# Patient Record
Sex: MALE | Race: WHITE | NOT HISPANIC OR LATINO | Employment: UNEMPLOYED | URBAN - METROPOLITAN AREA
[De-identification: names, ages, dates, MRNs, and addresses within clinical notes are randomized per-mention and may not be internally consistent; named-entity substitution may affect disease eponyms.]

---

## 2019-02-04 ENCOUNTER — HOSPITAL ENCOUNTER (EMERGENCY)
Facility: CLINIC | Age: 29
Discharge: HOME | End: 2019-02-04
Attending: EMERGENCY MEDICINE | Admitting: EMERGENCY MEDICINE
Payer: COMMERCIAL

## 2019-02-04 ENCOUNTER — OFFICE VISIT (OUTPATIENT)
Dept: INTERNAL MEDICINE | Facility: CLINIC | Age: 29
End: 2019-02-04
Payer: COMMERCIAL

## 2019-02-04 VITALS
RESPIRATION RATE: 18 BRPM | WEIGHT: 170 LBS | SYSTOLIC BLOOD PRESSURE: 142 MMHG | HEIGHT: 70 IN | OXYGEN SATURATION: 95 % | DIASTOLIC BLOOD PRESSURE: 87 MMHG | HEART RATE: 87 BPM | BODY MASS INDEX: 24.34 KG/M2 | TEMPERATURE: 98 F

## 2019-02-04 VITALS
WEIGHT: 171.4 LBS | BODY MASS INDEX: 24.59 KG/M2 | TEMPERATURE: 97.6 F | HEART RATE: 83 BPM | DIASTOLIC BLOOD PRESSURE: 80 MMHG | SYSTOLIC BLOOD PRESSURE: 140 MMHG | OXYGEN SATURATION: 96 % | RESPIRATION RATE: 16 BRPM

## 2019-02-04 DIAGNOSIS — F41.1 GENERALIZED ANXIETY DISORDER: Primary | ICD-10-CM

## 2019-02-04 DIAGNOSIS — F10.920 ALCOHOLIC INTOXICATION WITHOUT COMPLICATION (H): ICD-10-CM

## 2019-02-04 DIAGNOSIS — F41.1 ANXIETY REACTION: ICD-10-CM

## 2019-02-04 DIAGNOSIS — F10.10 ALCOHOL ABUSE, EPISODIC DRINKING BEHAVIOR: ICD-10-CM

## 2019-02-04 DIAGNOSIS — F10.10 ALCOHOL ABUSE: ICD-10-CM

## 2019-02-04 LAB
ANION GAP SERPL CALCULATED.3IONS-SCNC: 24 MMOL/L (ref 3–14)
BUN SERPL-MCNC: 9 MG/DL (ref 7–30)
CALCIUM SERPL-MCNC: 9.7 MG/DL (ref 8.5–10.1)
CHLORIDE SERPL-SCNC: 88 MMOL/L (ref 94–109)
CO2 SERPL-SCNC: 20 MMOL/L (ref 20–32)
CREAT SERPL-MCNC: 0.76 MG/DL (ref 0.66–1.25)
ERYTHROCYTE [DISTWIDTH] IN BLOOD BY AUTOMATED COUNT: 12.8 % (ref 10–15)
ETHANOL SERPL-MCNC: 0.09 G/DL
GFR SERPL CREATININE-BSD FRML MDRD: >90 ML/MIN/{1.73_M2}
GLUCOSE SERPL-MCNC: 93 MG/DL (ref 70–99)
HCT VFR BLD AUTO: 43.5 % (ref 40–53)
HGB BLD-MCNC: 15.7 G/DL (ref 13.3–17.7)
INTERPRETATION ECG - MUSE: NORMAL
MCH RBC QN AUTO: 31 PG (ref 26.5–33)
MCHC RBC AUTO-ENTMCNC: 36.1 G/DL (ref 31.5–36.5)
MCV RBC AUTO: 86 FL (ref 78–100)
PLATELET # BLD AUTO: 406 10E9/L (ref 150–450)
POTASSIUM SERPL-SCNC: 3.4 MMOL/L (ref 3.4–5.3)
RBC # BLD AUTO: 5.06 10E12/L (ref 4.4–5.9)
SODIUM SERPL-SCNC: 132 MMOL/L (ref 133–144)
WBC # BLD AUTO: 16.9 10E9/L (ref 4–11)

## 2019-02-04 PROCEDURE — 25000128 H RX IP 250 OP 636: Performed by: EMERGENCY MEDICINE

## 2019-02-04 PROCEDURE — 96374 THER/PROPH/DIAG INJ IV PUSH: CPT

## 2019-02-04 PROCEDURE — 93005 ELECTROCARDIOGRAM TRACING: CPT

## 2019-02-04 PROCEDURE — 99285 EMERGENCY DEPT VISIT HI MDM: CPT | Mod: 25

## 2019-02-04 PROCEDURE — 80048 BASIC METABOLIC PNL TOTAL CA: CPT | Performed by: EMERGENCY MEDICINE

## 2019-02-04 PROCEDURE — 99203 OFFICE O/P NEW LOW 30 MIN: CPT | Performed by: INTERNAL MEDICINE

## 2019-02-04 PROCEDURE — 80320 DRUG SCREEN QUANTALCOHOLS: CPT | Performed by: EMERGENCY MEDICINE

## 2019-02-04 PROCEDURE — 85027 COMPLETE CBC AUTOMATED: CPT | Performed by: EMERGENCY MEDICINE

## 2019-02-04 PROCEDURE — 25000128 H RX IP 250 OP 636

## 2019-02-04 PROCEDURE — 96361 HYDRATE IV INFUSION ADD-ON: CPT

## 2019-02-04 RX ORDER — TRAZODONE HYDROCHLORIDE 50 MG/1
50 TABLET, FILM COATED ORAL AT BEDTIME
Qty: 30 TABLET | Refills: 1 | Status: SHIPPED | OUTPATIENT
Start: 2019-02-04 | End: 2019-02-16

## 2019-02-04 RX ORDER — ONDANSETRON 4 MG/1
4 TABLET, ORALLY DISINTEGRATING ORAL EVERY 6 HOURS PRN
Qty: 10 TABLET | Refills: 0 | Status: SHIPPED | OUTPATIENT
Start: 2019-02-04 | End: 2019-02-14

## 2019-02-04 RX ORDER — ONDANSETRON 2 MG/ML
INJECTION INTRAMUSCULAR; INTRAVENOUS
Status: COMPLETED
Start: 2019-02-04 | End: 2019-02-04

## 2019-02-04 RX ORDER — LORAZEPAM 2 MG/ML
2 INJECTION INTRAMUSCULAR ONCE
Status: DISCONTINUED | OUTPATIENT
Start: 2019-02-04 | End: 2019-02-04

## 2019-02-04 RX ORDER — LORAZEPAM 2 MG/ML
1 INJECTION INTRAMUSCULAR ONCE
Status: COMPLETED | OUTPATIENT
Start: 2019-02-04 | End: 2019-02-04

## 2019-02-04 RX ORDER — SODIUM CHLORIDE 9 MG/ML
1000 INJECTION, SOLUTION INTRAVENOUS CONTINUOUS
Status: DISCONTINUED | OUTPATIENT
Start: 2019-02-04 | End: 2019-02-04 | Stop reason: HOSPADM

## 2019-02-04 RX ADMIN — SODIUM CHLORIDE 1000 ML: 9 INJECTION, SOLUTION INTRAVENOUS at 05:27

## 2019-02-04 RX ADMIN — LORAZEPAM 1 MG: 2 INJECTION, SOLUTION INTRAMUSCULAR; INTRAVENOUS at 05:27

## 2019-02-04 RX ADMIN — ONDANSETRON 4 MG: 2 INJECTION INTRAMUSCULAR; INTRAVENOUS at 06:24

## 2019-02-04 RX ADMIN — SODIUM CHLORIDE 1000 ML: 9 INJECTION, SOLUTION INTRAVENOUS at 06:24

## 2019-02-04 ASSESSMENT — ENCOUNTER SYMPTOMS
NERVOUS/ANXIOUS: 1
SHORTNESS OF BREATH: 0
ABDOMINAL PAIN: 0

## 2019-02-04 ASSESSMENT — MIFFLIN-ST. JEOR: SCORE: 1752.36

## 2019-02-04 NOTE — PROGRESS NOTES
"  SUBJECTIVE:   Mohit Porter is a 28 year old male who presents to clinic today for the following health issues:      ED/UC Followup:    Facility:  [facility]  Date of visit: 02/04/2019  Reason for visit: Panic Attack  Current Status: Still feeling anxious             Problem list and histories reviewed & adjusted, as indicated.  Additional history: as documented    Patient was just seen in emergency department earlier this morning, after presenting with acute alcohol intoxication, and anxiety.    Patient has previous history of alcohol abuse and episodic drinking behavior.  Multiple ER visits in the last few years for alcohol occasion, he has had at least 1 DWI, and has been through inpatient treatment at least once.  At present, he does not feel particularly motivated to maintain sobriety, and states that he drinks because of anxiety.    Has significant social stressors.  He has a young daughter who he sees very rarely.  When plans to see her did not materialize this past weekend, that sent him on another \"faria.\"    Past history of anxiety, has tried multiple medications, most of which he cannot remember the names of.  He does remember that he has been on trazodone in the past, which has been helpful for nighttime anxiety when alone, which has been a frequent past trigger.    The ER this morning intoxicated, after drinking most of the weekend due to anxiety.  BAC was 0.09 in ER this morning.    Reviewed and updated as needed this visit by clinical staff  Tobacco  Allergies  Meds  Problems  Med Hx  Surg Hx  Fam Hx       Reviewed and updated as needed this visit by Provider  Tobacco  Allergies  Meds  Problems  Med Hx  Surg Hx  Fam Hx         ROS:  Constitutional, HEENT, cardiovascular, pulmonary, GI, , musculoskeletal, neuro, skin, endocrine and psych systems are negative, except as otherwise noted.    OBJECTIVE:     /80   Pulse 83   Temp 97.6  F (36.4  C) (Oral)   Resp 16   " Wt 77.7 kg (171 lb 6.4 oz)   SpO2 96%   BMI 24.59 kg/m    Body mass index is 24.59 kg/m .  GENERAL: healthy, alert and no distress  NECK: no adenopathy, no asymmetry, masses, or scars and thyroid normal to palpation  RESP: lungs clear to auscultation - no rales, rhonchi or wheezes  CV: regular rate and rhythm, normal S1 S2, no S3 or S4, no murmur, click or rub, no peripheral edema and peripheral pulses strong  ABDOMEN: soft, nontender, no hepatosplenomegaly, no masses and bowel sounds normal  MS: no gross musculoskeletal defects noted, no edema        ASSESSMENT/PLAN:     1. Generalized anxiety disorder  Start Zoloft once daily, start trazodone nightly, needs to initiate some sort of talk therapy.  Follow-up with me in 1 month.  - MENTAL HEALTH REFERRAL  - Adult; Outpatient Treatment; Individual/Couples/Family/Group Therapy/Health Psychology[place] [facility] [place] [facility] [phone]; We will contact you to schedule the appointment or please call with any questions  - sertraline (ZOLOFT) 50 MG tablet; Take 1 tablet (50 mg) by mouth daily  Dispense: 30 tablet; Refill: 1  - traZODone (DESYREL) 50 MG tablet; Take 1 tablet (50 mg) by mouth At Bedtime  Dispense: 30 tablet; Refill: 1    2. Alcohol abuse, episodic drinking behavior  Reiterated the importance of maintaining sobriety.  Recommended attendance at AA meetings, counseling therapy as above etc.          Gerardo Rock MD  [facility]

## 2019-02-04 NOTE — ED PROVIDER NOTES
"  History     Chief Complaint:  Panic Attack    HPI   Mohit Porter is a 28 year old male with history of anxiety, who presents for evaluation of a panic attack. He states he has been \"binge drinking\" alcohol for the pat 4 days, and is drinking about a pint of vodka a day. He does not typically drink alcohol daily. His last drink was 24 hours ago. Since yesterday, he has been experiencing \"panic attacks\" and describes this as feeling anxious, nauseous, hyperventilating, and his hands intermittently have a tingling sensation. HR was 168 on arrival, but after examination came down to 116 without intervention. He states he has had panic attacks in the past, and today's symptoms feel similar. Denies chest pain, shortness of breath, or abdominal pain. Denies having seizures or fevers with withdrawal from alcohol; reports last drink was 1 day ago.  Reports poor oral intake. Marek drug use, specifically meth or cocaine. No surgical history. He is not on any medications for anxiety.     Allergies:  No Known Drug Allergies      Medications:    The patient is not currently taking any prescribed medications.     Past Medical History:    Anxiety    Past Surgical History:    The patient does not have any pertinent past surgical history.     Family History:    No past pertinent family history.     Social History:  Relationship status: Single  Tobacco use: Chews tobacco  Alcohol use: Yes, not typically daily. Past 4 days a pint a day.   The patient presents with significant other.       Review of Systems   Respiratory: Negative for shortness of breath.    Cardiovascular: Negative for chest pain.   Gastrointestinal: Negative for abdominal pain.   Neurological:        Tingling in fingertips   Psychiatric/Behavioral: The patient is nervous/anxious.    All other systems reviewed and are negative.    Physical Exam     Patient Vitals for the past 24 hrs:   BP Temp Temp src Pulse Resp SpO2 Height Weight   02/04/19 0453 (!) 137/106 " "98  F (36.7  C) Temporal 168 24 95 % 1.778 m (5' 10\") 77.1 kg (170 lb)        Physical Exam  General: Alert and cooperative with exam. Patient in mild to moderate distress. Normal mentation. Anxious in appearance.   Head:  Scalp is NC/AT  Eyes:  No scleral icterus, PERRL  ENT:  The external nose and ears are normal. The oropharynx is normal and without erythema; mucus membranes are moist. Uvula midline, no evidence of deep space infection.  Neck:  Normal range of motion without rigidity.  CV:  Tachycardic rate and regular rhythm    No pathologic murmur   Resp:  Breath sounds are clear bilaterally    Non-labored, no retractions or accessory muscle use  GI:  Abdomen is soft, no distension, no tenderness. No peritoneal signs  MS:  No lower extremity edema   Skin:  Warm and dry, No rash or lesions noted.  Neuro: Oriented x 3. No gross motor deficits.    Emergency Department Course     ECG:  Indication: Panic attacks  Completed at 0515.  Read at 0528.   Rate 119 bpm. HI interval 134. QRS duration 80. QT/QTc 326/458. P-R-T axes 60  75  51.  Sinus tachycardia. Cannot rule out anterior infarct, age undetermined. Abnormal ECG.     Laboratory:  Laboratory findings were communicated with the patient who voiced understanding of the findings.    CBC: WBC: 16.9(H) o/w WNL (HGB 15.7, )  BMP: NA: 132(L), Chloride: 88(L), Anion gap: 24(H) o/w WNL (Creatinine 0.76)   Alcohol level blood (collected 0520): 0.09(H)    Interventions:  0527 Normal Saline 1000 mL IV   0527 Lorazepam, 1 mg, IV injection   0624 Normal Saline 1000 mL IV   0624 Zofran 4 mg IV    Emergency Department Course:  Nursing notes and vitals reviewed.  EKG obtained in the ED, see results above.    IV was inserted and blood was drawn for laboratory testing, results above.     0459 I performed an exam of the patient as documented above.   0615 I reassessed the patient.     Findings and plan explained to the patient. Patient discharged home with instructions " regarding supportive care, medications, and reasons to return. The importance of close follow-up was reviewed.      I personally reviewed the laboratory results with the Patient and answered all related questions prior to discharge.    Impression & Plan      Medical Decision Making:  Patient is a 28-year-old male who presents with concern for panic attack.  Patient's medical history and records were reviewed.  Noted to be tachycardic and anxious on arrival.  As there was some concern for alcohol withdrawal labs and EKG were obtained.  EKG demonstrated sinus tachycardia.  He was provided IV fluids and 1 mg Ativan.  Patient became somnolent after the Ativan and required supplemental oxygen for mild hypoxia likely secondary to Ativan.  Labs returned demonstrating mildly elevated white count, significantly elevated anion gap, and elevated ethanol level (0.09).  Anion gap likely secondary to alcoholic/starvation ketosis.  Patient notes that he has been drinking Etoh heavily over the last 4 days and also states that he has not had much to eat.  No evidence of diabetes, infectious process, or other toxic alcohol ingestion or other emergent etiology.  On reevaluation patient notes significantly clinical improvement and is requesting discharge; tachycardia resolved; awake and alert.  He was able to tolerate oral challenge.  Offered but deferred resources for alcohol abuse.  At this time there is no evidence of significant alcohol withdrawal and patient denies history of withdrawal symptoms.  Recommended close follow-up with PCP.  Return precautions were discussed.  Patient discharged home.  Was provided prescription for Zofran for breakthrough nausea.    Diagnosis:    ICD-10-CM    1. Anxiety reaction F41.1    2. Alcohol abuse F10.10    3. Alcoholic intoxication without complication (H) F10.920        Disposition:   Discharged to home       Discharge Medications:  Current Discharge Medication List      START taking these  medications    Details   ondansetron (ZOFRAN ODT) 4 MG ODT tab Take 1 tablet (4 mg) by mouth every 6 hours as needed for nausea  Qty: 10 tablet, Refills: 0             Scribe Disclosure:  I, Angelita Cm, am serving as a scribe at 5:06 AM on 2/4/2019 to document services personally performed by Daniel Miller DO, based on my observations and the provider's statements to me.     Angelita Cm  2/4/2019    EMERGENCY DEPARTMENT       Daniel Miller DO  02/04/19 0856

## 2019-02-04 NOTE — ED AVS SNAPSHOT
Emergency Department  [address] [place] [address]  Phone:  [phone]  Fax:  [phone]                                    Mohit Porter   MRN: [id number]    Department:   Emergency Department   Date of Visit:  2/4/2019           After Visit Summary Signature Page    I have received my discharge instructions, and my questions have been answered. I have discussed any challenges I see with this plan with the nurse or doctor.    ..........................................................................................................................................  Patient/Patient Representative Signature      ..........................................................................................................................................  Patient Representative Print Name and Relationship to Patient    ..................................................               ................................................  Date                                   Time    ..........................................................................................................................................  Reviewed by Signature/Title    ...................................................              ..............................................  Date                                               Time          22EPIC Rev 08/18

## 2019-02-04 NOTE — LETTER
February 4, 2019      To Whom It May Concern:      Mohit Crouch [place] was seen in our Emergency Department today, 02/04/19.  I expect his condition to improve over the next 2 days.  He may return to work/school when improved.    Sincerely,        Jammie Chong RN

## 2019-02-04 NOTE — LETTER
2019    To whom it may concern:    I am writing this letter on behalf of my patient, Mohit Porter (: 1990).  This is to verify that he was seen in my clinic today.      Please contact my office if you have questions or concerns.    Sincerely,        BRETT Rock  Department of Internal Medicine  [facility]

## 2019-02-04 NOTE — PATIENT INSTRUCTIONS
- Start taking Zoloft, once daily.  (Prescription has been sent to your pharmacy)  - Start taking Trazodone nightly.  (Prescription has been sent to your pharmacy)    - [facility] will contact you to arrange your talk therapy.    - Please follow-up with me in clinic in 1 month.

## 2019-02-04 NOTE — LETTER
February 4, 2019      To Whom It May Concern:      Mohit Miko Charlotte was seen in our Emergency Department today, 02/04/19.  I expect his condition to improve over the next 2 days.  He may return to work/school when improved.    Since

## 2019-02-09 ENCOUNTER — HOSPITAL ENCOUNTER (EMERGENCY)
Facility: CLINIC | Age: 29
Discharge: HOME | End: 2019-02-09
Attending: EMERGENCY MEDICINE | Admitting: EMERGENCY MEDICINE
Payer: COMMERCIAL

## 2019-02-09 VITALS
DIASTOLIC BLOOD PRESSURE: 62 MMHG | OXYGEN SATURATION: 100 % | RESPIRATION RATE: 16 BRPM | SYSTOLIC BLOOD PRESSURE: 128 MMHG | TEMPERATURE: 97.4 F | HEART RATE: 78 BPM

## 2019-02-09 DIAGNOSIS — F10.920 ALCOHOLIC INTOXICATION WITHOUT COMPLICATION (H): ICD-10-CM

## 2019-02-09 PROCEDURE — 99282 EMERGENCY DEPT VISIT SF MDM: CPT

## 2019-02-09 NOTE — DISCHARGE INSTRUCTIONS
*You should drink less alcohol.  *No new medications. Continue your current medications.  *Follow-up with your doctor for a recheck in 2-3 days.  *Return if you change your mind and wish to stop drinking, develop fever, withdrawal symptoms, vomiting, faint or feel like you will faint or become worse in any way.    Discharge Instructions  Alcohol Intoxication    You have been seen today with alcohol intoxication. This means that you have enough alcohol in your system to impair your ability to mentally and physically function. When you are intoxicated, we are not allowed to release you without a sober adult to be with you. You may not drive, operate dangerous equipment, or do anything else dangerous until you are sober.    You may have come to the Emergency Department because of your intoxication, or for another reason, such as because of an injury. No matter what the case is, this visit is a ?red flag? regarding alcohol use, and you should consider whether your drinking pattern is a problem for you.     You may be at risk for alcohol-related problems if:    Men: you drink more than 14 drinks per week, or more than 4 drinks per occasion.    Women: you drink more than 7 drinks per week or more than 3 drinks per occasion.    You have black-outs.  You do things you regret while drinking.  You have legal problems because of drinking (DUI).  You have job problems because of drinking (you call in sick to work because of drinking).    CAGE Questions  Have you ever felt you should cut down on your drinking?  Have people annoyed you by criticizing your drinking?  Have you ever felt bad or guilty about your drinking?  Have you ever had a drink first thing in the morning to steady your nerves or get rid of a hangover (eye opener)?    If you answer yes to any of the CAGE questions, you may have a problem with alcohol.      Return to the Emergency Department if:  You become shaky or tremble when you try to stop drinking.    You  have a seizure or pass out.    You throw up (vomit) blood. This may be bright red or it may look like black coffee grounds.    You have blood in the stool. This may be bright red or appear as a black, tarry, bad smelling stool.    You become lightheaded or faint.      For further help, contact:   Your caregiver.    Alcoholics Anonymous (AA).    A drug or alcohol rehabilitation program.    You can get information on alcohol resources and groups by calling the number 807 or 1-482.180.3683 on any phone.       Seek medical care if:  You have persistent vomiting.    You have persistent pain in any part of your body.    You do not feel better after a few days.    If you were given a prescription for medicine here today, be sure to read all of the information (including the package insert) that comes with your prescription.  This will include important information about the medicine, its side effects, and any warnings that you need to know about.  The pharmacist who fills the prescription can provide more information and answer questions you may have about the medicine.  If you have questions or concerns that the pharmacist cannot address, please call or return to the Emergency Department.   Remember that you can always come back to the Emergency Department if you are not able to see your regular doctor in the amount of time listed above, if you get any new symptoms, or if there is anything that worries you.

## 2019-02-09 NOTE — ED NOTES
Bed: [facility]  Expected date:   Expected time:   Means of arrival:   Comments:  512  28 M etoh/hold  0996

## 2019-02-09 NOTE — ED AVS SNAPSHOT
Emergency Department  [address] [place] [address]  Phone:  [phone]  Fax:  [phone]                                    Mohit Porter   MRN: [id number]    Department:   Emergency Department   Date of Visit:  2/9/2019           After Visit Summary Signature Page    I have received my discharge instructions, and my questions have been answered. I have discussed any challenges I see with this plan with the nurse or doctor.    ..........................................................................................................................................  Patient/Patient Representative Signature      ..........................................................................................................................................  Patient Representative Print Name and Relationship to Patient    ..................................................               ................................................  Date                                   Time    ..........................................................................................................................................  Reviewed by Signature/Title    ...................................................              ..............................................  Date                                               Time          22EPIC Rev 08/18
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
## 2019-02-12 ENCOUNTER — HOSPITAL ENCOUNTER (EMERGENCY)
Facility: CLINIC | Age: 29
Discharge: HOME | End: 2019-02-12
Payer: COMMERCIAL

## 2019-02-14 ENCOUNTER — HOSPITAL ENCOUNTER (EMERGENCY)
Facility: CLINIC | Age: 29
Discharge: HOME | End: 2019-02-15
Attending: EMERGENCY MEDICINE | Admitting: EMERGENCY MEDICINE
Payer: COMMERCIAL

## 2019-02-14 DIAGNOSIS — F10.920 ALCOHOL INTOXICATION, UNCOMPLICATED (H): ICD-10-CM

## 2019-02-14 DIAGNOSIS — F10.10 ALCOHOL ABUSE: ICD-10-CM

## 2019-02-14 PROCEDURE — 99283 EMERGENCY DEPT VISIT LOW MDM: CPT

## 2019-02-14 ASSESSMENT — ENCOUNTER SYMPTOMS
SEIZURES: 0
NERVOUS/ANXIOUS: 1

## 2019-02-14 ASSESSMENT — MIFFLIN-ST. JEOR: SCORE: 1790.92

## 2019-02-14 NOTE — ED AVS SNAPSHOT
Emergency Department  [address] [place] [id number]  Phone:  [phone]  Fax:  [phone]                                    Mohit Porter   MRN: [id number]    Department:   Emergency Department   Date of Visit:  2/14/2019           After Visit Summary Signature Page    I have received my discharge instructions, and my questions have been answered. I have discussed any challenges I see with this plan with the nurse or doctor.    ..........................................................................................................................................  Patient/Patient Representative Signature      ..........................................................................................................................................  Patient Representative Print Name and Relationship to Patient    ..................................................               ................................................  Date                                   Time    ..........................................................................................................................................  Reviewed by Signature/Title    ...................................................              ..............................................  Date                                               Time          22EPIC Rev 08/18

## 2019-02-15 VITALS
RESPIRATION RATE: 18 BRPM | DIASTOLIC BLOOD PRESSURE: 98 MMHG | BODY MASS INDEX: 24.5 KG/M2 | HEART RATE: 112 BPM | WEIGHT: 175 LBS | HEIGHT: 71 IN | OXYGEN SATURATION: 97 % | TEMPERATURE: 98.2 F | SYSTOLIC BLOOD PRESSURE: 153 MMHG

## 2019-02-15 NOTE — DISCHARGE INSTRUCTIONS
"  Alcohol Abuse  Alcoholic drinks are harmful when you have too many of them. There is no set number of drinks that defines too much. Drinking that disrupts your life or your health is called alcohol abuse. Alcohol abuse can hurt your relationships with others. You may lose friends, a spouse, or even your job. You may be abusing alcohol if any of the following are true for you:    Duties at home or with  suffer because of drinking.    Duties at work or in school suffer because of drinking.    You have missed work or school because of drinking.    You use alcohol while driving or operating machinery.    You have legal problems such as arrests due to drinking.    You keep drinking even though it causes serious problems in your life.  Health effects  Alcohol abuse causes health problems. Sometimes this can happen after only drinking a  little.\" There is no set number of drinks or amount of alcohol that defines too much. The more you drink at one time, and the more often you drink determine both the short-term and long-term health effects. It affects all parts of your body and your health, including your:    Brain. Alcohol is a central nervous system depressant. It can damage parts of the brain that affect your balance, memory, thinking, and emotions. It can cause memory loss, blackouts, depression, agitation, sleep cycle changes, and seizures. These changes may or may not be reversible.    Heart and vascular system. Alcohol affects multiple areas. It can damage heart muscle causing cardiomyopathy, which is a weakening and stretching of the heart muscle. This can lead to trouble breathing, an irregular heartbeat, atrial fibrillation, leg swelling, and heart failure. Alcohol use makes the blood vessels stiffen causing high blood pressure. All of these problems increase your risk of having heart attacks or strokes.    Liver. Alcohol causes fat to build up in the liver, affecting its normal function. This " increases the risk for hepatitis, leading to abdominal pain, appetite loss, jaundice, bleeding problems, liver fibrosis, and cirrhosis. This, in turn, can affect your ability to fight off infections, and can cause diabetes. The liver changes prevent it from removing toxins in your blood that can cause encephalopathy, which may show with confusion, altered level of consciousness, personality changes, memory loss, seizures, coma, and death.    Pancreas. Alcohol can cause inflammation of the pancreas, or pancreatitis. This can cause abdominal pain, fever, and diabetes.    Immune system. Alcohol weakens your immune system in a number of ways. It suppresses your immune system making it harder to fight infections and colds. It also increases the chance of getting pneumonia and tuberculosis.    Cancer. Alcohol is a risk factor for developing cancer of the mouth, esophagus, pharynx, larynx, liver, and breast.    Sexual function. Alcohol can lead to sexual problems.  Home care  The following guidelines will help you deal with alcohol abuse:    Admit you have a problem with alcohol.    Ask for help from your healthcare provider and trusted family members or close friends.    Get help from people trained in dealing with alcohol abuse. This may be individual counseling or group therapy, or it may be a supervised alcohol treatment program.    Join a self-help group for alcohol abuse such as Alcoholics Anonymous (AA).    Stay away from people who abuse alcohol or tempt you to drink.  Follow-up care  Follow up with your healthcare provider, or as advised. Contact these groups to get help:    Alcoholics Anonymous (AA): Go to www.aa.org or check the phone book for meetings near you.    National Alcohol and Substance Abuse Information Center (NASAIC): 267.868.5884, www.addictioncareoptions.com    National [place] on Alcoholism and Drug Dependence (NCADD): 987-DUT-MKQO (985-704-8440), www.ncadd.org  Call 911  Call 911 if any of these  occur:    Trouble breathing or slow irregular breathing    Chest pain    Sudden weakness on one side of your body or sudden trouble speaking    Heavy bleeding or vomiting blood    Very drowsy or trouble awakening    Fainting or loss of consciousness    Rapid heart rate    Seizure  When to seek medical care  Call your healthcare provider right away if any of these occur:     Confusion    Hallucinations (seeing, hearing, or feeling things that aren t there)    Pain in your upper abdomen that gets worse    Repeated vomiting or black or tarry stools    Severe shakiness  Date Last Reviewed: 6/1/2016 2000-2018 [employer]. [address] [place] [address]. All rights reserved. This information is not intended as a substitute for professional medical care. Always follow your healthcare professional's instructions.

## 2019-02-15 NOTE — ED NOTES
Patient unable to contact parents at this time. Patient resting on stretcher with eyes closed, respirations even and unlabored.

## 2019-02-15 NOTE — PHARMACY-ADMISSION MEDICATION HISTORY
Admission medication history interview status for the 2/14/2019  admission is complete. See EPIC admission navigator for prior to admission medications     Medication history source reliability:Good    Actions taken by pharmacist (provider contacted, etc):None     Additional medication history information not noted on PTA med list :None    Medication reconciliation/reorder completed by provider prior to medication history? No    Time spent in this activity: 15 min    Prior to Admission medications    Medication Sig Last Dose Taking? Auth Provider   traZODone (DESYREL) 50 MG tablet Take 1 tablet (50 mg) by mouth At Bedtime Past Week at Unknown time Yes Gerardo Rock MD   sertraline (ZOLOFT) 50 MG tablet Take 1 tablet (50 mg) by mouth daily  Patient taking differently: Take 50 mg by mouth daily Not taking but just prescribed by Dr. Rock on 2/4/19   Gerardo Rock MD

## 2019-02-15 NOTE — ED PROVIDER NOTES
"  History     Chief Complaint:    Alcohol Intoxification     The history is provided by the patient and the EMS personnel.      Mohit Porter is a 28 year old male with a history of alcohol abuse who presents with alcohol intoxification. Per EMS report, police were called for a wellness check. He was reportedly found to be in his bed naked, motionless, and unable to care for himself prompting transfer here on a hold. Patient states he is an alcoholic and has been drinking today. He would like to stop drinking, but does not have interest in detox today stating he \"just want[s] to go home.\" He denies history of alcohol withdrawal seizures.    Notably, he told nursing that he was depressed after losing his job. I specifically asked the patient if he was sad or depressed and he denied this. He does endorse feeling anxious. He denies suicidal ideation. He denies use of illicit drugs. He has no concerns at this time.     Allergies:  No known drug allergies.       Medications:    Zoloft  Desyrel    Past Medical History:    Alcohol Abuse  Anxiety    Past Surgical History:    Past surgical history reviewed. No pertinent past surgical history.     Family History:    Family history reviewed. No pertinent family history.     Social History:  The patient was accompanied to the ED by EMS.  Smoking Status: Never Smoker  Smokeless Tobacco: Current User   Types: chew  Alcohol Use: Positive  Drug Use: Negative  Marital Status:  Single   Lives with his girlfriend    Review of Systems   Neurological: Negative for seizures.   Psychiatric/Behavioral: Negative for dysphoric mood and suicidal ideas. The patient is nervous/anxious.    All other systems reviewed and are negative.    Physical Exam     Patient Vitals for the past 24 hrs:   BP Temp Temp src Heart Rate Resp SpO2 Height Weight   02/14/19 2227 (!) 134/94 -- -- 99 16 95 % -- --   02/14/19 2004 (!) 144/103 98.3  F (36.8  C) Oral 113 16 97 % 1.803 m (5' 11\") 79.4 kg (175 lb)    " "    Physical Exam  General: WD/WN; well appearing young  man; cooperative; appears intoxicated  Head:  Atraumatic  Eyes:  Conjunctivae, lids, and sclerae are normal  ENT:    Normal nose; MMM  Neck:  Supple; normal ROM  Resp:  No respiratory distress  GI:  Nondistended    MS:  Normal ROM  Skin:  Warm; non-diaphoretic; no pallor  Neuro: Awake; A&Ox3  Psych:  Flat mood and affect; slow, slurred speech; denies suicidal thought content; not responding to internal stimuli  Vitals reviewed.    Emergency Department Course     Emergency Department Course:     Nursing notes and vitals reviewed.    2021 I performed an exam of the patient as documented above.     2205 I rechecked the patient due to the nurses telling me that the patient thought he was having a panic attack. He was not outwardly anxious on exam and not hyperventilating. He still is clinically intoxicated. Will continue to monitor.    2300 Patient endorsed to my partner at the end of my shift.     I personally reviewed the exam results with the patient and answered all related questions prior to signout.    Impression & Plan      Medical Decision Making:  Mohit is a 28 year old man who is brought in by paramedics after his girlfriend wanted a well-being check.  He was found intoxicated in his apartment and it was felt he could not care for himself.  He arrives on a hold.  He reports he has been drinking today but denies coingestions.  He denies suicidal ideation and denies feeling sad or depressed but does report being anxious.  Clinically, patient appears intoxicated with slow, slurred speech.  He does not appear anxious and is not responding to internal stimuli.  I believe he requires continued health officer hold as he is quite intoxicated clinically.  Patient was monitored in the emergency department and did quite well.  At one point he reported he was having a \"panic attack\" as evidenced by the fact that his hands were \"seizing up\" though patient " continued to appear relaxed with normal-appearing hands and did not seem anxious.  He was not hyperventilating and was not tremulous indicating withdrawal and did not require intervention.  At the end of my shift, patient remains intoxicated and will need further metabolism of his alcohol for repeat evaluation.  He may require mental health assessment for his reported anxiety or the depressed mood he reported to nursing staff though I suspect when he garrett he will be able to be discharged.  At the end of my shift, the patient was endorsed to my partner, Dr. Goss, pending clinical sobriety and repeat evaluation for final disposition.    Diagnosis:    ICD-10-CM    1. Alcohol intoxication, uncomplicated (H) F10.920    2. Alcohol abuse F10.10         Disposition:   The patient is signed out to my colleague, Dr. Goss, pending sobriety.    Scribe Disclosure:  I, Orla Severson, am serving as a scribe at 8:11 PM on 2/14/2019 to document services personally performed by Heavenly Robb MD based on my observations and the provider's statements to me.      EMERGENCY DEPARTMENT       Heavenly Robb MD  02/18/19 8297

## 2019-02-15 NOTE — ED NOTES
Patient given discharge paperwork and patient provided with his belongings. Patient dressed appropriate for weather. Pt ambulated into ED lobby with steady, unassisted gait.

## 2019-02-16 ENCOUNTER — HOSPITAL ENCOUNTER (INPATIENT)
Facility: CLINIC | Age: 29
LOS: 2 days | Discharge: HOME | DRG: 896 | End: 2019-02-18
Attending: PSYCHIATRY & NEUROLOGY | Admitting: PSYCHIATRY & NEUROLOGY
Payer: COMMERCIAL

## 2019-02-16 ENCOUNTER — HOSPITAL ENCOUNTER (EMERGENCY)
Facility: CLINIC | Age: 29
Discharge: ANOTHER HOSPITAL | End: 2019-02-16
Attending: EMERGENCY MEDICINE | Admitting: EMERGENCY MEDICINE
Payer: COMMERCIAL

## 2019-02-16 VITALS
DIASTOLIC BLOOD PRESSURE: 95 MMHG | OXYGEN SATURATION: 97 % | SYSTOLIC BLOOD PRESSURE: 142 MMHG | TEMPERATURE: 98.8 F | BODY MASS INDEX: 24.34 KG/M2 | HEIGHT: 70 IN | HEART RATE: 102 BPM | RESPIRATION RATE: 18 BRPM | WEIGHT: 170 LBS

## 2019-02-16 DIAGNOSIS — F10.930 ALCOHOL WITHDRAWAL SYNDROME WITHOUT COMPLICATION (H): Primary | ICD-10-CM

## 2019-02-16 DIAGNOSIS — D72.829 LEUKOCYTOSIS, UNSPECIFIED TYPE: ICD-10-CM

## 2019-02-16 DIAGNOSIS — F10.220 ACUTE ALCOHOLIC INTOXICATION IN ALCOHOLISM WITHOUT COMPLICATION (H): ICD-10-CM

## 2019-02-16 DIAGNOSIS — R74.8 ELEVATED LIVER ENZYMES: ICD-10-CM

## 2019-02-16 PROBLEM — F10.939 ALCOHOL WITHDRAWAL (H): Status: ACTIVE | Noted: 2019-02-16

## 2019-02-16 LAB
ALBUMIN SERPL-MCNC: 4.1 G/DL (ref 3.4–5)
ALBUMIN UR-MCNC: 100 MG/DL
ALP SERPL-CCNC: 116 U/L (ref 40–150)
ALT SERPL W P-5'-P-CCNC: 197 U/L (ref 0–70)
AMMONIA PLAS-SCNC: 19 UMOL/L (ref 10–50)
AMPHETAMINES UR QL SCN: NEGATIVE
ANION GAP SERPL CALCULATED.3IONS-SCNC: 20 MMOL/L (ref 3–14)
APPEARANCE UR: ABNORMAL
AST SERPL W P-5'-P-CCNC: 131 U/L (ref 0–45)
BARBITURATES UR QL: NEGATIVE
BASOPHILS # BLD AUTO: 0 10E9/L (ref 0–0.2)
BASOPHILS NFR BLD AUTO: 0.1 %
BENZODIAZ UR QL: NEGATIVE
BILIRUB SERPL-MCNC: 1.1 MG/DL (ref 0.2–1.3)
BILIRUB UR QL STRIP: NEGATIVE
BUN SERPL-MCNC: 9 MG/DL (ref 7–30)
CALCIUM SERPL-MCNC: 8.6 MG/DL (ref 8.5–10.1)
CANNABINOIDS UR QL SCN: NEGATIVE
CHLORIDE SERPL-SCNC: 89 MMOL/L (ref 94–109)
CO2 SERPL-SCNC: 25 MMOL/L (ref 20–32)
COCAINE UR QL: NEGATIVE
COLOR UR AUTO: YELLOW
CREAT SERPL-MCNC: 0.75 MG/DL (ref 0.66–1.25)
DIFFERENTIAL METHOD BLD: ABNORMAL
EOSINOPHIL # BLD AUTO: 0 10E9/L (ref 0–0.7)
EOSINOPHIL NFR BLD AUTO: 0 %
ERYTHROCYTE [DISTWIDTH] IN BLOOD BY AUTOMATED COUNT: 13.3 % (ref 10–15)
ERYTHROCYTE [DISTWIDTH] IN BLOOD BY AUTOMATED COUNT: 13.5 % (ref 10–15)
GFR SERPL CREATININE-BSD FRML MDRD: >90 ML/MIN/{1.73_M2}
GLUCOSE SERPL-MCNC: 157 MG/DL (ref 70–99)
GLUCOSE UR STRIP-MCNC: NEGATIVE MG/DL
HCT VFR BLD AUTO: 37 % (ref 40–53)
HCT VFR BLD AUTO: 47 % (ref 40–53)
HCT VFR BLD CALC: 39 %PCV (ref 40–53)
HGB BLD CALC-MCNC: 13.3 G/DL (ref 13.3–17.7)
HGB BLD-MCNC: 12.9 G/DL (ref 13.3–17.7)
HGB BLD-MCNC: 16.5 G/DL (ref 13.3–17.7)
HGB UR QL STRIP: ABNORMAL
HYALINE CASTS #/AREA URNS LPF: 70 /LPF (ref 0–2)
IMM GRANULOCYTES # BLD: 0.1 10E9/L (ref 0–0.4)
IMM GRANULOCYTES NFR BLD: 0.3 %
KETONES UR STRIP-MCNC: 40 MG/DL
LEUKOCYTE ESTERASE UR QL STRIP: NEGATIVE
LYMPHOCYTES # BLD AUTO: 0.6 10E9/L (ref 0.8–5.3)
LYMPHOCYTES NFR BLD AUTO: 2.2 %
MCH RBC QN AUTO: 29.8 PG (ref 26.5–33)
MCH RBC QN AUTO: 30.1 PG (ref 26.5–33)
MCHC RBC AUTO-ENTMCNC: 34.9 G/DL (ref 31.5–36.5)
MCHC RBC AUTO-ENTMCNC: 35.1 G/DL (ref 31.5–36.5)
MCV RBC AUTO: 85 FL (ref 78–100)
MCV RBC AUTO: 86 FL (ref 78–100)
MONOCYTES # BLD AUTO: 1 10E9/L (ref 0–1.3)
MONOCYTES NFR BLD AUTO: 3.6 %
MUCOUS THREADS #/AREA URNS LPF: PRESENT /LPF
NEUTROPHILS # BLD AUTO: 26 10E9/L (ref 1.6–8.3)
NEUTROPHILS NFR BLD AUTO: 93.8 %
NITRATE UR QL: NEGATIVE
OPIATES UR QL SCN: NEGATIVE
PCP UR QL SCN: NEGATIVE
PH UR STRIP: 6 PH (ref 5–7)
PLATELET # BLD AUTO: 185 10E9/L (ref 150–450)
PLATELET # BLD AUTO: 271 10E9/L (ref 150–450)
POTASSIUM BLD-SCNC: 3.2 MMOL/L (ref 3.4–5.3)
POTASSIUM SERPL-SCNC: 3.6 MMOL/L (ref 3.4–5.3)
PROT SERPL-MCNC: 7.8 G/DL (ref 6.8–8.8)
RBC # BLD AUTO: 4.29 10E12/L (ref 4.4–5.9)
RBC # BLD AUTO: 5.53 10E12/L (ref 4.4–5.9)
RBC #/AREA URNS AUTO: 0 /HPF (ref 0–2)
SODIUM BLD-SCNC: 137 MMOL/L (ref 133–144)
SODIUM SERPL-SCNC: 134 MMOL/L (ref 133–144)
SOURCE: ABNORMAL
SP GR UR STRIP: 1.02 (ref 1–1.03)
SQUAMOUS #/AREA URNS AUTO: 2 /HPF (ref 0–1)
UROBILINOGEN UR STRIP-MCNC: NORMAL MG/DL (ref 0–2)
WBC # BLD AUTO: 21.6 10E9/L (ref 4–11)
WBC # BLD AUTO: 27.7 10E9/L (ref 4–11)
WBC #/AREA URNS AUTO: 12 /HPF (ref 0–5)

## 2019-02-16 PROCEDURE — 40000498 ZZHCL STATISTIC POTASSIUM ED POCT

## 2019-02-16 PROCEDURE — 82075 ASSAY OF BREATH ETHANOL: CPT

## 2019-02-16 PROCEDURE — 96365 THER/PROPH/DIAG IV INF INIT: CPT

## 2019-02-16 PROCEDURE — 12800008 ZZH R&B CD ADULT

## 2019-02-16 PROCEDURE — 25000132 ZZH RX MED GY IP 250 OP 250 PS 637: Performed by: PSYCHIATRY & NEUROLOGY

## 2019-02-16 PROCEDURE — 25800030 ZZH RX IP 258 OP 636: Performed by: EMERGENCY MEDICINE

## 2019-02-16 PROCEDURE — H2032 ACTIVITY THERAPY, PER 15 MIN: HCPCS

## 2019-02-16 PROCEDURE — 25000128 H RX IP 250 OP 636: Performed by: EMERGENCY MEDICINE

## 2019-02-16 PROCEDURE — HZ2ZZZZ DETOXIFICATION SERVICES FOR SUBSTANCE ABUSE TREATMENT: ICD-10-PCS | Performed by: PSYCHIATRY & NEUROLOGY

## 2019-02-16 PROCEDURE — 85027 COMPLETE CBC AUTOMATED: CPT | Performed by: EMERGENCY MEDICINE

## 2019-02-16 PROCEDURE — 81001 URINALYSIS AUTO W/SCOPE: CPT | Performed by: EMERGENCY MEDICINE

## 2019-02-16 PROCEDURE — 40000501 ZZHCL STATISTIC HEMATOCRIT ED POCT

## 2019-02-16 PROCEDURE — 96375 TX/PRO/DX INJ NEW DRUG ADDON: CPT

## 2019-02-16 PROCEDURE — 85025 COMPLETE CBC W/AUTO DIFF WBC: CPT | Performed by: EMERGENCY MEDICINE

## 2019-02-16 PROCEDURE — 25000132 ZZH RX MED GY IP 250 OP 250 PS 637: Performed by: EMERGENCY MEDICINE

## 2019-02-16 PROCEDURE — 40000497 ZZHCL STATISTIC SODIUM ED POCT

## 2019-02-16 PROCEDURE — 82140 ASSAY OF AMMONIA: CPT | Performed by: EMERGENCY MEDICINE

## 2019-02-16 PROCEDURE — 80307 DRUG TEST PRSMV CHEM ANLYZR: CPT | Performed by: EMERGENCY MEDICINE

## 2019-02-16 PROCEDURE — 80053 COMPREHEN METABOLIC PANEL: CPT | Performed by: EMERGENCY MEDICINE

## 2019-02-16 PROCEDURE — 25000125 ZZHC RX 250: Performed by: EMERGENCY MEDICINE

## 2019-02-16 PROCEDURE — 99285 EMERGENCY DEPT VISIT HI MDM: CPT | Mod: 25

## 2019-02-16 PROCEDURE — 96361 HYDRATE IV INFUSION ADD-ON: CPT

## 2019-02-16 RX ORDER — ACETAMINOPHEN 325 MG/1
650 TABLET ORAL EVERY 4 HOURS PRN
Status: DISCONTINUED | OUTPATIENT
Start: 2019-02-16 | End: 2019-02-17

## 2019-02-16 RX ORDER — DIAZEPAM 5 MG
5 TABLET ORAL ONCE
Status: COMPLETED | OUTPATIENT
Start: 2019-02-16 | End: 2019-02-16

## 2019-02-16 RX ORDER — HYDROXYZINE HYDROCHLORIDE 25 MG/1
25 TABLET, FILM COATED ORAL EVERY 4 HOURS PRN
Status: DISCONTINUED | OUTPATIENT
Start: 2019-02-16 | End: 2019-02-18 | Stop reason: HOSPADM

## 2019-02-16 RX ORDER — DIAZEPAM 5 MG
5-20 TABLET ORAL EVERY 30 MIN PRN
Status: DISCONTINUED | OUTPATIENT
Start: 2019-02-16 | End: 2019-02-18 | Stop reason: HOSPADM

## 2019-02-16 RX ORDER — MULTIPLE VITAMINS W/ MINERALS TAB 9MG-400MCG
1 TAB ORAL DAILY
Status: DISCONTINUED | OUTPATIENT
Start: 2019-02-16 | End: 2019-02-18 | Stop reason: HOSPADM

## 2019-02-16 RX ORDER — TRAZODONE HYDROCHLORIDE 50 MG/1
50-100 TABLET, FILM COATED ORAL
Status: DISCONTINUED | OUTPATIENT
Start: 2019-02-16 | End: 2019-02-18 | Stop reason: HOSPADM

## 2019-02-16 RX ORDER — LANOLIN ALCOHOL/MO/W.PET/CERES
3 CREAM (GRAM) TOPICAL
Status: DISCONTINUED | OUTPATIENT
Start: 2019-02-16 | End: 2019-02-16 | Stop reason: HOSPADM

## 2019-02-16 RX ORDER — TRAZODONE HYDROCHLORIDE 50 MG/1
50 TABLET, FILM COATED ORAL
Status: ON HOLD | COMMUNITY
End: 2019-02-18

## 2019-02-16 RX ORDER — LORAZEPAM 1 MG/1
1 TABLET ORAL ONCE
Status: COMPLETED | OUTPATIENT
Start: 2019-02-16 | End: 2019-02-16

## 2019-02-16 RX ORDER — LANOLIN ALCOHOL/MO/W.PET/CERES
100 CREAM (GRAM) TOPICAL DAILY
Status: DISCONTINUED | OUTPATIENT
Start: 2019-02-16 | End: 2019-02-18 | Stop reason: HOSPADM

## 2019-02-16 RX ORDER — LORAZEPAM 2 MG/ML
1 INJECTION INTRAMUSCULAR ONCE
Status: COMPLETED | OUTPATIENT
Start: 2019-02-16 | End: 2019-02-16

## 2019-02-16 RX ORDER — ALUMINA, MAGNESIA, AND SIMETHICONE 2400; 2400; 240 MG/30ML; MG/30ML; MG/30ML
30 SUSPENSION ORAL EVERY 4 HOURS PRN
Status: DISCONTINUED | OUTPATIENT
Start: 2019-02-16 | End: 2019-02-18 | Stop reason: HOSPADM

## 2019-02-16 RX ORDER — SODIUM CHLORIDE, SODIUM LACTATE, POTASSIUM CHLORIDE, CALCIUM CHLORIDE 600; 310; 30; 20 MG/100ML; MG/100ML; MG/100ML; MG/100ML
1000 INJECTION, SOLUTION INTRAVENOUS CONTINUOUS
Status: DISCONTINUED | OUTPATIENT
Start: 2019-02-16 | End: 2019-02-16 | Stop reason: HOSPADM

## 2019-02-16 RX ORDER — LORAZEPAM 2 MG/ML
1 INJECTION INTRAMUSCULAR
Status: DISCONTINUED | OUTPATIENT
Start: 2019-02-16 | End: 2019-02-16

## 2019-02-16 RX ORDER — BISACODYL 10 MG
10 SUPPOSITORY, RECTAL RECTAL DAILY PRN
Status: DISCONTINUED | OUTPATIENT
Start: 2019-02-16 | End: 2019-02-18 | Stop reason: HOSPADM

## 2019-02-16 RX ORDER — FOLIC ACID 1 MG/1
1 TABLET ORAL DAILY
Status: DISCONTINUED | OUTPATIENT
Start: 2019-02-16 | End: 2019-02-18 | Stop reason: HOSPADM

## 2019-02-16 RX ORDER — ATENOLOL 50 MG/1
50 TABLET ORAL DAILY PRN
Status: DISCONTINUED | OUTPATIENT
Start: 2019-02-16 | End: 2019-02-18 | Stop reason: HOSPADM

## 2019-02-16 RX ADMIN — LORAZEPAM 1 MG: 2 INJECTION INTRAMUSCULAR; INTRAVENOUS at 04:02

## 2019-02-16 RX ADMIN — NICOTINE POLACRILEX 4 MG: 2 GUM, CHEWING BUCCAL at 16:46

## 2019-02-16 RX ADMIN — DIAZEPAM 10 MG: 5 TABLET ORAL at 16:08

## 2019-02-16 RX ADMIN — SODIUM CHLORIDE, POTASSIUM CHLORIDE, SODIUM LACTATE AND CALCIUM CHLORIDE 1000 ML: 600; 310; 30; 20 INJECTION, SOLUTION INTRAVENOUS at 04:03

## 2019-02-16 RX ADMIN — SODIUM CHLORIDE, POTASSIUM CHLORIDE, SODIUM LACTATE AND CALCIUM CHLORIDE 1000 ML: 600; 310; 30; 20 INJECTION, SOLUTION INTRAVENOUS at 04:02

## 2019-02-16 RX ADMIN — LORAZEPAM 1 MG: 2 INJECTION INTRAMUSCULAR; INTRAVENOUS at 12:03

## 2019-02-16 RX ADMIN — ALUMINUM HYDROXIDE, MAGNESIUM HYDROXIDE, AND DIMETHICONE 30 ML: 400; 400; 40 SUSPENSION ORAL at 19:10

## 2019-02-16 RX ADMIN — DIAZEPAM 10 MG: 5 TABLET ORAL at 19:53

## 2019-02-16 RX ADMIN — DIAZEPAM 5 MG: 5 TABLET ORAL at 01:16

## 2019-02-16 RX ADMIN — LORAZEPAM 1 MG: 1 TABLET ORAL at 08:30

## 2019-02-16 RX ADMIN — DIAZEPAM 10 MG: 5 TABLET ORAL at 14:04

## 2019-02-16 RX ADMIN — TRAZODONE HYDROCHLORIDE 100 MG: 50 TABLET ORAL at 20:36

## 2019-02-16 RX ADMIN — FOLIC ACID: 5 INJECTION, SOLUTION INTRAMUSCULAR; INTRAVENOUS; SUBCUTANEOUS at 06:00

## 2019-02-16 ASSESSMENT — ENCOUNTER SYMPTOMS
FEVER: 0
VOMITING: 0

## 2019-02-16 ASSESSMENT — MIFFLIN-ST. JEOR
SCORE: 1752.36
SCORE: 1743.29

## 2019-02-16 NOTE — ED PROVIDER NOTES
This patient is a 28-year-old male who was handed off to me by Dr. rajan pending hopeful room assignment in a detox bed.  I spoke with Dr. Case, the proposed admitting physician, who is willing to accept the patient but has understandable concerns given his elevated white count.  His primary concern is that there could be an infection, such as a pneumonia, that is being missed at this point and could get the patient both sick as well as require transfer of service.  I went and reevaluated the patient.  He is sitting upright in bed, talkative, alert, appropriate.  Heart rate is in the low 100s.  Heart sounds are normal, he has completely clear breath sounds with good air movement and no rhonchi/wheeze/rales in all 4 lungs.  I re-asked review of systems questions.  The patient adamantly denies any chest pain, shortness of breath, recent cough, rhinorrhea.  He does endorse that he had some mild streaks of blood in his emesis last night, consistent with a likely Tamera-Green tear.  I will recheck hemoglobin.  Otherwise he denies abdominal pain, current nausea or vomiting, recent diarrhea, skin changes.  Temperature was just rechecked and was around 98 degrees.  Assuming the hemoglobin is within a reasonable close range to last night's, I do suspect it will be slightly lower as he is probably not quite as hemodiluted at this point, I think is reasonable to continue ahead with the admission.  Per my conversation with Dr. Case he would feel comfortable with this plan.  MD Abdelrahman Duran, Rody Woodard MD  02/16/19 8446

## 2019-02-16 NOTE — ED NOTES
IV access #20 established - ivf infusing per orders at this time. Pt requesting something more for his anxiety as he has not been able to sleep since getting here - md made aware. Will medicate per emar

## 2019-02-16 NOTE — PROGRESS NOTES
28 year old male admitted to  for detoxification from alcohol. Patient reports drinking up to 750 ml.of vodka several times a week. Denies use of other chemicals. Last use of alcohol yesterday. Previous treatment x2 with minimal sobriety. Denies suicide ideation and thoughts of self harm. MSSA score 10 on admission. Medicated with Valium 10 mg.

## 2019-02-16 NOTE — PHARMACY-ADMISSION MEDICATION HISTORY
Admission medication history interview status for the 2/16/2019 admission is complete. See Epic admission navigator for allergy information, pharmacy, prior to admission medications and immunization status.     Medication history interview sources:  patient, [facility] pharmacy in [place]    Changes made to PTA medication list (reason)  Added: none  Deleted: none  Changed:   - trazodone 50 mg at bedtime --> at bedtime PRN     Additional medication history information (including reliability of information, actions taken by pharmacist):  - patient states he is not taking acramprosate, gabapentin, and mirtazapine   - patient states he is only taking trazodone PRN and no other rx, OTC, or supplement meds      Prior to Admission medications    Medication Sig Last Dose Taking? Auth Provider   traZODone (DESYREL) 50 MG tablet Take 50 mg by mouth nightly as needed for sleep  at prn Yes Unknown, Entered By History         Medication history completed by: Makenna Garrett, PharmD IV Student

## 2019-02-16 NOTE — ED PROVIDER NOTES
"  History     Chief Complaint:  Alcohol Intoxication    HPI   Mohit Porter is a 28 year old male with a history of alcohol abuse who presents via EMS for evaluation of alcohol intoxication. He was discharged from the ED for intoxication yesterday morning, and checked into a hotel because his girlfriend kicked him out of their apartment. When he arrived at the hotel, he reports immediately starting drinking and that he finished a 750 mL bottle of vodka by 1000. Tonight, he called EMS and requested that they bring him to detox. Here in the ED, he reports only consuming alcohol today and denies any drug use. He has no physical complaints, and denies any suicidal or homicidal ideations. He has no history of withdrawal seizures, but he has gone through detox treatment in the past. He denies any medical history.     Allergies:  NKDA    Medications:    Sertraline  Trazodone    Past Medical History:    Alcohol abuse  Anxiety    Past Surgical History:    The patient does not have any pertinent past surgical history.    Family History:    No past pertinent family history.    Social History:  Marital Status:  Single [1]  Presents via EMS  Negative for cigarette use.   Currently uses tobacco chew.   Positive for alcohol use.   Negative for drug use.      Review of Systems   Constitutional: Negative for fever.   Gastrointestinal: Negative for vomiting.   Psychiatric/Behavioral: Negative for suicidal ideas.   All other systems reviewed and are negative.        Physical Exam     Patient Vitals for the past 24 hrs:   BP Temp Temp src Pulse Heart Rate Resp SpO2 Height Weight   02/16/19 0629 131/85 98.7  F (37.1  C) Oral -- -- 16 98 % -- --   02/16/19 0255 -- -- -- 117 -- 18 95 % -- --   02/16/19 0250 (!) 146/99 -- -- -- -- -- -- -- --   02/16/19 0054 (!) 140/101 97.6  F (36.4  C) Oral -- 136 -- 96 % 1.778 m (5' 10\") 77.1 kg (170 lb)     Physical Exam  General: Alert, interactive in mild distress  Head:  Scalp is " atraumatic  Eyes:  The pupils are equal, round, and reactive to light    EOM's intact    No scleral icterus  ENT:      Nose:  The external nose is normal  Ears:  External ears are normal  Mouth/Throat: The oropharynx is normal    Mucus membranes are dry      Neck:  Normal range of motion.      There is no rigidity.    Trachea is in the midline         CV:  Tachycardic, regular rhythm    No murmur   Resp:  Breath sounds are clear bilaterally    Non-labored, no retractions or accessory muscle use      GI:  Abdomen is soft, no distension, no tenderness.       MS:  Normal strength in all 4 extremities  Skin:  Warm and dry, No rash or lesions noted.  Neuro: Mildly tremulous.     Strength 5/5 x4.  Sensation intact  In all 4 extremities.        Psych:  Awake. Alert.  Anxious affect.      Appropriate interactions.    Denies suicidal or homicidal ideations.     Emergency Department Course   Laboratory:  CBC: WBC: 27.7 (H), HGB: 16.5, PLT: 271  CBC: WBC: 21.6 (H), HGB: 12.9 (L), PLT: 185    CMP: Glucose 157 (H), Anion gap: 20 (H), Cl:  89 (L), ALT: 197 (H), AST: 131 (H), o/w WNL (Creatinine: 0.75)    Ammonia: pending    Alcohol breath test: 0.205 (H)    Drug screen: All Negative    UA with Microscopic: Ketones: 40 (A), Blood: Trace (A), Albumin: 100 (A), WBC: 12 (H), Squamous epithelial: 2 (H), Mucous: Present (A), Hyaline casts: 70 (H), o/w WNL    Interventions:  0116 Valium, 5 mg, PO  0402 Ativan, 1 mg, IV injection   LR, 1L, IV  0403 LR, 1L, IV  0600 NS w/ infuvite, 1L, IV    Emergency Department Course:  Patient arrives via EMS.  Paramedic notes and vitals reviewed.   (0050) I performed an exam of the patient as documented above.      IV inserted. Medicine administered as documented above. Blood drawn. This was sent to the lab for further testing, results above.     (0108) I consulted with the DEC  after their evaluation of the patient. They report that [facility] does have a bed available to which the patient can  be transferred.     (0144) I rechecked the patient and discussed the results of his workup thus far.     (0320) After seeing the patient's initial laboratory results, [facility] is now refusing to accept the patient because of his leukocytosis. I will repeat the CBC after IV fluids.     The patient provided a urine sample here in the emergency department. This was sent for laboratory testing, findings above.     (0359) I spoke to [facility] in regards to possible placement of the patient as [facility] has raised concerns for accepting him.     Findings and plan explained to the Patient who consents to admission.            Impression & Plan       Medical Decision Making:  Mohit Porter is a 28 year old male who was seen and evaluated.  The patient is requesting help with his alcohol use and is requesting detox.  He is demonstrating signs of mild withdrawal but no signs of delirium tremens or more concerning illness.  Breathalyzer was 0.207.  [facility] spoke with [facility] and there is a bed available at [facility] however they have requested laboratory workup which was performed and is noted above.  He does have a slight elevation of his liver function tests consistent with his alcoholism.  He has an unexplained leukocytosis however this is been an issue for him in the past, this did improve after IV fluids but his white count does remain elevated.  There are no signs of an acute infectious etiology and I do not have a clear-cut etiology for his leukocytosis other than his alcoholism.  There is no sign of coingestion.  Care was turned over to Dr. Quick pending availability of a detox bed.      Diagnosis:    ICD-10-CM    1. Acute alcoholic intoxication in alcoholism without complication (H) F10.220 CBC with platelets differential     Comprehensive metabolic panel   2. Elevated liver enzymes R74.8    3. Leukocytosis, unspecified type D72.829        Disposition:  Per Dr. Quick.    [facility]  Disclosure:  I, Darling Bennett, am serving as a scribe on 2/16/2019 at 12:50 AM to personally document services performed by Richard Yeager MD based on my observations and the provider's statements to me.       Darling Bennett  2/16/2019    EMERGENCY DEPARTMENT       Richard Yeager MD  02/16/19 0712       Richard Yeager MD  02/16/19 0742

## 2019-02-16 NOTE — PROGRESS NOTES
02/16/19 1307   Patient Belongings   Did you bring any home meds/supplements to the hospital?  No   Patient Belongings other (see comments)   Belongings Search Yes   Clothing Search Yes   Second Staff Roge RANDALL, Dick Ordonez, jacket, pants w/strings in storage    Cell,  in med room    2 [address] in security    A               Admission:  I am responsible for any personal items that are not sent to the safe or pharmacy.  [facility] is not responsible for loss, theft or damage of any property in my possession.    Signature:  _________________________________ Date: _______  Time: _____                                              Staff Signature:  ____________________________ Date: ________  Time: _____      2nd Staff person, if patient is unable/unwilling to sign:    Signature: ________________________________ Date: ________  Time: _____     Discharge:  [facility] has returned all of my personal belongings:    Signature: _________________________________ Date: ________  Time: _____                                          Staff Signature:  ____________________________ Date: ________  Time: _____

## 2019-02-17 LAB
ALBUMIN SERPL-MCNC: 3.6 G/DL (ref 3.4–5)
ALP SERPL-CCNC: 86 U/L (ref 40–150)
ALT SERPL W P-5'-P-CCNC: 136 U/L (ref 0–70)
ANION GAP SERPL CALCULATED.3IONS-SCNC: 9 MMOL/L (ref 3–14)
AST SERPL W P-5'-P-CCNC: 109 U/L (ref 0–45)
BASOPHILS # BLD AUTO: 0 10E9/L (ref 0–0.2)
BASOPHILS NFR BLD AUTO: 0.1 %
BILIRUB SERPL-MCNC: 1.4 MG/DL (ref 0.2–1.3)
BUN SERPL-MCNC: 5 MG/DL (ref 7–30)
CALCIUM SERPL-MCNC: 8.6 MG/DL (ref 8.5–10.1)
CHLORIDE SERPL-SCNC: 97 MMOL/L (ref 94–109)
CHOLEST SERPL-MCNC: 114 MG/DL
CO2 SERPL-SCNC: 30 MMOL/L (ref 20–32)
CREAT SERPL-MCNC: 0.65 MG/DL (ref 0.66–1.25)
DIFFERENTIAL METHOD BLD: ABNORMAL
EOSINOPHIL # BLD AUTO: 0 10E9/L (ref 0–0.7)
EOSINOPHIL NFR BLD AUTO: 0.3 %
ERYTHROCYTE [DISTWIDTH] IN BLOOD BY AUTOMATED COUNT: 13.1 % (ref 10–15)
GFR SERPL CREATININE-BSD FRML MDRD: >90 ML/MIN/{1.73_M2}
GGT SERPL-CCNC: 43 U/L (ref 0–75)
GLUCOSE SERPL-MCNC: 93 MG/DL (ref 70–99)
HCT VFR BLD AUTO: 39.2 % (ref 40–53)
HDLC SERPL-MCNC: 63 MG/DL
HGB BLD-MCNC: 13.1 G/DL (ref 13.3–17.7)
IMM GRANULOCYTES # BLD: 0 10E9/L (ref 0–0.4)
IMM GRANULOCYTES NFR BLD: 0.2 %
LDLC SERPL CALC-MCNC: 38 MG/DL
LYMPHOCYTES # BLD AUTO: 1.1 10E9/L (ref 0.8–5.3)
LYMPHOCYTES NFR BLD AUTO: 8.4 %
MCH RBC QN AUTO: 30 PG (ref 26.5–33)
MCHC RBC AUTO-ENTMCNC: 33.4 G/DL (ref 31.5–36.5)
MCV RBC AUTO: 90 FL (ref 78–100)
MONOCYTES # BLD AUTO: 0.6 10E9/L (ref 0–1.3)
MONOCYTES NFR BLD AUTO: 4.6 %
NEUTROPHILS # BLD AUTO: 11.6 10E9/L (ref 1.6–8.3)
NEUTROPHILS NFR BLD AUTO: 86.4 %
NONHDLC SERPL-MCNC: 51 MG/DL
NRBC # BLD AUTO: 0 10*3/UL
NRBC BLD AUTO-RTO: 0 /100
PLATELET # BLD AUTO: 171 10E9/L (ref 150–450)
POTASSIUM SERPL-SCNC: 3 MMOL/L (ref 3.4–5.3)
PROT SERPL-MCNC: 7 G/DL (ref 6.8–8.8)
RBC # BLD AUTO: 4.36 10E12/L (ref 4.4–5.9)
SODIUM SERPL-SCNC: 136 MMOL/L (ref 133–144)
TRIGL SERPL-MCNC: 63 MG/DL
TSH SERPL DL<=0.005 MIU/L-ACNC: 2.26 MU/L (ref 0.4–4)
VIT B12 SERPL-MCNC: 792 PG/ML (ref 193–986)
WBC # BLD AUTO: 13.5 10E9/L (ref 4–11)

## 2019-02-17 PROCEDURE — 87591 N.GONORRHOEAE DNA AMP PROB: CPT | Performed by: PHYSICIAN ASSISTANT

## 2019-02-17 PROCEDURE — 25000132 ZZH RX MED GY IP 250 OP 250 PS 637: Performed by: PHYSICIAN ASSISTANT

## 2019-02-17 PROCEDURE — 99207 ZZC CONSULT E&M CHANGED TO INITIAL LEVEL: CPT | Performed by: PHYSICIAN ASSISTANT

## 2019-02-17 PROCEDURE — 25000131 ZZH RX MED GY IP 250 OP 636 PS 637: Performed by: PHYSICIAN ASSISTANT

## 2019-02-17 PROCEDURE — 85025 COMPLETE CBC W/AUTO DIFF WBC: CPT | Performed by: PHYSICIAN ASSISTANT

## 2019-02-17 PROCEDURE — 82977 ASSAY OF GGT: CPT | Performed by: PSYCHIATRY & NEUROLOGY

## 2019-02-17 PROCEDURE — 82607 VITAMIN B-12: CPT | Performed by: PSYCHIATRY & NEUROLOGY

## 2019-02-17 PROCEDURE — 99222 1ST HOSP IP/OBS MODERATE 55: CPT | Performed by: PHYSICIAN ASSISTANT

## 2019-02-17 PROCEDURE — 84443 ASSAY THYROID STIM HORMONE: CPT | Performed by: PSYCHIATRY & NEUROLOGY

## 2019-02-17 PROCEDURE — 36415 COLL VENOUS BLD VENIPUNCTURE: CPT | Performed by: PSYCHIATRY & NEUROLOGY

## 2019-02-17 PROCEDURE — 36415 COLL VENOUS BLD VENIPUNCTURE: CPT | Performed by: PHYSICIAN ASSISTANT

## 2019-02-17 PROCEDURE — 25000132 ZZH RX MED GY IP 250 OP 250 PS 637: Performed by: PSYCHIATRY & NEUROLOGY

## 2019-02-17 PROCEDURE — 87086 URINE CULTURE/COLONY COUNT: CPT | Performed by: PHYSICIAN ASSISTANT

## 2019-02-17 PROCEDURE — 80053 COMPREHEN METABOLIC PANEL: CPT | Performed by: PHYSICIAN ASSISTANT

## 2019-02-17 PROCEDURE — 80061 LIPID PANEL: CPT | Performed by: PSYCHIATRY & NEUROLOGY

## 2019-02-17 PROCEDURE — 86803 HEPATITIS C AB TEST: CPT | Performed by: PSYCHIATRY & NEUROLOGY

## 2019-02-17 PROCEDURE — 12800008 ZZH R&B CD ADULT

## 2019-02-17 PROCEDURE — 87491 CHLMYD TRACH DNA AMP PROBE: CPT | Performed by: PHYSICIAN ASSISTANT

## 2019-02-17 RX ORDER — ONDANSETRON 4 MG/1
4 TABLET, ORALLY DISINTEGRATING ORAL EVERY 6 HOURS PRN
Status: DISCONTINUED | OUTPATIENT
Start: 2019-02-17 | End: 2019-02-18 | Stop reason: HOSPADM

## 2019-02-17 RX ORDER — GABAPENTIN 100 MG/1
100 CAPSULE ORAL 3 TIMES DAILY
Status: DISCONTINUED | OUTPATIENT
Start: 2019-02-17 | End: 2019-02-18 | Stop reason: HOSPADM

## 2019-02-17 RX ORDER — NICOTINE 21 MG/24HR
1 PATCH, TRANSDERMAL 24 HOURS TRANSDERMAL DAILY
Status: DISCONTINUED | OUTPATIENT
Start: 2019-02-17 | End: 2019-02-18 | Stop reason: HOSPADM

## 2019-02-17 RX ORDER — POTASSIUM CHLORIDE 750 MG/1
40 TABLET, EXTENDED RELEASE ORAL EVERY 4 HOURS
Status: COMPLETED | OUTPATIENT
Start: 2019-02-17 | End: 2019-02-17

## 2019-02-17 RX ORDER — SUCRALFATE ORAL 1 G/10ML
1 SUSPENSION ORAL
Status: DISCONTINUED | OUTPATIENT
Start: 2019-02-17 | End: 2019-02-18 | Stop reason: HOSPADM

## 2019-02-17 RX ORDER — ONDANSETRON 4 MG/1
4 TABLET, ORALLY DISINTEGRATING ORAL ONCE
Status: COMPLETED | OUTPATIENT
Start: 2019-02-17 | End: 2019-02-17

## 2019-02-17 RX ORDER — BUSPIRONE HYDROCHLORIDE 5 MG/1
5 TABLET ORAL 3 TIMES DAILY
Status: DISCONTINUED | OUTPATIENT
Start: 2019-02-17 | End: 2019-02-18 | Stop reason: HOSPADM

## 2019-02-17 RX ORDER — PANTOPRAZOLE SODIUM 40 MG/1
40 TABLET, DELAYED RELEASE ORAL
Status: DISCONTINUED | OUTPATIENT
Start: 2019-02-17 | End: 2019-02-18 | Stop reason: HOSPADM

## 2019-02-17 RX ADMIN — NICOTINE 1 PATCH: 21 PATCH, EXTENDED RELEASE TRANSDERMAL at 20:12

## 2019-02-17 RX ADMIN — SUCRALFATE 1 G: 1 SUSPENSION ORAL at 10:58

## 2019-02-17 RX ADMIN — BUSPIRONE HYDROCHLORIDE 5 MG: 5 TABLET ORAL at 20:15

## 2019-02-17 RX ADMIN — DIAZEPAM 10 MG: 5 TABLET ORAL at 08:11

## 2019-02-17 RX ADMIN — POTASSIUM CHLORIDE 40 MEQ: 750 TABLET, EXTENDED RELEASE ORAL at 20:12

## 2019-02-17 RX ADMIN — DIAZEPAM 10 MG: 5 TABLET ORAL at 11:14

## 2019-02-17 RX ADMIN — ONDANSETRON 4 MG: 4 TABLET, ORALLY DISINTEGRATING ORAL at 10:57

## 2019-02-17 RX ADMIN — GABAPENTIN 100 MG: 100 CAPSULE ORAL at 16:09

## 2019-02-17 RX ADMIN — PANTOPRAZOLE SODIUM 40 MG: 40 TABLET, DELAYED RELEASE ORAL at 16:09

## 2019-02-17 RX ADMIN — NICOTINE POLACRILEX 2 MG: 2 GUM, CHEWING BUCCAL at 16:47

## 2019-02-17 RX ADMIN — GABAPENTIN 100 MG: 100 CAPSULE ORAL at 22:24

## 2019-02-17 RX ADMIN — TRAZODONE HYDROCHLORIDE 100 MG: 50 TABLET ORAL at 22:24

## 2019-02-17 RX ADMIN — HYDROXYZINE HYDROCHLORIDE 25 MG: 25 TABLET ORAL at 00:22

## 2019-02-17 RX ADMIN — DIAZEPAM 10 MG: 5 TABLET ORAL at 05:20

## 2019-02-17 RX ADMIN — DIAZEPAM 10 MG: 5 TABLET ORAL at 00:22

## 2019-02-17 RX ADMIN — HYDROXYZINE HYDROCHLORIDE 25 MG: 25 TABLET ORAL at 16:09

## 2019-02-17 RX ADMIN — SUCRALFATE 1 G: 1 SUSPENSION ORAL at 16:09

## 2019-02-17 RX ADMIN — HYDROXYZINE HYDROCHLORIDE 25 MG: 25 TABLET ORAL at 20:00

## 2019-02-17 RX ADMIN — POTASSIUM CHLORIDE 40 MEQ: 750 TABLET, EXTENDED RELEASE ORAL at 13:04

## 2019-02-17 RX ADMIN — SUCRALFATE 1 G: 1 SUSPENSION ORAL at 22:24

## 2019-02-17 RX ADMIN — BUSPIRONE HYDROCHLORIDE 5 MG: 5 TABLET ORAL at 14:14

## 2019-02-17 RX ADMIN — PANTOPRAZOLE SODIUM 40 MG: 40 TABLET, DELAYED RELEASE ORAL at 10:58

## 2019-02-17 ASSESSMENT — ACTIVITIES OF DAILY LIVING (ADL)
HYGIENE/GROOMING: INDEPENDENT
DRESS: INDEPENDENT
ORAL_HYGIENE: INDEPENDENT
HYGIENE/GROOMING: INDEPENDENT

## 2019-02-17 NOTE — PROGRESS NOTES
"EDGARDO Rueda Miko Porter is a 28 year old year old male with a chief complaint of alcohol problem  S = Situation:   Pt admitted today for alcohol withdrawal.  Elevated WBC noted prior to admission.  Evaluation in ER deemed pt medically clear for admission for alcohol withdrawal.  White blood cell count 21.6 mg/dL, pt at time of admission was afebrile, HR 90's, no respiratory complaints, lung fields clear.  UA presented keytones, protein, white cells and hyaline casts.  B  = Background:   Pt BP has elevated over evening.  He has been medicated for alcohol withdrawal X 2 with valium 10 mg.  At 20:00 HR 94, /105 and temp elevated to 99.9 orally.  He denies discomfort although reports \"a little tightness in my chest.\"  Pt is eating well, drinking fluids, awake and watching television, conversing with peers in lounge this evening.  In no apparent distress.  A  =  Assessment:   Vital Signs: BP (!) 151/105   Pulse 94   Temp 99.9  F (37.7  C) (Oral)   Resp 16   Ht 1.778 m (5' 10\")   Wt 76.2 kg (168 lb)   BMI 24.11 kg/m    Alert and oriented X 3, no SI, no SIB.  Pt's affect is flat is mood is somewhat sad.  Child protection was informed of his situation today and he was told by his girlfriend that he can no longer stay with her.  Pt's symptoms are consistent with alcohol withdrawal and low mood.  R =   Request or Recommendation:   Continue alcohol withdrawal monitoring, Psychiatrist and moon lighter alerted to patient symptoms and labs for further evaluation.  Medicine to see patient in am.  Nursing to continue to monitor symptoms.     "

## 2019-02-17 NOTE — PLAN OF CARE
Continues in detox status for alcohol withdrawal. MSSA scores 8 and 8. Medicated x2 with Valium 10 mg.Taking fluids well. Solids poorly. Complaining of burning sensation in his throat and stomach when he tries to eat. Low energy. Spent most of the day in bed.Denies suicide ideation and thoughts of self harm.

## 2019-02-17 NOTE — CONSULTS
"Consult Date:  02/17/2019      HISTORY OF PRESENT ILLNESS:  The patient is a 28-year-old male admitted to station 3A for severe alcohol abuse and detoxification.  He states he has been on a 5-6 day binge, drinking up to 750 mL of vodka daily.  An Internal Medicine consultation was ordered by Dr. Lombardi to assess multiple medical problems, including leukocytosis, hypokalemia, transaminitis, and acute chest and abdominal pain with nausea.        At this time, Mohit admits to approximately 3-4 day history of chest pain which he describes as \"esophageal burning,\" as well as nausea.  States he vomited several times prior to admission and stated it was dark in appearance.  Also states having blackish stools.  States during his alcohol binge, he had no p.o. food intake at all.  States he tried to eat a bite at breakfast this morning; however, due to the pain and nausea did not have any more.  Denies fever and chills.  Denies shortness of breath.  Denies bladder concerns, including dysuria.      PAST MEDICAL HISTORY:   1.  Alcohol use disorder and other psychiatric history per Dr. Lombardi.   2.  Denies history of major medical problems, including cardiopulmonary disease, hypertension, and diabetes.      PAST SURGICAL HISTORY:  None.      ADMISSION MEDICATIONS:  Trazodone.      ALLERGIES:  NO KNOWN DRUG ALLERGIES.      SOCIAL HISTORY:  Single.  One child.  Lives in [place].  States he recently lost his job.  Denies smoking cigarettes.  Denies illicit drug use.      FAMILY HISTORY:  Reviewed and noncontributory.      REVIEW OF SYSTEMS:  Ten-point review of systems negative except as stated above in history of present illness.      PHYSICAL EXAMINATION:   GENERAL:  A young man in no acute distress.   VITAL SIGNS:  Stable.  Temperature afebrile, pulse in the 90s, blood pressure 150s/100s.   HEENT:  His tongue has a whitish coat but nontender.  The rest of exam negative.   NECK:  Supple, no cervical lymphadenopathy or " thyromegaly.   LUNGS:  Clear.   CARDIOVASCULAR:  Regular rate and rhythm.   ABDOMEN:  Soft, nontender.   EXTREMITIES:  No edema.   SKIN:  No rashes noted on exposed areas.   NEUROLOGIC:  He is awake, alert and oriented x 3.  Cranial nerves grossly intact.  He does have a mild upper extremity tremor.  Gait is sluggish.      LABORATORY DATA:  Urinalysis with trace of blood, 12 white blood cells, otherwise unremarkable.  Repeat CBC with improved white blood cell count of 13.5, hemoglobin 13.1.  Repeat potassium 3.0.  Total bilirubin 1.4, , .  Otherwise, rest of CBC and comprehensive metabolic panel unremarkable.  Lipid panel, TSH, and vitamin B12 levels normal.  GGT within normal limits.      IMPRESSION:   1.  Alcohol abuse and withdrawal, per Dr. Lombardi.   2.  Acute nausea, vomiting, with likely Tamera-Green tear.   3.  Marked leukocytosis, resolving, and most likely secondary to stress leukemoid reaction related to his severe withdrawal.   4.  Resolving transaminitis, likely due to his heavy alcohol abuse prior to admission; however, not unusual 2:1 AST to ALT ratio, now with mild hyperbilirubinemia.   5.  Worsening hypokalemia, likely due to the patient admitting he had no p.o. food intake prior to admission.   6.  Mild microscopic pyuria of unclear etiology at this time.  Rule out UTI or sexually transmitted infection.      PLAN:  Will start Carafate 1 gram q.i.d., as well as Protonix 40 mg p.o. b.i.d., to treat likely severe alcohol-induced gastritis and likely Tamera-Green tear.  Will give patient potassium chloride 40 mEq every 4 hours x 2 doses with repeat CBC and comprehensive metabolic panel tomorrow a.m.  Will add on to labs already drawn a hepatitis C screen.  Will obtain a routine urine culture, gonorrhea and chlamydia screen.  Discontinue Tylenol.  No NSAIDs.  Will obtain a Nutrition consult given the patient's likely malnourished state and ongoing poor p.o. intake.  No further medical  intervention indicated at this time.  The patient is medically stable and Medicine will continue to follow.  Please feel free to call with questions.         Thank you for the consultation.     Rajiv Gutiérrez PA-C  Internal Medicine [facility]  [phone]            D: 2019   T: 2019   MT: RAMÓN      Name:     AMAYA GUSTAFSON   MRN:      [id number]        Account:       [id number]   :      1990           Consult Date:  2019      Document: [id number]

## 2019-02-17 NOTE — H&P
"Admitted:     02/16/2019      Mr. Mohit Porter, date of birth 1990, is a 28-year-old man admitted to [facility] detox unit on 02/16/2019.  He was admitted to the hospital on a voluntary status to detoxify from alcohol.      He had presented to the hospital after a series of unfortunate events.  He has been on a 5-6 day binge, drinking about 750 mL of vodka per day.  He notes that his girlfriend has left him.  She had come home from work as a nurse, and he was supposed to be babysitting but found him drunk and very difficult to wake up.  She called the police, and now there is a Child Protection Services case involving him.      In the emergency room, he was noted to have a high white count of 27.      Urinalysis showed key tones.  Treatment was started with Valium and workup was done for other causes of his high white count, which turned out negative (probably gastritis after Rajiv Gutiérrez's evaluation).  He presented via the EMS.  He had checked into a hotel, and when he arrived he started drinking, and he called the EMS requesting they bring him to detoxification.      The patient states this is his first binge.  He believes he has had alcohol problems for about 2 years, but \"it's not much of a problem when everything goes well.\"      PSYCHIATRIC HISTORY:  Positive for generalized anxiety disorder.  He has previously taken Lexapro, Celexa, mirtazapine, and gabapentin.  He was prescribed sertraline but has not taken it.  He is prescribed trazodone at night for sleep.  He felt gabapentin was partially helpful.  Lexapro and Celexa caused sexual side effects.  He has been reluctant to take the sertraline, fearing that it would do the same (probably it would).      LABORATORY DATA:  Shows most recent white count down to 13.5, hemoglobin 13.1, hematocrit 39.2, platelets 171.  Red blood cell count slightly low at 4.36.  GGT was 43.  Lipids were unremarkable.  ALT was elevated at 136, " AST at 109, bilirubin at 1.4.  BUN and creatinine were a bit low and unremarkable.      VITAL SIGNS:  Today show temperature 97, pulse 87, heart rate 136, respirations 16, blood pressure 157/103, SpO2 of 97 on room air.      MENTAL STATUS EXAMINATION:  Shows an alert man.  He is somewhat guarded but overall cooperative.  Motor behavior is normal.  Eye contact is good.  There are no signs of psychosis.  Associations are intact.      He denies being suicidal, and there is no sign of homicidal or psychotic symptoms.        IMPRESSION:  Alcohol use disorder, severe, with withdrawal.  He apparently has gastritis and possible esophageal tear secondary to alcohol.      PLAN:  Plan at this time is to discharge using an MSSA protocol and Valium.  He is asking to go home, but he is clearly not safe for that at this time.  Plan is to restart gabapentin and add BuSpar for his anxiety and add naltrexone for alcohol.         MADDI JACOBS MD             D: 2019   T: 2019   MT: RAMÓN      Name:     AMAYA GUSTASFON   MRN:      [id number]        Account:      [id number]   :      1990        Admitted:     2019                   Document: [id number]

## 2019-02-17 NOTE — PROGRESS NOTES
Pt. Endorses anxiety and general feeling of unwellness.  Pt. Has eleveted (temp 99.8 tympnic) and /82.  MSSA 11 & 14;10 X 2 of valium given.

## 2019-02-17 NOTE — ED NOTES
Chart accessed by writer Nahed Jones RN for follow up with ED management as writer was pt primary RN on 2/16/2019.

## 2019-02-17 NOTE — PHARMACY-ADMISSION MEDICATION HISTORY
Med rec completed by current pharmacy student Makenna at [facility].  Please refer.

## 2019-02-17 NOTE — PROGRESS NOTES
Case Management Note  2/17/2019    Met with pt to discuss discharge planning. Pt reported he wants to go home and attend AA meetings and get a sponsor. Pt reports this has worked for him in the past. Pt reports this use episode has lasted maybe a week or week and a half, but prior to this pt was sober for 3-4 months. Pt asked when he would be allowed to leave, writer deferred pt to talk with his nurse/attending about this.     Lilia Leon, LIGIA, LADC

## 2019-02-17 NOTE — PROGRESS NOTES
"CLINICAL NUTRITION SERVICES - ASSESSMENT NOTE     Nutrition Prescription    RECOMMENDATIONS FOR MDs/PROVIDERS TO ORDER:  Continue to monitor esophageal irritation/burning. Pt reports this is already resolving this am and was able to eat most of breakfast this am.  If po intakes decline, consider scheduling Boost Plus with meals. If pt feeling hungry still despite eating 100% of meal tray, consider allowing double portions.    Malnutrition Status:    Patient does not meet two of the above criteria necessary for diagnosing malnutrition but is at risk for malnutrition pending po intakes and wt trends    Recommendations already ordered by Registered Dietitian (RD):  None today    Future/Additional Recommendations:  Monitor po intakes and wt trends. Consider need to add snack/supplement. Adjust flavors pending trends and pt preferences.       REASON FOR ASSESSMENT  Mohit Porter is a/an 28 year old male assessed by the dietitian for Provider Order - Malnourished 2/2 severe etoh abuse    NUTRITION HISTORY  Mohit reports no food allergies or intolerances. He reports that he usually eats 3 meals/day + some snacking. He reports that for 4-5 days PTA he was binge drinking and only eating ~1 fast food meal/day during this time. He says that he had a lot of esophageal irritation d/t emesis PTA with heavy drinking, which was also affecting food intakes.    CURRENT NUTRITION ORDERS  Diet: Regular  Intake/Tolerance: Patient reports that his appetite has been improved over the past couple of days. Has some anxiety here, which causes some changes in appetite for him. He reports that already his esophageal irritation and burning sensation when eating is resolving. No problems with breakfast his am. He reports eating all of his eggs, sausage, some fruit, and juice.    LABS  BUN 5 (L)    MEDICATIONS  Folic Acid  Thiamine  Theravit-M    ANTHROPOMETRICS  Ht Readings from Last 1 Encounters:   02/16/19 1.778 m (5' 10\")   Most Recent " Weight: 76.2 kg (168 lb)  IBW: 75.5 kg (101% IBW)  BMI: Normal BMI  Weight History: Pt reports UBW of 165-170 lb. Appears stable over the last 2 weeks. Minimal wt history prior to this time. Pt reports no known weight changes.  Wt Readings from Last 10 Encounters:   02/16/19 76.2 kg (168 lb)   02/16/19 77.1 kg (170 lb)   02/14/19 79.4 kg (175 lb)   02/04/19 77.7 kg (171 lb 6.4 oz)   02/04/19 77.1 kg (170 lb)     Dosing Weight: 76 kg (actual)    ASSESSED NUTRITION NEEDS  Estimated Energy Needs: 2425-7121 kcals/day (25 - 30 kcals/kg)  Justification: Maintenance  Estimated Protein Needs: 61-76 grams protein/day (0.8 - 1 grams of pro/kg)  Justification: Maintenance  Estimated Fluid Needs: 1 mL/kcal   Justification: Per provider pending fluid status    PHYSICAL FINDINGS  See malnutrition section below.     MALNUTRITION  % Intake: </= 50% for >/= 5 days (severe)  % Weight Loss: None noted  Subcutaneous Fat Loss: None observed  Muscle Loss: None observed  Fluid Accumulation/Edema: None noted  Malnutrition Diagnosis: Patient does not meet two of the above criteria necessary for diagnosing malnutrition but is at risk for malnutrition pending po intakes and wt trends    NUTRITION DIAGNOSIS  Inadequate oral intake related to recent binge drinking 4-5 days PTA with esophageal irritation/burning as evidenced by reported intakes in the days leading to admission.    INTERVENTIONS  Implementation  Nutrition Education: Encouraged pt to establish pattern of meals TID here + snacks on unit. Given patient states his esophageal irritation is resolving, reduced intakes was acute, and intakes are improving, no additional oral nutritional supplements warranted at this time. If intakes decline or pt still feeling hungry at meals, may consider supplements or double portions.    Goals  Patient to consume % of nutritionally adequate meal trays TID, or the equivalent with supplements/snacks.     Monitoring/Evaluation  Progress toward  goals will be monitored and evaluated per protocol.    Rosa Salgado RD, LD  Unit pgr: [phone]

## 2019-02-18 VITALS
DIASTOLIC BLOOD PRESSURE: 95 MMHG | HEIGHT: 70 IN | WEIGHT: 168 LBS | RESPIRATION RATE: 16 BRPM | HEART RATE: 88 BPM | SYSTOLIC BLOOD PRESSURE: 143 MMHG | BODY MASS INDEX: 24.05 KG/M2 | TEMPERATURE: 98.2 F

## 2019-02-18 LAB
ALBUMIN SERPL-MCNC: 4 G/DL (ref 3.4–5)
ALP SERPL-CCNC: 109 U/L (ref 40–150)
ALT SERPL W P-5'-P-CCNC: 164 U/L (ref 0–70)
ANION GAP SERPL CALCULATED.3IONS-SCNC: 8 MMOL/L (ref 3–14)
AST SERPL W P-5'-P-CCNC: 142 U/L (ref 0–45)
BACTERIA SPEC CULT: NO GROWTH
BILIRUB SERPL-MCNC: 0.8 MG/DL (ref 0.2–1.3)
BUN SERPL-MCNC: 5 MG/DL (ref 7–30)
C TRACH DNA SPEC QL NAA+PROBE: NEGATIVE
CALCIUM SERPL-MCNC: 9.3 MG/DL (ref 8.5–10.1)
CHLORIDE SERPL-SCNC: 102 MMOL/L (ref 94–109)
CO2 SERPL-SCNC: 28 MMOL/L (ref 20–32)
CREAT SERPL-MCNC: 0.78 MG/DL (ref 0.66–1.25)
ERYTHROCYTE [DISTWIDTH] IN BLOOD BY AUTOMATED COUNT: 13.4 % (ref 10–15)
GFR SERPL CREATININE-BSD FRML MDRD: >90 ML/MIN/{1.73_M2}
GLUCOSE SERPL-MCNC: 103 MG/DL (ref 70–99)
HCT VFR BLD AUTO: 44.8 % (ref 40–53)
HGB BLD-MCNC: 14.7 G/DL (ref 13.3–17.7)
Lab: NORMAL
MCH RBC QN AUTO: 30.1 PG (ref 26.5–33)
MCHC RBC AUTO-ENTMCNC: 32.8 G/DL (ref 31.5–36.5)
MCV RBC AUTO: 92 FL (ref 78–100)
N GONORRHOEA DNA SPEC QL NAA+PROBE: NEGATIVE
PLATELET # BLD AUTO: 185 10E9/L (ref 150–450)
POTASSIUM SERPL-SCNC: 3.7 MMOL/L (ref 3.4–5.3)
PROT SERPL-MCNC: 8.2 G/DL (ref 6.8–8.8)
RBC # BLD AUTO: 4.89 10E12/L (ref 4.4–5.9)
SODIUM SERPL-SCNC: 138 MMOL/L (ref 133–144)
SPECIMEN SOURCE: NORMAL
WBC # BLD AUTO: 14 10E9/L (ref 4–11)

## 2019-02-18 PROCEDURE — 99239 HOSP IP/OBS DSCHRG MGMT >30: CPT | Performed by: PSYCHIATRY & NEUROLOGY

## 2019-02-18 PROCEDURE — 25000132 ZZH RX MED GY IP 250 OP 250 PS 637: Performed by: PSYCHIATRY & NEUROLOGY

## 2019-02-18 PROCEDURE — 36415 COLL VENOUS BLD VENIPUNCTURE: CPT | Performed by: PHYSICIAN ASSISTANT

## 2019-02-18 PROCEDURE — 85027 COMPLETE CBC AUTOMATED: CPT | Performed by: PHYSICIAN ASSISTANT

## 2019-02-18 PROCEDURE — 80053 COMPREHEN METABOLIC PANEL: CPT | Performed by: PHYSICIAN ASSISTANT

## 2019-02-18 PROCEDURE — 25000132 ZZH RX MED GY IP 250 OP 250 PS 637: Performed by: PHYSICIAN ASSISTANT

## 2019-02-18 PROCEDURE — 25000131 ZZH RX MED GY IP 250 OP 636 PS 637: Performed by: PHYSICIAN ASSISTANT

## 2019-02-18 RX ORDER — BUSPIRONE HYDROCHLORIDE 5 MG/1
5 TABLET ORAL 3 TIMES DAILY
Qty: 90 TABLET | Refills: 0 | Status: SHIPPED | OUTPATIENT
Start: 2019-02-18 | End: 2019-04-08

## 2019-02-18 RX ORDER — GABAPENTIN 100 MG/1
100 CAPSULE ORAL 3 TIMES DAILY
Qty: 90 CAPSULE | Refills: 0 | Status: SHIPPED | OUTPATIENT
Start: 2019-02-18 | End: 2019-04-08

## 2019-02-18 RX ORDER — SUCRALFATE ORAL 1 G/10ML
1 SUSPENSION ORAL
Qty: 1200 ML | Refills: 0 | Status: SHIPPED | OUTPATIENT
Start: 2019-02-18 | End: 2019-04-08

## 2019-02-18 RX ORDER — PANTOPRAZOLE SODIUM 40 MG/1
40 TABLET, DELAYED RELEASE ORAL
Qty: 60 TABLET | Refills: 0 | Status: SHIPPED | OUTPATIENT
Start: 2019-02-18 | End: 2019-04-08

## 2019-02-18 RX ORDER — FOLIC ACID 1 MG/1
1 TABLET ORAL DAILY
Qty: 30 TABLET | Refills: 0 | Status: SHIPPED | OUTPATIENT
Start: 2019-02-18 | End: 2019-04-08

## 2019-02-18 RX ORDER — NALTREXONE HYDROCHLORIDE 50 MG/1
50 TABLET, FILM COATED ORAL DAILY
Qty: 30 TABLET | Refills: 0 | Status: SHIPPED | OUTPATIENT
Start: 2019-02-18 | End: 2019-02-18

## 2019-02-18 RX ORDER — LANOLIN ALCOHOL/MO/W.PET/CERES
100 CREAM (GRAM) TOPICAL DAILY
Qty: 30 TABLET | Refills: 0 | Status: SHIPPED | OUTPATIENT
Start: 2019-02-18 | End: 2019-04-08

## 2019-02-18 RX ORDER — MULTIPLE VITAMINS W/ MINERALS TAB 9MG-400MCG
1 TAB ORAL DAILY
Qty: 30 TABLET | Refills: 0 | Status: SHIPPED | OUTPATIENT
Start: 2019-02-18 | End: 2019-04-08

## 2019-02-18 RX ADMIN — PANTOPRAZOLE SODIUM 40 MG: 40 TABLET, DELAYED RELEASE ORAL at 16:08

## 2019-02-18 RX ADMIN — GABAPENTIN 100 MG: 100 CAPSULE ORAL at 08:20

## 2019-02-18 RX ADMIN — BUSPIRONE HYDROCHLORIDE 5 MG: 5 TABLET ORAL at 13:47

## 2019-02-18 RX ADMIN — ONDANSETRON 4 MG: 4 TABLET, ORALLY DISINTEGRATING ORAL at 00:21

## 2019-02-18 RX ADMIN — BUSPIRONE HYDROCHLORIDE 5 MG: 5 TABLET ORAL at 08:20

## 2019-02-18 RX ADMIN — GABAPENTIN 100 MG: 100 CAPSULE ORAL at 16:08

## 2019-02-18 RX ADMIN — ATENOLOL 50 MG: 50 TABLET ORAL at 00:21

## 2019-02-18 RX ADMIN — NICOTINE 1 PATCH: 21 PATCH, EXTENDED RELEASE TRANSDERMAL at 08:20

## 2019-02-18 RX ADMIN — PANTOPRAZOLE SODIUM 40 MG: 40 TABLET, DELAYED RELEASE ORAL at 08:20

## 2019-02-18 RX ADMIN — SUCRALFATE 1 G: 1 SUSPENSION ORAL at 13:46

## 2019-02-18 RX ADMIN — MULTIPLE VITAMINS W/ MINERALS TAB 1 TABLET: TAB at 08:20

## 2019-02-18 RX ADMIN — HYDROXYZINE HYDROCHLORIDE 25 MG: 25 TABLET ORAL at 00:21

## 2019-02-18 RX ADMIN — Medication 100 MG: at 08:20

## 2019-02-18 RX ADMIN — SUCRALFATE 1 G: 1 SUSPENSION ORAL at 16:08

## 2019-02-18 RX ADMIN — FOLIC ACID 1 MG: 1 TABLET ORAL at 08:20

## 2019-02-18 RX ADMIN — SUCRALFATE 1 G: 1 SUSPENSION ORAL at 08:21

## 2019-02-18 ASSESSMENT — ACTIVITIES OF DAILY LIVING (ADL)
ORAL_HYGIENE: INDEPENDENT
HYGIENE/GROOMING: INDEPENDENT
DRESS: SCRUBS (BEHAVIORAL HEALTH)
LAUNDRY: WITH SUPERVISION

## 2019-02-18 ASSESSMENT — ENCOUNTER SYMPTOMS: DYSPHORIC MOOD: 0

## 2019-02-18 NOTE — DISCHARGE SUMMARY
Admit Date:     02/16/2019   Discharge Date:           More than 35 minutes spent on this discharge summary, doing the discharge history and physical examination, discharge medicines, and discharge mental status examination.       DISCHARGE DIAGNOSIS: Axis I: Alcohol use disorder, severe.      HOSPITAL COURSE:  Please see detailed admission note by Dr. Case on this patient for initial admission note on 02/17/2018.  During the hospitalization, the patient had been detoxed off alcohol using [facility] protocol on Valium.  He really has poor insight into his illness and is focused on discharging.  He is refusing medications.  During his hospitalization, the patient was seen by Austyn Gutiérrez PA-C, for Internal Medicine consult.  Please see detailed note on 02/17/2018.  The patient had lab work done, which shows a normal complete metabolic panel, blood urine nitrogen is 5, ALT is 164, AST is 142, glucose was 103.  WBC is 14.  During the hospitalization, the patient's energy, motivation, sleep and interest improved.  He did not have any suicidal or homicidal ideation, plan or intent.  Chlamydia, Trichomonas are all pending.  Started on BuSpar during his stay and gabapentin 100 mg 3 times a day.  The patient is anticipated to come out of detox.  He will be discharged.  Ideally, I would like for patient to do some form of treatment, but he says he is already in the treatment and he does not want to stay.  Because his liver enzymes are climbing up his naltrexone was stopped.  The patient's prognosis is guarded if he relapses.  He has very poor insight.  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            DISCHARGE MENTAL STATUS EXAMINATION:  The patient is alert, oriented x3.  Good fund of knowledge.  Good use of language.  Recent and remote memory, language, fund of knowledge are all adequate.  Euthymic mood congruent affect  Speech normal rate/rhythm linear tp  no loose asso,The patient does not have any active suicidal or homicidal ideation.  Does not have any auditory or visual hallucination.  Fair insight/judgment At this time, the patient was stable to be discharged.        Pt was not determined to not be a danger to himself or others. At the current time of discharge, the patient does not meet criteria for involuntary hospitalization. On the day of discharge, the patient reports that they do not have suicidal or homicidal ideation and would never hurt themselves or others. Steps taken to minimize risk include: assessing patient s behavior and thought process daily during hospital stay, discharging patient with adequate plan for follow up for mental and physical health and discussing safety plan of returning to the hospital should the patient ever have thoughts of harming themselves or others. Therefore, based on all available evidence including the factors cited above, the patient does not appear to be at imminent risk for self-harm, and is appropriate for outpatient level of care.     Educated about side effects/risk vs benefits /alternative including non treatment.Pt consented to be on medication.     .Total time spent on discharge summary more than 35 min  More than  20 min  planning, coordination of care, medication reconciliation and performance of physical exam on day of discharge.Care was coordinated with unit RN and unit therapist       Mohit Porter   Home Medication Instructions CHRISTIANNE:[id number]    Printed on:02/25/19 104   Medication Information                      busPIRone (BUSPAR) 5 MG tablet  Take 1 tablet (5 mg) by mouth 3 times daily             folic acid (FOLVITE) 1 MG tablet  Take 1 tablet (1 mg) by mouth daily             gabapentin (NEURONTIN) 100 MG capsule  Take 1 capsule (100 mg) by mouth 3 times daily             multivitamin w/minerals (THERA-VIT-M) tablet  Take 1 tablet by mouth daily             pantoprazole (PROTONIX) 40 MG EC  tablet  Take 1 tablet (40 mg) by mouth 2 times daily (before meals)             sucralfate (CARAFATE) 1 GM/10ML suspension  Take 10 mLs (1 g) by mouth 4 times daily (before meals and nightly)             vitamin B1 (THIAMINE) 100 MG tablet  Take 1 tablet (100 mg) by mouth daily             Disposition: Home     Rule 25 Assessment Follow-Up:  You will need a rule 25 to attend treatment. The following clinics offer walk-in assessments     [facility]  They have walk in assessments available M-F from 7am to 2:15 pm  [address] [place] [address]  Phone: [phone]  Walk-ins also available on Saturday mornings starting at 8am at [facility], [place] [address] [place]   from 8:00am-5:00pm   [address] [place] [address]  Phone: [phone]     Follow up appointment:     2019  4:30 PM CST  SHORT with Gerardo Rock MD  [facility] [address] [place] [id number] [phone]   Mar 04, 2019  4:30 PM CST  SHORT with Gerardo Rock MD  [facility] [address] [place] [id number] [phone]           ILANA HO MD             D: 2019   T: 2019   MT: TRINO      Name:     AMAYA GUSTAFSON   MRN:      [id number]        Account:        [id number]   :      1990           Admit Date:     2019                                  Discharge Date:       Document: [id number]

## 2019-02-18 NOTE — PROGRESS NOTES
Brief Internal Medicine Note, 2/18/19:    IM following up on labs.  LFTs continue to uptrend slightly in setting of alcohol abuse.  Recommend repeat in 1 week with PCP.        Shanika Madsen, [facility]  Hospitalist Service   Pager: [phone]

## 2019-02-18 NOTE — PLAN OF CARE
Behavioral Team Discussion: (2/18/2019)    Continued Stay Criteria/Rationale: Patient admitted for Chemical Use Issues.  Plan: The following services will be provided to the patient; psychiatric assessment, medication management, therapeutic milieu, individual and group support, and skills groups.   Participants: 3A Provider: Dr. Rena Lombardi MD; 3A RN's: Huseyin Olguin, RN; 3A CM's: Billie Malik.  Summary/Recommendation: Providers will assess today for treatment recommendations, discharge planning, and aftercare plans. CM will meet with pt for discharge planning.   Medical/Physical: Deferred (see medical notes).  Precautions:   Behavioral Orders   Procedures     Code 1 - Restrict to Unit     No NSAIDS     Routine Programming     As clinically indicated     Status 15     Every 15 minutes.     Withdrawal precautions     Rationale for change in precautions or plan: N/A  Progress: Initial.,

## 2019-02-18 NOTE — PROGRESS NOTES
Pt discharged to home with all belongings and medications.  Acknowledged understanding of all discharge instructions.Picked up by WERO, walked off unit by Warner LOPEZ.

## 2019-02-18 NOTE — PROGRESS NOTES
Pt again requested to leave this evening.  Pt instructed that medically he is not stable for discharge--he is hypertensive, he has gastric symptoms preventing him from eating solid foods; and he has several laboratory values that are abnormal and are being evaluated further by medicine.  Instructed patient that discharging him to a hotel room where he would again be alone would put him at extremely high risk for relapse and could further debilitate him physically.  Encouraged patient to meet with a  and explore treatment options.

## 2019-02-18 NOTE — PROGRESS NOTES
"[facility], [place]   Psychiatric Progress Note    Interim history   This is a 28 year old male with alcohol use dx severe .Pt seen in rounds.  overnight   Assessed pt for withdrawal as he seems to be exhibiting symptoms.  MSSA 14. Pt. Refused valium stating he would like to leave so he does not want to take medication       Patient's mood is anxious    Energy Level:LOW  Sleep:No concerns, sleeps well through night  Appetite:fair motivation interest improving    denied any Suicidal/homicidal ideation/plan intent.psychosis    Pt is in detox  Pt mssa score are monitered  Tolerating meds and has no side effects.              Medications:     Current Facility-Administered Medications   Medication     alum & mag hydroxide-simethicone (MYLANTA ES/MAALOX  ES) suspension 30 mL     atenolol (TENORMIN) tablet 50 mg     bisacodyl (DULCOLAX) Suppository 10 mg     busPIRone (BUSPAR) tablet 5 mg     diazepam (VALIUM) tablet 5-20 mg     folic acid (FOLVITE) tablet 1 mg     gabapentin (NEURONTIN) capsule 100 mg     hydrOXYzine (ATARAX) tablet 25 mg     magnesium hydroxide (MILK OF MAGNESIA) suspension 30 mL     multivitamin w/minerals (THERA-VIT-M) tablet 1 tablet     naltrexone (DEPADE;REVIA) half-tab 25 mg     nicotine (NICODERM CQ) 21 MG/24HR 24 hr patch 1 patch     nicotine (NICORETTE) gum 2-4 mg     nicotine Patch in Place     nicotine patch REMOVAL     ondansetron (ZOFRAN-ODT) ODT tab 4 mg     pantoprazole (PROTONIX) EC tablet 40 mg     sucralfate (CARAFATE) suspension 1 g     traZODone (DESYREL) tablet  mg     vitamin B1 (THIAMINE) tablet 100 mg             Allergies:   No Known Allergies         Psychiatric Examination:   Blood pressure (!) 138/95, pulse 89, temperature 98.7  F (37.1  C), resp. rate 16, height 1.778 m (5' 10\"), weight 76.2 kg (168 lb).  Weight is 168 lbs 0 oz  Body mass index is 24.11 kg/m .    Appearance:  awake, alert and adequately groomed  Attitude:  cooperative  Eye " Contact:  good  Mood:  anxious  Affect:  appropriate and in normal range and mood congruent  Speech:  clear, coherent rate /rhythm are good  Psychomotor Behavior:  no evidence of tardive dyskinesia, dystonia, or tics and intact station, gait and muscle tone  Throught Process:  logical  Associations:  no loose associations  Thought Content:  no evidence of suicidal ideation or homicidal ideation, no evidence of psychotic thought, no auditory hallucinations present and no visual hallucinations present  Insight:  limited  Judgement:  limited  Oriented to:  time, person, and place  Attention Span and Concentration:  intact  Recent and Remote Memory:  intact  Language fund of knowledge are adequate         Labs:     No results found for: NTBNPI, NTBNP  Lab Results   Component Value Date    WBC 14.0 (H) 02/18/2019    HGB 14.7 02/18/2019    HCT 44.8 02/18/2019    MCV 92 02/18/2019     02/18/2019     Lab Results   Component Value Date    TSH 2.26 02/17/2019              Dx alcohol use dx severe  Plan   Pt still scoring on mssa last night but refusing meds  Also had elevated k  Will continue to moniter on mssa on valium    Laboratory/Imaging: reviewed with patient   Consults: internal medicine consult reviewed  Patient will be treated in therapeutic milieu with appropriate individual and group therapies as described.      Medical diagnoses to be addressed this admission:    Plan:  LABORATORY DATA:  Urinalysis with trace of blood, 12 white blood cells, otherwise unremarkable.  Repeat CBC with improved white blood cell count of 13.5, hemoglobin 13.1.  Repeat potassium 3.0.  Total bilirubin 1.4, , .  Otherwise, rest of CBC and comprehensive metabolic panel unremarkable.  Lipid panel, TSH, and vitamin B12 levels normal.  GGT within normal limits.      IMPRESSION:   1.  Alcohol abuse and withdrawal, per Dr. Lombardi.   2.  Acute nausea, vomiting, with likely Tamera-Green tear.   3.  Marked leukocytosis,  resolving, and most likely secondary to stress leukemoid reaction related to his severe withdrawal.   4.  Resolving transaminitis, likely due to his heavy alcohol abuse prior to admission; however, not unusual 2:1 AST to ALT ratio, now with mild hyperbilirubinemia.   5.  Worsening hypokalemia, likely due to the patient admitting he had no p.o. food intake prior to admission.   6.  Mild microscopic pyuria of unclear etiology at this time.  Rule out UTI or sexually transmitted infection.      PLAN:  We will start Carafate 1 gram q.i.d., as well as Protonix 40 mg p.o. b.i.d., to treat likely severe alcohol-induced gastritis and likely Tamera-Green tear.  We will give patient potassium chloride 40 mEq every 4 hours x 2 doses with repeat CBC and comprehensive metabolic panel tomorrow a.m.  Will add on to labs already drawn a hepatitis C screen.  Will obtain a routine urine culture, gonorrhea and chlamydia screen.  Discontinue Tylenol.  No NSAIDs.  Will obtain a Nutrition consult given the patient's likely malnourished state and ongoing poor p.o. intake.  No further medical intervention indicated at this time.  The patient is medically stable and Medicine will continue to follow.  Please feel free to call with questions.         Thank you for the consultation.          Relevant psychosocial stressors: cps invlovement    Legal Status: voluntary    Safety Assessment:   Checks:  15 min  Precautions: withdrawal precautions  Pt has not required locked seclusion or restraints in the past 24 hours to maintain safety, please refer to RN documentation for further details.  Discussed with patient many issues of addiction,triggers, relapse, and establishing a solid recovery program.  Able to give informed consent:  YES   Discussed Risks/Benefits/Side Effects/Alternatives: YES    After discussion of the indications, risks, benefits, alternatives and consequences of no treatment, the patient elects to complete detox and docnsider  bree

## 2019-02-18 NOTE — PROGRESS NOTES
"     The patient was having a hard time sleeping. He was refusing meds from his nurse.         Around 4:45 am He and another patient in room 329-1  Started doing a jumping jigna in the hallway. Their attention was called and the other patient requested if they could do it somewhere. I told them that the lounge is close and will not be open till 6:00 am.         They were then told that they can have  a few minutes but, if the undersigned  finds another patient going in the lounge, They all have to go back to their rooms.          In less than 5 mins a patient came out of his room and went to the lounge where they were. Undersigned immediately sent them all back to their rooms.           The other patient wanted to do it in his (Mohit)  room and I said \"no\" because patients are not  allowed in other patient's room. They were not happy about it.            After a few minutes, the patient came out of his room and apologized. Undersigned apologized too for not allowing them to do what they wanted.   "

## 2019-02-18 NOTE — PROGRESS NOTES
Pt. Restless and awake all night. Pt. Anxious for discharge.  Pt seems to be minimizing withdrawal symptoms.  MSSA 7.  Pt stated he no longer wants valium because he would like to leave today.  Pt tachycardic (104) and hypertensive (150/ 83). Pt. Given atenolol and hydroxyzine.    Pt.awake, engaging in loud conversation, and attempting to exercise.  Assessed pt for withdrawal as he seems to be exhibiting symptoms.  MSSA 14. Pt. Refused valium stating he would like to leave so he does not want to take medication.

## 2019-02-20 LAB — HCV AB SERPL QL IA: NONREACTIVE

## 2019-04-07 ENCOUNTER — HOSPITAL ENCOUNTER (EMERGENCY)
Facility: CLINIC | Age: 29
Discharge: NURSING, REHAB OR LONG-TERM CARE | End: 2019-04-08
Attending: EMERGENCY MEDICINE | Admitting: EMERGENCY MEDICINE
Payer: MEDICAID

## 2019-04-07 DIAGNOSIS — K70.10 ALCOHOLIC HEPATITIS WITHOUT ASCITES (H): ICD-10-CM

## 2019-04-07 DIAGNOSIS — F10.920 ALCOHOLIC INTOXICATION WITHOUT COMPLICATION (H): ICD-10-CM

## 2019-04-07 DIAGNOSIS — E87.6 HYPOKALEMIA: ICD-10-CM

## 2019-04-07 PROCEDURE — 80320 DRUG SCREEN QUANTALCOHOLS: CPT | Performed by: EMERGENCY MEDICINE

## 2019-04-07 PROCEDURE — 80053 COMPREHEN METABOLIC PANEL: CPT | Performed by: EMERGENCY MEDICINE

## 2019-04-07 PROCEDURE — 85025 COMPLETE CBC W/AUTO DIFF WBC: CPT | Performed by: EMERGENCY MEDICINE

## 2019-04-07 PROCEDURE — 99285 EMERGENCY DEPT VISIT HI MDM: CPT | Mod: 25

## 2019-04-07 RX ORDER — SODIUM CHLORIDE 9 MG/ML
1000 INJECTION, SOLUTION INTRAVENOUS CONTINUOUS
Status: DISCONTINUED | OUTPATIENT
Start: 2019-04-07 | End: 2019-04-08 | Stop reason: HOSPADM

## 2019-04-07 RX ORDER — MULTIVITAMIN,THERAPEUTIC
1 TABLET ORAL ONCE
Status: COMPLETED | OUTPATIENT
Start: 2019-04-07 | End: 2019-04-08

## 2019-04-07 RX ORDER — LIDOCAINE 40 MG/G
CREAM TOPICAL
Status: DISCONTINUED | OUTPATIENT
Start: 2019-04-07 | End: 2019-04-08 | Stop reason: CLARIF

## 2019-04-07 RX ORDER — MAGNESIUM OXIDE 400 MG/1
800 TABLET ORAL ONCE
Status: COMPLETED | OUTPATIENT
Start: 2019-04-07 | End: 2019-04-08

## 2019-04-07 RX ORDER — LANOLIN ALCOHOL/MO/W.PET/CERES
100 CREAM (GRAM) TOPICAL ONCE
Status: COMPLETED | OUTPATIENT
Start: 2019-04-07 | End: 2019-04-08

## 2019-04-07 RX ORDER — FOLIC ACID 1 MG/1
1 TABLET ORAL ONCE
Status: COMPLETED | OUTPATIENT
Start: 2019-04-07 | End: 2019-04-08

## 2019-04-07 ASSESSMENT — MIFFLIN-ST. JEOR: SCORE: 1743.29

## 2019-04-07 NOTE — ED AVS SNAPSHOT
Emergency Department  [address] [place] [id number]  Phone:  [phone]  Fax:  [phone]                                    Mohit Porter   MRN: [id number]    Department:   Emergency Department   Date of Visit:  4/7/2019           After Visit Summary Signature Page    I have received my discharge instructions, and my questions have been answered. I have discussed any challenges I see with this plan with the nurse or doctor.    ..........................................................................................................................................  Patient/Patient Representative Signature      ..........................................................................................................................................  Patient Representative Print Name and Relationship to Patient    ..................................................               ................................................  Date                                   Time    ..........................................................................................................................................  Reviewed by Signature/Title    ...................................................              ..............................................  Date                                               Time          22EPIC Rev 08/18

## 2019-04-08 VITALS
BODY MASS INDEX: 24.05 KG/M2 | SYSTOLIC BLOOD PRESSURE: 134 MMHG | WEIGHT: 168 LBS | TEMPERATURE: 98.5 F | DIASTOLIC BLOOD PRESSURE: 98 MMHG | HEIGHT: 70 IN | RESPIRATION RATE: 22 BRPM | OXYGEN SATURATION: 98 % | HEART RATE: 82 BPM

## 2019-04-08 LAB
ALBUMIN SERPL-MCNC: 4.3 G/DL (ref 3.4–5)
ALP SERPL-CCNC: 82 U/L (ref 40–150)
ALT SERPL W P-5'-P-CCNC: 546 U/L (ref 0–70)
AMPHETAMINES UR QL SCN: NEGATIVE
ANION GAP SERPL CALCULATED.3IONS-SCNC: 11 MMOL/L (ref 3–14)
ANION GAP SERPL CALCULATED.3IONS-SCNC: 7 MMOL/L (ref 3–14)
ANION GAP SERPL CALCULATED.3IONS-SCNC: NORMAL MMOL/L (ref 6–17)
AST SERPL W P-5'-P-CCNC: 364 U/L (ref 0–45)
BARBITURATES UR QL: NEGATIVE
BASOPHILS # BLD AUTO: 0 10E9/L (ref 0–0.2)
BASOPHILS NFR BLD AUTO: 0.1 %
BENZODIAZ UR QL: NEGATIVE
BILIRUB SERPL-MCNC: 0.6 MG/DL (ref 0.2–1.3)
BUN SERPL-MCNC: 4 MG/DL (ref 7–30)
BUN SERPL-MCNC: 4 MG/DL (ref 7–30)
BUN SERPL-MCNC: NORMAL MG/DL (ref 7–30)
CALCIUM SERPL-MCNC: 7.7 MG/DL (ref 8.5–10.1)
CALCIUM SERPL-MCNC: 8.2 MG/DL (ref 8.5–10.1)
CALCIUM SERPL-MCNC: NORMAL MG/DL (ref 8.5–10.1)
CANNABINOIDS UR QL SCN: NEGATIVE
CHLORIDE SERPL-SCNC: 96 MMOL/L (ref 94–109)
CHLORIDE SERPL-SCNC: 99 MMOL/L (ref 94–109)
CHLORIDE SERPL-SCNC: NORMAL MMOL/L (ref 94–109)
CO2 SERPL-SCNC: 32 MMOL/L (ref 20–32)
CO2 SERPL-SCNC: 33 MMOL/L (ref 20–32)
CO2 SERPL-SCNC: NORMAL MMOL/L (ref 20–32)
COCAINE UR QL: NEGATIVE
CREAT SERPL-MCNC: 0.73 MG/DL (ref 0.66–1.25)
CREAT SERPL-MCNC: 0.75 MG/DL (ref 0.66–1.25)
CREAT SERPL-MCNC: NORMAL MG/DL (ref 0.66–1.25)
DIFFERENTIAL METHOD BLD: NORMAL
EOSINOPHIL # BLD AUTO: 0 10E9/L (ref 0–0.7)
EOSINOPHIL NFR BLD AUTO: 0.1 %
ERYTHROCYTE [DISTWIDTH] IN BLOOD BY AUTOMATED COUNT: 13.9 % (ref 10–15)
ETHANOL SERPL-MCNC: 0.49 G/DL
GFR SERPL CREATININE-BSD FRML MDRD: >90 ML/MIN/{1.73_M2}
GFR SERPL CREATININE-BSD FRML MDRD: >90 ML/MIN/{1.73_M2}
GFR SERPL CREATININE-BSD FRML MDRD: NORMAL ML/MIN/{1.73_M2}
GLUCOSE SERPL-MCNC: 101 MG/DL (ref 70–99)
GLUCOSE SERPL-MCNC: 130 MG/DL (ref 70–99)
GLUCOSE SERPL-MCNC: NORMAL MG/DL (ref 70–99)
HCT VFR BLD AUTO: 47.5 % (ref 40–53)
HGB BLD-MCNC: 17.3 G/DL (ref 13.3–17.7)
IMM GRANULOCYTES # BLD: 0 10E9/L (ref 0–0.4)
IMM GRANULOCYTES NFR BLD: 0.3 %
INTERPRETATION ECG - MUSE: NORMAL
LYMPHOCYTES # BLD AUTO: 1.6 10E9/L (ref 0.8–5.3)
LYMPHOCYTES NFR BLD AUTO: 21.6 %
MCH RBC QN AUTO: 32 PG (ref 26.5–33)
MCHC RBC AUTO-ENTMCNC: 36.4 G/DL (ref 31.5–36.5)
MCV RBC AUTO: 88 FL (ref 78–100)
MONOCYTES # BLD AUTO: 0.5 10E9/L (ref 0–1.3)
MONOCYTES NFR BLD AUTO: 7 %
NEUTROPHILS # BLD AUTO: 5.4 10E9/L (ref 1.6–8.3)
NEUTROPHILS NFR BLD AUTO: 70.9 %
NRBC # BLD AUTO: 0 10*3/UL
NRBC BLD AUTO-RTO: 0 /100
OPIATES UR QL SCN: NEGATIVE
PCP UR QL SCN: NEGATIVE
PLATELET # BLD AUTO: 232 10E9/L (ref 150–450)
POTASSIUM SERPL-SCNC: 2.9 MMOL/L (ref 3.4–5.3)
POTASSIUM SERPL-SCNC: 3.1 MMOL/L (ref 3.4–5.3)
POTASSIUM SERPL-SCNC: NORMAL MMOL/L (ref 3.4–5.3)
PROT SERPL-MCNC: 7.6 G/DL (ref 6.8–8.8)
RBC # BLD AUTO: 5.4 10E12/L (ref 4.4–5.9)
SODIUM SERPL-SCNC: 138 MMOL/L (ref 133–144)
SODIUM SERPL-SCNC: 140 MMOL/L (ref 133–144)
SODIUM SERPL-SCNC: NORMAL MMOL/L (ref 133–144)
WBC # BLD AUTO: 7.6 10E9/L (ref 4–11)

## 2019-04-08 PROCEDURE — 96361 HYDRATE IV INFUSION ADD-ON: CPT

## 2019-04-08 PROCEDURE — 96366 THER/PROPH/DIAG IV INF ADDON: CPT

## 2019-04-08 PROCEDURE — 25800030 ZZH RX IP 258 OP 636: Performed by: EMERGENCY MEDICINE

## 2019-04-08 PROCEDURE — 25000132 ZZH RX MED GY IP 250 OP 250 PS 637: Performed by: EMERGENCY MEDICINE

## 2019-04-08 PROCEDURE — 93005 ELECTROCARDIOGRAM TRACING: CPT

## 2019-04-08 PROCEDURE — 25000128 H RX IP 250 OP 636: Performed by: EMERGENCY MEDICINE

## 2019-04-08 PROCEDURE — 96365 THER/PROPH/DIAG IV INF INIT: CPT

## 2019-04-08 PROCEDURE — 80307 DRUG TEST PRSMV CHEM ANLYZR: CPT | Performed by: EMERGENCY MEDICINE

## 2019-04-08 PROCEDURE — 80048 BASIC METABOLIC PNL TOTAL CA: CPT | Performed by: EMERGENCY MEDICINE

## 2019-04-08 RX ORDER — TRAZODONE HYDROCHLORIDE 100 MG/1
100 TABLET ORAL AT BEDTIME
COMMUNITY
End: 2019-07-28

## 2019-04-08 RX ORDER — POTASSIUM CHLORIDE 1.5 G/1.58G
40 POWDER, FOR SOLUTION ORAL ONCE
Status: COMPLETED | OUTPATIENT
Start: 2019-04-08 | End: 2019-04-08

## 2019-04-08 RX ORDER — DIAZEPAM 5 MG
10 TABLET ORAL ONCE
Status: COMPLETED | OUTPATIENT
Start: 2019-04-08 | End: 2019-04-08

## 2019-04-08 RX ORDER — MAGNESIUM SULFATE HEPTAHYDRATE 40 MG/ML
2 INJECTION, SOLUTION INTRAVENOUS ONCE
Status: COMPLETED | OUTPATIENT
Start: 2019-04-08 | End: 2019-04-08

## 2019-04-08 RX ORDER — POTASSIUM CHLORIDE 1.5 G/1.58G
20 POWDER, FOR SOLUTION ORAL ONCE
Status: COMPLETED | OUTPATIENT
Start: 2019-04-08 | End: 2019-04-08

## 2019-04-08 RX ORDER — POTASSIUM CHLORIDE 1500 MG/1
20 TABLET, EXTENDED RELEASE ORAL DAILY
Qty: 3 TABLET | Refills: 0 | Status: ON HOLD | OUTPATIENT
Start: 2019-04-08 | End: 2019-08-06

## 2019-04-08 RX ORDER — POTASSIUM CHLORIDE 1500 MG/1
20 TABLET, EXTENDED RELEASE ORAL ONCE
Status: COMPLETED | OUTPATIENT
Start: 2019-04-08 | End: 2019-04-08

## 2019-04-08 RX ORDER — DIAZEPAM 5 MG
5 TABLET ORAL ONCE
Status: COMPLETED | OUTPATIENT
Start: 2019-04-08 | End: 2019-04-08

## 2019-04-08 RX ORDER — TRAZODONE HYDROCHLORIDE 100 MG/1
100 TABLET ORAL AT BEDTIME
Qty: 3 TABLET | Refills: 0 | Status: SHIPPED | OUTPATIENT
Start: 2019-04-08 | End: 2019-07-28

## 2019-04-08 RX ADMIN — FOLIC ACID 1 MG: 1 TABLET ORAL at 00:18

## 2019-04-08 RX ADMIN — POTASSIUM CHLORIDE 40 MEQ: 1.5 POWDER, FOR SOLUTION ORAL at 08:11

## 2019-04-08 RX ADMIN — DIAZEPAM 5 MG: 5 TABLET ORAL at 11:20

## 2019-04-08 RX ADMIN — DIAZEPAM 10 MG: 5 TABLET ORAL at 07:55

## 2019-04-08 RX ADMIN — MAGNESIUM OXIDE TAB 400 MG (241.3 MG ELEMENTAL MG) 800 MG: 400 (241.3 MG) TAB at 00:18

## 2019-04-08 RX ADMIN — POTASSIUM CHLORIDE 20 MEQ: 1.5 POWDER, FOR SOLUTION ORAL at 01:18

## 2019-04-08 RX ADMIN — MAGNESIUM SULFATE HEPTAHYDRATE 2 G: 40 INJECTION, SOLUTION INTRAVENOUS at 01:18

## 2019-04-08 RX ADMIN — POTASSIUM CHLORIDE 20 MEQ: 1500 TABLET, EXTENDED RELEASE ORAL at 06:10

## 2019-04-08 RX ADMIN — Medication 100 MG: at 00:18

## 2019-04-08 RX ADMIN — THERA TABS 1 TABLET: TAB at 00:18

## 2019-04-08 RX ADMIN — SODIUM CHLORIDE 1000 ML: 9 INJECTION, SOLUTION INTRAVENOUS at 00:18

## 2019-04-08 RX ADMIN — SODIUM CHLORIDE 1000 ML: 9 INJECTION, SOLUTION INTRAVENOUS at 06:10

## 2019-04-08 ASSESSMENT — ENCOUNTER SYMPTOMS: ABDOMINAL PAIN: 0

## 2019-04-08 NOTE — ED NOTES
DATE:  4/8/2019   TIME OF RECEIPT FROM LAB:  0034  LAB TEST:  ALT  LAB VALUE:  546  RESULTS GIVEN WITH READ-BACK TO (PROVIDER):  Data Unavailable  TIME LAB VALUE REPORTED TO PROVIDER:

## 2019-04-08 NOTE — ED NOTES
Video Observation initiated, patient informed.   Limited understanding due to alcohol intoxication.    Vita Durán RN

## 2019-04-08 NOTE — ED PROVIDER NOTES
ED Behavioral Health Handoff Note:       Brief HPI:  This is a 28 year old male signed out to me by Dr. Trierweiler.  See initial ED Provider note for details of the presentation.     Briefly the patient presented with EtOH intoxication in the setting of alcohol abuse. Labs revealed hypokalemia. Potassium and magnesium repletion given. Repeat BMP pending. Patient's clinical trajectory improving. Plan for ultimate discharge to detox.    The patient has not required medication for agitation.      Exam:   Temp:  [98.5  F (36.9  C)] 98.5  F (36.9  C)  Pulse:  [] 106  Heart Rate:  [] 98  Resp:  [9-18] 11  BP: (111-142)/() 116/85  SpO2:  [94 %-100 %] 100 %  Awake alert and oriented.  No tremor.  Patient follows commands appropriately.  Speech is nonslurred.  Patient moves all extremity spontaneously.  Respirations are even and unlabored.  Patient is calm and cooperative.    ED Course:  7:55 AM - Patient rechecked. Patient feeling anxious and is mildly tachycardic. No significant tremor. Valium given. Repeat potassium 3.1 up from 2.9 will give additional PO repletion. Patient remains agreeable to detox placement. Will continue PO potassium supplementation as outpatient.      There were no significant events while under my care.            Impression:    ICD-10-CM    1. Alcoholic intoxication without complication (H) F10.920 Basic metabolic panel   2. Hypokalemia E87.6    3. Alcoholic hepatitis without ascites K70.10        Disposition:    Discharged to Detox with transportation via [facility], [place].      RESULTS:   Results for orders placed or performed during the hospital encounter of 04/07/19 (from the past 24 hour(s))   CBC with platelets differential     Status: None    Collection Time: 04/07/19 11:30 PM   Result Value Ref Range    WBC 7.6 4.0 - 11.0 10e9/L    RBC Count 5.40 4.4 - 5.9 10e12/L    Hemoglobin 17.3 13.3 - 17.7 g/dL    Hematocrit 47.5 40.0 - 53.0 %    MCV 88 78 - 100 fl    MCH 32.0 26.5 - 33.0  pg    MCHC 36.4 31.5 - 36.5 g/dL    RDW 13.9 10.0 - 15.0 %    Platelet Count 232 150 - 450 10e9/L    Diff Method Automated Method     % Neutrophils 70.9 %    % Lymphocytes 21.6 %    % Monocytes 7.0 %    % Eosinophils 0.1 %    % Basophils 0.1 %    % Immature Granulocytes 0.3 %    Nucleated RBCs 0 0 /100    Absolute Neutrophil 5.4 1.6 - 8.3 10e9/L    Absolute Lymphocytes 1.6 0.8 - 5.3 10e9/L    Absolute Monocytes 0.5 0.0 - 1.3 10e9/L    Absolute Eosinophils 0.0 0.0 - 0.7 10e9/L    Absolute Basophils 0.0 0.0 - 0.2 10e9/L    Abs Immature Granulocytes 0.0 0 - 0.4 10e9/L    Absolute Nucleated RBC 0.0    Comprehensive metabolic panel     Status: Abnormal    Collection Time: 04/07/19 11:30 PM   Result Value Ref Range    Sodium 140 133 - 144 mmol/L    Potassium 2.9 (L) 3.4 - 5.3 mmol/L    Chloride 96 94 - 109 mmol/L    Carbon Dioxide 33 (H) 20 - 32 mmol/L    Anion Gap 11 3 - 14 mmol/L    Glucose 130 (H) 70 - 99 mg/dL    Urea Nitrogen 4 (L) 7 - 30 mg/dL    Creatinine 0.75 0.66 - 1.25 mg/dL    GFR Estimate >90 >60 mL/min/[1.73_m2]    GFR Estimate If Black >90 >60 mL/min/[1.73_m2]    Calcium 8.2 (L) 8.5 - 10.1 mg/dL    Bilirubin Total 0.6 0.2 - 1.3 mg/dL    Albumin 4.3 3.4 - 5.0 g/dL    Protein Total 7.6 6.8 - 8.8 g/dL    Alkaline Phosphatase 82 40 - 150 U/L     (HH) 0 - 70 U/L     (H) 0 - 45 U/L   Alcohol level blood     Status: Abnormal    Collection Time: 04/07/19 11:30 PM   Result Value Ref Range    Ethanol g/dL 0.49 (HH) <0.01 g/dL   EKG 12 lead     Status: None    Collection Time: 04/08/19  1:22 AM   Result Value Ref Range    Interpretation ECG Click View Image link to view waveform and result    Basic metabolic panel     Status: None    Collection Time: 04/08/19  6:48 AM   Result Value Ref Range    Sodium Canceled, Test credited 133 - 144 mmol/L    Potassium Canceled, Test credited 3.4 - 5.3 mmol/L    Chloride Canceled, Test credited 94 - 109 mmol/L    Carbon Dioxide Canceled, Test credited 20 - 32 mmol/L     Anion Gap Canceled, Test credited 6 - 17 mmol/L    Glucose Canceled, Test credited 70 - 99 mg/dL    Urea Nitrogen Canceled, Test credited 7 - 30 mg/dL    Creatinine Canceled, Test credited 0.66 - 1.25 mg/dL    GFR Estimate Canceled, Test credited >60 mL/min/[1.73_m2]    GFR Estimate If Black Canceled, Test credited >60 mL/min/[1.73_m2]    Calcium Canceled, Test credited 8.5 - 10.1 mg/dL             William Brra MD  04/08/19 0644

## 2019-04-08 NOTE — ED NOTES
DATE:  4/8/2019   TIME OF RECEIPT FROM LAB:  0034  LAB TEST:  alcohol  LAB VALUE:  0.49  RESULTS GIVEN WITH READ-BACK TO (PROVIDER):  Trierweiler, Chad A, MD  TIME LAB VALUE REPORTED TO PROVIDER:

## 2019-04-08 NOTE — DISCHARGE INSTRUCTIONS
Your liver function tests are abnormal which is a sign of alcoholic hepatitis.  The amount of alcohol you are drinking is damaging your liver.  You must stop drinking to prevent further damage to your liver.  If you continue to drink heavily this may progress to severe liver disease, liver failure and can ultimately cause death. If you stop drinking the liver will most likely heal. Your primary care physician should recheck your liver function tests in 2-3 weeks.    Discharge Instructions  Alcohol Intoxication    You have been seen today with alcohol intoxication. This means that you have enough alcohol in your system to impair your ability to mentally and physically function, perhaps to the extent that you were unable to care for yourself.    Generally, every Emergency Department visit should have a follow-up clinic visit with either a primary or a specialty clinic/provider. Please follow-up as instructed by your emergency provider today.    You may have come to the Emergency Department because of your intoxication, or for another reason, such as because of an injury. No matter what the case is, this visit is a ?red flag? regarding alcohol use, and you should consider whether your drinking pattern is a problem for you.     You may be at risk for alcohol-related problems if:    Men: you drink more than 14 drinks per week, or more than 4 drinks per occasion.    Women: you drink more than 7 drinks per week or more than 3 drinks per occasion.    You have black-outs.  You do things you regret while drinking.  You have legal problems because of drinking.  You have job problems because of drinking (you call in sick to work because of drinking).    CAGE Questions  Have you ever felt you should cut down on your drinking?  Have people annoyed you by criticizing your drinking?  Have you ever felt bad or guilty about your drinking?  Have you ever had a drink first thing in the morning to steady your nerves or get rid of a  hangover (eye opener)?    If you answer yes to any of the CAGE questions, you may have a problem with alcohol.      Return to the Emergency Department if:  You become shaky or tremble when you try to stop drinking.   You have severe abdominal pain (belly pain).   You have a seizure or pass out.    You vomit (throw up) blood or have blood in your stool. This may be bright red or it may look like black coffee grounds.  You become lightheaded or faint.      For further help, contact:   Your caregiver.    Alcoholics Anonymous (AA).    [facility]: [phone]  [facility] Central Office: [phone]   A drug or alcohol rehabilitation program.    You can get information on alcohol resources and groups by calling the number [phone] or [phone] on any phone.     Seek medical care if:  You have persistent vomiting.   You have persistent pain in any part of your body.    You do not feel better after a few days.    If you were given a prescription for medicine here today, be sure to read all of the information (including the package insert) that comes with your prescription.  This will include important information about the medicine, its side effects, and any warnings that you need to know about.  The pharmacist who fills the prescription can provide more information and answer questions you may have about the medicine.  If you have questions or concerns that the pharmacist cannot address, please call or return to the Emergency Department.   Remember that you can always come back to the Emergency Department if you are not able to see your regular doctor in the amount of time listed above, if you get any new symptoms, or if there is anything that worries you.

## 2019-04-08 NOTE — PHARMACY-ADMISSION MEDICATION HISTORY
Admission medication history interview status for the 4/7/2019  admission is complete. See EPIC admission navigator for prior to admission medications     Medication history source reliability:Good    Actions taken by pharmacist (provider contacted, etc):None     Additional medication history information not noted on PTA med list :None    Medication reconciliation/reorder completed by provider prior to medication history? No    Time spent in this activity: 15 minutes    Prior to Admission medications    Medication Sig Last Dose Taking? Auth Provider   traZODone (DESYREL) 100 MG tablet Take 100 mg by mouth At Bedtime Past Week at Unknown time Yes Unknown, Entered By History

## 2019-04-08 NOTE — ED PROVIDER NOTES
"  History     Chief Complaint:  Alcohol intoxication     HPI   Mohit Porter is a 28 year old male who presents with alcohol intoxication. The patient was called on for a wellfare check by friends earlier today. He was found in his hotel room by police. The patient was intoxicated and naked in bed saturated in his own urine. Due to concern, the patient has been taken to the ED for evaluation and treatment. Upon arrival, EMS states the patient was surrounded by 5 1.75 liter bottles of vodka. The patient has been drinking for the past 5 days. The patient also states he is in a \"bad place.\" He is concerned for going to residential. He denies any abdominal pain or use of drugs.    Allergies:  The patient has no known drug allergies.    Medications:    neurontin     Past Medical History:    Alcohol withdrawal  Alcohol abuse  Anxiety     Past Surgical History:    The patient does not have any pertinent past surgical history.    Family History:    No past pertinent family history.    Social History:  Marital Status:  Single   Smoker:   Never   Smokeless:   Current   Alcohol:   Yes   Drugs:   No     Review of Systems   Unable to perform ROS: Mental status change   Gastrointestinal: Negative for abdominal pain.     Physical Exam     Patient Vitals for the past 24 hrs:   BP Temp Temp src Pulse Heart Rate Resp SpO2 Height Weight   04/08/19 0600 116/85 -- -- 106 98 11 100 % -- --   04/08/19 0530 111/80 -- -- 73 83 12 100 % -- --   04/08/19 0500 111/75 -- -- 85 93 9 99 % -- --   04/08/19 0430 126/87 -- -- 81 80 13 98 % -- --   04/08/19 0400 (!) 125/95 -- -- 101 94 13 99 % -- --   04/08/19 0330 122/85 -- -- 84 82 12 98 % -- --   04/08/19 0310 (!) 127/97 -- -- 81 81 14 98 % -- --   04/08/19 0300 (!) 127/97 -- -- 104 80 13 98 % -- --   04/08/19 0200 (!) 124/97 -- -- 96 93 15 98 % -- --   04/08/19 0100 -- -- -- 96 105 12 94 % -- --   04/08/19 0015 -- -- -- -- 112 9 96 % -- --   04/08/19 0000 (!) 138/104 -- -- -- -- -- -- -- -- " "  04/07/19 2345 (!) 139/101 -- -- -- -- -- 96 % -- --   04/07/19 2338 -- 98.5  F (36.9  C) -- -- -- -- -- -- --   04/07/19 2332 (!) 142/110 -- Oral 105 -- 18 96 % 1.778 m (5' 10\") 76.2 kg (168 lb)   04/07/19 2330 (!) 142/110 -- -- 115 -- -- 95 % -- --     Physical Exam  Eye:  Pupils are equal, round, and reactive.  Extraocular movements intact.    ENT:  No rhinorrhea.  Moist mucus membranes.  Normal tongue and tonsil.    Cardiac:  Tachycardic rate and regular rhythm.  No murmurs, gallops, or rubs.    Pulmonary:  Clear to auscultation bilaterally.  No wheezes, rales, or rhonchi.    Abdomen:  Positive bowel sounds.  Abdomen is soft and non-distended, without focal tenderness.    Musculoskeletal:  Normal movement of all extremities without evidence for deficit.    Skin:  Warm and dry without rashes.    Neurologic:  Non-focal exam without asymmetric weakness or numbness.     Psychiatric:  The patient is markedly intoxicated. He is tearful and fearful of going assisted.    Emergency Department Course   ECG:  Indication: alcohol intoxication   Time: 0122  Vent. Rate 99 bpm. HI interval 154. QRS duration 86. QT/QTc 372/477. P-R-T axis 44 60 38. Normal sinus rhythm. Normal ECG. Read time: 0129    Laboratory:  Alcohol level blood: 0.49  CBC: WBC: 7.6, HGB: 17.3, PLT: 232  CMP: Glucose 130, potassium 2.9, carbon dioxide 33, urea nitrogen 4, calcium 8.2, , , o/w WNL (Creatinine: 0.75)    Interventions:  0018: NS 1L IV Bolus   0018: Magnesium oxide 800 mg PO  0018: Folvite 1 tablet PO  0018: multivitamin 1 tablet  0118: Potassium chloride 20 mEq PO  0118: Magnesium sulfate 2 g in NS IV Infusion      Emergency Department Course:  (2330) I performed an exam of the patient as documented above.     (0126) I rechecked the patient and discussed the results of their workup thus far.   Th patient was signed out to the oncoming physician.    Impression & Plan    Medical Decision Making:  This very unfortunate 28-year-old " "alcoholic presents to us in a severely intoxicated state when his friends were worried that they had not heard from him for several days.  They found that he had checked into a local hotel and has been drinking 1.75 L of vodka each day for the last 5 days.  He was found in his bed soaked in urine and unable to care for himself.    On my initial assessment, the patient was severely intoxicated with a level of 0.49, unable to provide much of a history.  An IV was established and fluids were initiated.  He was given a standard rally pack of vitamins.  Laboratory investigation shows evidence of an alcoholic hepatitis and evidence of hypokalemia.  He was given both potassium and magnesium here, and with fluid boluses, his vital signs improved significantly.  A recheck of his BMP is pending at the time of this dictation.    At the end of my shift, I reassessed the patient.  He is sobering appropriately, but is still too intoxicated to be able to form a final disposition.  Therefore he will be signed out to the oncoming emergency physician to determine final disposition.  Reviewing his prior admission for alcohol intoxication and withdrawal approximately 2 months ago, he refused any chemical dependency help and he freely admits that he started drinking again as soon as he left the hospital.  He suffers from significant anxiety as well, but does not consistently follow up with psychiatry or take medications on a regular basis.  I went over these issues with him and he admits that \"I am a screw up\" but he states that \"I am ready for help this time.\"  If his metabolic profile improves and he garertt appropriately, I believe that the best course of action would be to have him transferred to a local detox center where he can get help through the detox portion of his care followed by discussion with counselors there to get him into an inpatient or outpatient treatment program.  When asking him about his depression, he admits that " "\"it is bad\" but he denies being actively suicidal at this time.  Clearly this is going to need to be reviewed more over the next 2 or 3 hours as he garrett further.  I am concerned that if he stays in the emergency department for an extended period of time that he is very likely to go into withdrawal and may find himself medically admitted, something that he states that he would like to avoid if possible.    The oncoming physician will perform a sober reevaluation in the next several hours with plans for probable transfer to a local detox center if there are beds available.  Otherwise, once he is clinically sober he certainly could be discharged to the street if that is his desire.    Diagnosis:    ICD-10-CM    1. Alcoholic intoxication without complication (H) F10.920 Basic metabolic panel   2. Hypokalemia E87.6    3. Alcoholic hepatitis without ascites K70.10        Disposition:  The patient will be signed out to the oncoming physician, Dr. Sneed.    Scribe Disclosure:  I, Luca Delatorre, am serving as a scribe on 4/7/2019 at 11:30 PM to personally document services performed by Trierweiler, Chad A, MD based on my observations and the provider's statements to me.     Luca Delatorre  4/7/2019    EMERGENCY DEPARTMENT       Trierweiler, Chad A, MD  04/08/19 0701    "

## 2019-04-08 NOTE — ED NOTES
Bed: ED17  Expected date: 4/7/19  Expected time: 11:20 PM  Means of arrival: Ambulance  Comments:  [address] 28M intoxicated

## 2019-04-08 NOTE — ED NOTES
"EMS arrival:    Patient drinking vodka for last 4-5days.  \"Drinking in sorrow\".   Family, job, girlfriend issues.  Patient was found by friends at [place].  Pretty much comatose & not responding to officers.  Naked in bed & saturated in urine.  Takes trazodone in past medical hx.   didn't see any evidence of drug use in room.  .   HR in the 130s.  EMS started IV & NS bolus started.   "

## 2019-04-11 ENCOUNTER — HOSPITAL ENCOUNTER (EMERGENCY)
Facility: CLINIC | Age: 29
Discharge: OTHER INSTITUTION | End: 2019-04-11
Attending: EMERGENCY MEDICINE | Admitting: EMERGENCY MEDICINE
Payer: MEDICAID

## 2019-04-11 VITALS
TEMPERATURE: 97.4 F | WEIGHT: 175 LBS | OXYGEN SATURATION: 95 % | RESPIRATION RATE: 18 BRPM | SYSTOLIC BLOOD PRESSURE: 131 MMHG | DIASTOLIC BLOOD PRESSURE: 94 MMHG | HEIGHT: 70 IN | BODY MASS INDEX: 25.05 KG/M2 | HEART RATE: 93 BPM

## 2019-04-11 DIAGNOSIS — F32.A DEPRESSION, UNSPECIFIED DEPRESSION TYPE: ICD-10-CM

## 2019-04-11 DIAGNOSIS — F10.220 ACUTE ALCOHOLIC INTOXICATION IN ALCOHOLISM WITHOUT COMPLICATION (H): ICD-10-CM

## 2019-04-11 PROCEDURE — 25000132 ZZH RX MED GY IP 250 OP 250 PS 637: Performed by: EMERGENCY MEDICINE

## 2019-04-11 PROCEDURE — 99285 EMERGENCY DEPT VISIT HI MDM: CPT

## 2019-04-11 RX ORDER — OLANZAPINE 10 MG/1
10 TABLET, ORALLY DISINTEGRATING ORAL ONCE
Status: COMPLETED | OUTPATIENT
Start: 2019-04-11 | End: 2019-04-11

## 2019-04-11 RX ADMIN — OLANZAPINE 10 MG: 10 TABLET, ORALLY DISINTEGRATING ORAL at 15:46

## 2019-04-11 ASSESSMENT — MIFFLIN-ST. JEOR: SCORE: 1775.04

## 2019-04-11 ASSESSMENT — ENCOUNTER SYMPTOMS
ABDOMINAL PAIN: 0
FEVER: 0
HEADACHES: 0
SHORTNESS OF BREATH: 0

## 2019-04-11 NOTE — ED NOTES
I have performed an in person assessment of the patient. Based on this assessment the patient no longer requires a one on one attendant at this point in time.    Wilfrido Gunter MD  3:43 PM  April 11, 2019           Wilfrido Gunter MD  04/11/19 1541

## 2019-04-11 NOTE — DISCHARGE INSTRUCTIONS
Discharge Instructions  Mental Health Concerns    You were seen today for mental health concerns, such as depression, anxiety, or suicidal thinking. Your provider feels that you do not require hospitalization at this time. However, your symptoms may become worse, and you may need to return to the Emergency Department. Most treatments of depression and suicidal thoughts are a process rather than a single intervention.  Medications and counseling can take several weeks or more to help.    Generally, every Emergency Department visit should have a follow-up clinic visit with either a primary or a specialty clinic/provider. Please follow-up as instructed by your emergency provider today.    By accepting these discharge instructions:  You promise to not harm yourself or others.  You agree that if you feel you are becoming unable to keep that promise, you will do something to help yourself before you do anything to harm yourself or others.   You agree to keep any safety plan arranged on your visit here today.  You agree to take any medication prescribed or recommended by your provider.  If you are getting worse, you can contact a friend or a family member, contact your counselor or family provider, contact a crisis line, or other options discussed with the provider or therapist today.  At any time, you can call 911 and return to the Emergency Department for more help.  You understand that follow-up is essential to your treatment, and you will make and keep appointments recommended on your visit today.    How to improve your mental health and prevent suicide:  Involve others by letting family, friends, counselors know.  Do not isolate yourself.  Avoid alcohol or drugs. Remove weapons, poisons from your home.  Try to stick to routines for eating, sleeping and getting regular exercise.    Try to get into sunlight. Bright natural light not only treats seasonal affective disorder but also depression.  Increase safe activities  that you enjoy.    If you feel worse, contact 1-800-suicide (1-422.639.4013), or call 911, or your primary provider/counselor for additional assistance.    If you were given a prescription for medicine here today, be sure to read all of the information (including the package insert) that comes with your prescription.  This will include important information about the medicine, its side effects, and any warnings that you need to know about.  The pharmacist who fills the prescription can provide more information and answer questions you may have about the medicine.  If you have questions or concerns that the pharmacist cannot address, please call or return to the Emergency Department.   Remember that you can always come back to the Emergency Department if you are not able to see your regular provider in the amount of time listed above, if you get any new symptoms, or if there is anything that worries you.      Discharge Instructions  Alcohol Intoxication    You have been seen today with alcohol intoxication. This means that you have enough alcohol in your system to impair your ability to mentally and physically function, perhaps to the extent that you were unable to care for yourself.    Generally, every Emergency Department visit should have a follow-up clinic visit with either a primary or a specialty clinic/provider. Please follow-up as instructed by your emergency provider today.    You may have come to the Emergency Department because of your intoxication, or for another reason, such as because of an injury. No matter what the case is, this visit is a ?red flag? regarding alcohol use, and you should consider whether your drinking pattern is a problem for you.     You may be at risk for alcohol-related problems if:    Men: you drink more than 14 drinks per week, or more than 4 drinks per occasion.    Women: you drink more than 7 drinks per week or more than 3 drinks per occasion.    You have black-outs.  You do  things you regret while drinking.  You have legal problems because of drinking.  You have job problems because of drinking (you call in sick to work because of drinking).    CAGE Questions  Have you ever felt you should cut down on your drinking?  Have people annoyed you by criticizing your drinking?  Have you ever felt bad or guilty about your drinking?  Have you ever had a drink first thing in the morning to steady your nerves or get rid of a hangover (eye opener)?    If you answer yes to any of the CAGE questions, you may have a problem with alcohol.      Return to the Emergency Department if:  You become shaky or tremble when you try to stop drinking.   You have severe abdominal pain (belly pain).   You have a seizure or pass out.    You vomit (throw up) blood or have blood in your stool. This may be bright red or it may look like black coffee grounds.  You become lightheaded or faint.      For further help, contact:   Your caregiver.    Alcoholics Anonymous (AA).    [facility]: [phone]  [facility] Central Office: [phone]   A drug or alcohol rehabilitation program.    You can get information on alcohol resources and groups by calling the number 211 or 1-422.192.7647 on any phone.     Seek medical care if:  You have persistent vomiting.   You have persistent pain in any part of your body.    You do not feel better after a few days.    If you were given a prescription for medicine here today, be sure to read all of the information (including the package insert) that comes with your prescription.  This will include important information about the medicine, its side effects, and any warnings that you need to know about.  The pharmacist who fills the prescription can provide more information and answer questions you may have about the medicine.  If you have questions or concerns that the pharmacist cannot address, please call or return to the Emergency Department.    Remember that you can always come back to the Emergency Department if you are not able to see your regular doctor in the amount of time listed above, if you get any new symptoms, or if there is anything that worries you.

## 2019-04-11 NOTE — ED PROVIDER NOTES
"  History     Chief Complaint:  Alcohol Intoxication    HPI   Mohit Porter is a 28 year old male with a history of alcohol abuse and anxiety who presents to the emergency department via EMS today for evaluation of alcohol intoxication. The patient was supposed to check out of his hotel today at 1100. When housekeeping went in to clean he room, they found him lying in the bed unresponsive and paramedics were called. Per PD, patient blew 0.378 breathalyzer. Patient reports drinking 1 L of vodka daily and states he has been drinking more within the last time he was in the hospital because of relationship problems. He endorses that he doesn't have a safe place to go home to because his parents \"probably hate him\" and he does not know if him and his girlfriend are still together or not. Patient states he went to detox after he was recently discharged from the hospital. He endorses that he is open to treatment today. Patient reports he has never attempted suicide and has never been admitted for psychiatric problems. He denies fever, chest pain, shortness of breath, abdominal pain, headache, suicidal ideations or homicidal ideations.    Allergies:  No Known Drug Allergies     Medications:    Desyrel    Past Medical History:    Alcohol abuse  Anxiety    Past Surgical History:    Surgical history reviewed. No pertinent surgical history.    Family History:    Family history reviewed. No pertinent family history.    Social History:  The patient was accompanied to the ED by himself.  Smoking Status: Never Smoker  Smokeless Tobacco: Current user, chew  Alcohol Use: Positive  Drug Use: Negative  Marital Status:  Single     Review of Systems   Constitutional: Negative for fever.   Respiratory: Negative for shortness of breath.    Cardiovascular: Negative for chest pain.   Gastrointestinal: Negative for abdominal pain.   Neurological: Negative for headaches.   Psychiatric/Behavioral: Negative for suicidal ideas.   All other " "systems reviewed and are negative.    Physical Exam     Patient Vitals for the past 24 hrs:   BP Temp Temp src Pulse Resp SpO2 Height Weight   04/11/19 1620 (!) 131/94 -- -- 93 -- 95 % -- --   04/11/19 1507 (!) 144/122 97.4  F (36.3  C) Oral 120 18 94 % 1.778 m (5' 10\") 79.4 kg (175 lb)     Physical Exam  General: Intoxicated, appears well-developed and well-nourished. Cooperative.     In mild distress  HEENT:  Head:  Atraumatic  Ears:  External ears are normal  Mouth/Throat:  Oropharynx is without erythema or exudate and mucous membranes are dry.   Eyes:   Conjunctivae normal and EOM are normal. No scleral icterus.  CV:  Normal rate, regular rhythm, normal heart sounds and radial pulses are 2+ and symmetric.  No murmur.  Resp:  Breath sounds are clear bilaterally    Non-labored, no retractions or accessory muscle use  GI:  Abdomen is soft, no distension, no tenderness. No rebound or guarding.  No CVA tenderness bilaterally  MS:  Normal range of motion. No edema.    Normal strength in all 4 extremities.     Back atraumatic.    No midline cervical, thoracic, or lumbar tenderness  Skin:  Warm and dry.  No rash or lesions noted.  Neuro: Intoxicated. Normal strength.  GCS: 15  Psych:  Denies SI/HI.  Depressed.  Denies hallucinations.     Emergency Department Course     Interventions:  1546 Zyprexa 10 mg PO    Emergency Department Course:    1507 Nursing notes and vitals reviewed.    1530 I performed an exam of the patient as documented above.     1727 Patient transferred [facility].    Impression & Plan      Medical Decision Making:  Mohit Porter is a 28 year old male who presents for evaluation of alcohol abuse.  He is intoxicated here in ED by EMS breathalyzer 0.378. He has no history of DT's or alcohol withdrawal seizures. There are no signs of co-ingestion including acetaminophen, drugs, medications, volatile alcohols. Given a dose of ODT zyprexa due to anxiety and tearfulness.  He has no signs of " trauma related to alcohol use and no further workup is needed including head CT. Patient will be transferred to [address] [place] [address]. Stable to transfer.  Discharged to detox via EMS transport.     Diagnosis:    ICD-10-CM    1. Acute alcoholic intoxication in alcoholism without complication (H) F10.220    2. Depression, unspecified depression type F32.9      Disposition:   The patient is transferred to [address] [place] [address] for further evaluation and treatment.    Scribe Disclosure:  I, Jammie Matson, am serving as a scribe at 3:46 PM on 4/11/2019 to document services personally performed by Wilfrido Gunter MD based on my observations and the provider's statements to me.     EMERGENCY DEPARTMENT       Wilfrido Gunter MD  04/11/19 1864

## 2019-04-14 ENCOUNTER — HOSPITAL ENCOUNTER (EMERGENCY)
Facility: CLINIC | Age: 29
Discharge: HOME | End: 2019-04-15
Attending: EMERGENCY MEDICINE | Admitting: EMERGENCY MEDICINE
Payer: MEDICAID

## 2019-04-14 DIAGNOSIS — T50.901A OVERDOSE, ACCIDENTAL OR UNINTENTIONAL, INITIAL ENCOUNTER: ICD-10-CM

## 2019-04-14 LAB
ALBUMIN SERPL-MCNC: 4.4 G/DL (ref 3.4–5)
ALP SERPL-CCNC: 138 U/L (ref 40–150)
ALT SERPL W P-5'-P-CCNC: 393 U/L (ref 0–70)
ANION GAP SERPL CALCULATED.3IONS-SCNC: 6 MMOL/L (ref 3–14)
APAP SERPL-MCNC: <2 MG/L (ref 10–20)
AST SERPL W P-5'-P-CCNC: 161 U/L (ref 0–45)
BASOPHILS # BLD AUTO: 0 10E9/L (ref 0–0.2)
BASOPHILS NFR BLD AUTO: 0.3 %
BILIRUB SERPL-MCNC: 0.4 MG/DL (ref 0.2–1.3)
BUN SERPL-MCNC: 7 MG/DL (ref 7–30)
CALCIUM SERPL-MCNC: 8.9 MG/DL (ref 8.5–10.1)
CHLORIDE SERPL-SCNC: 104 MMOL/L (ref 94–109)
CO2 SERPL-SCNC: 30 MMOL/L (ref 20–32)
CREAT SERPL-MCNC: 0.72 MG/DL (ref 0.66–1.25)
DIFFERENTIAL METHOD BLD: NORMAL
EOSINOPHIL # BLD AUTO: 0 10E9/L (ref 0–0.7)
EOSINOPHIL NFR BLD AUTO: 0.5 %
ERYTHROCYTE [DISTWIDTH] IN BLOOD BY AUTOMATED COUNT: 14 % (ref 10–15)
ETHANOL SERPL-MCNC: <0.01 G/DL
GFR SERPL CREATININE-BSD FRML MDRD: >90 ML/MIN/{1.73_M2}
GLUCOSE SERPL-MCNC: 81 MG/DL (ref 70–99)
HCT VFR BLD AUTO: 44.2 % (ref 40–53)
HGB BLD-MCNC: 15.3 G/DL (ref 13.3–17.7)
IMM GRANULOCYTES # BLD: 0 10E9/L (ref 0–0.4)
IMM GRANULOCYTES NFR BLD: 0.4 %
LYMPHOCYTES # BLD AUTO: 1.5 10E9/L (ref 0.8–5.3)
LYMPHOCYTES NFR BLD AUTO: 19.4 %
MCH RBC QN AUTO: 31.2 PG (ref 26.5–33)
MCHC RBC AUTO-ENTMCNC: 34.6 G/DL (ref 31.5–36.5)
MCV RBC AUTO: 90 FL (ref 78–100)
MONOCYTES # BLD AUTO: 0.8 10E9/L (ref 0–1.3)
MONOCYTES NFR BLD AUTO: 10.3 %
NEUTROPHILS # BLD AUTO: 5.4 10E9/L (ref 1.6–8.3)
NEUTROPHILS NFR BLD AUTO: 69.1 %
NRBC # BLD AUTO: 0 10*3/UL
NRBC BLD AUTO-RTO: 0 /100
PLATELET # BLD AUTO: 170 10E9/L (ref 150–450)
POTASSIUM SERPL-SCNC: 3.3 MMOL/L (ref 3.4–5.3)
PROT SERPL-MCNC: 7.7 G/DL (ref 6.8–8.8)
RBC # BLD AUTO: 4.9 10E12/L (ref 4.4–5.9)
SALICYLATES SERPL-MCNC: <2 MG/DL
SODIUM SERPL-SCNC: 140 MMOL/L (ref 133–144)
WBC # BLD AUTO: 7.8 10E9/L (ref 4–11)

## 2019-04-14 PROCEDURE — 93005 ELECTROCARDIOGRAM TRACING: CPT

## 2019-04-14 PROCEDURE — 99285 EMERGENCY DEPT VISIT HI MDM: CPT | Mod: 25

## 2019-04-14 PROCEDURE — 80329 ANALGESICS NON-OPIOID 1 OR 2: CPT | Mod: 91 | Performed by: EMERGENCY MEDICINE

## 2019-04-14 PROCEDURE — 80320 DRUG SCREEN QUANTALCOHOLS: CPT | Performed by: EMERGENCY MEDICINE

## 2019-04-14 PROCEDURE — 80053 COMPREHEN METABOLIC PANEL: CPT | Performed by: EMERGENCY MEDICINE

## 2019-04-14 PROCEDURE — 80329 ANALGESICS NON-OPIOID 1 OR 2: CPT | Performed by: EMERGENCY MEDICINE

## 2019-04-14 PROCEDURE — 96361 HYDRATE IV INFUSION ADD-ON: CPT

## 2019-04-14 PROCEDURE — 96374 THER/PROPH/DIAG INJ IV PUSH: CPT

## 2019-04-14 PROCEDURE — 25000128 H RX IP 250 OP 636: Performed by: EMERGENCY MEDICINE

## 2019-04-14 PROCEDURE — 85025 COMPLETE CBC W/AUTO DIFF WBC: CPT | Performed by: EMERGENCY MEDICINE

## 2019-04-14 RX ORDER — LORAZEPAM 2 MG/ML
1 INJECTION INTRAMUSCULAR ONCE
Status: COMPLETED | OUTPATIENT
Start: 2019-04-14 | End: 2019-04-14

## 2019-04-14 RX ADMIN — LORAZEPAM 1 MG: 2 INJECTION INTRAMUSCULAR; INTRAVENOUS at 22:18

## 2019-04-14 RX ADMIN — SODIUM CHLORIDE 1000 ML: 9 INJECTION, SOLUTION INTRAVENOUS at 22:18

## 2019-04-14 ASSESSMENT — ENCOUNTER SYMPTOMS
NERVOUS/ANXIOUS: 1
VOMITING: 1

## 2019-04-14 ASSESSMENT — MIFFLIN-ST. JEOR: SCORE: 1775.04

## 2019-04-14 NOTE — ED AVS SNAPSHOT
Emergency Department  [address] [place] [address]  Phone:  [phone]  Fax:  [phone]                                    Mohit Porter   MRN: [id number]    Department:   Emergency Department   Date of Visit:  4/14/2019           After Visit Summary Signature Page    I have received my discharge instructions, and my questions have been answered. I have discussed any challenges I see with this plan with the nurse or doctor.    ..........................................................................................................................................  Patient/Patient Representative Signature      ..........................................................................................................................................  Patient Representative Print Name and Relationship to Patient    ..................................................               ................................................  Date                                   Time    ..........................................................................................................................................  Reviewed by Signature/Title    ...................................................              ..............................................  Date                                               Time          22EPIC Rev 08/18

## 2019-04-15 VITALS
BODY MASS INDEX: 25.05 KG/M2 | TEMPERATURE: 98.2 F | OXYGEN SATURATION: 100 % | SYSTOLIC BLOOD PRESSURE: 150 MMHG | DIASTOLIC BLOOD PRESSURE: 113 MMHG | HEART RATE: 98 BPM | HEIGHT: 70 IN | RESPIRATION RATE: 15 BRPM | WEIGHT: 175 LBS

## 2019-04-15 LAB
APAP SERPL-MCNC: <2 MG/L (ref 10–20)
INTERPRETATION ECG - MUSE: NORMAL

## 2019-04-15 PROCEDURE — 80329 ANALGESICS NON-OPIOID 1 OR 2: CPT | Mod: 91 | Performed by: EMERGENCY MEDICINE

## 2019-04-15 PROCEDURE — 25000128 H RX IP 250 OP 636: Performed by: EMERGENCY MEDICINE

## 2019-04-15 PROCEDURE — 25000132 ZZH RX MED GY IP 250 OP 250 PS 637: Performed by: EMERGENCY MEDICINE

## 2019-04-15 RX ORDER — DIAZEPAM 5 MG
10 TABLET ORAL ONCE
Status: COMPLETED | OUTPATIENT
Start: 2019-04-15 | End: 2019-04-15

## 2019-04-15 RX ADMIN — SODIUM CHLORIDE 1000 ML: 9 INJECTION, SOLUTION INTRAVENOUS at 00:24

## 2019-04-15 RX ADMIN — DIAZEPAM 10 MG: 5 TABLET ORAL at 00:23

## 2019-04-15 NOTE — ED PROVIDER NOTES
"  History     Chief Complaint:  Ingestion    HPI   Mohit Porter is a 28 year old male with a history of alcohol abuse and anxiety who presents for evaluation of an ingestion. About 2-3 hours prior to presenting, he reports he drank a full \"family size\" bottle of Nyquil in an attempt to feel drunk as he did not have any alcohol available at home because his girlfriend threatened to break up with him if he continued drinking. About 45 minutes after ingesting the medication, he reports that he went online and read that drinking Nyquil could kill him, thus he reports forcing himself to throw up twice prior to arrival. His girlfriend, a RN, reports that he had been acting weird as they got ready for bed, but denied taking any medication until she forced him to tell the truth. Once he confessed to drinking the whole bottle, she brought him in. Here, he reports feeling anxious about what he believes to be his impending doom; he denies having any suicidal ideations tonight. He denies drinking any other alcohol or taking any non-prescription medications; however, he did take gabapentin and Buspar for anxiety this evening.     Allergies:  NKDA    Medications:    Trazodone  Mirtazapine  Acamprosate  Gabapentin  Buspar     Past Medical History:    Alcohol abuse  Anxiety  Suicidal Ideation    Past Surgical History:    The patient does not have any pertinent past surgical history.    Family History:    HTN  Hyperlipidemia    Social History:  Marital Status:  Single [1]  Positive for chewing tobacco use.   Positive for alcohol use.   Negative for drug use.      Review of Systems   Gastrointestinal: Positive for vomiting (one episode).   Psychiatric/Behavioral: Negative for suicidal ideas. The patient is nervous/anxious.    All other systems reviewed and are negative.        Physical Exam     Patient Vitals for the past 24 hrs:   BP Temp Temp src Pulse Heart Rate Resp SpO2 Height Weight   04/15/19 0130 (!) 150/113 -- -- 98 " "96 15 100 % -- --   04/15/19 0115 (!) 151/111 -- -- 98 101 14 100 % -- --   04/15/19 0100 (!) 153/114 -- -- 99 94 14 99 % -- --   04/15/19 0045 (!) 179/126 -- -- 111 103 11 100 % -- --   04/15/19 0030 (!) 155/117 -- -- 103 105 16 100 % -- --   04/15/19 0000 (!) 170/122 -- -- 122 133 11 100 % -- --   04/14/19 2345 (!) 161/119 -- -- 124 117 16 95 % -- --   04/14/19 2330 (!) 157/116 -- -- 118 115 9 98 % -- --   04/14/19 2315 (!) 159/112 -- -- 123 126 15 99 % -- --   04/14/19 2301 (!) 168/119 -- -- 123 128 20 98 % -- --   04/14/19 2300 (!) 168/119 -- -- 123 -- 18 -- -- --   04/14/19 2215 (!) 158/106 -- -- 139 130 25 100 % -- --   04/14/19 2202 (!) 165/119 98.2  F (36.8  C) Temporal -- 145 20 100 % 1.778 m (5' 10\") 79.4 kg (175 lb)      Physical Exam  Vitals: reviewed by me  General: Pt seen on [facility], cooperative, and alert to conversation.  Mildly anxious.    Eyes: Tracking well, clear conjunctiva BL  ENT: MMM, midline trachea.   Lungs: No tachypnea, no accessory muscle use. No respiratory distress.   CV: Rate as above, regular rhythm.    Abd: Soft, non tender, no guarding, no rebound. Non distended  MSK: no peripheral edema or joint effusion.  No evidence of trauma  Skin: No rash, normal turgor and temperature  Neuro: Clear speech and no facial droop.  Psych: Not RIS, no e/o AH/VH.  Adamantly denies suicidality.      Emergency Department Course   ECG:  Indication: Ingestion  Time: 2217  Vent. Rate 132 bpm. GA interval 148. QRS duration 74. QT/QTc 300/444. P-R-T axis 46 61 47.    Sinus tachycardia.  Possible anterior infarct, age undetermined.  Abnormal ECG. Read time: 2218.    Laboratory:  CBC: WBC: 7.8, HGB: 15.3, PLT: 170  CMP: K: 3.3 (L), ALT: 393 (H), AST: 161 (H), o/w WNL (Creatinine: 0.72)    Salicylate:<2  2215 Acetaminophen: <2  0027 Acetaminophen: <2    Alcohol ethyl: <0.01    Interventions:  2218 NS 1L IV   Ativan, 1 mg, IV injection  0023 Valium, 10 mg, PO  0024 NS 1L IV     Emergency " "Department Course:  Nursing notes and vitals reviewed.   (2209) I performed an exam of the patient as documented above.      EKG obtained in the ED, see results above.     IV inserted. Medicine administered as documented above. Blood drawn. This was sent to the lab for further testing, results above.     (2229) I consulted with Mame from Poison Control, regarding the patient's history and presentation here in the emergency department, specifically about the amounts of Tylenol and Benadryl in the Nyquil.     (2244) Per nursing, the patient is now reporting \"liver pain.\"     (0009) I rechecked the patient and discussed the results of his workup thus far. At this point in time, he would like to leave. I discussed the risks/benefits of leaving, however his mind was made up.     (0016) Per EMT, the patient has decided to remain in the emergency department.      (0131) I rechecked the patient and he is feeling calmer and okay for discharge.     Findings and plan explained to the Patient. Patient discharged home with instructions regarding supportive care, medications, and reasons to return. The importance of close follow-up was reviewed.     I personally reviewed the laboratory results with the Patient and answered all related questions prior to discharge.        Impression & Plan      Medical Decision Making:  Mohit Porter is a 28 year old male who presents to the emergency room with what appears to be an intentional overdose for euphoric purposes. He took a whole bottle of Nyquil, but curiously, he has a negative Tylenol level x2, even at 4 hours. I think this is likely due to vomiting after the event, and patient states that he did force himself to vomit twice out of concern for an overdose. Here in the ER, he is quite agitated, and has required valium, but has no evidence of a wide QRS, no evidence of benadryl toxicity, normal labs, normal co-ingestions, and he is asking to go home now. He is roughly five " hours after ingestion, which is through the peak time of symptomology per poison center with whom I have spoken. Patient does have plan to meet with a recovery center later this week. Will plan for discharge with very clear return to ED precautions.     Diagnosis:    ICD-10-CM    1. Overdose, accidental or unintentional, initial encounter T50.901A        Disposition:  discharged to home    Discharge Medications:  There were no discharge medications.     Scribe Disclosure:  I, Darling Bennett, am serving as a scribe on 4/14/2019 at 10:09 PM to personally document services performed by Russel Clemons MD based on my observations and the provider's statements to me.     Darling Bennett  4/14/2019    EMERGENCY DEPARTMENT       Russel Clemons MD  04/15/19 0346

## 2019-05-30 ENCOUNTER — HOSPITAL ENCOUNTER (EMERGENCY)
Facility: CLINIC | Age: 29
Discharge: HOME | End: 2019-05-30
Attending: EMERGENCY MEDICINE | Admitting: EMERGENCY MEDICINE
Payer: MEDICAID

## 2019-05-30 VITALS
TEMPERATURE: 98.2 F | OXYGEN SATURATION: 95 % | BODY MASS INDEX: 25.48 KG/M2 | DIASTOLIC BLOOD PRESSURE: 83 MMHG | WEIGHT: 178 LBS | RESPIRATION RATE: 20 BRPM | HEART RATE: 93 BPM | SYSTOLIC BLOOD PRESSURE: 128 MMHG | HEIGHT: 70 IN

## 2019-05-30 DIAGNOSIS — F10.220 ACUTE ALCOHOLIC INTOXICATION IN ALCOHOLISM WITHOUT COMPLICATION (H): ICD-10-CM

## 2019-05-30 LAB
AMPHETAMINES UR QL SCN: NEGATIVE
BARBITURATES UR QL: NEGATIVE
BENZODIAZ UR QL: NEGATIVE
CANNABINOIDS UR QL SCN: NEGATIVE
COCAINE UR QL: NEGATIVE
ETHANOL SERPL-MCNC: 0.34 G/DL
OPIATES UR QL SCN: NEGATIVE
PCP UR QL SCN: NEGATIVE

## 2019-05-30 PROCEDURE — 80307 DRUG TEST PRSMV CHEM ANLYZR: CPT | Performed by: EMERGENCY MEDICINE

## 2019-05-30 PROCEDURE — 80320 DRUG SCREEN QUANTALCOHOLS: CPT | Performed by: EMERGENCY MEDICINE

## 2019-05-30 PROCEDURE — 99285 EMERGENCY DEPT VISIT HI MDM: CPT

## 2019-05-30 ASSESSMENT — MIFFLIN-ST. JEOR: SCORE: 1788.65

## 2019-05-30 ASSESSMENT — ENCOUNTER SYMPTOMS
NAUSEA: 0
VOMITING: 0
ABDOMINAL PAIN: 0
HALLUCINATIONS: 0

## 2019-05-30 NOTE — ED NOTES
DATE:  5/30/2019   TIME OF RECEIPT FROM LAB:  5:58 PM    LAB TEST: blood alcohol  LAB VALUE:  0.34  RESULTS GIVEN WITH READ-BACK TO (PROVIDER):  Rhonda Solorzano MD  TIME LAB VALUE REPORTED TO PROVIDER:   5:58 PM

## 2019-05-30 NOTE — ED NOTES
Bed: ED19  Expected date: 5/30/19  Expected time: 3:42 PM  Means of arrival: Ambulance  Comments:  533 27 depression  ETA 1545

## 2019-05-30 NOTE — ED PROVIDER NOTES
History     Chief Complaint:  Depression    HPI:   The history is provided by the patient.      Mohit Porter is a 28 year old male with history of alcohol abuse who presents for evaluation of intoxication. According to records, this is the patient's 13th ED visit for alcohol intoxication this calendar year, with the most recent visit being 15 days ago at the [facility]. With several of these visits, it seems that the patient's girlfriend has contacted police due to concern for the patient's drinking. Today, the patient states he was at his girlfriend's apartment drinking, and the girlfriend did call the police after speaking with the patient over the phone. The patient states that he has been drinking vodka today, and that he regularly drinks about 1 pint of vodka per day. He reports that he did not make any suicidal comments, and that he is not having suicidal thoughts. The patient states that he has been through treatment for alcohol in the past. He states that he has been followed by psychiatry in the past. He states that he is not currently taking his medications regularly. 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 He denies hallucinations or homicidal ideation.     Allergies:  No known drug allergies      Medications:    Trazodone   Gabapentin   Mirtazapine   Campral      Past Medical History:    Alcohol abuse  Anxiety    Past Surgical History:    History reviewed. No pertinent surgical history.     Family History:    History reviewed. No pertinent family history.      Social History:  [facility]   Marital Status:  Single  Smoking status: never smoker, current chewing tobacco use.   Alcohol use: positive, alcohol abuse, about 1 pint of vodka per day.       Review of Systems   Constitutional:        Intoxicated    Gastrointestinal: Negative for abdominal pain, nausea and vomiting.   Psychiatric/Behavioral: Negative for  "hallucinations and suicidal ideas.   All other systems reviewed and are negative.    Physical Exam     Patient Vitals for the past 24 hrs:   BP Temp Temp src Pulse Heart Rate Resp SpO2 Height Weight   05/30/19 1825 -- -- -- -- -- -- 95 % -- --   05/30/19 1613 -- -- -- -- -- -- 94 % -- --   05/30/19 1606 128/83 98.2  F (36.8  C) Oral 93 93 20 96 % 1.778 m (5' 10\") 80.7 kg (178 lb)        Physical Exam  Nursing note and vitals reviewed.    Constitutional:  Appears intoxicated, well-developed and well-nourished, comfortable.    HENT:     Nose normal.  No discharge.      Oropharynx is clear and moist.  Eyes:    Conjunctivae are normal without injection. No lid droop.     Pupils are equal, round, and reactive to light.   Cardiovascular:  Normal rate, regular rhythm with normal S1 and S2.      Normal heart sounds and peripheral pulses 2+ and equal.       No murmur or zohaib.  Pulmonary:  Effort normal and breath sounds clear to auscultation bilaterally        No respiratory distress.  No stridor.     No wheezes. No rales.     GI:    Soft. No distension and no mass. No tenderness.      No HSM.  Musculoskeletal:  Normal range of motion. No extremity deformity.  Neurological:   Alert and oriented. No cranial nerve deficit, no facial droop.     Exhibits good muscle tone. Coordination normal.  Gait is normal.     GCS eye subscore is 4. GCS verbal subscore is 5.      GCS motor subscore is 6.   Skin:    Skin is warm and dry. No rash noted. No diaphoresis.      No erythema. No pallor.  No lesions.  Psychiatric:   Sad mood and affect is otherwise normal.  Behavior is normal.     Judgment and thought content normal.     Emergency Department Course     Laboratory:  Alcohol level blood: 0.34  Drug abuse screen: Negative    Emergency Department Course:  The patient arrived in the emergency department via EMS.   Past medical records, nursing notes, and vitals reviewed.  1613: I performed an exam of the patient and obtained history, as " documented above.  IV inserted and blood drawn for laboratory testing. Results are as above.        1806: I rechecked the patient. Explained findings to the patient. We had a long discussion about his alcohol use and plan for Detox.      1040: Patient left his room and ran out of the ED. Security was called.     1937: PD did find the patient outside. He blew a 0.2 alcohol breath test, and the police let him leave.     Impression & Plan      Medical Decision Making:  Patient comes in intoxicated.  He is ambulating without assistance.  His blood alcohol level is 0.34, his drug abuse screen is negative.  I had a long talk with the patient and he seemed open to getting some help.  This was his 13th visit to the emergency room this calendar year.  Each time it seems to be that he came in after his girlfriend called the police while she was at work and he was at home.  [address] [place] [address]. had a detox bed which I took for him.  He was placed on a 72-hour hold per [address] [place] [address].  He told the nurse he had to go to the bathroom and when he went to the bathroom he took off.  Security chased him and he took off down the road.  He ran into the mall.  Our security contacted the [place] police requesting that they bring him back to the emergency room.  They eventually found him and he blew a 0.2 on the breathalyzer.  Apparently they told him that he could go and did not return him to the emergency room.  The patient was not suicidal here and I do not feel he was an immediate danger to himself but I did want him to get help.      Plan: Go to detox.  No more alcohol.  Get into treatment as soon as you finish detox. (Patient did leave prior to receiving these instructions.)     Diagnosis:    ICD-10-CM    1. Acute alcoholic intoxication in alcoholism without complication (H) F10.220        Disposition:  Patient eloped and was found by the police and was released from their custody.  Hopefully he will follow-up in the clinic and get  some help.    Discharge Medications:     Medication List      There are no discharge medications for this visit.       I, Megan Beh, am serving as a scribe at 4:13 PM on 5/30/2019 to document services personally performed by Rhonda Solorzano MD based on my observations and the provider's statements to me.      Megan Beh  5/30/2019    EMERGENCY DEPARTMENT       Rhonda Solorzano MD  05/30/19 7353

## 2019-05-30 NOTE — ED NOTES
Security states pt ran to the [employer] and so they were unable to follow him there. They are contacting PD

## 2019-05-30 NOTE — ED TRIAGE NOTES
Pt presents after girlfriend called to have a welfare check on patient and EMS found pt to be at home, heavily intoxicated and reporting he is feeling very depressed. PD breathalyzer was .36. Pt reports increased stressors and feeling very depressed. Pt denies SI. Pt states he is drinking to help numb the pain. He was supposed to go to treatment one week ago but did not go.

## 2019-05-30 NOTE — ED NOTES
Pt was updated by Dr. Solorzano that pt would be placed on a 72 hour hold and pt would be going to detox. RN could tell pt was agitated and wanting to leave. Pt attempted to go to the bathroom with another nurse and then ran. Pt was told that if he left PD would be called and pt would be brought back to ED.

## 2019-05-31 NOTE — ED NOTES
Per ED charge PD did locate pt and they let him go because they stated he blew a 0.2 and felt he was safe to go care for himself.

## 2019-07-28 ENCOUNTER — HOSPITAL ENCOUNTER (EMERGENCY)
Facility: CLINIC | Age: 29
Discharge: HOME | End: 2019-07-29
Attending: EMERGENCY MEDICINE | Admitting: EMERGENCY MEDICINE
Payer: COMMERCIAL

## 2019-07-28 DIAGNOSIS — F10.929 ALCOHOLIC INTOXICATION WITH COMPLICATION (H): ICD-10-CM

## 2019-07-28 LAB — ETHANOL SERPL-MCNC: 0.42 G/DL

## 2019-07-28 PROCEDURE — 25000125 ZZHC RX 250: Performed by: EMERGENCY MEDICINE

## 2019-07-28 PROCEDURE — 99284 EMERGENCY DEPT VISIT MOD MDM: CPT | Mod: 25

## 2019-07-28 PROCEDURE — 25800030 ZZH RX IP 258 OP 636: Performed by: EMERGENCY MEDICINE

## 2019-07-28 PROCEDURE — 96365 THER/PROPH/DIAG IV INF INIT: CPT

## 2019-07-28 PROCEDURE — 25000128 H RX IP 250 OP 636: Performed by: EMERGENCY MEDICINE

## 2019-07-28 PROCEDURE — 82075 ASSAY OF BREATH ETHANOL: CPT

## 2019-07-28 PROCEDURE — 80320 DRUG SCREEN QUANTALCOHOLS: CPT | Performed by: EMERGENCY MEDICINE

## 2019-07-28 PROCEDURE — 96366 THER/PROPH/DIAG IV INF ADDON: CPT

## 2019-07-28 RX ORDER — TRAZODONE HYDROCHLORIDE 50 MG/1
50 TABLET, FILM COATED ORAL
Status: ON HOLD | COMMUNITY
End: 2019-08-06

## 2019-07-28 RX ORDER — GABAPENTIN 300 MG/1
300 CAPSULE ORAL 3 TIMES DAILY PRN
Status: ON HOLD | COMMUNITY
End: 2019-08-06

## 2019-07-28 RX ORDER — LANOLIN ALCOHOL/MO/W.PET/CERES
3 CREAM (GRAM) TOPICAL
Status: DISCONTINUED | OUTPATIENT
Start: 2019-07-28 | End: 2019-07-29 | Stop reason: HOSPADM

## 2019-07-28 RX ORDER — MIRTAZAPINE 15 MG/1
15 TABLET, FILM COATED ORAL AT BEDTIME
Status: ON HOLD | COMMUNITY
End: 2019-08-06

## 2019-07-28 RX ORDER — ACAMPROSATE CALCIUM 333 MG/1
666 TABLET, DELAYED RELEASE ORAL 3 TIMES DAILY PRN
Status: ON HOLD | COMMUNITY
End: 2019-08-06

## 2019-07-28 RX ORDER — ACETAMINOPHEN 500 MG
500 TABLET ORAL EVERY 6 HOURS PRN
Status: ON HOLD | COMMUNITY
End: 2019-08-06

## 2019-07-28 RX ADMIN — FOLIC ACID: 5 INJECTION, SOLUTION INTRAMUSCULAR; INTRAVENOUS; SUBCUTANEOUS at 18:04

## 2019-07-28 NOTE — ED TRIAGE NOTES
Pt was found by housekeeping in hotel after he didn't check out. Patient was passed out. PD was called. Was seen yesterday for etoh intox/head injury

## 2019-07-28 NOTE — ED PROVIDER NOTES
History     Chief Complaint:  Alcohol Intoxication    History limited due to being non-verbal. History supplemented by EMS    HPI   Mohit Porter is a 29 year old male with history of alcohol abuse who presents to the emergency department today for evaluation of alcohol intoxication. The patient has been to the ED 19 times for alcohol intoxication this year,  the most recent one being yesterday at [facility]. Records from [facility] were not shown in detail, but per chart review he acquired a negative head CT scan and an eyebrow laceration. Here, the patient was found unresponsive on bed at a hotel, and was found by the housekeeping  staff when the patient did not check out. He was brought in by EMS with blood sugar 88. He had full labs 4 days ago which were normal other than  and .     Allergies:  No Known Drug Allergies     Medications:    Desyrel     Past Medical History:    Alcohol abuse and withdrawal  Anxiety    Past Surgical History:    Surgical history reviewed. No pertinent surgical history.     Family History:    Family history reviewed. No pertinent family history.     Social History:  Living situation is unknown.   Smoking Status: Current Smoker  Smokeless Tobacco: Positive    Type: chew  Alcohol Use: Positive  Drug Use: Negative  Marital Status:  Single      Review of Systems   Neurological: Positive for syncope.       History limited due to being non-verbal.    Physical Exam     Patient Vitals for the past 24 hrs:   BP Temp Temp src Pulse Resp SpO2   07/28/19 2145 -- -- -- -- -- 94 %   07/28/19 2141 -- -- -- -- -- 93 %   07/28/19 2130 112/64 -- -- 80 -- 93 %   07/28/19 2100 125/77 -- -- 107 -- 93 %   07/28/19 1903 -- -- -- -- -- 95 %   07/28/19 1900 122/79 -- -- 105 -- 94 %   07/28/19 1830 (!) 122/91 -- -- 101 -- 93 %   07/28/19 1800 129/89 -- -- -- -- 92 %   07/28/19 1721 127/87 98.4  F (36.9  C) Temporal 91 18 92 %        Physical Exam    Constitutional:  Patient smells of alcohol, is  nonverbal, opens eyes when address by name. Gauge is present, but decreased.   HENT:   Head:    Normocephalic and atraumatic.   Right Ear:   Tympanic membrane and external ear normal.   Left Ear:   Tympanic membrane and external ear normal.   Mouth/Throat:   Oropharynx is clear and moist.      Mucous membranes are normal.   Eyes:    Conjunctivae normal and EOM are normal.      Pupils are equal, round, and reactive to light. Ecchymosis around right eye repaired eyebrow laceration.  Neck:    Normal range of motion. Neck supple.   Cardiovascular:  Normal rate, regular rhythm, S1 normal and S2 normal.      No gallop and no friction rub. No murmur heard.  Pulmonary/Chest:  Breath sounds normal. No respiratory distress.      No wheezes. No rhonchi. No rales.   Abdominal:   Soft. No hepatosplenomegaly. No tenderness.      No rebound and no CVA tenderness.   Musculoskeletal:  Normal range of motion.   Neurological:   Alert and oriented to person, place, and time. Normal strength.      GCS eye subscore is 4. GCS verbal subscore is 5.      GCS motor subscore is 6.   Skin:    Skin is warm and dry.       Laboratory:  Laboratory findings were communicated with the patient who voiced understanding of the findings.    Alcohol ethyl: 0.42 (H)    Interventions:  1804 sodium chloride 0.9 % 1000 ml with INFUVITE adult 10 ml, Thiamine 100 mg, Folic acid 1 mg, Magnesium sulfate 2 g IV    Emergency Department Course:    1713 Nursing notes and vitals reviewed.    1715 I performed an exam of the patient as documented above.     1740 I spoke with Pharmacy regarding patient's presentation, findings, and plan of care.     1804 IV was inserted and blood was drawn for laboratory testing, results above.     1834 Patient rechecked and updated.  Patient is still sleeping, stats are 95 %.     2020 Patient rechecked and updated. The patient still sleep, just tossing around in bed.     2249 Patient rechecked and updated.  Patient is still sleeping.  The patient is signed out to my colleague, Dr. Yeager.      Impression & Plan      Medical Decision Making:  Mohit Porter is a 29 year old male who presents to the emergency department today for evaluation of alcohol intoxication.  He was staying in a hotel and the  found when he was brought in by EMS smelling of alcohol.  The patient has many ED visits for alcohol intoxication about 19 in 2019. .  He was seen at [facility] yesterday and I do not have that report but I can see that he had a negative head CT and had a laceration that was repaired.  Patient here opens his eyes to voice but does not interact with me verbally.  He appears to have a nonfocal exam.  He did initially have some lower saturations but now his saturations are good.  He will be slept until he awakens.  He has not given us a urine sample.  He will be signed out to the night physician to be dispositioned when he is sober.  At this point I believe that the most likely thing is his alcohol intoxication causing his decreased level of responsiveness but will give him time to sort of sober up and then reevaluate.  Given his history the alcohol has been the primary issue in the past.    Diagnosis:    ICD-10-CM    1. Alcoholic intoxication with complication (H) F10.929         Disposition:   The patient is signed out to Dr. Yeager.     Scribe Disclosure:  I, Wilfredo Pamela, am serving as a scribe at 5:17 PM on 7/28/2019 to document services personally performed by Brynn Sanchez MD based on my observations and the provider's statements to me.      EMERGENCY DEPARTMENT       Brynn Sanchez MD  07/28/19 9359

## 2019-07-28 NOTE — ED NOTES
DATE:  7/28/2019   TIME OF RECEIPT FROM LAB:  1832  LAB TEST:  Ethanol  LAB VALUE:  0.42  RESULTS GIVEN WITH READ-BACK TO (PROVIDER):  Brynn Sanchez MD  TIME LAB VALUE REPORTED TO PROVIDER:   6000

## 2019-07-28 NOTE — ED AVS SNAPSHOT
Emergency Department  [address] [place] [address]  Phone:  [phone]  Fax:  [phone]                                    Mohit Porter   MRN: [id number]    Department:   Emergency Department   Date of Visit:  7/28/2019           After Visit Summary Signature Page    I have received my discharge instructions, and my questions have been answered. I have discussed any challenges I see with this plan with the nurse or doctor.    ..........................................................................................................................................  Patient/Patient Representative Signature      ..........................................................................................................................................  Patient Representative Print Name and Relationship to Patient    ..................................................               ................................................  Date                                   Time    ..........................................................................................................................................  Reviewed by Signature/Title    ...................................................              ..............................................  Date                                               Time          22EPIC Rev 08/18

## 2019-07-29 VITALS
DIASTOLIC BLOOD PRESSURE: 87 MMHG | TEMPERATURE: 98.4 F | HEART RATE: 103 BPM | RESPIRATION RATE: 18 BRPM | SYSTOLIC BLOOD PRESSURE: 132 MMHG | OXYGEN SATURATION: 94 %

## 2019-07-29 LAB — ALCOHOL BREATH TEST: 0.24 (ref 0–0.01)

## 2019-07-29 NOTE — ED PROVIDER NOTES
Assumed care at midnight from Dr. Sanchez.  At 12:30 AM patient was noted to be weak walking around his room asking for food, he ate a meal and was steady on his feet.  Upon repeat evaluation the patient denies any history of significant alcohol withdrawal or delirium tremens.  He denies suicidal or homicidal ideation.  I question the patient on any desire for detox or alcohol treatment and he refuses.  He states he wants to go stay at a hotel and has a means to pay for this.  He was subsequently discharged as he is not acutely holdable.      Impression: Alcohol intoxication and alcoholism  Plan: Discharged to the hotel of his choice.     Trigger, Richard Baker MD  07/29/19 0119

## 2019-08-03 ENCOUNTER — HOSPITAL ENCOUNTER (EMERGENCY)
Facility: CLINIC | Age: 29
Discharge: HOME | End: 2019-08-04
Attending: EMERGENCY MEDICINE | Admitting: EMERGENCY MEDICINE
Payer: COMMERCIAL

## 2019-08-03 DIAGNOSIS — F10.220 ACUTE ALCOHOLIC INTOXICATION IN ALCOHOLISM WITHOUT COMPLICATION (H): ICD-10-CM

## 2019-08-03 DIAGNOSIS — R74.01 TRANSAMINITIS: ICD-10-CM

## 2019-08-03 LAB
ALCOHOL BREATH TEST: 0.33 (ref 0–0.01)
ALCOHOL BREATH TEST: 0.39 (ref 0–0.01)

## 2019-08-03 PROCEDURE — 82075 ASSAY OF BREATH ETHANOL: CPT | Mod: 91

## 2019-08-03 PROCEDURE — 99285 EMERGENCY DEPT VISIT HI MDM: CPT

## 2019-08-03 PROCEDURE — 82075 ASSAY OF BREATH ETHANOL: CPT

## 2019-08-03 ASSESSMENT — MIFFLIN-ST. JEOR: SCORE: 1747.36

## 2019-08-03 NOTE — ED PROVIDER NOTES
"  History     Chief Complaint:  Alcohol Intoxication      The history is provided by the EMS personnel. The history is limited by the condition of the patient.      Mohit Porter is a 29 year old male who presents with alcohol intoxication. The patient was found at the [facility] after missing his check out and highly intoxicated with lots of alcohol bottles around the room.  He is quite intoxicated now, but does not endorse any suicidality or homicidal, no trauma reported per EMS.    Allergies:  No Known Drug Allergies    Medications:    Medications reviewed. No current medications.     Past Medical History:    alcohol abuse  suicidal ideation  generalized anxiety disorder     Past Surgical History:    Surgical history reviewed. No pertinent surgical history.    Family History:    Family history reviewed. No pertinent family history.     Social History:  Smoking Status: Never Smoker  Smokeless Tobacco: Current user  Alcohol Use: Positive  Marital Status:  Single     Review of Systems   Unable to perform ROS: Acuity of condition       Physical Exam     Patient Vitals for the past 24 hrs:   BP Temp Temp src Pulse Heart Rate Resp SpO2 Height Weight   08/03/19 1630 -- -- -- -- -- -- 91 % -- --   08/03/19 1615 -- -- -- -- -- -- 93 % -- --   08/03/19 1600 -- -- -- -- -- -- 94 % -- --   08/03/19 1545 -- -- -- -- -- -- 95 % -- --   08/03/19 1528 130/83 98  F (36.7  C) Temporal 126 126 18 96 % 1.778 m (5' 10\") 77.1 kg (170 lb)         Physical Exam  Vitals: reviewed by me  General: Pt seen on [facility], somnolent but arousable, slurred speech when awake.  Eyes: Tracking well, clear conjunctiva BL  ENT: MMM, midline trachea.   Lungs: No tachypnea, no accessory muscle use. No respiratory distress.   CV: Rate as above, regular rhythm.    Abd: Soft, non tender, no guarding, no rebound. Non distended  MSK: no peripheral edema or joint effusion.  No evidence of trauma  Skin: No rash, normal turgor and temperature  Neuro: " Slurred speech and no facial droop.  Moving all extremities spontaneously  Psych: Not RIS, no e/o AH/VH      Emergency Department Course   Laboratory:     Alcohol breath test POCT: 0.391 (A)    Emergency Department Course:     Nursing notes and vitals reviewed.    1548 I performed an exam of the patient as documented above.     1558 Alcohol breath POCT collected, results above.    1700 Patient was signed out to my colleague Melchor Charles MD      Impression & Plan      Medical Decision Making:  Mohit Porter is a 29 year old male who presents to the emergency department today for evaluation of severe alcohol intoxication. HE has normal vital signs here, is able to wake up, change clothes, and say his name but appears to be heavily intoxicated with alcohol. He has no evidence of agitated delirium, prefers to sleep here in the ED, and the report of him being alone in a hotel room with recent alcohol use does seem to explain his clinical picture. No trauma.  Will monitor very carefully, he is too intoxicated at this point to be sent to detox, but should he sober up to the point where he can walk around take care of himself I do think he would be an ideal discharge candidate to detox. Unable to access suicidal or homicidal  ideation at this time, will place on a NICOLASA and continue to monitor.     Diagnosis:    ICD-10-CM    1. Transaminitis R74.0    2. Acute alcoholic intoxication in alcoholism without complication (H) F10.220           Disposition:   Patient was signed out to my colleague Melchor Charles MD    Scribe Disclosure:  I, Elsie Arzate, am serving as a scribe at 5:15 PM on 8/3/2019 to document services personally performed by Deonte Chandler MD based on my observations and the provider's statements to me.     EMERGENCY DEPARTMENT       Russel Clemons MD  08/21/19 2012

## 2019-08-03 NOTE — ED TRIAGE NOTES
Pt at [facility], staff went up to check on pt in room and room was trashed and etoh bottles all over the room, pt urinated on himself, pt quite somulent. Eyes closed, follows commands to change pt into scrubs and brief. Pt has hx of going to [facility] intoxicated.

## 2019-08-03 NOTE — ED NOTES
Bed: ED16  Expected date:   Expected time:   Means of arrival:   Comments:  Majo 2 ETOH cooperative. 29 m

## 2019-08-03 NOTE — ED AVS SNAPSHOT
Emergency Department  [address] [place] [id number]  Phone:  [phone]  Fax:  [phone]                                    Mohit Porter   MRN: [id number]    Department:   Emergency Department   Date of Visit:  8/3/2019           After Visit Summary Signature Page    I have received my discharge instructions, and my questions have been answered. I have discussed any challenges I see with this plan with the nurse or doctor.    ..........................................................................................................................................  Patient/Patient Representative Signature      ..........................................................................................................................................  Patient Representative Print Name and Relationship to Patient    ..................................................               ................................................  Date                                   Time    ..........................................................................................................................................  Reviewed by Signature/Title    ...................................................              ..............................................  Date                                               Time          22EPIC Rev 08/18

## 2019-08-03 NOTE — ED PROVIDER NOTES
5:01 PM: signout from Dr Clemons. Pt found drunk at [facility]. H/o etoh abuse. Will need sober eval.     10PM: pt reassessed, still intoxicated but engages in conversation. Will continue monitoring mental status for appropriate clearing.     1:25 AM: pt still intoxicated- care was signed out to colleague for further monitoring while mental status clears and then reassessment of any medical or psychiatric complaints once sober.      Melchor Charles MD  08/04/19 0126

## 2019-08-04 VITALS
BODY MASS INDEX: 24.34 KG/M2 | DIASTOLIC BLOOD PRESSURE: 80 MMHG | WEIGHT: 170 LBS | SYSTOLIC BLOOD PRESSURE: 122 MMHG | TEMPERATURE: 98 F | RESPIRATION RATE: 11 BRPM | OXYGEN SATURATION: 96 % | HEART RATE: 141 BPM | HEIGHT: 70 IN

## 2019-08-04 LAB
ALBUMIN SERPL-MCNC: 3.7 G/DL (ref 3.4–5)
ALP SERPL-CCNC: 95 U/L (ref 40–150)
ALT SERPL W P-5'-P-CCNC: 218 U/L (ref 0–70)
ANION GAP SERPL CALCULATED.3IONS-SCNC: 11 MMOL/L (ref 3–14)
AST SERPL W P-5'-P-CCNC: 294 U/L (ref 0–45)
BASOPHILS # BLD AUTO: 0 10E9/L (ref 0–0.2)
BASOPHILS NFR BLD AUTO: 0.4 %
BILIRUB SERPL-MCNC: 0.9 MG/DL (ref 0.2–1.3)
BUN SERPL-MCNC: 10 MG/DL (ref 7–30)
CALCIUM SERPL-MCNC: 8.6 MG/DL (ref 8.5–10.1)
CHLORIDE SERPL-SCNC: 94 MMOL/L (ref 94–109)
CO2 SERPL-SCNC: 31 MMOL/L (ref 20–32)
CREAT SERPL-MCNC: 0.63 MG/DL (ref 0.66–1.25)
DIFFERENTIAL METHOD BLD: NORMAL
EOSINOPHIL # BLD AUTO: 0 10E9/L (ref 0–0.7)
EOSINOPHIL NFR BLD AUTO: 0.3 %
ERYTHROCYTE [DISTWIDTH] IN BLOOD BY AUTOMATED COUNT: 14.5 % (ref 10–15)
ETHANOL SERPL-MCNC: 0.22 G/DL
GFR SERPL CREATININE-BSD FRML MDRD: >90 ML/MIN/{1.73_M2}
GLUCOSE SERPL-MCNC: 124 MG/DL (ref 70–99)
HCT VFR BLD AUTO: 42.8 % (ref 40–53)
HGB BLD-MCNC: 15 G/DL (ref 13.3–17.7)
IMM GRANULOCYTES # BLD: 0 10E9/L (ref 0–0.4)
IMM GRANULOCYTES NFR BLD: 0.2 %
INTERPRETATION ECG - MUSE: NORMAL
LYMPHOCYTES # BLD AUTO: 1.6 10E9/L (ref 0.8–5.3)
LYMPHOCYTES NFR BLD AUTO: 16.4 %
MCH RBC QN AUTO: 31.3 PG (ref 26.5–33)
MCHC RBC AUTO-ENTMCNC: 35 G/DL (ref 31.5–36.5)
MCV RBC AUTO: 89 FL (ref 78–100)
MONOCYTES # BLD AUTO: 0.6 10E9/L (ref 0–1.3)
MONOCYTES NFR BLD AUTO: 6 %
NEUTROPHILS # BLD AUTO: 7.3 10E9/L (ref 1.6–8.3)
NEUTROPHILS NFR BLD AUTO: 76.7 %
NRBC # BLD AUTO: 0 10*3/UL
NRBC BLD AUTO-RTO: 0 /100
PLATELET # BLD AUTO: 325 10E9/L (ref 150–450)
POTASSIUM SERPL-SCNC: 3.3 MMOL/L (ref 3.4–5.3)
PROT SERPL-MCNC: 6.8 G/DL (ref 6.8–8.8)
RBC # BLD AUTO: 4.79 10E12/L (ref 4.4–5.9)
SODIUM SERPL-SCNC: 136 MMOL/L (ref 133–144)
WBC # BLD AUTO: 9.5 10E9/L (ref 4–11)

## 2019-08-04 PROCEDURE — 25000128 H RX IP 250 OP 636: Performed by: EMERGENCY MEDICINE

## 2019-08-04 PROCEDURE — 85025 COMPLETE CBC W/AUTO DIFF WBC: CPT | Performed by: EMERGENCY MEDICINE

## 2019-08-04 PROCEDURE — 96360 HYDRATION IV INFUSION INIT: CPT

## 2019-08-04 PROCEDURE — 93005 ELECTROCARDIOGRAM TRACING: CPT

## 2019-08-04 PROCEDURE — 80320 DRUG SCREEN QUANTALCOHOLS: CPT | Performed by: EMERGENCY MEDICINE

## 2019-08-04 PROCEDURE — 80053 COMPREHEN METABOLIC PANEL: CPT | Performed by: EMERGENCY MEDICINE

## 2019-08-04 RX ADMIN — SODIUM CHLORIDE 1000 ML: 9 INJECTION, SOLUTION INTRAVENOUS at 04:28

## 2019-08-04 NOTE — DISCHARGE INSTRUCTIONS

## 2019-08-04 NOTE — ED NOTES
Pt ambulated to room 18 at this time - steady gait noted. Food, fluids and warm blankets provided.

## 2019-08-04 NOTE — ED PROVIDER NOTES
"[facility] ED Behavioral Health Handoff Note:       Brief HPI:  This is a 29 year old male signed out to me by Dr. Charles .  See initial ED Provider note for details of the presentation.     Intoxicated male, clinically sober and likely discharge home.     Pending studies include none.      The patient is on a hold.  The type of hold is NICOLASA.      The patient has not required medication for agitation.      Exam:   Patient Vitals for the past 24 hrs:   BP Temp Temp src Pulse Heart Rate Resp SpO2 Height Weight   08/04/19 0431 -- -- -- -- 90 11 96 % -- --   08/04/19 0400 122/80 -- -- -- -- -- -- -- --   08/04/19 0308 (!) 131/120 -- -- 141 134 26 97 % -- --   08/04/19 0305 -- -- -- -- -- -- 93 % -- --   08/04/19 0300 -- -- -- -- -- -- 92 % -- --   08/04/19 0155 -- -- -- -- -- -- 94 % -- --   08/04/19 0100 -- -- -- -- -- -- 96 % -- --   08/04/19 0045 -- -- -- -- -- -- 93 % -- --   08/03/19 2310 -- -- -- -- -- -- 95 % -- --   08/03/19 2000 -- -- -- -- -- -- 95 % -- --   08/03/19 1900 -- -- -- -- -- -- 96 % -- --   08/03/19 1845 -- -- -- -- -- -- 95 % -- --   08/03/19 1830 -- -- -- -- -- -- 96 % -- --   08/03/19 1815 -- -- -- -- -- -- 94 % -- --   08/03/19 1800 -- -- -- -- -- -- 93 % -- --   08/03/19 1745 -- -- -- -- -- -- 96 % -- --   08/03/19 1730 -- -- -- -- -- -- 97 % -- --   08/03/19 1715 -- -- -- -- -- -- 96 % -- --   08/03/19 1630 -- -- -- -- -- -- 91 % -- --   08/03/19 1615 -- -- -- -- -- -- 93 % -- --   08/03/19 1600 -- -- -- -- -- -- 94 % -- --   08/03/19 1545 -- -- -- -- -- -- 95 % -- --   08/03/19 1528 130/83 98  F (36.7  C) Temporal 126 126 18 96 % 1.778 m (5' 10\") 77.1 kg (170 lb)     General: Resting comfortably on the gurney  Head:  The scalp, face, and head appear normal  Eyes:  The pupils are normal    Conjunctivae and sclera appear normal  ENT:    The nose is normal    Ears/pinnae are normal  Neck:  Normal range of motion  MS:  No deformities  Skin:  No rash or lesions noted.  Neuro: Speech is normal and " fluent.  Steady gait.  Psych:  Awake. Alert.  Normal affect.      Appropriate interactions        ED Course:  Indication: chest pain  Time: 0312  Vent. Rate 111 bpm. NJ interval 140. QRS duration 88. QT/QTc 372/595. P-R-T axis 53 64 23. Sinus tachycardia. Non specific T wave abnormality. Abnormal ECG. Read time: 0320. No significant changes to EKG dated 4/8/19.      There were no significant events while under my care.  Patient was tachycardic, consistent with alcohol intoxication and continued sobering.  Patient cleared and clinically sobered under my care.  Stable gait.  Appropriate for discharge.  Transaminitis noted on blood work.  F/u with PCP indicated for re-assessment in one week.  Discharged home with return precautions and follow up instructions.       Impression:    ICD-10-CM    1. Transaminitis R74.0    2. Acute alcoholic intoxication in alcoholism without complication (H) F10.220        Plan:    1. Discharged to home      RESULTS:   Results for orders placed or performed during the hospital encounter of 08/03/19 (from the past 24 hour(s))   Alcohol breath test POCT     Status: Abnormal    Collection Time: 08/03/19  3:58 PM   Result Value Ref Range    Alcohol Breath Test 0.391 (A) 0.00 - 0.01   Alcohol breath test POCT     Status: Abnormal    Collection Time: 08/03/19  9:34 PM   Result Value Ref Range    Alcohol Breath Test 0.327 (A) 0.00 - 0.01   EKG 12-lead, tracing only     Status: None    Collection Time: 08/04/19  3:12 AM   Result Value Ref Range    Interpretation ECG Click View Image link to view waveform and result    CBC with platelets differential     Status: None    Collection Time: 08/04/19  3:40 AM   Result Value Ref Range    WBC 9.5 4.0 - 11.0 10e9/L    RBC Count 4.79 4.4 - 5.9 10e12/L    Hemoglobin 15.0 13.3 - 17.7 g/dL    Hematocrit 42.8 40.0 - 53.0 %    MCV 89 78 - 100 fl    MCH 31.3 26.5 - 33.0 pg    MCHC 35.0 31.5 - 36.5 g/dL    RDW 14.5 10.0 - 15.0 %    Platelet Count 325 150 - 450  10e9/L    Diff Method Automated Method     % Neutrophils 76.7 %    % Lymphocytes 16.4 %    % Monocytes 6.0 %    % Eosinophils 0.3 %    % Basophils 0.4 %    % Immature Granulocytes 0.2 %    Nucleated RBCs 0 0 /100    Absolute Neutrophil 7.3 1.6 - 8.3 10e9/L    Absolute Lymphocytes 1.6 0.8 - 5.3 10e9/L    Absolute Monocytes 0.6 0.0 - 1.3 10e9/L    Absolute Eosinophils 0.0 0.0 - 0.7 10e9/L    Absolute Basophils 0.0 0.0 - 0.2 10e9/L    Abs Immature Granulocytes 0.0 0 - 0.4 10e9/L    Absolute Nucleated RBC 0.0    Alcohol level blood     Status: Abnormal    Collection Time: 08/04/19  3:40 AM   Result Value Ref Range    Ethanol g/dL 0.22 (H) <0.01 g/dL   Comprehensive metabolic panel     Status: Abnormal    Collection Time: 08/04/19  3:40 AM   Result Value Ref Range    Sodium 136 133 - 144 mmol/L    Potassium 3.3 (L) 3.4 - 5.3 mmol/L    Chloride 94 94 - 109 mmol/L    Carbon Dioxide 31 20 - 32 mmol/L    Anion Gap 11 3 - 14 mmol/L    Glucose 124 (H) 70 - 99 mg/dL    Urea Nitrogen 10 7 - 30 mg/dL    Creatinine 0.63 (L) 0.66 - 1.25 mg/dL    GFR Estimate >90 >60 mL/min/[1.73_m2]    GFR Estimate If Black >90 >60 mL/min/[1.73_m2]    Calcium 8.6 8.5 - 10.1 mg/dL    Bilirubin Total 0.9 0.2 - 1.3 mg/dL    Albumin 3.7 3.4 - 5.0 g/dL    Protein Total 6.8 6.8 - 8.8 g/dL    Alkaline Phosphatase 95 40 - 150 U/L     (H) 0 - 70 U/L     (H) 0 - 45 U/L             Wilfrido Grimes MD  08/04/19 2299

## 2019-08-04 NOTE — ED NOTES
Bed: [facility]  Expected date:   Expected time:   Means of arrival:   Comments:  Room 16

## 2019-08-05 ENCOUNTER — HOSPITAL ENCOUNTER (INPATIENT)
Facility: CLINIC | Age: 29
LOS: 6 days | Discharge: PSYCHIATRIC OR SUBSTANCE USE FACILITY | End: 2019-08-12
Attending: EMERGENCY MEDICINE | Admitting: INTERNAL MEDICINE
Payer: COMMERCIAL

## 2019-08-05 DIAGNOSIS — K75.9 HEPATITIS: ICD-10-CM

## 2019-08-05 DIAGNOSIS — I10 BENIGN ESSENTIAL HYPERTENSION: ICD-10-CM

## 2019-08-05 DIAGNOSIS — B36.0 TINEA VERSICOLOR: ICD-10-CM

## 2019-08-05 DIAGNOSIS — R94.31 PROLONGED QT INTERVAL: ICD-10-CM

## 2019-08-05 DIAGNOSIS — F10.229 ALCOHOL DEPENDENCE WITH INTOXICATION WITH COMPLICATION (H): ICD-10-CM

## 2019-08-05 DIAGNOSIS — F33.2 SEVERE EPISODE OF RECURRENT MAJOR DEPRESSIVE DISORDER, WITHOUT PSYCHOTIC FEATURES (H): Primary | ICD-10-CM

## 2019-08-05 DIAGNOSIS — F10.929 ALCOHOLIC INTOXICATION WITH COMPLICATION (H): ICD-10-CM

## 2019-08-05 DIAGNOSIS — E86.0 DEHYDRATION: ICD-10-CM

## 2019-08-05 LAB
ALBUMIN SERPL-MCNC: 4 G/DL (ref 3.4–5)
ALP SERPL-CCNC: 114 U/L (ref 40–150)
ALT SERPL W P-5'-P-CCNC: 289 U/L (ref 0–70)
ANION GAP SERPL CALCULATED.3IONS-SCNC: 9 MMOL/L (ref 3–14)
AST SERPL W P-5'-P-CCNC: 735 U/L (ref 0–45)
BILIRUB SERPL-MCNC: 0.7 MG/DL (ref 0.2–1.3)
BUN SERPL-MCNC: 9 MG/DL (ref 7–30)
CALCIUM SERPL-MCNC: 8.2 MG/DL (ref 8.5–10.1)
CHLORIDE SERPL-SCNC: 96 MMOL/L (ref 94–109)
CO2 BLDCOV-SCNC: 29 MMOL/L (ref 21–28)
CO2 SERPL-SCNC: 32 MMOL/L (ref 20–32)
CREAT SERPL-MCNC: 0.58 MG/DL (ref 0.66–1.25)
ETHANOL SERPL-MCNC: 0.61 G/DL
GFR SERPL CREATININE-BSD FRML MDRD: >90 ML/MIN/{1.73_M2}
GLUCOSE BLDC GLUCOMTR-MCNC: 137 MG/DL (ref 70–99)
GLUCOSE SERPL-MCNC: 127 MG/DL (ref 70–99)
INTERPRETATION ECG - MUSE: NORMAL
LACTATE BLD-SCNC: 3.8 MMOL/L (ref 0.7–2.1)
LIPASE SERPL-CCNC: 446 U/L (ref 73–393)
PCO2 BLDV: 52 MM HG (ref 40–50)
PH BLDV: 7.36 PH (ref 7.32–7.43)
PO2 BLDV: 59 MM HG (ref 25–47)
POTASSIUM SERPL-SCNC: 3.7 MMOL/L (ref 3.4–5.3)
PROT SERPL-MCNC: 7.5 G/DL (ref 6.8–8.8)
SAO2 % BLDV FROM PO2: 89 %
SODIUM SERPL-SCNC: 137 MMOL/L (ref 133–144)

## 2019-08-05 PROCEDURE — 99285 EMERGENCY DEPT VISIT HI MDM: CPT | Mod: 25

## 2019-08-05 PROCEDURE — 00000146 ZZHCL STATISTIC GLUCOSE BY METER IP

## 2019-08-05 PROCEDURE — 80053 COMPREHEN METABOLIC PANEL: CPT | Performed by: EMERGENCY MEDICINE

## 2019-08-05 PROCEDURE — 25800030 ZZH RX IP 258 OP 636: Performed by: EMERGENCY MEDICINE

## 2019-08-05 PROCEDURE — 25000128 H RX IP 250 OP 636: Performed by: EMERGENCY MEDICINE

## 2019-08-05 PROCEDURE — 25000128 H RX IP 250 OP 636

## 2019-08-05 PROCEDURE — 80329 ANALGESICS NON-OPIOID 1 OR 2: CPT | Performed by: EMERGENCY MEDICINE

## 2019-08-05 PROCEDURE — 83605 ASSAY OF LACTIC ACID: CPT

## 2019-08-05 PROCEDURE — 25000125 ZZHC RX 250: Performed by: EMERGENCY MEDICINE

## 2019-08-05 PROCEDURE — 93005 ELECTROCARDIOGRAM TRACING: CPT

## 2019-08-05 PROCEDURE — 85025 COMPLETE CBC W/AUTO DIFF WBC: CPT | Performed by: EMERGENCY MEDICINE

## 2019-08-05 PROCEDURE — 80320 DRUG SCREEN QUANTALCOHOLS: CPT | Performed by: EMERGENCY MEDICINE

## 2019-08-05 PROCEDURE — 96365 THER/PROPH/DIAG IV INF INIT: CPT

## 2019-08-05 PROCEDURE — 83690 ASSAY OF LIPASE: CPT | Performed by: EMERGENCY MEDICINE

## 2019-08-05 PROCEDURE — 96361 HYDRATE IV INFUSION ADD-ON: CPT

## 2019-08-05 PROCEDURE — 82803 BLOOD GASES ANY COMBINATION: CPT

## 2019-08-05 RX ORDER — ONDANSETRON 2 MG/ML
INJECTION INTRAMUSCULAR; INTRAVENOUS
Status: COMPLETED
Start: 2019-08-05 | End: 2019-08-05

## 2019-08-05 RX ADMIN — FOLIC ACID: 5 INJECTION, SOLUTION INTRAMUSCULAR; INTRAVENOUS; SUBCUTANEOUS at 22:51

## 2019-08-05 RX ADMIN — SODIUM CHLORIDE 1000 ML: 9 INJECTION, SOLUTION INTRAVENOUS at 22:15

## 2019-08-05 RX ADMIN — ONDANSETRON 4 MG: 2 INJECTION INTRAMUSCULAR; INTRAVENOUS at 22:01

## 2019-08-05 ASSESSMENT — MIFFLIN-ST. JEOR: SCORE: 1760.97

## 2019-08-06 ENCOUNTER — APPOINTMENT (OUTPATIENT)
Dept: ULTRASOUND IMAGING | Facility: CLINIC | Age: 29
End: 2019-08-06
Attending: EMERGENCY MEDICINE
Payer: COMMERCIAL

## 2019-08-06 LAB
ALBUMIN SERPL-MCNC: 3.8 G/DL (ref 3.4–5)
ALP SERPL-CCNC: 94 U/L (ref 40–150)
ALT SERPL W P-5'-P-CCNC: 241 U/L (ref 0–70)
AMPHETAMINES UR QL SCN: NEGATIVE
ANION GAP SERPL CALCULATED.3IONS-SCNC: 9 MMOL/L (ref 3–14)
APAP SERPL-MCNC: <2 MG/L (ref 10–20)
AST SERPL W P-5'-P-CCNC: 649 U/L (ref 0–45)
BARBITURATES UR QL: NEGATIVE
BASOPHILS # BLD AUTO: 0 10E9/L (ref 0–0.2)
BASOPHILS NFR BLD AUTO: 0.1 %
BENZODIAZ UR QL: NEGATIVE
BILIRUB DIRECT SERPL-MCNC: 0.2 MG/DL (ref 0–0.2)
BILIRUB SERPL-MCNC: 0.7 MG/DL (ref 0.2–1.3)
BUN SERPL-MCNC: 4 MG/DL (ref 7–30)
CALCIUM SERPL-MCNC: 7.8 MG/DL (ref 8.5–10.1)
CANNABINOIDS UR QL SCN: NEGATIVE
CHLORIDE SERPL-SCNC: 103 MMOL/L (ref 94–109)
CO2 BLDCOV-SCNC: 29 MMOL/L (ref 21–28)
CO2 SERPL-SCNC: 29 MMOL/L (ref 20–32)
COCAINE UR QL: NEGATIVE
CREAT SERPL-MCNC: 0.5 MG/DL (ref 0.66–1.25)
DIFFERENTIAL METHOD BLD: ABNORMAL
EOSINOPHIL # BLD AUTO: 0 10E9/L (ref 0–0.7)
EOSINOPHIL NFR BLD AUTO: 0 %
ERYTHROCYTE [DISTWIDTH] IN BLOOD BY AUTOMATED COUNT: 15 % (ref 10–15)
ERYTHROCYTE [DISTWIDTH] IN BLOOD BY AUTOMATED COUNT: 15.2 % (ref 10–15)
ETHANOL SERPL-MCNC: 0.58 G/DL
GFR SERPL CREATININE-BSD FRML MDRD: >90 ML/MIN/{1.73_M2}
GLUCOSE SERPL-MCNC: 80 MG/DL (ref 70–99)
HCT VFR BLD AUTO: 41.9 % (ref 40–53)
HCT VFR BLD AUTO: 46.5 % (ref 40–53)
HGB BLD-MCNC: 14.3 G/DL (ref 13.3–17.7)
HGB BLD-MCNC: 15.9 G/DL (ref 13.3–17.7)
IMM GRANULOCYTES # BLD: 0 10E9/L (ref 0–0.4)
IMM GRANULOCYTES NFR BLD: 0.2 %
LACTATE BLD-SCNC: 2.1 MMOL/L (ref 0.7–2)
LACTATE BLD-SCNC: 2.6 MMOL/L (ref 0.7–2.1)
LACTATE BLD-SCNC: 2.9 MMOL/L (ref 0.7–2)
LYMPHOCYTES # BLD AUTO: 0.5 10E9/L (ref 0.8–5.3)
LYMPHOCYTES NFR BLD AUTO: 3.2 %
MAGNESIUM SERPL-MCNC: 1.6 MG/DL (ref 1.6–2.3)
MCH RBC QN AUTO: 30.2 PG (ref 26.5–33)
MCH RBC QN AUTO: 30.3 PG (ref 26.5–33)
MCHC RBC AUTO-ENTMCNC: 34.1 G/DL (ref 31.5–36.5)
MCHC RBC AUTO-ENTMCNC: 34.2 G/DL (ref 31.5–36.5)
MCV RBC AUTO: 88 FL (ref 78–100)
MCV RBC AUTO: 89 FL (ref 78–100)
MONOCYTES # BLD AUTO: 0.7 10E9/L (ref 0–1.3)
MONOCYTES NFR BLD AUTO: 4 %
NEUTROPHILS # BLD AUTO: 15.2 10E9/L (ref 1.6–8.3)
NEUTROPHILS NFR BLD AUTO: 92.5 %
OPIATES UR QL SCN: NEGATIVE
PCO2 BLDV: 52 MM HG (ref 40–50)
PCP UR QL SCN: NEGATIVE
PH BLDV: 7.36 PH (ref 7.32–7.43)
PHOSPHATE SERPL-MCNC: 3 MG/DL (ref 2.5–4.5)
PLATELET # BLD AUTO: 233 10E9/L (ref 150–450)
PLATELET # BLD AUTO: 279 10E9/L (ref 150–450)
PO2 BLDV: 92 MM HG (ref 25–47)
POTASSIUM SERPL-SCNC: 3.5 MMOL/L (ref 3.4–5.3)
PROT SERPL-MCNC: 6.5 G/DL (ref 6.8–8.8)
RBC # BLD AUTO: 4.72 10E12/L (ref 4.4–5.9)
RBC # BLD AUTO: 5.27 10E12/L (ref 4.4–5.9)
SAO2 % BLDV FROM PO2: 97 %
SODIUM SERPL-SCNC: 141 MMOL/L (ref 133–144)
WBC # BLD AUTO: 12.7 10E9/L (ref 4–11)
WBC # BLD AUTO: 16.5 10E9/L (ref 4–11)

## 2019-08-06 PROCEDURE — 82803 BLOOD GASES ANY COMBINATION: CPT

## 2019-08-06 PROCEDURE — 99221 1ST HOSP IP/OBS SF/LOW 40: CPT | Performed by: PSYCHIATRY & NEUROLOGY

## 2019-08-06 PROCEDURE — 25000125 ZZHC RX 250: Performed by: INTERNAL MEDICINE

## 2019-08-06 PROCEDURE — 80076 HEPATIC FUNCTION PANEL: CPT | Performed by: INTERNAL MEDICINE

## 2019-08-06 PROCEDURE — 12000000 ZZH R&B MED SURG/OB

## 2019-08-06 PROCEDURE — 83735 ASSAY OF MAGNESIUM: CPT | Performed by: INTERNAL MEDICINE

## 2019-08-06 PROCEDURE — 25000128 H RX IP 250 OP 636: Performed by: EMERGENCY MEDICINE

## 2019-08-06 PROCEDURE — 80307 DRUG TEST PRSMV CHEM ANLYZR: CPT | Performed by: EMERGENCY MEDICINE

## 2019-08-06 PROCEDURE — 25000132 ZZH RX MED GY IP 250 OP 250 PS 637: Performed by: PSYCHIATRY & NEUROLOGY

## 2019-08-06 PROCEDURE — 25000128 H RX IP 250 OP 636: Performed by: INTERNAL MEDICINE

## 2019-08-06 PROCEDURE — 83605 ASSAY OF LACTIC ACID: CPT | Performed by: INTERNAL MEDICINE

## 2019-08-06 PROCEDURE — 99207 ZZC CDG-MDM COMPONENT: MEETS MODERATE - UP CODED: CPT | Performed by: INTERNAL MEDICINE

## 2019-08-06 PROCEDURE — 25000132 ZZH RX MED GY IP 250 OP 250 PS 637: Performed by: HOSPITALIST

## 2019-08-06 PROCEDURE — 99223 1ST HOSP IP/OBS HIGH 75: CPT | Mod: AI | Performed by: INTERNAL MEDICINE

## 2019-08-06 PROCEDURE — 96361 HYDRATE IV INFUSION ADD-ON: CPT

## 2019-08-06 PROCEDURE — HZ2ZZZZ DETOXIFICATION SERVICES FOR SUBSTANCE ABUSE TREATMENT: ICD-10-PCS | Performed by: HOSPITALIST

## 2019-08-06 PROCEDURE — 83605 ASSAY OF LACTIC ACID: CPT

## 2019-08-06 PROCEDURE — 25800030 ZZH RX IP 258 OP 636: Performed by: EMERGENCY MEDICINE

## 2019-08-06 PROCEDURE — 36415 COLL VENOUS BLD VENIPUNCTURE: CPT | Performed by: INTERNAL MEDICINE

## 2019-08-06 PROCEDURE — 25000128 H RX IP 250 OP 636: Performed by: HOSPITALIST

## 2019-08-06 PROCEDURE — 85027 COMPLETE CBC AUTOMATED: CPT | Performed by: INTERNAL MEDICINE

## 2019-08-06 PROCEDURE — 83605 ASSAY OF LACTIC ACID: CPT | Performed by: HOSPITALIST

## 2019-08-06 PROCEDURE — 80048 BASIC METABOLIC PNL TOTAL CA: CPT | Performed by: INTERNAL MEDICINE

## 2019-08-06 PROCEDURE — 84100 ASSAY OF PHOSPHORUS: CPT | Performed by: INTERNAL MEDICINE

## 2019-08-06 PROCEDURE — 36415 COLL VENOUS BLD VENIPUNCTURE: CPT | Performed by: HOSPITALIST

## 2019-08-06 PROCEDURE — 25800030 ZZH RX IP 258 OP 636: Performed by: INTERNAL MEDICINE

## 2019-08-06 PROCEDURE — 76705 ECHO EXAM OF ABDOMEN: CPT

## 2019-08-06 PROCEDURE — 99207 ZZC NON-BILLABLE SERV PER CHARTING: CPT | Performed by: HOSPITALIST

## 2019-08-06 RX ORDER — ONDANSETRON 4 MG/1
4 TABLET, ORALLY DISINTEGRATING ORAL EVERY 6 HOURS PRN
Status: DISCONTINUED | OUTPATIENT
Start: 2019-08-06 | End: 2019-08-12 | Stop reason: HOSPADM

## 2019-08-06 RX ORDER — POTASSIUM CHLORIDE 1500 MG/1
20-40 TABLET, EXTENDED RELEASE ORAL
Status: DISCONTINUED | OUTPATIENT
Start: 2019-08-06 | End: 2019-08-12 | Stop reason: HOSPADM

## 2019-08-06 RX ORDER — SODIUM CHLORIDE 9 MG/ML
INJECTION, SOLUTION INTRAVENOUS ONCE
Status: COMPLETED | OUTPATIENT
Start: 2019-08-06 | End: 2019-08-06

## 2019-08-06 RX ORDER — MAGNESIUM SULFATE HEPTAHYDRATE 40 MG/ML
4 INJECTION, SOLUTION INTRAVENOUS EVERY 4 HOURS PRN
Status: DISCONTINUED | OUTPATIENT
Start: 2019-08-06 | End: 2019-08-12 | Stop reason: HOSPADM

## 2019-08-06 RX ORDER — MIRTAZAPINE 7.5 MG/1
7.5 TABLET, FILM COATED ORAL AT BEDTIME
Status: DISCONTINUED | OUTPATIENT
Start: 2019-08-06 | End: 2019-08-09

## 2019-08-06 RX ORDER — POTASSIUM CL/LIDO/0.9 % NACL 10MEQ/0.1L
10 INTRAVENOUS SOLUTION, PIGGYBACK (ML) INTRAVENOUS
Status: DISCONTINUED | OUTPATIENT
Start: 2019-08-06 | End: 2019-08-12 | Stop reason: HOSPADM

## 2019-08-06 RX ORDER — POTASSIUM CHLORIDE 29.8 MG/ML
20 INJECTION INTRAVENOUS
Status: DISCONTINUED | OUTPATIENT
Start: 2019-08-06 | End: 2019-08-12 | Stop reason: HOSPADM

## 2019-08-06 RX ORDER — POTASSIUM CHLORIDE 1.5 G/1.58G
20-40 POWDER, FOR SOLUTION ORAL
Status: DISCONTINUED | OUTPATIENT
Start: 2019-08-06 | End: 2019-08-12 | Stop reason: HOSPADM

## 2019-08-06 RX ORDER — LIDOCAINE 40 MG/G
CREAM TOPICAL
Status: DISCONTINUED | OUTPATIENT
Start: 2019-08-06 | End: 2019-08-12 | Stop reason: HOSPADM

## 2019-08-06 RX ORDER — DIAZEPAM 5 MG
10 TABLET ORAL EVERY 30 MIN PRN
Status: DISCONTINUED | OUTPATIENT
Start: 2019-08-06 | End: 2019-08-10

## 2019-08-06 RX ORDER — DIAZEPAM 10 MG/2ML
5-10 INJECTION, SOLUTION INTRAMUSCULAR; INTRAVENOUS EVERY 30 MIN PRN
Status: DISCONTINUED | OUTPATIENT
Start: 2019-08-06 | End: 2019-08-10

## 2019-08-06 RX ORDER — CHLORPROMAZINE HYDROCHLORIDE 10 MG/1
10 TABLET, FILM COATED ORAL 3 TIMES DAILY
Status: DISCONTINUED | OUTPATIENT
Start: 2019-08-06 | End: 2019-08-09

## 2019-08-06 RX ORDER — QUETIAPINE FUMARATE 25 MG/1
25 TABLET, FILM COATED ORAL
Status: DISCONTINUED | OUTPATIENT
Start: 2019-08-06 | End: 2019-08-09

## 2019-08-06 RX ORDER — SODIUM CHLORIDE 9 MG/ML
INJECTION, SOLUTION INTRAVENOUS CONTINUOUS
Status: DISCONTINUED | OUTPATIENT
Start: 2019-08-06 | End: 2019-08-07

## 2019-08-06 RX ORDER — POTASSIUM CHLORIDE 7.45 MG/ML
10 INJECTION INTRAVENOUS
Status: DISCONTINUED | OUTPATIENT
Start: 2019-08-06 | End: 2019-08-12 | Stop reason: HOSPADM

## 2019-08-06 RX ORDER — NALOXONE HYDROCHLORIDE 0.4 MG/ML
.1-.4 INJECTION, SOLUTION INTRAMUSCULAR; INTRAVENOUS; SUBCUTANEOUS
Status: DISCONTINUED | OUTPATIENT
Start: 2019-08-06 | End: 2019-08-12 | Stop reason: HOSPADM

## 2019-08-06 RX ORDER — ONDANSETRON 2 MG/ML
4 INJECTION INTRAMUSCULAR; INTRAVENOUS EVERY 6 HOURS PRN
Status: DISCONTINUED | OUTPATIENT
Start: 2019-08-06 | End: 2019-08-12 | Stop reason: HOSPADM

## 2019-08-06 RX ADMIN — SODIUM CHLORIDE: 9 INJECTION, SOLUTION INTRAVENOUS at 02:29

## 2019-08-06 RX ADMIN — DIAZEPAM 10 MG: 5 TABLET ORAL at 17:50

## 2019-08-06 RX ADMIN — MIRTAZAPINE 7.5 MG: 7.5 TABLET ORAL at 21:04

## 2019-08-06 RX ADMIN — DIAZEPAM 10 MG: 5 TABLET ORAL at 11:28

## 2019-08-06 RX ADMIN — QUETIAPINE 25 MG: 25 TABLET ORAL at 21:21

## 2019-08-06 RX ADMIN — DIAZEPAM 10 MG: 5 TABLET ORAL at 16:40

## 2019-08-06 RX ADMIN — DIAZEPAM 5 MG: 5 INJECTION, SOLUTION INTRAMUSCULAR; INTRAVENOUS at 18:18

## 2019-08-06 RX ADMIN — QUETIAPINE 25 MG: 25 TABLET ORAL at 12:43

## 2019-08-06 RX ADMIN — DIAZEPAM 10 MG: 5 TABLET ORAL at 13:57

## 2019-08-06 RX ADMIN — CHLORPROMAZINE HYDROCHLORIDE 10 MG: 10 TABLET, SUGAR COATED ORAL at 21:04

## 2019-08-06 RX ADMIN — SODIUM CHLORIDE: 9 INJECTION, SOLUTION INTRAVENOUS at 11:28

## 2019-08-06 RX ADMIN — CHLORPROMAZINE HYDROCHLORIDE 10 MG: 10 TABLET, SUGAR COATED ORAL at 15:10

## 2019-08-06 RX ADMIN — FOLIC ACID: 5 INJECTION, SOLUTION INTRAMUSCULAR; INTRAVENOUS; SUBCUTANEOUS at 21:21

## 2019-08-06 RX ADMIN — QUETIAPINE 25 MG: 25 TABLET ORAL at 19:18

## 2019-08-06 RX ADMIN — SODIUM CHLORIDE 500 ML: 9 INJECTION, SOLUTION INTRAVENOUS at 19:03

## 2019-08-06 RX ADMIN — QUETIAPINE 25 MG: 25 TABLET ORAL at 15:49

## 2019-08-06 RX ADMIN — SODIUM CHLORIDE 1000 ML: 9 INJECTION, SOLUTION INTRAVENOUS at 00:30

## 2019-08-06 ASSESSMENT — ACTIVITIES OF DAILY LIVING (ADL)
ADLS_ACUITY_SCORE: 17
ADLS_ACUITY_SCORE: 17
ADLS_ACUITY_SCORE: 16
ADLS_ACUITY_SCORE: 17

## 2019-08-06 NOTE — PLAN OF CARE
Admission    Patient arrives to room 621-1 via cart from ED.  Care plan note:   DATE & TIME: 8/6/19   Cognitive Concerns/ Orientation :  A&Ox3, disoriented to time, slow to respond  BEHAVIOR & AGGRESSION TOOL COLOR: Green, lethargic  CIWA SCORE: 3  ABNL VS/O2: HR tachy 100-110's, BP elevated 137/94.  MOBILITY: Asst 1, unsteady at times  PAIN MANAGMENT: Denies  DIET: Regular  BOWEL/BLADDER: Inc of bladder at times  ABNL LAB/BG: Lactic 2.6, Elevated LFT's, lipase 446, WBC 16.5  DRAIN/DEVICES: PIV infusing NS at 100/hr.    TELEMETRY RHYTHM: Sinus Tach  SKIN: Bruises, abrasion on L foot, scabs  TESTS/PROCEDURES: None  D/C DAY/GOALS/PLACE:  Discharge pending  OTHER IMPORTANT INFO: Psych, chem dep, SW consulted.  IV infuvite.        Inpatient nursing criteria listed below were met:    PCD's Documented: Yes  Skin issues/needs documented :Yes  Isolation education started/completed NA  Patient allergies verified with patient: Yes  Verified completion of [place] Risk Assessment Tool:  Yes  Verified completion of Guardianship screening tool: Yes  Fall Prevention: Care plan updated, Education given and documented Yes  Care Plan initiated: Yes  Home medications documented in belongings flowsheet: NA  Patient belongings documented in [place] flowsheet: Yes  Reminder note (belongings/ medications) placed in discharge instructions:NA  Admission profile/ required documentation complete: Yes  Bedside Report Letter given and explained to patient No

## 2019-08-06 NOTE — ED PROVIDER NOTES
"  History     Chief Complaint:  Alcohol Intoxication      HPI   Mohit Porter is a 29 year old male who presents with Alcohol Intoxication. Per chart review, the patient was seen 8 times in the ED for alcohol intoxication in the last month. Per EMS, the patient was found unresponsive at Extended stay in [place]. He was found with empty bottles of vodka. EMS reports that they placed a nasal airway. He was tachycardic and incontinent on while en route. Here, the patient has a limited history due to alcohol intoxication.       Allergies:  No known drug allergies      Medications:    Trazodone  Gabapentin  Mirtazapine  Campral    Past Medical History:    Alcohol withdrawal  Alcohol abuse  Anxiety  Hepatitis C  Suicidal ideation  Drug induced mental disorder    Past Surgical History:    The patient does not have any pertinent past surgical history.     Family History:    Alcohol /Drug problem  Hypertension  Heart disease    Social History:  Smoking status: never  Alcohol use: yes  Drug use: No  PCP: Physician No Ref-Primary  Marital Status:  Single        Review of Systems   Unable to perform ROS: Other   All other systems reviewed and are negative.  ROS limited due to alcohol intoxication      Physical Exam     Patient Vitals for the past 24 hrs:   BP Temp Temp src Pulse Heart Rate Resp SpO2 Height Weight   08/06/19 0200 117/77 -- -- 80 80 11 93 % -- --   08/06/19 0039 117/83 -- -- 99 -- -- 98 % -- --   08/06/19 0008 -- -- -- -- -- -- 92 % -- --   08/06/19 0000 117/82 -- -- 105 110 19 -- -- --   08/05/19 2356 125/89 -- -- 113 -- -- -- -- --   08/05/19 2253 (!) 135/104 -- -- -- 112 16 -- -- --   08/05/19 2157 (!) 148/106 97.4  F (36.3  C) Temporal -- 126 16 95 % 1.727 m (5' 8\") 81.6 kg (180 lb)        Physical Exam    Constitutional:  Resting with eyes closed and opens them spontaneously and in response to voice and command. Does not follow command for strength testing or answer questions. He does move away " from uncomfortable stimuli appropriately and repositions himself in the bed.   Head:    Atraumatic.   Mouth/Throat:   Oropharynx is without erythema or exudate and dry oral mucosa.   Eyes:    Conjunctivae normal and EOM are normal.      Pupils are equal, round, and reactive to light.   Neck:    Normal range of motion. Neck supple.   Cardiovascular:  Normal rate, regular rhythm, normal heart sounds and radial and    dorsalis pedis pulses are 2+ and symmetric. Tachycardic. No murmurs, rubs or gallops.     Pulmonary/Chest:  Effort normal and breath sounds normal.   Abdominal:   Soft. Bowel sounds are normal.      No splenomegaly or hepatomegaly. No tenderness. No rebound.   Musculoskeletal:  Normal range of motion. No edema and no tenderness.   Neurological:  Appears intoxicated. No facial asymmetry. Moves all extremities. Normal tone.    Skin:    Skin is warm and dry. He has healing abrasions on both knees. Chronic appearing diffuse mildly erythematous rashs across his upper back.  Psychiatric:   Intoxicated. Unable to assess.       Emergency Department Course     ECG:  ECG taken at 2219, ECG read at 2229  Normal sinus rhythm  Prolonged QT  Abnormal ECG  Rated slowed  QT lengthened  No significant changed compared to EKG dated 8/4/19  Rate 90 bpm. OR interval 154 ms. QRS duration 96 ms. QT/QTc 396/486 ms. P-R-T axes 53 74 42.    Imaging:  Radiology findings were communicated with the patient who voiced understanding of the findings.    Abdomen US, limited (RUQ only):  1. Unremarkable appearance of the gallbladder.  2. No biliary dilatation.  3. Mild diffuse fatty infiltration of the liver.    Reading per radiology       Laboratory:  Laboratory findings were communicated with the patient who voiced understanding of the findings.    CBC: WBC 16.5(H), HGB 15.9,   CMP: Glucose (2304) 127(H), Creatinine 0.58(L), Calcium 8.2(L), (H), (H)  Glucose by meter(2224): 137(H)  ISTAT gases lactate michael POCT: pH:  7.36, PCO2: 52(H), PO2: 59(H), Bicarbonate: 29(H), O2 Sat: 89, Lactic acid: 3.8(H)    Lipase: 446(H)    Alcohol level blood(2304): 0.61(H)  Alcohol level blood(0034): 0.58(H)  Acetaminophen level: <2  Drug abuse screen: negative      Interventions:  2201 Zofran 4mg IV   2215 NS 1L IV Bolus   0030 NS 1L IV Bolus     Emergency Department Course:   Nursing notes and vitals reviewed.    2210 I performed an exam of the patient as documented above.     2214 IV was inserted and blood was drawn for laboratory testing, results above.     2222 EKG was performed, see results above.     2316 IV was inserted and blood was drawn for laboratory testing, results above.     2340 I checked on the patient. He is able to move himself and adjust him position in the bed.    2344 IV was inserted and blood was drawn for laboratory testing, results above.     0032 The patient provided a urine sample here in the emergency department. This was sent for laboratory testing, findings above.     0046 The patient was sent for a Abdomen US while in the emergency department, results above.     0258  I spoke to Dr. Gleason of the hospitalist service who accepts the patient for admission.       0300 I personally reviewed the results with the patient and answered all related questions prior to admission.    Impression & Plan      Medical Decision Making:  Mohit Porter is a 29 year old male who presents to the emergency department today for evaluation for acutely intoxicated with alcohol. On Initial exam, he opens his eyes and responds to voice and command but does not answer questions or follow commands. He is moving all of his extremity. He smells strongly of alcohol and is disheveled and covered in urine. I reviewed his charts and he has frequent ED and hospital visits for alcohol intoxication in the recent months.    Clinically he looks dehydrated and is known to be tachycardic. I used was given fluids and nurse gave him Zofran for nausea and  vomiting. EKG, however showed a long QT in interval and so additional antiemetics were not given. The patient got a liter of fluids followed by a banana bag and another liter of fluid. Initial venous blood gas showed some mild CO2 retention but a normal pH. The second gas two hours later continued to be stable. He does not appear to be hypoventilating. Initial serum lactate was elevated to 3.8. I suspect this is due to dehydration than sepsis. Second lactate, after fluids, had improved to 2.6. Drug screen was negative. Alcohol level was markedly elevated to 0.61. I am sure the patient was found in a hotel room with a empty bottle of vodka.An hour and a half following the first blood alcohol draw, I drew a second one that he was not still climbing and he had fallen minimally to 0.58. On reexamination, the patient continues to rouse with physical or verbal stimuli. CMP looks fairly unremarkable aside from increasing elevations in his ALT and AST compared to his labs drawn on the 4th. The patient does not seem tender in his abdomen but he is so intoxicated he can not answer question about his pain. Gallbladder ultrasound does not show evidence coleitis, biliary obstruction or cholelithiasis. There is fatty infiltration of the liver. Lipase is mildly elevated at 446. Tylenol was negative. White cell counts is elevated at 16.5. The patient has not developed a fever. I do not detect a source for infection on exam.     Heart rate improved after hydration. The patient has been slowly having increasing awareness. However because of his significant history of alcohol abuse and is now worsening liver function tests. I think the patient should be hospitalized. Hopefully another intervention to try and help him get resources for his alcohol abuse would benefit the patient. He also needs further evaluation for his abnormal LFTs.     I discussed this case with Dr. Gleason on call for the hospitalist and she agreed to accept for  admission. Patient is admitted in good condition.     Diagnosis:    ICD-10-CM    1. Alcoholic intoxication with complication (H) F10.929    2. Dehydration E86.0    3. Hepatitis K75.9    4. Alcohol dependence with intoxication with complication (H) F10.229      Disposition:   The patient is admitted into the care of Dr. Gleason.     Scribe Disclosure:  Maria Antonia MUNIZ, am serving as a scribe at 11:10 PM on 8/5/2019 to document services personally performed by Obey Rivas MD based on my observations and the provider's statements to me.   EMERGENCY DEPARTMENT       Obey Rivas MD  08/06/19 0689

## 2019-08-06 NOTE — PROGRESS NOTES
[facility]    Sepsis Evaluation Progress Note    Date of Service: 08/06/2019    I was called to see Mohit Porter due to abnormal vital signs triggering the Sepsis SIRS screening alert. He is not known to have an infection.     Physical Exam    Vital Signs:  Temp: 98.6  F (37  C) Temp src: Oral BP: 135/76 Pulse: 92 Heart Rate: 114 Resp: 18 SpO2: 93 % O2 Device: None (Room air) Oxygen Delivery: 2 LPM    Lab:  Lactic Acid   Date Value Ref Range Status   08/06/2019 2.1 (H) 0.7 - 2.0 mmol/L Final     Lactate for Sepsis Protocol   Date Value Ref Range Status   08/06/2019 2.9 (H) 0.7 - 2.0 mmol/L Final     Comment:     Significant value called to and read back by  SAMIA ESCOBAR ON 66 AT 1819 AN         The patient has signs of altered level of consciousness suspicious for alcohol withdrawal.    The rest of their physical exam is significant for alcohol withdrawal.    Assessment and Plan    The SIRS and exam findings are likely due to alcohol withdraw, there is no sign of sepsis at this time.    Disposition: The patient has an underlying condition of alcohol withdrawal that will continue to affect their vital signs and should not have further labs drawn to assess sepsis unless their clinical appearance changes.    Roberto Carlos Meyesr MD

## 2019-08-06 NOTE — CONSULTS
"[facility] Initial Psychiatric Consult Note      TIME SPENT IN PSYCHIATRY INITIAL CONSULT: 55 MINUTES    Consult ordered by: Lupe Gleason MD  Reason: alcohol      Initial History     The patient's care was discussed, patient seen and chart notes were reviewed.    Patient examined for psychiatric consultation.     IDENTIFICATION  Pt is a 29 year old male who is a father to one. He currently resides in [place], [place] with his girlfriend. Pt does not see a therapist or psychiatrist at this time. Pt sees PCP Dr. Sheila Escamilla-Primary, Physician. Pt seen on 8/06/19 for psychiatric consultation.    HISTORY OF PRESENT ILLNESS  Patient does not obtain any previous inpatient mental health hospitalizations, however has undergone chemical dependency treatment and has multiple recent ED visits. He was initially presented to [facility] ED on 8/06/19 after he was found unresponsive at an extended stay in [place]. His blood alcohol level upon arrival was 0.61. Psychiatry was consulted to address patients alcohol use. On interview, patient admits to obtaining a severely depressed and anxious mood. He endorses symptoms such as poor motivation, poor concentration, low energy, feelings of hopeless, helpless, and worthless. He denies ever obtaining suicidal ideations or thoughts of committing suicide. Patient also declares endorsing consistent and pervasive sense of anxiety and worry. He finds this anxiety difficult to control, often resulting in restlessness, feeling on edge, irritability, and sleep disturbance. Patient reports he has met with a psychiatrist in the past, however has never been prescribed psychiatric medications. Patient states, \"my anxiety is horrible, can I get something for it?\" Dr. Crowell informs the patient about the medications Seroquel and Remeron and how these medications work, what they treat, and their possible side effects. After much discussion, the patient reports he is willing to try these " medications, thus Dr. Stringer will order Remeron 7.5mg at bedtime and Seroquel 25mg every 2 hrs PRN. Dr. Stringer also informs patient why both Thorazine and Valium will be administered today. Patient in agreement with these medication adjustments. In addition to this, Dr. Stringer informs the patient that he is very much in need of residential treatment at this time. Patient is not resistant to this idea. Patient notes he just recently walked out of treatment at [facility] and notes that he didn't feel as though the treatment was effective for him. Dr. Stringer then informs the patient about the treatment center known as [facility] in [place], [place]. After much conversation about this facility, the patient reports he is willing to attend this treatment facility after hospitalization. Dr. Stringer will order a chemical dependency consultation where he recommends residential treatment.     CHEMICAL DEPENDENCY HISTORY  Patient obtains a history of extreme alcohol dependence. Patient was recently sent to [facility] after undergoing detox at [place]. Patient admits to drinking alcohol on a daily basis, he reported to Hospitalist (on 8/06/19) that he drinks vodka, but was unsure of the amount. He admits to experiencing blackouts however denies alcohol withdrawal seizures or DT's. He notes he started drinking alcohol when he was 18 years old, it becaming a problem when he was 23 years old. Alcohol has caused many strains in his life such as losing jobs, ruining relationships, and obtaining one DWI.     PAST PSYCHIATRIC HISTORY  Patient does not obtain a psychiatric history. He has no history of inpatient mental health hospitalizations, however has multiple recent ED visits due to his alcohol use. Patient believes he met with a psychiatrist in the past, but he denies ever being prescribed psychiatric medications. He denies ever undergoing ECT psychiatric treatment and has never attended day treatment.     FAMILY  "HISTORY  Uncle - alcohol abuse  Father - anxiety     SOCIAL HISTORY  Patient grew up in [place], [place]. He has two siblings and grew up with both parents who were/are supportive. He graduated high school then went to attend university at [employer]. 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 Patient is currently unemployed. He is currently in a relationship and has been for the past 2 years. His girlfriend is not supportive of patients alcohol use and has threatened to leave him on multiple occassions.      Medications     No medications prior to admission.       Scheduled Medications    sodium chloride (PF)  3 mL Intracatheter Q8H     IV Fluid with vitamins   Intravenous Q24H     PRNs:  lidocaine 4%, lidocaine (buffered or not buffered), magnesium sulfate, naloxone, ondansetron **OR** ondansetron, potassium chloride, potassium chloride with lidocaine, potassium chloride, potassium chloride, potassium chloride, potassium phosphate (KPHOS) in D5W IV, potassium phosphate (KPHOS) in D5W IV, potassium phosphate (KPHOS) in D5W IV, potassium phosphate (KPHOS) in D5W IV, sodium chloride (PF)      Allergies      No Known Allergies     Previous Medical History     Past Medical History:   Diagnosis Date     Alcohol abuse      Anxiety      Hepatitis C         Medical Review of Systems     /87 (BP Location: Right arm)   Pulse 92   Temp 98.3  F (36.8  C) (Oral)   Resp 18   Ht 1.727 m (5' 8\")   Wt 81.6 kg (180 lb)   SpO2 95%   BMI 27.37 kg/m    Body mass index is 27.37 kg/m .    Previous 10-point ROS completed by Lupe Gleason MD on 8/05/19 reviewed by Samuel Stringer MD on August 6, 2019 and is unchanged except for those problems mentioned within the HPI.      Mental Status Examination     Appearance Lying in bed, dressed in gown. Appears stated age.   Attitude Cooperative   Orientation Oriented to person, place, time   Eye Contact Fair   Speech Regular " rate, rhythm, volume and tone   Language Normal   Psychomotor Behavior Excessive agitation with visual hiccup    Thought Process Goal-Oriented, Intact   Associations Intact   Thought Content Patient is currently negative for suicidal ideation, negative for plan or intent, able to contract no self harm and identify barriers to suicide.  Negative for obsessions, compulsions or psychosis.     Mood Anxious   Affect Aggravated    Fund of Knowledge Intact   Insight Impaired   Judgement Impaired   Attention Span & Concentration Poor   Recent & Remote Memory Fair    Gait Normal   Muscle Tone Intact      Labs     Labs reviewed.  Recent Results (from the past 24 hour(s))   Glucose by meter    Collection Time: 08/05/19 10:09 PM   Result Value Ref Range    Glucose 137 (H) 70 - 99 mg/dL   Comprehensive metabolic panel    Collection Time: 08/05/19 10:13 PM   Result Value Ref Range    Sodium 137 133 - 144 mmol/L    Potassium 3.7 3.4 - 5.3 mmol/L    Chloride 96 94 - 109 mmol/L    Carbon Dioxide 32 20 - 32 mmol/L    Anion Gap 9 3 - 14 mmol/L    Glucose 127 (H) 70 - 99 mg/dL    Urea Nitrogen 9 7 - 30 mg/dL    Creatinine 0.58 (L) 0.66 - 1.25 mg/dL    GFR Estimate >90 >60 mL/min/[1.73_m2]    GFR Estimate If Black >90 >60 mL/min/[1.73_m2]    Calcium 8.2 (L) 8.5 - 10.1 mg/dL    Bilirubin Total 0.7 0.2 - 1.3 mg/dL    Albumin 4.0 3.4 - 5.0 g/dL    Protein Total 7.5 6.8 - 8.8 g/dL    Alkaline Phosphatase 114 40 - 150 U/L     (H) 0 - 70 U/L     (HH) 0 - 45 U/L   Alcohol level blood    Collection Time: 08/05/19 10:13 PM   Result Value Ref Range    Ethanol g/dL 0.61 (HH) <0.01 g/dL   Lipase    Collection Time: 08/05/19 10:13 PM   Result Value Ref Range    Lipase 446 (H) 73 - 393 U/L   Acetaminophen level    Collection Time: 08/05/19 10:13 PM   Result Value Ref Range    Acetaminophen Level <2 mg/L   CBC with platelets differential    Collection Time: 08/05/19 10:13 PM   Result Value Ref Range    WBC 16.5 (H) 4.0 - 11.0 10e9/L     RBC Count 5.27 4.4 - 5.9 10e12/L    Hemoglobin 15.9 13.3 - 17.7 g/dL    Hematocrit 46.5 40.0 - 53.0 %    MCV 88 78 - 100 fl    MCH 30.2 26.5 - 33.0 pg    MCHC 34.2 31.5 - 36.5 g/dL    RDW 15.0 10.0 - 15.0 %    Platelet Count 279 150 - 450 10e9/L    Diff Method Automated Method     % Neutrophils 92.5 %    % Lymphocytes 3.2 %    % Monocytes 4.0 %    % Eosinophils 0.0 %    % Basophils 0.1 %    % Immature Granulocytes 0.2 %    Absolute Neutrophil 15.2 (H) 1.6 - 8.3 10e9/L    Absolute Lymphocytes 0.5 (L) 0.8 - 5.3 10e9/L    Absolute Monocytes 0.7 0.0 - 1.3 10e9/L    Absolute Eosinophils 0.0 0.0 - 0.7 10e9/L    Absolute Basophils 0.0 0.0 - 0.2 10e9/L    Abs Immature Granulocytes 0.0 0 - 0.4 10e9/L   EKG 12 lead    Collection Time: 08/05/19 10:19 PM   Result Value Ref Range    Interpretation ECG Click View Image link to view waveform and result    ISTAT gases lactate michael POCT    Collection Time: 08/05/19 11:54 PM   Result Value Ref Range    Ph Venous 7.36 7.32 - 7.43 pH    PCO2 Venous 52 (H) 40 - 50 mm Hg    PO2 Venous 59 (H) 25 - 47 mm Hg    Bicarbonate Venous 29 (H) 21 - 28 mmol/L    O2 Sat Venous 89 %    Lactic Acid 3.8 (H) 0.7 - 2.1 mmol/L   Alcohol level blood    Collection Time: 08/05/19 11:55 PM   Result Value Ref Range    Ethanol g/dL 0.58 (HH) <0.01 g/dL   Drug abuse screen urine    Collection Time: 08/06/19 12:30 AM   Result Value Ref Range    Amphetamine Qual Urine Negative NEG^Negative    Barbiturates Qual Urine Negative NEG^Negative    Benzodiazepine Qual Urine Negative NEG^Negative    Cannabinoids Qual Urine Negative NEG^Negative    Cocaine Qual Urine Negative NEG^Negative    Opiates Qualitative Urine Negative NEG^Negative    PCP Qual Urine Negative NEG^Negative   ISTAT gases lactate michael POCT    Collection Time: 08/06/19  2:11 AM   Result Value Ref Range    Ph Venous 7.36 7.32 - 7.43 pH    PCO2 Venous 52 (H) 40 - 50 mm Hg    PO2 Venous 92 (H) 25 - 47 mm Hg    Bicarbonate Venous 29 (H) 21 - 28 mmol/L    O2 Sat  Venous 97 %    Lactic Acid 2.6 (H) 0.7 - 2.1 mmol/L   Basic metabolic panel    Collection Time: 08/06/19  8:23 AM   Result Value Ref Range    Sodium 141 133 - 144 mmol/L    Potassium 3.5 3.4 - 5.3 mmol/L    Chloride 103 94 - 109 mmol/L    Carbon Dioxide 29 20 - 32 mmol/L    Anion Gap 9 3 - 14 mmol/L    Glucose 80 70 - 99 mg/dL    Urea Nitrogen 4 (L) 7 - 30 mg/dL    Creatinine 0.50 (L) 0.66 - 1.25 mg/dL    GFR Estimate >90 >60 mL/min/[1.73_m2]    GFR Estimate If Black >90 >60 mL/min/[1.73_m2]    Calcium 7.8 (L) 8.5 - 10.1 mg/dL   Hepatic panel    Collection Time: 08/06/19  8:23 AM   Result Value Ref Range    Bilirubin Direct 0.2 0.0 - 0.2 mg/dL    Bilirubin Total 0.7 0.2 - 1.3 mg/dL    Albumin 3.8 3.4 - 5.0 g/dL    Protein Total 6.5 (L) 6.8 - 8.8 g/dL    Alkaline Phosphatase 94 40 - 150 U/L     (H) 0 - 70 U/L     (HH) 0 - 45 U/L   CBC with platelets    Collection Time: 08/06/19  8:23 AM   Result Value Ref Range    WBC 12.7 (H) 4.0 - 11.0 10e9/L    RBC Count 4.72 4.4 - 5.9 10e12/L    Hemoglobin 14.3 13.3 - 17.7 g/dL    Hematocrit 41.9 40.0 - 53.0 %    MCV 89 78 - 100 fl    MCH 30.3 26.5 - 33.0 pg    MCHC 34.1 31.5 - 36.5 g/dL    RDW 15.2 (H) 10.0 - 15.0 %    Platelet Count 233 150 - 450 10e9/L   Magnesium    Collection Time: 08/06/19  8:23 AM   Result Value Ref Range    Magnesium 1.6 1.6 - 2.3 mg/dL   Phosphorus    Collection Time: 08/06/19  8:23 AM   Result Value Ref Range    Phosphorus 3.0 2.5 - 4.5 mg/dL   Lactic acid whole blood    Collection Time: 08/06/19  8:23 AM   Result Value Ref Range    Lactic Acid 2.1 (H) 0.7 - 2.0 mmol/L        Impression   This is a 29 year old male with no previous inpatient mental health hospitalizations, multiple ED visits related to alcohol use, and has attended chemical dependency treatment for alcohol use (last treatment being just a few weeks ago). Patient was initially presented to [facility] ED on 8/06/19 after being found unresponsive at an extended stay in [place],  [place]. Upon admission, the patients blood alcohol level read to be 0.61. While meeting with Dr. Stringer this morning, the patient was found to be awake, alert, and still very much intoxicated. He denied ever experiencing alcohol withdrawal seizures or DT's in the past, which makes it quite obvious that patient obtains an extremely high alcohol tolerance. He did show signs of neurological irritability such as hiccups throughout the interview. For this, Dr. Stringer ordered Thorazine 10mg TID along with Valium to aid in withdrawal. Dr. Stringer stressed that patient is very much in need of residential chemical dependency treatment immediately after hospitalization. A chemical dependency consult has been ordered.       Diagnoses     1. Major depression, recurrent, severe, without psychotic features.  2. Alcohol Use Disorder, severe      Plan     1. Explained side effects, benefits and complications of medications to the patient.  2. Medication Changes: Started Remeron 7.5mg at bedtime, Seroquel 25mg every 2 hrs PRN, Thorazine 10mg TID  3. Discussed treatment plan with patient and team.  4. Re-consult Psychiatry as needed  5. Ordered Chemical Dependency consult - refer to [facility] for residential treatment       TIME SPENT IN PSYCHIATRY INITIAL CONSULT: 55 MINUTES     Attestation:   Lyric MUNIZ, am serving as a scribe at 11:02 AM on 8/6/2019 to document services personally performed by Samuel Stringer MD based on my observations and the provider's statements to me.    Patient ID:  Name: Mohit Porter MRN: [id number]  Admission: 8/5/2019 YOB: 1990

## 2019-08-06 NOTE — ED NOTES
"[facility]  ED Nurse Handoff Report    ED Chief complaint: Alcohol Intoxication      ED Diagnosis:   Final diagnoses:   Alcoholic intoxication with complication (H)   Dehydration   Hepatitis   Alcohol dependence with intoxication with complication (H)       Code Status: Full Code    Allergies: No Known Allergies    Activity level - Baseline/Home:  Independent  Activity Level - Current:   Pt has not been ambulated while in ED    Patient's Preferred language: English   Needed?: No    Isolation: No  Infection: Not Applicable  Bariatric?: No    Vital Signs:   Vitals:    08/06/19 0008 08/06/19 0039 08/06/19 0200 08/06/19 0300   BP:  117/83 117/77 126/80   Pulse:  99 80 76   Resp:   11 11   Temp:       TempSrc:       SpO2: 92% 98% 93% 100%   Weight:       Height:           Cardiac Rhythm: ,        Pain level:      Is this patient confused?: Yes   Does this patient have a guardian?  No         If yes, is there guardianship documents in the Epic \"Code/ACP\" activity?  N/A         Guardian Notified?  N/A  [place] - Suicide Severity Rating Scale Completed?  Yes  If yes, what color did the patient score?  White    Patient Report: Initial Complaint: Pt presents with alcohol intoxication.  Pt found unresponsive at hotel with Vodka bottles around him.  Upon arrival pt is 0.61.  Pt has hx of ETOH abuse.  Pt liver enzymes elevated.  Pt responds to voice, and pain while in ED.  Respiration even and equal.  Pt intially needed nasal airway to raise O2 sats.  In ED pt just needs 2L BNC.  Pt is incontinent at times.    Focused Assessment: see above  Tests Performed:   Results for orders placed or performed during the hospital encounter of 08/05/19   Abdomen US, limited (RUQ only)    Narrative    ULTRASOUND ABDOMEN LIMITED RIGHT UPPER QUADRANT  8/6/2019 1:12 AM     HISTORY: Alcohol abuse. Elevated liver function tests.    COMPARISON: None.    FINDINGS: Mild diffuse increased hepatic echogenicity, " likely  representing fatty infiltration. No hepatic masses. The gallbladder is  unremarkable without gallstones, wall thickening or pericholecystic  fluid. No focal tenderness over the gallbladder. No intra- or  extrahepatic biliary dilatation. The common duct measures 0.5 cm in  diameter. The right kidney has normal size and echogenicity, measuring  13.0 cm in length. No right intrarenal collecting system dilatation,  calculi or masses. No free fluid in the upper right hemiabdomen.      Impression    IMPRESSION:  1. Unremarkable appearance of the gallbladder.  2. No biliary dilatation.  3. Mild diffuse fatty infiltration of the liver.    ALEX RAINES MD   Comprehensive metabolic panel   Result Value Ref Range    Sodium 137 133 - 144 mmol/L    Potassium 3.7 3.4 - 5.3 mmol/L    Chloride 96 94 - 109 mmol/L    Carbon Dioxide 32 20 - 32 mmol/L    Anion Gap 9 3 - 14 mmol/L    Glucose 127 (H) 70 - 99 mg/dL    Urea Nitrogen 9 7 - 30 mg/dL    Creatinine 0.58 (L) 0.66 - 1.25 mg/dL    GFR Estimate >90 >60 mL/min/[1.73_m2]    GFR Estimate If Black >90 >60 mL/min/[1.73_m2]    Calcium 8.2 (L) 8.5 - 10.1 mg/dL    Bilirubin Total 0.7 0.2 - 1.3 mg/dL    Albumin 4.0 3.4 - 5.0 g/dL    Protein Total 7.5 6.8 - 8.8 g/dL    Alkaline Phosphatase 114 40 - 150 U/L     (H) 0 - 70 U/L     (HH) 0 - 45 U/L   Alcohol level blood   Result Value Ref Range    Ethanol g/dL 0.61 (HH) <0.01 g/dL   Drug abuse screen urine   Result Value Ref Range    Amphetamine Qual Urine Negative NEG^Negative    Barbiturates Qual Urine Negative NEG^Negative    Benzodiazepine Qual Urine Negative NEG^Negative    Cannabinoids Qual Urine Negative NEG^Negative    Cocaine Qual Urine Negative NEG^Negative    Opiates Qualitative Urine Negative NEG^Negative    PCP Qual Urine Negative NEG^Negative   Glucose by meter   Result Value Ref Range    Glucose 137 (H) 70 - 99 mg/dL   Lipase   Result Value Ref Range    Lipase 446 (H) 73 - 393 U/L   Alcohol level blood    Result Value Ref Range    Ethanol g/dL 0.58 (HH) <0.01 g/dL   Acetaminophen level   Result Value Ref Range    Acetaminophen Level <2 mg/L   CBC with platelets differential   Result Value Ref Range    WBC 16.5 (H) 4.0 - 11.0 10e9/L    RBC Count 5.27 4.4 - 5.9 10e12/L    Hemoglobin 15.9 13.3 - 17.7 g/dL    Hematocrit 46.5 40.0 - 53.0 %    MCV 88 78 - 100 fl    MCH 30.2 26.5 - 33.0 pg    MCHC 34.2 31.5 - 36.5 g/dL    RDW 15.0 10.0 - 15.0 %    Platelet Count 279 150 - 450 10e9/L    Diff Method Automated Method     % Neutrophils 92.5 %    % Lymphocytes 3.2 %    % Monocytes 4.0 %    % Eosinophils 0.0 %    % Basophils 0.1 %    % Immature Granulocytes 0.2 %    Absolute Neutrophil 15.2 (H) 1.6 - 8.3 10e9/L    Absolute Lymphocytes 0.5 (L) 0.8 - 5.3 10e9/L    Absolute Monocytes 0.7 0.0 - 1.3 10e9/L    Absolute Eosinophils 0.0 0.0 - 0.7 10e9/L    Absolute Basophils 0.0 0.0 - 0.2 10e9/L    Abs Immature Granulocytes 0.0 0 - 0.4 10e9/L   EKG 12 lead   Result Value Ref Range    Interpretation ECG Click View Image link to view waveform and result    ISTAT gases lactate michael POCT   Result Value Ref Range    Ph Venous 7.36 7.32 - 7.43 pH    PCO2 Venous 52 (H) 40 - 50 mm Hg    PO2 Venous 59 (H) 25 - 47 mm Hg    Bicarbonate Venous 29 (H) 21 - 28 mmol/L    O2 Sat Venous 89 %    Lactic Acid 3.8 (H) 0.7 - 2.1 mmol/L   ISTAT gases lactate michael POCT   Result Value Ref Range    Ph Venous 7.36 7.32 - 7.43 pH    PCO2 Venous 52 (H) 40 - 50 mm Hg    PO2 Venous 92 (H) 25 - 47 mm Hg    Bicarbonate Venous 29 (H) 21 - 28 mmol/L    O2 Sat Venous 97 %    Lactic Acid 2.6 (H) 0.7 - 2.1 mmol/L       Abnormal Results: elevated lactic.  Elevated liver enzymes.  Treatments provided: see MAR    Family Comments: n/a    OBS brochure/video discussed/provided to patient/family: No              Name of person given brochure if not patient: n/a              Relationship to patient: n/a    ED Medications:   Medications   ondansetron (ZOFRAN) 2 MG/ML injection (4 mg   Given 8/5/19 2201)   sodium chloride 0.9 % 1,000 mL with INFUVITE ADULT 10 mL, thiamine 100 mg, folic acid 1 mg infusion ( Intravenous Stopped 8/6/19 0033)   0.9% sodium chloride BOLUS (0 mLs Intravenous Stopped 8/5/19 2251)   0.9% sodium chloride BOLUS (0 mLs Intravenous Stopped 8/6/19 0228)   sodium chloride 0.9% infusion ( Intravenous New Bag 8/6/19 0229)       Drips infusing?:  No    For the majority of the shift this patient was Green.   Interventions performed were TLC.    Severe Sepsis OR Septic Shock Diagnosis Present: No    To be done/followed up on inpatient unit:  n/a    ED NURSE PHONE NUMBER: [phone]

## 2019-08-06 NOTE — PROGRESS NOTES
Brief, no charge note    Patient seen by my colleague Dr Gleason early this morning    Mr Mohit Porter is a 29-year-old gentleman with past medical history of anxiety, alcohol abuse/dependence, history of alcohol withdrawal and alcoholic hepatitis who was brought in by EMS after he was found unresponsive at extended stay in [place].     # Acute toxic encephalopathy.   # Alcohol intoxication.   # Concern for ETOH witdrawal  # abnormal LFTs, likely alcoholic hepatitis  # Anxiety  - long history of alcohol abuse/dependence; he had multiple ER visits this month only for alcohol intoxication  - ETOH level on admission 0.58  - on CIWA protocol but valium were not started as patient was intoxicated  - evaluated by psychiatry, at high risk for withdrawal; will start Valium per CIWA protocol  - psychiatry started Remeron 7.5 mg bedtime and Seroquel 25 mg Q2 hours PRN, thorazine 10 mg tid  - Chem dep eval pending, will probably require residential treatment  - elevated transaminases, normal bilirubin  - US RUQ shows fatty liver, otherwise unremarkable  - will monitor LFTs  -continue IV multivitamin     # Leukocytosis  - Suspect reactive  - wbc trending down 16.5---12.7  - elevated lactate is slightly secondary to alcohol abuse and intoxication; no clinical concern for sepsis; lactate trending down, 3.8----2.6---2.1    # Deep venous thrombosis prophylaxis:  Pneumatic compression device.      CODE STATUS:  Discussed with the patient and patient is full code

## 2019-08-06 NOTE — ED TRIAGE NOTES
Found unresponsive at the Extended Stay in [place] per EMS. Empty bottles of vodka found with patient, no other evidence of other substance use at the scene. Unknown ETOH level, nasal airway placed by EMS for decreasing SpO2 in upper 80s, now 95% on 2 L, tachycardic, BP stable. PERRLA, responds to painful stimuli, opens eyes periodically. Patient was incontinent of urine en route.

## 2019-08-06 NOTE — PROGRESS NOTES
RECEIVING UNIT ED HANDOFF REVIEW    ED Nurse Handoff Report was reviewed by: Belkys Palacios on August 6, 2019 at 5:24 AM

## 2019-08-06 NOTE — PLAN OF CARE
DATE & TIME: 8/6/19 AM                 Cognitive Concerns/ Orientation : A&Ox3-4, was d/o to time but has been x4 since this afternoon    BEHAVIOR & AGGRESSION TOOL COLOR: Green/yellow - frequently asked for anxiety medications. Had episode of yellow when pt pulled off tele and pulled IV out, stating that he's leaving. Has been redirectable.   CIWA SCORE: 8/6/7/8 - Valium x2 given, scoring mostly for anxiety, PRN seroquel given x1.   ABNL VS/O2: VSS on RA  MOBILITY: SBA   PAIN MANAGMENT: Denies pain   DIET: Regular - tolerating denies N/V   BOWEL/BLADDER: Incontinent at times but also has been using bedside urinal with good output   ABNL LAB/BG: LFTs Elevated, last JOANA was 0.58 drawn last midnight   DRAIN/DEVICES: New PIV to L hand infusing NS @ 100ml/hr. IV infuvite tonight   TELEMETRY RHYTHM: Sinus Tach   SKIN: Bruised, scabs, abrasions to L foot, Stitches to R eyebrow.   TESTS/PROCEDURES: NA   D/C DAY/GOALS/PLACE: Discharge pending alcohol withdrawal and consults.   OTHER IMPORTANT INFO: Pt has been having hiccups. PRN thorazine is scheduled for TID, first dose starting at 1600.

## 2019-08-06 NOTE — CODE/RAPID RESPONSE
Brief house ROSEANN note  8/6/2019  8582    I was called to evaluate Mr. Porter for increasing agitation, attempt to elope.  Psychiatry is evaluated this patient who presented to the emergency department acutely intoxicated with an alcohol blood level 0.61.  His last drink was thought to be yesterday at noon.  Nursing, the patient was escalating, attempting to leave allowed him to use the bathroom and called a code 21.    In my arrival, the patient is calmly walking to the bathroom, security outside the door.  Patient is holdable per psychiatry, he was amenable to verbal de-escalation and redirection and accepted oral Valium for his increasing anxiety.      At the conclusion of this code, the patient is amenable to staying, however I suspect this is a short-term agreement.  Given the blood alcohol level on arrival, I suspect there is very low chance the patient will not require ICU level care in the near future for Precedex drip.  I discussed consistent Valium, Seroquel, Thorazine (hiccups) and Remeron treatment with nursing.      MILAGRO Stubbs [facility]  Hospitalist Service  House Officer  Pager: [phone] (5v - 6p)

## 2019-08-06 NOTE — ED NOTES
DATE:  8/6/2019   TIME OF RECEIPT FROM LAB:  0035   LAB TEST:  Alcohol Level  LAB VALUE:  0.58  RESULTS GIVEN WITH READ-BACK TO (PROVIDER):  Dr. Rosario  TIME LAB VALUE REPORTED TO PROVIDER:   0036

## 2019-08-06 NOTE — PROVIDER NOTIFICATION
MD Notification    Notified Person: MD    Notified Person Name: Myke     Notification Date/Time: 8/6/2019 1530    Notification Interaction: phone call     Purpose of Notification: clarification if pt is holdable when trying to leave the hospital     Orders Received: pt is holdable when trying to leave    Comments:

## 2019-08-06 NOTE — PROVIDER NOTIFICATION
MD Notification    Notified Person: MD    Notified Person Name: Mira     Notification Date/Time: 8/6/2019 0277    Notification Interaction: phone page      Purpose of Notification: lactic of 2.9    Orders Received: Bolus 500 ml, increase IVF to 125 ml/hour. No recheck needed.

## 2019-08-06 NOTE — PHARMACY-ADMISSION MEDICATION HISTORY
Admission medication history interview status for the 8/5/2019  admission is complete. See EPIC admission navigator for prior to admission medications     Medication history source reliability:Good    Actions taken by pharmacist (provider contacted, etc): interviewed patient, care everywhere     Additional medication history information not noted on PTA med list :None    Medication reconciliation/reorder completed by provider prior to medication history? No    Time spent in this activity: 15 min    Prior to Admission medications    Not on File

## 2019-08-06 NOTE — H&P
Admitted:     08/05/2019     DATE OF SERVICE: 08/06/2019     CHIEF COMPLAINT:  Alcohol intoxication.      HISTORY OF PRESENT ILLNESS:  Had been limited due to the patient's alcohol intoxication.  He is very sleepy, able to wake up, but cannot provide much information.  I did discuss with ER attending and I reviewed his chart as well.  As per report, he was brought in by EMS after he was found unresponsive at extended stay in [place].  Again, per report, empty bottles of vodka were found at the site.  As per the chart review, he had visits for alcohol intoxication in the last month.  It seems that in 02/2019, he was admitted to detox unit.  He does admit drinking vodka, although he cannot tell me how much.  He denies other illicit drug abuse.  It is not clear if he is taking any other medications at home.  Upon further questioning, the patient denies having any chest pain.  He denies having headache, no nausea, no vomiting, no abdominal pain, no diarrhea, no constipation.      In the ER, he was seen by Dr. Rivas.  His initial vitals showed a blood pressure of 148/106, his heart rate was in the 120s, temperature 97.4, respiratory rate 16, oxygen saturation 95% on room air.  He did have basic blood work which showed unremarkable BMP with sodium 137, potassium 3.7, chloride 96, bicarbonate 39, BUN 9, creatinine 0.58.  His anion gap was 9, calcium 8.2.  His liver function test showed a normal albumin 4, total protein 7.5, total bilirubin 0.7, alkaline phosphatase was 114.  He had marked elevated transaminase, , , up from 294 a couple of days ago.  His lactic acid initially 3.8, improved to 2.6 after initial IV hydration.  Lipase was 446, glucose 127.  His VBG with pH 7.36, pCO2 of 52, pO2 of 59.  Bicarbonate was 29 in VBG.  His CBC with white blood cells 16.5, hemoglobin 15.9, hematocrit 46.5, platelet count 279,000.  Ethanol level was 0.58.  Acetaminophen level negative.  Urine toxicology negative.   His EKG showed sinus rhythm at a rate of 90 beats per minute with prolonged QT mL of 486.  Ultrasound of the abdomen showed mild diffuse fatty infiltration of the liver, otherwise unremarkable appearance of the gallbladder.  No biliary dilation.      In ER, he received 2 boluses of normal saline, 1 dose of Zofran and Hospitalist Service was called regarding the admission.      The patient, while he was still in ER, he was still intoxicated.  He admitted drinking vodka.  He admitted that he has history of alcohol withdrawals, but no history of withdrawal seizures.      PAST MEDICAL HISTORY:   1.  Alcohol abuse/dependence.   2.  History of alcohol withdrawal.   3.  Anxiety.   4.  Alcoholic hepatitis.      PAST SURGICAL HISTORY:  None.      SOCIAL HISTORY:  He does admit drinking vodka, but he cannot quantify for me.  He denies smoking.  He denies illicit drug abuse.      FAMILY HISTORY:  I could not discuss with family history with the patient given his significant alcohol intoxication.      PRIOR TO ADMISSION MEDICATIONS:  - As per the pharmacy- no medications.     ALLERGIES:  APPARENTLY, HE DOES NOT HAVE ANY KNOWN DRUG ALLERGIES.      REVIEW OF SYSTEMS:  A 10-point review of systems was conducted and it was negative except for pertinent positives mentioned in history of present illness.      PHYSICAL EXAMINATION:   VITAL SIGNS:  Blood pressure 128/91, heart rate 120, respiratory rate 14, oxygen saturation 93% on 2 liters via nasal cannula, temperature 97.4.   GENERAL:  The patient is sleepy, intoxicated, no acute distress.   HEENT:  Normocephalic, atraumatic.  Pupils are equally round and reactive to light.  Oral mucosa is mildly dry.   NECK:  Supple.  No cervical lymphadenopathy, no thyromegaly.   CHEST:  There is bilateral air entry, no wheezing, no rales, no crackles.   CARDIOVASCULAR:  There is normal S1, S2, tachycardia, no murmurs, no rubs.   ABDOMEN:  Soft, nontender, nondistended.  Bowel sounds are present.    EXTREMITIES:  There is no leg swelling, no calf tenderness, 2+ peripheral pulses are palpable.   SKIN:  Intact, no rash, no cyanosis.   NEUROLOGIC:  The patient is intoxicated, able to wake up, but he is oriented to self and partially to time.  There are no focal neurological deficits.   PSYCHIATRIC:  Unable to assess due to his severe intoxication.   MUSCULOSKELETAL:  He moves all extremities freely.  There are no obvious joint deformities.      LABORATORY DATA:  Reviewed and dictated above.      ASSESSMENT AND PLAN:  Mohit Porter is a 29-year-old gentleman with past medical history of anxiety, alcohol abuse/dependence, history of alcohol withdrawal and alcoholic hepatitis who was brought in by EMS after he was found unresponsive at extended stay in [place].   1.  Acute toxic encephalopathy.   2.  Alcohol intoxication.    He has a long history of alcohol abuse/dependence.  He had been in detox in 02/2019,  as per the chart review, he had multiple ER visits this month only for alcohol intoxication.  He admits drinking vodka, although he cannot quantify how much he drinks at this time.  His alcohol level was 0.58.  He is very sleepy but able to wake up.  He is maintaining his oxygen saturation.  His blood pressure is fine.  Lactic acid was elevated at 3.8 initially but improved to 2.6 after some IV fluids.  Plan for now is to admit him to a medical floor as inpatient.  He will be monitored on telemetry.  I think he is a good candidate for detoxification.  Psychiatry consult, Chemical Dependency consult and  consult requested.  He is at high risk to develop withdrawal symptoms.  We will monitor him closely with CIWA, but I did not order the symptom-triggered Ativan at this time.  He was placed on IV fluids, IV vitamins, magnesium, phosphorus and potassium replacement protocol as needed.   3.  Alcoholic hepatitis.  His liver function tests are elevated with , , even higher compared  with his LFTs from a couple of days ago.  His total bilirubin is normal.  Urine toxicology is negative.  Ultrasound of his abdomen showed mild diffuse fatty infiltration of the liver, but unremarkable appearance of gallbladder.  He needs to quit drinking.  Plan for now is to avoid hepatotoxic drugs and repeat LFTs in the morning.   4.  Leukocytosis.  Suspect reactive.  He does not have any fever.  Will monitor him clinically while off antibiotics.  Repeat CBC in the morning.   5.  History of anxiety.  It is not clear if he is on any medications at this time.  A psychiatry consult requested.   6.  Deep venous thrombosis prophylaxis:  Pneumatic compression device.      CODE STATUS:  Discussed with the patient and patient is full code.      DISPOSITION:  Admit inpatient.  Anticipate at least a couple of days of hospitalization.         PRISCA NEIL MD             D: 2019   T: 2019   MT: TRINO      Name:     AMAYA GUSTAFSON   MRN:      [id number]        Account:      [id number]   :      1990        Admitted:     2019                   Document: [id number]

## 2019-08-06 NOTE — ED NOTES
Hospitalist in room. Pt able to answer questions, but responds slowly. States he has gone through alcohol withdrawal but has never had a related seizure.

## 2019-08-07 LAB
ALBUMIN SERPL-MCNC: 3.1 G/DL (ref 3.4–5)
ALP SERPL-CCNC: 114 U/L (ref 40–150)
ALT SERPL W P-5'-P-CCNC: 186 U/L (ref 0–70)
ANION GAP SERPL CALCULATED.3IONS-SCNC: 6 MMOL/L (ref 3–14)
AST SERPL W P-5'-P-CCNC: 480 U/L (ref 0–45)
BILIRUB DIRECT SERPL-MCNC: 0.3 MG/DL (ref 0–0.2)
BILIRUB SERPL-MCNC: 0.9 MG/DL (ref 0.2–1.3)
BUN SERPL-MCNC: 6 MG/DL (ref 7–30)
CALCIUM SERPL-MCNC: 8.4 MG/DL (ref 8.5–10.1)
CHLORIDE SERPL-SCNC: 98 MMOL/L (ref 94–109)
CO2 SERPL-SCNC: 33 MMOL/L (ref 20–32)
CREAT SERPL-MCNC: 0.59 MG/DL (ref 0.66–1.25)
ERYTHROCYTE [DISTWIDTH] IN BLOOD BY AUTOMATED COUNT: 14.7 % (ref 10–15)
GFR SERPL CREATININE-BSD FRML MDRD: >90 ML/MIN/{1.73_M2}
GLUCOSE SERPL-MCNC: 157 MG/DL (ref 70–99)
HCT VFR BLD AUTO: 37.7 % (ref 40–53)
HGB BLD-MCNC: 12.6 G/DL (ref 13.3–17.7)
MCH RBC QN AUTO: 30.4 PG (ref 26.5–33)
MCHC RBC AUTO-ENTMCNC: 33.4 G/DL (ref 31.5–36.5)
MCV RBC AUTO: 91 FL (ref 78–100)
PLATELET # BLD AUTO: 136 10E9/L (ref 150–450)
POTASSIUM SERPL-SCNC: 3.2 MMOL/L (ref 3.4–5.3)
POTASSIUM SERPL-SCNC: 3.8 MMOL/L (ref 3.4–5.3)
PROT SERPL-MCNC: 5.8 G/DL (ref 6.8–8.8)
RBC # BLD AUTO: 4.15 10E12/L (ref 4.4–5.9)
SODIUM SERPL-SCNC: 137 MMOL/L (ref 133–144)
WBC # BLD AUTO: 6.4 10E9/L (ref 4–11)

## 2019-08-07 PROCEDURE — 36415 COLL VENOUS BLD VENIPUNCTURE: CPT | Performed by: HOSPITALIST

## 2019-08-07 PROCEDURE — 25000132 ZZH RX MED GY IP 250 OP 250 PS 637: Performed by: PSYCHIATRY & NEUROLOGY

## 2019-08-07 PROCEDURE — 12000000 ZZH R&B MED SURG/OB

## 2019-08-07 PROCEDURE — H0001 ALCOHOL AND/OR DRUG ASSESS: HCPCS

## 2019-08-07 PROCEDURE — 25000132 ZZH RX MED GY IP 250 OP 250 PS 637: Performed by: INTERNAL MEDICINE

## 2019-08-07 PROCEDURE — 25000132 ZZH RX MED GY IP 250 OP 250 PS 637: Performed by: HOSPITALIST

## 2019-08-07 PROCEDURE — 80076 HEPATIC FUNCTION PANEL: CPT | Performed by: HOSPITALIST

## 2019-08-07 PROCEDURE — 25000125 ZZHC RX 250: Performed by: INTERNAL MEDICINE

## 2019-08-07 PROCEDURE — 25800030 ZZH RX IP 258 OP 636: Performed by: INTERNAL MEDICINE

## 2019-08-07 PROCEDURE — 25000128 H RX IP 250 OP 636: Performed by: INTERNAL MEDICINE

## 2019-08-07 PROCEDURE — 25800030 ZZH RX IP 258 OP 636: Performed by: HOSPITALIST

## 2019-08-07 PROCEDURE — 99232 SBSQ HOSP IP/OBS MODERATE 35: CPT | Performed by: HOSPITALIST

## 2019-08-07 PROCEDURE — 84132 ASSAY OF SERUM POTASSIUM: CPT | Performed by: HOSPITALIST

## 2019-08-07 PROCEDURE — 80048 BASIC METABOLIC PNL TOTAL CA: CPT | Performed by: HOSPITALIST

## 2019-08-07 PROCEDURE — 85027 COMPLETE CBC AUTOMATED: CPT | Performed by: HOSPITALIST

## 2019-08-07 RX ORDER — SODIUM CHLORIDE 9 MG/ML
INJECTION, SOLUTION INTRAVENOUS CONTINUOUS
Status: DISCONTINUED | OUTPATIENT
Start: 2019-08-07 | End: 2019-08-08

## 2019-08-07 RX ADMIN — SODIUM CHLORIDE: 9 INJECTION, SOLUTION INTRAVENOUS at 15:39

## 2019-08-07 RX ADMIN — POTASSIUM CHLORIDE 40 MEQ: 1500 TABLET, EXTENDED RELEASE ORAL at 10:14

## 2019-08-07 RX ADMIN — QUETIAPINE 25 MG: 25 TABLET ORAL at 19:40

## 2019-08-07 RX ADMIN — QUETIAPINE 25 MG: 25 TABLET ORAL at 12:51

## 2019-08-07 RX ADMIN — QUETIAPINE 25 MG: 25 TABLET ORAL at 08:45

## 2019-08-07 RX ADMIN — DIAZEPAM 10 MG: 5 TABLET ORAL at 17:55

## 2019-08-07 RX ADMIN — QUETIAPINE 25 MG: 25 TABLET ORAL at 02:45

## 2019-08-07 RX ADMIN — DIAZEPAM 10 MG: 5 TABLET ORAL at 15:38

## 2019-08-07 RX ADMIN — CHLORPROMAZINE HYDROCHLORIDE 10 MG: 10 TABLET, SUGAR COATED ORAL at 15:12

## 2019-08-07 RX ADMIN — FOLIC ACID: 5 INJECTION, SOLUTION INTRAMUSCULAR; INTRAVENOUS; SUBCUTANEOUS at 22:07

## 2019-08-07 RX ADMIN — QUETIAPINE 25 MG: 25 TABLET ORAL at 17:34

## 2019-08-07 RX ADMIN — CHLORPROMAZINE HYDROCHLORIDE 10 MG: 10 TABLET, SUGAR COATED ORAL at 08:45

## 2019-08-07 RX ADMIN — SODIUM CHLORIDE: 9 INJECTION, SOLUTION INTRAVENOUS at 21:23

## 2019-08-07 RX ADMIN — QUETIAPINE 25 MG: 25 TABLET ORAL at 22:06

## 2019-08-07 RX ADMIN — MIRTAZAPINE 7.5 MG: 7.5 TABLET ORAL at 22:05

## 2019-08-07 RX ADMIN — CHLORPROMAZINE HYDROCHLORIDE 10 MG: 10 TABLET, SUGAR COATED ORAL at 22:05

## 2019-08-07 RX ADMIN — POTASSIUM CHLORIDE 20 MEQ: 1500 TABLET, EXTENDED RELEASE ORAL at 12:51

## 2019-08-07 RX ADMIN — SODIUM CHLORIDE: 9 INJECTION, SOLUTION INTRAVENOUS at 07:52

## 2019-08-07 RX ADMIN — QUETIAPINE 25 MG: 25 TABLET ORAL at 15:14

## 2019-08-07 ASSESSMENT — ACTIVITIES OF DAILY LIVING (ADL)
ADLS_ACUITY_SCORE: 11.5
ADLS_ACUITY_SCORE: 11
ADLS_ACUITY_SCORE: 11.5
ADLS_ACUITY_SCORE: 11
ADLS_ACUITY_SCORE: 11.5
ADLS_ACUITY_SCORE: 16

## 2019-08-07 NOTE — PROGRESS NOTES
[facility]  [address] [place] [id number]        ADULT CD ASSESSMENT ADDENDUM      Patient Name: Mohit Porter  Cell Phone:   Home: [phone] (home)    Mobile:   Telephone Information:   Mobile [phone]       Email:  The patient doesn't have an e-mail address.  Emergency Contact: mary jane Mcmillan Tel: [phone]    The patient reported being:  Single, in a serious relationship    With which race do you identify? White    Initial Screening Questions     1. Are you currently having severe withdrawal symptoms that are putting yourself or others in danger?  Yes, explain: Pt admitted to [facility] for detox    2. Are you currently having severe medical problems that require immediate attention?  No    3. Are you currently having severe emotional or behavioral problems that are putting yourself or others at risk of harm?  No    4. Do you have sufficient reading skills that will enable you to understand written materials, including the program rules and client rights materials?  Yes     Family History and other additional information     Who raised you? (parents, grandparents, adoptive parents, step-parents, etc.)    Both Parents    Please tell me what it was like growing up in your family. (please include any history of substance abuse, mental health issues, emotional/physical/sexual abuse, forms of discipline, and support)     Parents raised him, still together.   Siblings: 2 younger brothers.      MH: Anxiety  RYAN: Pt denies.      Support/Discipline: Pt reports he felt supported growing up, denies abuse.     Do you have any children or Stepchildren? Yes, explain: 2 year old daughter    Are you being investigated by Child Protection Services? Pt denied, but in 02/2019 pt's chart indicates he had CPS involvement at that time. Unknown of the outcome or if it's still ongoing.     Do you have a child protection worker, probation office or ?  Yes, explain: Probation  "Officer with [facility]    How would you describe your current finances?  Just making it    If you are having problems, (unpaid bills, bankruptcy, IRS problems) please explain:  Yes, explain: Unpaid bills, maxed out credit card    If working or a student are you able to function appropriately in that setting? Yes     Describe your preferred learning style:  by hands-on practice    What are your some of your personal strengths?  \"caring person\"    Do you currently participate in community alex activities, such as attending Religion, temple, Orthodox or Restorationist services?  The patient denied currently being involved in any community alex activities.    How does your spirituality impact your recovery?  Pt did not answer.     Do you currently self-administer your medications?  Yes    Have you ever had to lie to people important to you about how much you butts?   No   Have you ever felt the need to bet more and more money?   No   Have you ever attempted treatment for a gambling problem?   No   Have you ever touched or fondled someone else inappropriately or forced them to have sex with you against their will?   No   Are you or have you ever been a registered sex offender?   No   Is there any history of sexual abuse in your family? No   Have you ever felt obsessed by your sexual behavior, such as having sex with many partners, masturbating often, using pornography often?   No     Have you ever received therapy or stayed in the hospital for mental health problems?   No     Have you ever hurt yourself, such as cutting, burning or hitting yourself?   No     Have you ever purged, binged or restricted yourself as a way to control your weight?   No     Are you on a special diet?   No     Do you have any concerns regarding your nutritional status?   No     Have you had any appetite changes in the last 3 months?   No   Have you had weight loss or weight gain of more than 10 lbs in the last 3 months?   If patient gained or lost " more than 10 lbs, then refer to program RN / attending Physician for assessment.   No   Was the patient informed of BMI?   No   Have you engaged in any risk-taking behavior that would put you at risk for exposure to blood-borne or sexually transmitted diseases?   No   Do you have any dental problems?   No   Have you ever lived through any trauma or stressful life events?   No   In the past month, have you had any of the following symptoms related to the trauma listed above? (dreams, intense memories, flashbacks, physical reactions, etc.)   No   Have you ever believed people were spying on you, or that someone was plotting against you or trying to hurt you?   No   Have you ever believed someone was reading your mind or could hear your thoughts or that you could actually read someone's mind or hear what another person was thinking?   No   Have you ever believed that someone of some force outside of yourself was putting thoughts into your mind or made you act in a way that was not your usual self?  Have you ever though you were possessed?   No   Have you ever believed you were being sent special messages through the TV, radio or newspaper?   No   Have you ever heard things other people couldn't hear, such as voices or other noises?   No   Have you ever had visions when you were awake?  Or have you ever seen things other people couldn't see?   No   Do you have a valid 's license?    Yes     PHQ-9, KALINA-7 and Suicide Risk Assessment   PHQ-9 on 8/7/2019 KALINA-7 on 8/7/2019   The patient's PHQ-9 score was see psych notes.   The patient's KALINA-7 score was see psych notes.        [place]-Suicide Severity Rating Scale   Suicide Ideation   1.) Have you ever wished you were dead or that you could go to sleep and not wake up?     Lifetime:  No   Past Month:  No     2.) Have you actually had any thoughts of killing yourself?   Lifetime:  No   Past Month:  No     3.) Have you been thinking about how you might do this?      Lifetime:  No   Past Month:  No     4.) Have you had these thoughts and had some intention of acting on them?     Lifetime:  No   Past Month:  No     5.) Have you started to work out the details of how to kill yourself?   Lifetime:  No   Past Month:  No     6.) Do you intend to carry out this plan?      Lifetime:  No   Past Month:  No     Intensity of Ideation   Intensity of ideation (1 being least severe, 5 being most severe):     Lifetime:  The patient denied ever having any suicidal thoughts in life.   Past Month:  The patient denied ever having any suicidal thoughts in life.     How often do you have these thoughts?  The patient denied ever having any suicidal thoughts in life.     When you have the thoughts how long do they last?  The patient denied ever having any suicidal thoughts in life.     Can you stop thinking about killing yourself or wanting to die if you want to?  The patient denied ever having any suicidal thoughts in life.     Are there things - anyone or anything (i.e. family, Judaism, pain of death) that stopped you from wanting to die or acting on thoughts of suicide?  Does not apply     What sort of reasons did you have for thinking about wanting to die or killing yourself (ie end pain, stop how you were feeling, get attention or reaction, revenge)?  Does not apply     Suicidal Behavior   (Suicide Attempt) - Have you made a suicide attempt?     Lifetime:  The patient had never made a suicide attempt.   Past Month:  The patient had never made a suicide attempt.     Have you engaged in self-harm (non-suicidal self-injury)?  The patient denied having any history of engaging in self-harm (non-suicidal self-injury).     (Interrupted Attempt) - Has there been a time when you started to do something to end your life but someone or something stopped you before you actually did anything?  No     (Aborted or Self-Interrupted Attempt) - Has there been a time when you started to do something to try to  "end your life but you stopped yourself before you actually did anything?  No     (Preparatory Acts of Behavior) - Have you taken any steps towards making suicide attempt or preparing to kill yourself (such as collecting pills, getting a gun, giving valuables away or writing a suicide note)?  No     Actual Lethality/Medical Damage:  The patient denied ever making a suicidal attempt.       2008  [facility] [place] for [facility] [employer]  Used with permission by Andreia Kwon, PhD.       Guide to C-SSRS Risk Ratings   NO IDEATION:  with no active thoughts IDEATION: with a wish to die. IDEATION: with active thoughts. Risk Ratings   If Yes No No 0 - Very Low Risk   If NA Yes No 1 - Low Risk   If NA Yes Yes 2 - Low/moderate risk   IDEATION: associated thoughts of methods without intent or plan INTENT: Intent to follow through on suicide PLAN: Plan to follow through on suicide Risk Ratings cont...   If Yes No No 3 - Moderate Risk   If Yes Yes No 4 - High Risk   If Yes Yes Yes 5 - High Risk   The patient's ADDITIONAL RISK FACTORS and lack of PROTECTIVE FACTORS may increase their overall suicide risk ratings.     Additional Risk Factors:    Tendency to be socially isolated and/or cut off from the support of others     A recent loss that was significant to the patient, i.e. loss of job, loss of home, divorce, break-up, etc.     A triggering event(s) leading to humiliation, shame or despair   Protective Factors:    Having people in his/her life that would prevent the patient from considering a suicide attempt (i.e. young children, spouse, parents, etc.)     An absence of chronic health problems or stable and well treated chronic health issues     Having easy access to supportive family members     Having a good community support network     Having restricted access to highly lethal means of suicide     Risk Status   Past month:0. - Very Low Risk:  Evaluation Counselors:  Document in Epic / RocketHubAR to counselor \"Very Low " "Risk\".      Treatment Counselors:  Reassess upon admission as applicable, assess weekly in progress notes under Dimension 3 and summarize in Discharge / Treatment summary under Dimension 3.    Past 24 hours:0. - Very Low Risk:  Evaluation Counselors:  Document in Epic / SBAR to counselor \"Very Low Risk\".      Treatment Counselors:  Reassess upon admission as applicable, assess weekly in progress notes under Dimension 3 and summarize in Discharge / Treatment summary under Dimension 3.   Additional information to support suicide risk rating: There was no additional information to provide at this time.     Mental Health Status   Physical Appearance/Attire: Attire appropriate to age/situation   Hygiene: well groomed   Eye Contact: at examiner   Speech Rate:  regular   Speech Volume: regular   Speech Quality: fluid and lively   Cognitive/Perceptual:  reality based   Cognition: memory intact    Judgment: intact and able to concentrate   Insight: intact and able to concentrate   Orientation:  time, place, person and situation   Thought: logical  and circumstantial   Hallucinations:  none   General Behavioral Tone: cooperative   Psychomotor Activity: no problem noted   Gait:  no problem   Mood: appropriate   Affect: congruence/appropriate   Counselor Notes: NA     Criteria for Diagnosis: DSM-5 Criteria for Substance Use Disorders      Alcohol Use Disorder Severe - 303.90 (F10.20)  Tobacco Use Disorder Moderate - 305.10 (F17.200)  Anxiety disorder NOS, per patient self-report    Level of Care   I.) Intoxication and Withdrawal: 0   II.) Biomedical:  0   III.) Emotional and Behavioral:  2   IV.) Readiness to Change:  2   V.) Relapse Potential: 4   VI.) Recovery Environmental: 3     Initial Problem List     The patient lacks relapse prevention skills  The patient has poor coping skills  The patient has poor refusal skills   The patient lacks a sober peer support network  The patient has a tendency to isolate  The patient has " dual issues of MI and CD  The patient has current legal issues  The patient has current child protection and/or child custody issues    Patient/Client is willing to follow treatment recommendations.  Yes    Counselor: [facility]    Vulnerable Adult Checklist for LODGING:     This LODGING patient, or other Residential/Lodging CD Treatment patient is a categorical Vulnerable Adult according to [place] Statute 626.5572 subdivision 21.    Susceptibility to abuse by others     1.  Have you ever been emotionally abused by anyone?          No    2.  Have you ever been bullied, or physically assaulted by anyone?        No    3.  Have you ever been sexually taken advantage of or sexually assaulted?        No    4.  Have you ever been financially taken advantage of?        No    5.  Have you ever hurt yourself intentionally such as burns or cuts?       No    Risk of abusing other vulnerable adults     1.  Have you ever bullied, berated or emotionally degraded someone else?       No    2.  Have you ever financially taken advantage of someone else?       No    3.  Have you ever sexually exploited or assaulted another person?       No    4.  Have you ever gotten into fights, verbal arguments or physically assaulted someone?          No    Based on the above information:    This [facility]odging Plus patient, or other Residential/Lodging CD Treatment patient is a categorical Vulnerable Adult according to [facility] Statue 626.5572 subdivision 21.          This person has a history of abuse, but is assessed as stable and not in need of an individual abuse prevention plan beyond the program abuse prevention plan.

## 2019-08-07 NOTE — PROGRESS NOTES
Rule 25 Assessment  Background Information   1. Date of Assessment Request  2. Date of Assessment  8/7/2019 3. Date Service Authorized     4.   VINAYAK Mixon 5.  Phone Number   [phone] 6. Referent  Dr. Myke Gleason 7. Assessment Site  [place] [address] [facility]     8. Client Name   Mohit Porter 9. Date of Birth  1990 Age  29 year old 10. Gender  male  11. PMI/ Insurance No.  [id number]   12. Client's Primary Language:  English 13. Do you require special accommodations, such as an  or assistance with written material? No   14. Current Address: [address] [place] [address] [address]  [place] [address]   15. Client Phone Numbers: [phone] (home)      16. Tell me what has happened to bring you here today.    HISTORY OF PRESENT ILLNESS:  Had been limited due to the patient's alcohol intoxication.  He is very sleepy, able to wake up, but cannot provide much information.  I did discuss with ER attending and I reviewed his chart as well.  As per report, he was brought in by EMS after he was found unresponsive at extended stay in [place].  Again, per report, empty bottles of vodka were found at the site.  As per the chart review, he had visits for alcohol intoxication in the last month.  It seems that in 02/2019, he was admitted to detox unit.  He does admit drinking vodka, although he cannot tell me how much.  He denies other illicit drug abuse.  It is not clear if he is taking any other medications at home.  Upon further questioning, the patient denies having any chest pain.  He denies having headache, no nausea, no vomiting, no abdominal pain, no diarrhea, no constipation.    17. Have you had other rule 25 assessments?     Yes. When, Where, and What circumstances: Unsure, had some this year as he went to treatment as also at [address] [place] [address].     DIMENSION I - Acute Intoxication /Withdrawal Potential   1. Chemical use most recent 12 months outside  a facility and other significant use history (client self-report)              X = Primary Drug Used   Age of First Use Most Recent Pattern of Use and Duration   Need enough information to show pattern (both frequency and amounts) and to show tolerance for each chemical that has a diagnosis   Date of last use and time, if needed   Withdrawal Potential? Requiring special care Method of use  (oral, smoked, snort, IV, etc)      Alcohol     18 Off and on use.   HU: 1-2 days, 1.75 in days.  Few times per week, vodka use. Isolate and drink     Per chart review: pt drinks a liter to 1.75 liters a day and has numerous binges. He has been found with 5 1.75L bottles of vodka during a 5 day binge. Pt's JOANA was 0.61 at time of admission to the hospital.  08/05/2019  In the evening Yes Oral      Marijuana/  Hashish   No use          Cocaine/Crack     No use          Meth/  Amphetamines   No use          Heroin     No use          Other Opiates/  Synthetics   No use          Inhalants     No use          Benzodiazepines     No use          Hallucinogens     No use          Barbiturates/  Sedatives/  Hypnotics No use          Over-the-Counter Drugs   No use          Other     No use          Nicotine     20 A can per day  08/05/2019 Yes Oral     2. Do you use greater amounts of alcohol/other drugs to feel intoxicated or achieve the desired effect?  Yes.  Or use the same amount and get less of an effect?  Yes.  Example: The patient reported having increased use and tolerance issues with alcohol.    3A. Have you ever been to detox?     Yes    3B. When was the first time?     1 month ago    3C. How many times since then?     None    3D. Date of most recent detox:     The patient denied ever having a detoxification admission.    4.  Withdrawal symptoms: Have you had any of the following withdrawal symptoms?  Past 12 months Recent (past 30 days)   Sweating (Rapid Pulse)  Shaky / Jittery / Tremors  Unable to  Sleep  Agitation  Headache  Fatigue / Extremely Tired  Vivid / Unpleasant Dreams  Irritability  Sensitivity to Noise  High Blood Pressure  Nausea / Vomiting  Diarrhea  Diminished Appetite  Unable to Eat  Anxiety / Worried Sweating (Rapid Pulse)  Shaky / Jittery / Tremors  Unable to Sleep  Agitation  Headache  Fatigue / Extremely Tired  Vivid / Unpleasant Dreams  Irritability  Sensitivity to Noise  High Blood Pressure  Nausea / Vomiting  Diarrhea  Diminished Appetite  Unable to Eat  Anxiety / Worried     's Visual Observations and Symptoms: Pt showed mild symptoms of withdrawal at time of assessment.     Based on the above information, is withdrawal likely to require attention as part of treatment participation?  No    Dimension I Ratings   Acute intoxication/Withdrawal potential - The placing authority must use the criteria in Dimension I to determine a client s acute intoxication and withdrawal potential.    RISK DESCRIPTIONS - Severity ratin Client displays full functioning with good ability to tolerate and cope with withdrawal discomfort. No signs or symptoms of intoxication or withdrawal or resolving signs or symptoms.    REASONS SEVERITY WAS ASSIGNED (What about the amount of the person s use and date of most recent use and history of withdrawal problems suggests the potential of withdrawal symptoms requiring professional assistance? )     Patient reports using alcohol, last date of use reported as 2019. Patient reported withdrawal symptoms in past 12 months and also with in the past 30 days. Patient is currently going through withdrawal protocol while admitted to [facility]. Patient reported 1 lifetime detox admissions (pt's chart review indicates at least 2 detox admissions).  Patient was given a UA and was negative for all substances. Pt's JOANA upon this hospital admission was 0.61.         DIMENSION II - Biomedical Complications and Conditions   1a. Do you have any current  health/medical conditions?(Include any infectious diseases, allergies, or chronic or acute pain, history of chronic conditions)      No    Past Medical History:   Diagnosis Date     Alcohol abuse      Anxiety      Hepatitis C      1b. On a scale of mild, moderate to severe please specify the severity of the patient's diabetes and/or neuropathy.    The patient denied having a history of being diagnosed with diabetes or neuropathy.    2. Do you have a health care provider? When was your most recent appointment? What concerns were identified?     The patient does not have a PCP at this time.    3. If indicated by answers to items 1 or 2: How do you deal with these concerns? Is that working for you? If you are not receiving care for this problem, why not?      The patient denied having any current clinical health issues.    4A. List current medication(s) including over-the-counter or herbal supplements--including pain management:     Current Facility-Administered Medications   Medication     chlorproMAZINE (THORAZINE) tablet 10 mg     diazepam (VALIUM) tablet 10 mg    Or     diazepam (VALIUM) injection 5-10 mg     lidocaine (LMX4) cream     lidocaine 1 % 0.1-1 mL     magnesium sulfate 4 g in 100 mL sterile water (premade)     mirtazapine (REMERON) tablet TABS 7.5 mg     naloxone (NARCAN) injection 0.1-0.4 mg     ondansetron (ZOFRAN-ODT) ODT tab 4 mg    Or     ondansetron (ZOFRAN) injection 4 mg     potassium chloride (KLOR-CON) Packet 20-40 mEq     potassium chloride 10 mEq in 100 mL intermittent infusion with 10 mg lidocaine     potassium chloride 10 mEq in 100 mL sterile water intermittent infusion (premix)     potassium chloride 20 mEq in 50 mL intermittent infusion     potassium chloride ER (K-DUR/KLOR-CON M) CR tablet 20-40 mEq     potassium phosphate 15 mmol in D5W 250 mL intermittent infusion     potassium phosphate 20 mmol in D5W 250 mL intermittent infusion     potassium phosphate 20 mmol in D5W 500 mL  intermittent infusion     potassium phosphate 25 mmol in D5W 500 mL intermittent infusion     QUEtiapine (SEROquel) tablet 25 mg     sodium chloride (PF) 0.9% PF flush 3 mL     sodium chloride (PF) 0.9% PF flush 3 mL     sodium chloride 0.9 % 1,000 mL with INFUVITE ADULT 10 mL, thiamine 100 mg, folic acid 1 mg infusion     sodium chloride 0.9% infusion       4B. Do you follow current medical recommendations/take medications as prescribed?     The patient denied taking any prescription or over the counter medications at this time.    4C. When did you last take your medication?     2019 - administered by hospital staff.     4D. Do you need a referral to have a follow up with a primary care physician?    Yes, Recommendations:   physical exam    5. Has a health care provider/healer ever recommended that you reduce or quit alcohol/drug use?     Yes    6. Are you pregnant?     NA, because the patient is male    7. Have you had any injuries, assaults/violence towards you, accidents, health related issues, overdose(s) or hospitalizations related to your use of alcohol or other drugs:     Yes, explain: Per chart review, pt has had 27 ED visits/hospitalizations due to alcohol intoxication or alcohol withdrawal since 2017. Of those encounters, 22 have been in the year 2019, and 9 of them have been since 2019.    8. Do you have any specific physical needs/accommodations? No    Dimension II Ratings   Biomedical Conditions and Complications - The placing authority must use the criteria in Dimension II to determine a client s biomedical conditions and complications.   RISK DESCRIPTIONS - Severity ratin Client displays full functioning with good ability to cope with physical discomfort.    REASONS SEVERITY WAS ASSIGNED (What physical/medical problems does this person have that would inhibit his or her ability to participate in treatment? What issues does he or she have that require assistance to address?)    The  "patient reported being in good overall physical health and he denied having any history of chronic medical problems.  The patient denied taking any prescription or OTC medications at this time.  The patient would benefit from following all of the recommendations of his medical providers. If pt continues to drink the way he has been, he will most likely develop physical health concerns.        DIMENSION III - Emotional, Behavioral, Cognitive Conditions and Complications   1. (Optional) Tell me what it was like growing up in your family. (substance use, mental health, discipline, abuse, support)     Parents raised him, still together.   Siblings: 2 younger brothers.     MH: Anxiety  RYAN: Pt denies.     Support/Discipline: Pt reports he felt supported growing up, denies abuse.     2. When was the last time that you had significant problems...  A. with feeling very trapped, lonely, sad, blue, depressed or hopeless  about the future? Past Month - with his use    B. with sleep trouble, such as bad dreams, sleeping restlessly, or falling  asleep during the day? \"Sometimes\"    C. with feeling very anxious, nervous, tense, scared, panicked, or like  something bad was going to happen? Past Month - anxious     D. with becoming very distressed and upset when something reminded  you of the past? Past Month - past stuff when using     E. with thinking about ending your life or committing suicide? Never    3. When was the last time that you did the following things two or more times?  A. Lied or conned to get things you wanted or to avoid having to do  something? Past Month    B. Had a hard time paying attention at school, work, or home? 1+ years ago    C. Had a hard time listening to instructions at school, work, or home? 1+ years ago    D. Were a bully or threatened other people? Never    E. Started physical fights with other people? Never    Note: These questions are from the Global Appraisal of Individual Needs--Short " Screener. Any item marked  past month  or  2 to 12 months ago  will be scored with a severity rating of at least 2.     For each item that has occurred in the past month or past year ask follow up questions to determine how often the person has felt this way or has the behavior occurred? How recently? How has it affected their daily living? And, whether they were using or in withdrawal at the time?    See above.     4A. If the person has answered item 2E with  in the past year  or  the past month , ask about frequency and history of suicide in the family or someone close and whether they were under the influence.     The patient denied any family member or someone close to the patient had ever completed suicide.    Any history of suicide in your family? Or someone close to you?     The patient denied any family member or someone close to the patient had ever completed suicide.    4B. If the person answered item 2E  in the past month  ask about  intent, plan, means and access and any other follow-up information  to determine imminent risk. Document any actions taken to intervene  on any identified imminent risk.      The patient denied having any suicide ideation within the past month.    5A. Have you ever been diagnosed with a mental health problem?     Yes, explain: generalized anxiety      5B. Are you receiving care for any mental health issues? If yes, what is the focus of that care or treatment?  Are you satisfied with the service? Most recent appointment?  How has it been helpful?     The patient reported having prior treatment for mental health issues, but denied receiving any current treatment for mental health issues.    6. Have you been prescribed medications for emotional/psychological problems?     The patient reported a history of being prescribed psychotropic medications, but denied being prescribed any psychotropic medications at this time.    7. Does your MH provider know about your use?     The  patient does not currently have any mental health providers.    8A. Have you ever been verbally, emotionally, physically or sexually abused?      The patient denied having any history of being verbally, emotionally, physically or sexually abused.     Follow up questions to learn current risk, continuing emotional impact.      The patient denied having any history of being verbally, emotionally, physically or sexually abused.    8B. Have you received counseling for abuse?      The patient denied having any history of being verbally, emotionally, physically or sexually abused.    9. Have you ever experienced or been part of a group that experienced community violence, historical trauma, rape or assault?     No    10A. [place]:    No    11. Do you have problems with any of the following things in your daily life?    Remembering, In relationships with others and Fights, being fired, arrests      Note: If the person has any of the above problems, follow up with items 12, 13, and 14. If none of the issues in item 11 are a problem for the person, skip to item 15.    The patient would benefit from developing sober coping skills.    12. Have you been diagnosed with traumatic brain injury or Alzheimer s?  No    13. If the answer to #12 is no, ask the following questions:    Have you ever hit your head or been hit on the head? No    Were you ever seen in the Emergency Room, hospital or by a doctor because of an injury to your head? No    Have you had any significant illness that affected your brain (brain tumor, meningitis, West Nile Virus, stroke or seizure, heart attack, near drowning or near suffocation)? No    14. If the answer to #12 is yes, ask if any of the problems identified in #11 occurred since the head injury or loss of oxygen. The patient had never had a head injury or a loss of oxygen.    15A. Highest grade of school completed:     Graduate/professional degree    15B. Do you have a learning disability? No    15C.  Did you ever have tutoring in Math or English? No    15D. Have you ever been diagnosed with Fetal Alcohol Effects or Fetal Alcohol Syndrome? No    16. If yes to item 15 B, C, or D: How has this affected your use or been affected by your use?     The patient denied having any history of a learning disability, tutoring in math or English or being diagnosed with Fetal Alcohol Effects or Fetal Alcohol Syndrome.    Dimension III Ratings   Emotional/Behavioral/Cognitive - The placing authority must use the criteria in Dimension III to determine a client s emotional, behavioral, and cognitive conditions and complications.   RISK DESCRIPTIONS - Severity ratin Client has difficulty with impulse control and lacks coping skills. Client has thoughts of suicide or harm to others without means; however, the thoughts may interfere with participation in some treatment activities. Client has difficulty functioning in significant life areas. Client has moderate symptoms of emotional, behavioral, or cognitive problems. Client is able to participate in most treatment activities.    REASONS SEVERITY WAS ASSIGNED - What current issues might with thinking, feelings or behavior pose barriers to participation in a treatment program? What coping skills or other assets does the person have to offset those issues? Are these problems that can be initially accommodated by a treatment provider? If not, what specialized skills or attributes must a provider have?    Pt reports a diagnosis of anxiety. Pt denies medications for his mental health, or mental health providers. Pt denies that RYAN runs in his family, reports depression and anxiety run in his family. Pt denies past suicide attempts. Pt denies any past or current SI/SIB. Pt appears to lack coping skills for his anxiety. Pt lacks insight of his use and mental health issues.        DIMENSION IV - Readiness for Change   1. You ve told me what brought you here today. (first section) What do  "you think the problem really is?     Pt appears to have been binge drinking in a hotel.     2. Tell me how things are going. Ask enough questions to determine whether the person has use related problems or assets that can be built upon in the following areas: Family/friends/relationships; Legal; Financial; Emotional; Educational; Recreational/ leisure; Vocational/employment; Living arrangements (DSM)      \"Not good\"  Friends/Family/Relationships: \"not good, strained, due to use\"   Living situation: Live in an apartment, got kicked out  Legal: Court DWI in [facility], second probation violation   Financial: Lost job due to alcohol use, was working at [employer] to make some money in the meantime  Emotional: Pt did not answer.     3. What activities have you engaged in when using alcohol/other drugs that could be hazardous to you or others (i.e. driving a car/motorcycle/boat, operating machinery, unsafe sex, sharing needles for drugs or tattoos, etc     The patient reported having a history of driving while under the influence of alcohol or drugs.    4. How much time do you spend getting, using or getting over using alcohol or drugs? (DSM)     It appears pt drinks daily, all day.     5. Reasons for drinking/drug use (Use the space below to record answers. It may not be necessary to ask each item.)  Like the feeling Yes   Trying to forget problems Yes   To cope with stress Yes   To relieve physical pain No   To cope with anxiety Yes   To cope with depression Yes   To relax or unwind Yes   Makes it easier to talk with people No   Partner encourages use No   Most friends drink or use Yes   To cope with family problems Yes   Afraid of withdrawal symptoms/to feel better Yes   Other (specify)  No     A. What concerns other people about your alcohol or drug use/Has anyone told you that you use too much? What did they say? (DSM)     Who and what concerns: \"Girlfriend, ex-fiance, parents, brothers\". Pt reports that they don't " want him drinking.     B. What did you think about that/ do you think you have a problem with alcohol or drug use?     Pt agrees with having a problem.     6. What changes are you willing to make? What substance are you willing to stop using? How are you going to do that? Have you tried that before? What interfered with your success with that goal?      His plan and goal is to abstain from non-prescribed all mood altering chemicals.     7. What would be helpful to you in making this change?     Pt is open to attend residential treatment.     Dimension IV Ratings   Readiness for Change - The placing authority must use the criteria in Dimension IV to determine a client s readiness for change.   RISK DESCRIPTIONS - Severity ratin Client displays verbal compliance, but lacks consistent behaviors; has low motivation for change; and is passively involved in treatment.    REASONS SEVERITY WAS ASSIGNED - (What information did the person provide that supports your assessment of his or her readiness to change? How aware is the person of problems caused by continued use? How willing is she or he to make changes? What does the person feel would be helpful? What has the person been able to do without help?)      Patient admits to having a problem with alcohol use. Patient reports that his family, girlfriend, ex-fiance have all expressed concerns about use of alcohol. Patient appears early in  the contemplation stage of change based on his verbal reports and behaviors. Patient is willing to enter residential programming for treatment of his substance abuse. Pt also appears externally motivated as he has court date for a probation violation.        DIMENSION V - Relapse, Continued Use, and Continued Problem Potential   1A. In what ways have you tried to control, cut-down or quit your use? If you have had periods of sobriety, how did you accomplish that? What was helpful? What happened to prevent you from continuing your  "sobriety? (DSM)     Longest period of sobriety: 6 months after DWI  What was helpful: Stayed in a sober home.     1B. What were the circumstances of your most recent relapse with mood altering chemicals?    \"Started dating current girlfriend, socially drink and it was fine and then I just started isolating and drinking\"     2. Have you experienced cravings? If yes, ask follow up questions to determine if the person recognizes triggers and if the person has had any success in dealing with them.     The patient reported having cravings to use mood altering chemicals on an almost daily basis.    3. Have you been treated for alcohol/other drug abuse/dependence? Yes.    3B. Number of times(lifetime) (over what period) 2.    3C. Number of times completed treatment (lifetime) 0.    3D. During the past three years have you participated in outpatient and/or residential?  Yes.    3E. When and where? [facility] inpatient and outpatient.       4. Support group participation: Have you/do you attend support group meetings to reduce/stop your alcohol/drug use? How recently? What was your experience? Are you willing to restart? If the person has not participated, is he or she willing?     The patient denied having any history of attending 12-step or other support group meetings.    5. What would assist you in staying sober/straight?     Pt is unsure at this time.     Dimension V Ratings   Relapse/Continued Use/Continued problem potential - The placing authority must use the criteria in Dimension V to determine a client s relapse, continued use, and continued problem potential.   RISK DESCRIPTIONS - Severity ratin No awareness of the negative impact of mental health problems or substance abuse. No coping skills to arrest mental health or addiction illnesses, or prevent relapse.    REASONS SEVERITY WAS ASSIGNED - (What information did the person provide that indicates his or her understanding of relapse issues? What about the person s " "experience indicates how prone he or she is to relapse? What coping skills does the person have that decrease relapse potential?)      The patient appears to lack sober coping skills and he lacks long-term sober maintenance skills.  The patient reported being socially isolated which could be a barrier to his recovery.  The patient reported his relationship conflict with his parents, family, girlfriend could be a potential trigger for him to abuse mood altering chemicals.  The patient reported his current living environment could be a barrier to his recovery.  The patient reported his current unemployment is a potential trigger for him to abuse mood altering chemicals.  The patient reported his current financial problems are a potential trigger for him to abuse mood altering chemicals.  The patient lacks awareness regarding the disease model of addiction.  The patient lacks a sober peer support network.       DIMENSION VI - Recovery Environment   1. Are you employed/attending school? Tell me about that.     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 [employer].    If not employed, last time worked: [employer] over the summer, couple weeks ago    2A. Describe a typical day; evening for you. Work, school, social, leisure, volunteer, spiritual practices. Include time spent obtaining, using, recovering from drugs or alcohol. (DSM)     Pt did not answer.     Please describe what leisure activities have been associated with your substance abuse:     The patient denied having any leisure activities which had been associated with his substance abuse.    2B. How often do you spend more time than you planned using or use more than you planned? (DSM)     Pt appears to binge drink, doesn't stop unless he is at the ED/hospital or detox.     3. How important is using to your social connections? Do many of your family or friends use?     Pt denies, \"not really no, not important\".     4A. Are " you currently in a significant relationship?     Yes.  4B. How long? Almost 2 year            Please describe your significant other's use of mood altering chemicals? Pt reports she uses socially.     4C. Sexual Orientation:     Heterosexual    5A. Who do you live with?      Pt lives with his girlfriend.     5B. Tell me about their alcohol/drug use and mental health issues.     Pt denies any concerns.     5C. Are you concerned for your safety there? No    5D. Are you concerned about the safety of anyone else who lives with you? No    6A. Do you have children who live with you?     No    6B. Do you have children who do not live with you?     Yes.  (Ask follow-up questions to determine the person's relationship and responsibility, both legal and care giving; and what arrangements are made for supervision for the children when the person is not available.) 1 Daughter, little over 2 years old. Haven't seen her for awhile.     7A. Who supports you in making changes in your alcohol or drug use? What are they willing to do to support you? Who is upset or angry about you making changes in your alcohol or drug use? How big a problem is this for you?      Parents, ex-fiance, girlfriend.     7B. This table is provided to record information about the person s relationships and available support It is not necessary to ask each item; only to get a comprehensive picture of their support system.  How often can you count on the following people when you need someone?   Partner / Spouse Usually supportive   Parent(s)/Aunt(s)/Uncle(s)/Grandparents Usually supportive   Sibling(s)/Cousin(s) Usually supportive   Child(davi) Never supportive - she is 2 yrs old.   Other relative(s) The patient doesn't have any current contact with other relatives.   Friend(s)/neighbor(s) The patient doesn't have any current contact with friends.   Child(davi) s father(s)/mother(s) Rarely supportive   Support group member(s) The patient denied having any  current involvement with 12-step or other support group meetings.   Community of alex members The patient denied having any current involvement with community alex members.   /counselor/therapist/healer The patient denied having any current involvement with a , counselor, therapist or healer.   Other (specify) No     8A. What is your current living situation?     Pt reports his girlfriend kicked him out when he is using.     8B. What is your long term plan for where you will be living?     Pt reports he can return to his apartment.     8C. Tell me about your living environment/neighborhood? Ask enough follow up questions to determine safety, criminal activity, availability of alcohol and drugs, supportive or antagonistic to the person making changes.      Pt reports it is safe.     9. Criminal justice history: Gather current/recent history and any significant history related to substance use--Arrests? Convictions? Circumstances? Alcohol or drug involvement? Sentences? Still on probation or parole? Expectations of the court? Current court order? Any sex offenses - lifetime? What level? (DSM)    DWI 10/2017 - Probation Right now  Probation violation.     Commitment: Denies   Registered Sex Offender: Denies    10. What obstacles exist to participating in treatment? (Time off work, childcare, funding, transportation, pending care home time, living situation)     The patient denied having any obstacles for participating in substance abuse treatment.    Dimension VI Ratings   Recovery environment - The placing authority must use the criteria in Dimension VI to determine a client s recovery environment.   RISK DESCRIPTIONS - Severity rating: 3 Client is not engaged in structured, meaningful activity and the client's peers, family, significant other, and living environment are unsupportive, or there is significant criminal justice system involvement.    REASONS SEVERITY WAS ASSIGNED - (What support  does the person have for making changes? What structure/stability does the person have in his or her daily life that will increase the likelihood that changes can be sustained? What problems exist in the person s environment that will jeopardize getting/staying clean and sober?)     Patient lives with his girlfriend, though their relationship is strained due to his use. He reports she has sent boundaries that he cannot use at the apartment. 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 Pt reports his relationship with his family is strained due to his use, that his parents have kicked him out of their house before. Pt reports he has one daughter, who is 2 years old. Pt denied a CPS case, but per chart records, he had one open in 02/2019. Unknown about the status of that case. Patient has current legals. Pt reports he is on probation in [facility] due to a DWI in 10/2017 and is supposed to go to court this week for a probation violation (2nd one).         Client Choice/Exceptions   Would you like services specific to language, age, gender, culture, Taoism preference, race, ethnicity, sexual orientation or disability?  No    What particular treatment choices and options would you like to have? Residential treatment    Do you have a preference for a particular treatment program? [facility]    Criteria for Diagnosis     Criteria for Diagnosis  DSM-5 Criteria for Substance Use Disorder  Instructions: Determine whether the client currently meets the criteria for Substance Use Disorder using the diagnostic criteria in the DSM-V [id number].[phone]. Current means during the most recent 12 months outside a facility that controls access to substances    Category of Substance Severity (ICD-10 Code / DSM 5 Code)      Alcohol Use Disorder Severe  (10.20) (303.90)   Cannabis Use Disorder The patient does not meet the criteria for a Cannabis use disorder.   Hallucinogen Use Disorder The patient does not meet the criteria for a Hallucinogen use disorder.   Inhalant Use Disorder The patient does not meet the criteria for an Inhalant use disorder.   Opioid Use Disorder The patient does not meet the criteria for an Opioid use disorder.   Sedative, Hypnotic, or Anxiolytic Use Disorder The patient does not meet the criteria for a Sedative/Hypnotic use disorder.   Stimulant Related Disorder The patient does not meet the criteria for a Stimulant use disorder.   Tobacco Use Disorder Moderate   (F17.200) (305.1)   Other (or unknown) Substance Use Disorder The patient does not meet the criteria for a Other (or unknown) Substance use disorder.       Collateral Contact Summary   Number of contacts made: 1    Contact with referring person:  No    If court related records were reviewed, summarize here: No court records had been reviewed at the time of this documentation.    Information from collateral contacts supported/largely agreed with information from the client and associated risk ratings.      Rule 25 Assessment Summary and Plan   's Recommendation    1) Abstain from alcohol and all illicit drugs and mood altering drugs unless prescribed by a healthcare giver familiar with their addiction.  2) Enter and complete [facility] program or similar program and follow counselor recommendations.  3) Arrange for sober supportive living upon discharge.  4) Develop a sober supportive network.  5) Continue to receive appropriate medical and psychiatric services as needed.     Collateral Contacts     Name:    EMR   Relationship:    Medical Records   Phone Number:    None Releases:    Yes     Admitted:     08/05/2019      DATE OF SERVICE: 08/06/2019     CHIEF COMPLAINT:  Alcohol intoxication.      HISTORY  OF PRESENT ILLNESS:  Had been limited due to the patient's alcohol intoxication.  He is very sleepy, able to wake up, but cannot provide much information.  I did discuss with ER attending and I reviewed his chart as well.  As per report, he was brought in by EMS after he was found unresponsive at extended stay in [place].  Again, per report, empty bottles of vodka were found at the site.  As per the chart review, he had visits for alcohol intoxication in the last month.  It seems that in 02/2019, he was admitted to detox unit.  He does admit drinking vodka, although he cannot tell me how much.  He denies other illicit drug abuse.  It is not clear if he is taking any other medications at home.  Upon further questioning, the patient denies having any chest pain.  He denies having headache, no nausea, no vomiting, no abdominal pain, no diarrhea, no constipation.      In the ER, he was seen by Dr. Rivas.  His initial vitals showed a blood pressure of 148/106, his heart rate was in the 120s, temperature 97.4, respiratory rate 16, oxygen saturation 95% on room air.  He did have basic blood work which showed unremarkable BMP with sodium 137, potassium 3.7, chloride 96, bicarbonate 39, BUN 9, creatinine 0.58.  His anion gap was 9, calcium 8.2.  His liver function test showed a normal albumin 4, total protein 7.5, total bilirubin 0.7, alkaline phosphatase was 114.  He had marked elevated transaminase, , , up from 294 a couple of days ago.  His lactic acid initially 3.8, improved to 2.6 after initial IV hydration.  Lipase was 446, glucose 127.  His VBG with pH 7.36, pCO2 of 52, pO2 of 59.  Bicarbonate was 29 in VBG.  His CBC with white blood cells 16.5, hemoglobin 15.9, hematocrit 46.5, platelet count 279,000.  Ethanol level was 0.58.  Acetaminophen level negative.  Urine toxicology negative.  His EKG showed sinus rhythm at a rate of 90 beats per minute with prolonged QT mL of 486.  Ultrasound of the abdomen  showed mild diffuse fatty infiltration of the liver, otherwise unremarkable appearance of the gallbladder.  No biliary dilation.      In ER, he received 2 boluses of normal saline, 1 dose of Zofran and Hospitalist Service was called regarding the admission.      The patient, while he was still in ER, he was still intoxicated.  He admitted drinking vodka.  He admitted that he has history of alcohol withdrawals, but no history of withdrawal seizures.      PAST MEDICAL HISTORY:   1.  Alcohol abuse/dependence.   2.  History of alcohol withdrawal.   3.  Anxiety.   4.  Alcoholic hepatitis.      PAST SURGICAL HISTORY:  None.      SOCIAL HISTORY:  He does admit drinking vodka, but he cannot quantify for me.  He denies smoking.  He denies illicit drug abuse.      FAMILY HISTORY:  I could not discuss with family history with the patient given his significant alcohol intoxication.      PRIOR TO ADMISSION MEDICATIONS:  - As per the pharmacy- no medications.     ALLERGIES:  APPARENTLY, HE DOES NOT HAVE ANY KNOWN DRUG ALLERGIES.      REVIEW OF SYSTEMS:  A 10-point review of systems was conducted and it was negative except for pertinent positives mentioned in history of present illness.      PHYSICAL EXAMINATION:   VITAL SIGNS:  Blood pressure 128/91, heart rate 120, respiratory rate 14, oxygen saturation 93% on 2 liters via nasal cannula, temperature 97.4.   GENERAL:  The patient is sleepy, intoxicated, no acute distress.   HEENT:  Normocephalic, atraumatic.  Pupils are equally round and reactive to light.  Oral mucosa is mildly dry.   NECK:  Supple.  No cervical lymphadenopathy, no thyromegaly.   CHEST:  There is bilateral air entry, no wheezing, no rales, no crackles.   CARDIOVASCULAR:  There is normal S1, S2, tachycardia, no murmurs, no rubs.   ABDOMEN:  Soft, nontender, nondistended.  Bowel sounds are present.   EXTREMITIES:  There is no leg swelling, no calf tenderness, 2+ peripheral pulses are palpable.   SKIN:  Intact, no  rash, no cyanosis.   NEUROLOGIC:  The patient is intoxicated, able to wake up, but he is oriented to self and partially to time.  There are no focal neurological deficits.   PSYCHIATRIC:  Unable to assess due to his severe intoxication.   MUSCULOSKELETAL:  He moves all extremities freely.  There are no obvious joint deformities.      LABORATORY DATA:  Reviewed and dictated above.      ASSESSMENT AND PLAN:  Mohit Porter is a 29-year-old gentleman with past medical history of anxiety, alcohol abuse/dependence, history of alcohol withdrawal and alcoholic hepatitis who was brought in by EMS after he was found unresponsive at extended stay in [place].   1.  Acute toxic encephalopathy.   2.  Alcohol intoxication.    He has a long history of alcohol abuse/dependence.  He had been in detox in 02/2019,  as per the chart review, he had multiple ER visits this month only for alcohol intoxication.  He admits drinking vodka, although he cannot quantify how much he drinks at this time.  His alcohol level was 0.58.  He is very sleepy but able to wake up.  He is maintaining his oxygen saturation.  His blood pressure is fine.  Lactic acid was elevated at 3.8 initially but improved to 2.6 after some IV fluids.  Plan for now is to admit him to a medical floor as inpatient.  He will be monitored on telemetry.  I think he is a good candidate for detoxification.  Psychiatry consult, Chemical Dependency consult and  consult requested.  He is at high risk to develop withdrawal symptoms.  We will monitor him closely with [place], but I did not order the symptom-triggered Ativan at this time.  He was placed on IV fluids, IV vitamins, magnesium, phosphorus and potassium replacement protocol as needed.   3.  Alcoholic hepatitis.  His liver function tests are elevated with , , even higher compared with his LFTs from a couple of days ago.  His total bilirubin is normal.  Urine toxicology is negative.  Ultrasound of  his abdomen showed mild diffuse fatty infiltration of the liver, but unremarkable appearance of gallbladder.  He needs to quit drinking.  Plan for now is to avoid hepatotoxic drugs and repeat LFTs in the morning.   4.  Leukocytosis.  Suspect reactive.  He does not have any fever.  Will monitor him clinically while off antibiotics.  Repeat CBC in the morning.   5.  History of anxiety.  It is not clear if he is on any medications at this time.  A psychiatry consult requested.   6.  Deep venous thrombosis prophylaxis:  Pneumatic compression device.      CODE STATUS:  Discussed with the patient and patient is full code.      DISPOSITION:  Admit inpatient.  Anticipate at least a couple of days of hospitalization.       PRISCA NEIL MD    ollateral Contacts      A problematic pattern of alcohol/drug use leading to clinically significant impairment or distress, as manifested by at least two of the following, occurring within a 12-month period:    1.) Alcohol/drug is often taken in larger amounts or over a longer period than was intended.  2.) There is a persistent desire or unsuccessful efforts to cut down or control alcohol/drug use  3.) A great deal of time is spent in activities necessary to obtain alcohol, use alcohol, or recover from its effects.  4.) Craving, or a strong desire or urge to use alcohol/drug  5.) Recurrent alcohol/drug use resulting in a failure to fulfill major role obligations at work, school or home.  6.) Continued alcohol use despite having persistent or recurrent social or interpersonal problems caused or exacerbated by the effects of alcohol/drug.  7.) Important social, occupational, or recreational activities are given up or reduced because of alcohol/drug use.  8.) Recurrent alcohol/drug use in situations in which it is physically hazardous.  9.) Alcohol/drug use is continued despite knowledge of having a persistent or recurrent physical or psychological problem that is likely to have been  caused or exacerbated by alcohol.  10.) Tolerance, as defined by either of the following: A need for markedly increased amounts of alcohol/drug to achieve intoxication or desired effect. and A markedly diminished effect with continued use of the same amount of alcohol/drug.  11.) Withdrawal, as manifested by either of the following: The characteristic withdrawal syndrome for alcohol/drug (refer to Criteria A and B of the criteria set for alcohol/drug withdrawal). and Alcohol/drug (or a closely related substance, such as a benzodiazepine) is taken to relieve or avoid withdrawal symptoms.      Specify if: In early remission:  After full criteria for alcohol/drug use disorder were previously met, none of the criteria for alcohol/drug use disorder have been met for at least 3 months but for less than 12 months (with the exception that Criterion A4,  Craving or a strong desire or urge to use alcohol/drug  may be met).     In sustained remission:   After full criteria for alcohol use disorder were previously met, none of the criteria for alcohol/drug use disorder have been met at any time during a period of 12 months or longer (with the exception that Criterion A4,  Craving or strong desire or urge to use alcohol/drug  may be met).   Specify if:   This additional specifier is used if the individual is in an environment where access to alcohol is restricted.    Mild: Presence of 2-3 symptoms  Moderate: Presence of 4-5 symptoms  Severe: Presence of 6 or more symptoms

## 2019-08-07 NOTE — PLAN OF CARE
DATE & TIME: 8/6/19, 2434 - 7005                           Cognitive Concerns/ Orientation : A&O x 4   BEHAVIOR & AGGRESSION TOOL COLOR: Green  CIWA SCORE: O, 4, 0(Asleep)          ABNL VS/O2: BP elevated at 156/99. Tachycardic. Other VSS on room air  MOBILITY: SBA  PAIN MANAGMENT: Denied pain  DIET: Regular  BOWEL/BLADDER: Continent  ABNL LAB/BG: N/a  DRAIN/DEVICES: PIV infusing  TELEMETRY RHYTHM: Sinus Tachycardia  SKIN: Bruises, abrasions and scabs. Sutures to right eyebrow  TESTS/PROCEDURES: N/a  D/C DAY/GOALS/PLACE: Pending progress  OTHER IMPORTANT INFO: Seroquel x 1 dose given for anxiety. Sitter and VPM at bedside. Psychiatry following. Chemical dependency consulted

## 2019-08-07 NOTE — PLAN OF CARE
DATE & TIME: 8/6/2019 [id number]  Cognitive Concerns/ Orientation : A&O x4  BEHAVIOR & AGGRESSION TOOL COLOR: yellow, threatened to pull out IV and leave hospital, code 21 called at 1740. Holdable if trying to leave hospital. Asked to remove IV and Tele several times during shift, explained the importance of them, pt verbalized understanding.   CIWA SCORE: 6/7/7/8/8/6 for anxiety. Oral valium twice, IV valium once. Seroquel three times. Pt kept asking for Valium, writer informed that it was for ETOH withdrawal only. Will use Seroquel for anxiety. Pt verbalized understanding.    ABNL VS/O2: Tachycardic--as high as 150s during earlier part of the evening. Now NSR while resting.   MOBILITY: SBA   PAIN MANAGMENT: denied pain.   DIET: regular, good appetite  BOWEL/BLADDER: continent of B&B  ABNL LAB/BG: lactic of 2.9, MD notified, gave NS bolus of 500ml once then increased IVF to 125 ml/hour.   DRAIN/DEVICES: PIV on left hand, infusing.   TELEMETRY RHYTHM: ST to NSR  SKIN: intact.   TESTS/PROCEDURES: na   D/C DAY/GOALS/PLACE: Psych following, referral made to [place], CD consult--need to see pt.   OTHER IMPORTANT INFO:

## 2019-08-07 NOTE — PLAN OF CARE
DATE & TIME: 8/7/19, 1558-1042    Cognitive Concerns/ Orientation : A&O x 4   BEHAVIOR & AGGRESSION TOOL COLOR: Yellow  CIWA SCORE: 5, 6, 10, 8. Anxiety and agitation   ABNL VS/O2: VSS on RA  MOBILITY: SBA  PAIN MANAGMENT: Denied pain  DIET: Regular  BOWEL/BLADDER: Continent  ABNL LAB/BG: N/a  DRAIN/DEVICES: PIV, IVF @ 75ml/hr  TELEMETRY RHYTHM: SR in bed, ST with movement, MD aware.  SKIN: Bruises, abrasions and scabs. Sutures to right eyebrow.  TESTS/PROCEDURES: N/a  D/C DAY/GOALS/PLACE: Psych following, CD consulted, plan for residential treatment at discharge.  OTHER IMPORTANT INFO: PRN Seroquel ordered for anxiety given x4, pt. Reporting he still feels anxious. Sitter and VPM at bedside. Pt. On 72 hr. Hold started 8/7/2019 11:58 AM. Pt. Attempted to leave this afternoon able to redirect and de-escalate situation, belongings locked in blue pod locker #11. Emesis x1, pt. Denied intervention.

## 2019-08-07 NOTE — PROVIDER NOTIFICATION
MD Notification    Notified Person: MD    Notified Person Name: Dr. Locke     Notification Date/Time: August 7, 2019 11:20 AM    Notification Interaction: paged    Purpose of Notification: Pt. Wants to leave today, he has court tomorrow in [place]. Call RN to discuss plan of care.     Orders Received: MD told RN to call Dr. Stringer regarding if he wants the pt. To be placed on 72 hr. Hold.    Comments: 11:31 RN called Dr. Stringer and he said to keep pt. In the hospital. Pt. Needs psych consult for tomorrow 8/8/19

## 2019-08-07 NOTE — PROVIDER NOTIFICATION
MD Notification    Notified Person: MD    Notified Person Name:  Chucky     Notification Date/Time: 5:44 PM August 7, 2019    Notification Interaction: paged    Purpose of Notification: Pt. Had couple episodes of ST while up in the bathroom. HR up to 160-190. Now resting in bed at 105. BP: 143/101. Pt. Requesting his Seroquel dosage be increased. Please advised.    Orders Received: Continue to monitor.     Comments:

## 2019-08-07 NOTE — PROGRESS NOTES
[facility]  Hospitalist Progress Note        Herman Locke MD   08/07/2019        Interval History:      - was agitated last evening wanting to leave (see RRT note); appears more calm today but he still wants to leave stating he has a court appearance tomorrow in [place] for a DWI         Assessment and Plan:        Mr Mohit Porter is a 29-year-old gentleman with past medical history of anxiety, alcohol abuse/dependence, history of alcohol withdrawal and alcoholic hepatitis who was brought in by EMS after he was found unresponsive at extended stay in [place].      # Acute toxic encephalopathy.   # Alcohol intoxication.   # Concern for ETOH witdrawal  # abnormal LFTs, likely alcoholic hepatitis  # Anxiety  - long history of alcohol abuse/dependence; he had multiple ER visits this month only for alcohol intoxication  - ETOH level on admission 0.58  - on CIWA protocol but valium were not started on admission as patient was intoxicated  - evaluated by psychiatry, at high risk for withdrawal; started Valium per CIWA protocol on 8/6/19; received 45 mg Valium last night  - psychiatry started Remeron 7.5 mg bedtime and Seroquel 25 mg Q2 hours PRN, thorazine 10 mg tid  - Chem dep evaluated -- suggested residential treatment, referral sent for 8/8/19  - elevated transaminases, normal bilirubin; improving AST//241---480/186  - US RUQ shows fatty liver, otherwise unremarkable  - will monitor LFTs  - continue IV multivitamin  - tolerating PO well; will decrease IVF to 75/hr and discontinue in 24 hrs  - patient wanting to leave; put on 72 hrs hold starting 8/7/19     # Leukocytosis  # elevated lactate  - Suspect reactive  - wbc trending down 16.5---12.7---6.4  - elevated lactate is slightly secondary to alcohol abuse and intoxication; no clinical concern for sepsis    # Hypokalemia  - sec to poor po from alcohol abuse; K 3.2; start K supple protocol     # Deep venous thrombosis prophylaxis:  " Pneumatic compression device.      CODE STATUS:  Discussed with the patient and patient is full code    Disposition: likely in 1-2 days to residental treatment when cleared by psych; psych to re-eval on 8/8/19                     Physical Exam:      Blood pressure (!) 135/94, pulse 123, temperature 98.8  F (37.1  C), temperature source Oral, resp. rate 16, height 1.727 m (5' 8\"), weight 81.6 kg (180 lb), SpO2 97 %.  Vitals:    08/05/19 2157   Weight: 81.6 kg (180 lb)     Vital Signs with Ranges  Temp:  [97.9  F (36.6  C)-99.6  F (37.6  C)] 98.8  F (37.1  C)  Pulse:  [123] 123  Heart Rate:  [] 75  Resp:  [16-18] 16  BP: (132-156)/(75-99) 135/94  SpO2:  [93 %-97 %] 97 %  I/O's Last 24 hours  I/O last 3 completed shifts:  In: 1115 [P.O.:240; I.V.:875]  Out: 450 [Urine:450]    Constitutional: appears more calm , still slightly tremulous; oriented X 3; lying comfortably in bed in no apparent distress   HEENT: Pupils equal and reactive to light and accomodation, EOMI intact; neck supple no raised JVD or rigidity    Oral cavity: Moist mucosa   Cardiovascular: Normal s1 s2, regular rate and rhythm, no murmur   Lungs: B/l clear to auscultation, no wheezes or crepitations   Abdomen: Soft, nt, nd, no guarding, rigidity or rebound; BS +   LE : No edema   Musculoskeletal: Power 5/5 in all extremities   Neuro: No focal neurological deficits noted, CN II to XII grossly intact   Psychiatry: normal mood and affect                Medications:          chlorproMAZINE  10 mg Oral TID     mirtazapine  7.5 mg Oral At Bedtime     sodium chloride (PF)  3 mL Intracatheter Q8H     IV Fluid with vitamins   Intravenous Q24H     PRN Meds: diazepam **OR** diazepam, lidocaine 4%, lidocaine (buffered or not buffered), magnesium sulfate, naloxone, ondansetron **OR** ondansetron, potassium chloride, potassium chloride with lidocaine, potassium chloride, potassium chloride, potassium chloride, potassium phosphate (KPHOS) in D5W IV, potassium " phosphate (KPHOS) in D5W IV, potassium phosphate (KPHOS) in D5W IV, potassium phosphate (KPHOS) in D5W IV, QUEtiapine, sodium chloride (PF)         Data:      All new lab and imaging data was reviewed.   Recent Labs   Lab Test 08/07/19  0835 08/06/19  0823 08/05/19  2213   WBC 6.4 12.7* 16.5*   HGB 12.6* 14.3 15.9   MCV 91 89 88   * 233 279      Recent Labs   Lab Test 08/07/19  0835 08/06/19  0823 08/05/19  2213    141 137   POTASSIUM 3.2* 3.5 3.7   CHLORIDE 98 103 96   CO2 33* 29 32   BUN 6* 4* 9   CR 0.59* 0.50* 0.58*   ANIONGAP 6 9 9   ARYA 8.4* 7.8* 8.2*   * 80 127*     No lab results found.    Invalid input(s): TROP, TROPONINIES

## 2019-08-07 NOTE — CONSULTS
8/7/2019    Writer met with pt to complete CD eval. Pt completed CD eval with writer, signed releases for his  with [facility]. Pt is willing to attend residential treatment. Pt reported the sooner the better for attending treatment. Writer will complete assessment and send referral morning of 08/08.    Per chart review, pt has had 27 ED visits/hospitalizations due to alcohol intoxication or alcohol withdrawal since 2017. Of those encounters, 22 have been in the year 2019, and 9 of them have been since 07/05/2019. If pt does not follow through on completing treatment, commitment should be considered due to his ongoing alcohol use and coming into the ED with high JOANA levels repeatedly.     Christy Atkins, [facility]  [phone]

## 2019-08-08 LAB
ALBUMIN SERPL-MCNC: 3.1 G/DL (ref 3.4–5)
ALP SERPL-CCNC: 130 U/L (ref 40–150)
ALT SERPL W P-5'-P-CCNC: 195 U/L (ref 0–70)
ANION GAP SERPL CALCULATED.3IONS-SCNC: 2 MMOL/L (ref 3–14)
AST SERPL W P-5'-P-CCNC: 394 U/L (ref 0–45)
BILIRUB DIRECT SERPL-MCNC: 0.3 MG/DL (ref 0–0.2)
BILIRUB SERPL-MCNC: 0.7 MG/DL (ref 0.2–1.3)
BUN SERPL-MCNC: 8 MG/DL (ref 7–30)
CALCIUM SERPL-MCNC: 8.5 MG/DL (ref 8.5–10.1)
CHLORIDE SERPL-SCNC: 108 MMOL/L (ref 94–109)
CO2 SERPL-SCNC: 32 MMOL/L (ref 20–32)
CREAT SERPL-MCNC: 0.49 MG/DL (ref 0.66–1.25)
ERYTHROCYTE [DISTWIDTH] IN BLOOD BY AUTOMATED COUNT: 14.8 % (ref 10–15)
GFR SERPL CREATININE-BSD FRML MDRD: >90 ML/MIN/{1.73_M2}
GLUCOSE SERPL-MCNC: 89 MG/DL (ref 70–99)
HCT VFR BLD AUTO: 39.8 % (ref 40–53)
HGB BLD-MCNC: 13.2 G/DL (ref 13.3–17.7)
MCH RBC QN AUTO: 30.3 PG (ref 26.5–33)
MCHC RBC AUTO-ENTMCNC: 33.2 G/DL (ref 31.5–36.5)
MCV RBC AUTO: 92 FL (ref 78–100)
PLATELET # BLD AUTO: 115 10E9/L (ref 150–450)
POTASSIUM SERPL-SCNC: 3.7 MMOL/L (ref 3.4–5.3)
PROT SERPL-MCNC: 6 G/DL (ref 6.8–8.8)
RBC # BLD AUTO: 4.35 10E12/L (ref 4.4–5.9)
SODIUM SERPL-SCNC: 142 MMOL/L (ref 133–144)
WBC # BLD AUTO: 7 10E9/L (ref 4–11)

## 2019-08-08 PROCEDURE — 80048 BASIC METABOLIC PNL TOTAL CA: CPT | Performed by: HOSPITALIST

## 2019-08-08 PROCEDURE — 36415 COLL VENOUS BLD VENIPUNCTURE: CPT | Performed by: HOSPITALIST

## 2019-08-08 PROCEDURE — 25800030 ZZH RX IP 258 OP 636: Performed by: INTERNAL MEDICINE

## 2019-08-08 PROCEDURE — 99232 SBSQ HOSP IP/OBS MODERATE 35: CPT | Performed by: HOSPITALIST

## 2019-08-08 PROCEDURE — 12000000 ZZH R&B MED SURG/OB

## 2019-08-08 PROCEDURE — 25000128 H RX IP 250 OP 636: Performed by: INTERNAL MEDICINE

## 2019-08-08 PROCEDURE — 25800030 ZZH RX IP 258 OP 636: Performed by: HOSPITALIST

## 2019-08-08 PROCEDURE — 25000132 ZZH RX MED GY IP 250 OP 250 PS 637: Performed by: HOSPITALIST

## 2019-08-08 PROCEDURE — 85027 COMPLETE CBC AUTOMATED: CPT | Performed by: HOSPITALIST

## 2019-08-08 PROCEDURE — 25000132 ZZH RX MED GY IP 250 OP 250 PS 637: Performed by: PSYCHIATRY & NEUROLOGY

## 2019-08-08 PROCEDURE — 80076 HEPATIC FUNCTION PANEL: CPT | Performed by: HOSPITALIST

## 2019-08-08 PROCEDURE — 25000125 ZZHC RX 250: Performed by: INTERNAL MEDICINE

## 2019-08-08 RX ADMIN — FOLIC ACID: 5 INJECTION, SOLUTION INTRAMUSCULAR; INTRAVENOUS; SUBCUTANEOUS at 22:57

## 2019-08-08 RX ADMIN — SODIUM CHLORIDE: 9 INJECTION, SOLUTION INTRAVENOUS at 09:17

## 2019-08-08 RX ADMIN — QUETIAPINE 25 MG: 25 TABLET ORAL at 15:48

## 2019-08-08 RX ADMIN — DIAZEPAM 10 MG: 5 TABLET ORAL at 21:17

## 2019-08-08 RX ADMIN — CHLORPROMAZINE HYDROCHLORIDE 10 MG: 10 TABLET, SUGAR COATED ORAL at 22:17

## 2019-08-08 RX ADMIN — MIRTAZAPINE 7.5 MG: 7.5 TABLET ORAL at 22:17

## 2019-08-08 RX ADMIN — QUETIAPINE 25 MG: 25 TABLET ORAL at 20:51

## 2019-08-08 RX ADMIN — CHLORPROMAZINE HYDROCHLORIDE 10 MG: 10 TABLET, SUGAR COATED ORAL at 15:48

## 2019-08-08 RX ADMIN — QUETIAPINE 25 MG: 25 TABLET ORAL at 12:20

## 2019-08-08 RX ADMIN — CHLORPROMAZINE HYDROCHLORIDE 10 MG: 10 TABLET, SUGAR COATED ORAL at 12:20

## 2019-08-08 RX ADMIN — DIAZEPAM 10 MG: 5 TABLET ORAL at 17:14

## 2019-08-08 RX ADMIN — QUETIAPINE 25 MG: 25 TABLET ORAL at 18:52

## 2019-08-08 ASSESSMENT — ACTIVITIES OF DAILY LIVING (ADL)
ADLS_ACUITY_SCORE: 17
ADLS_ACUITY_SCORE: 11.5
ADLS_ACUITY_SCORE: 17
ADLS_ACUITY_SCORE: 11.5

## 2019-08-08 NOTE — PROGRESS NOTES
Care Transition Initial Assessment - RN  Met with: Patient.  DATA   Active Problems:    Alcohol intoxication (H)       Cognitive Status: alert and oriented.   Contact information and PCP information verified: No  Lives With: other (see comments), alone(hotel)   Insurance concerns: No Insurance issues identified  ASSESSMENT  Patient currently receives the following services:   None      Identified issues/concerns regarding health management:  Per patients request writer met with patient yesterday to discuss discharge needs. Patient stating he used to live with his girlfriend for the last couple years however was asked to leave due to ETOH. Patient presently is at [facility] in [place]. Patient was requesting to leave [facility] to get his belongings and move his car as he felt it would be towed. Writer offered to call [facility] [phone] who confirmed patients residence. Discussed with [facility] the  who affirmed that they will not tow  his car,however patients belongings will be taken out of the room and put in a    locker .   Patient verbalized that he drinks alcohol to decrease anxiety and to sleep. Patient stating he is willing to seek treatment for alcohol as he does not want to loose his job. Patient aware that he is accepted at [facility]. Waiting for psychiatry to clear patient for transfer pending bed availability..  PLAN  Financial costs for the patient include none .  Patient given options and choices for discharge yes.  Patient/family is agreeable to the plan?  Yes:   Patient anticipates discharging to [facility] .   Patient anticipates needs for home equipment: No  Transportation/person available to transport on day of discharge  is facility and have they been notified  Plan/Disposition: Home   Care  (CTS) will continue to follow as needed.

## 2019-08-08 NOTE — PLAN OF CARE
DATE & TIME: 8/8/19 8235-7858                  Cognitive Concerns/ Orientation : A&O x 4   BEHAVIOR & AGGRESSION TOOL COLOR: Green  CIWA SCORE: 4, 4        ABNL VS/O2: VSS on RA  MOBILITY: SBA  PAIN MANAGMENT: Denied pain  DIET: Regular  BOWEL/BLADDER: Continent  ABNL LAB/BG: N/a  DRAIN/DEVICES: PIV, IVF saline locked  TELEMETRY RHYTHM: D/C'd  SKIN: Bruises, abrasions and scabs. Sutures to right eyebrow.  TESTS/PROCEDURES: N/a  D/C DAY/GOALS/PLACE: Psych following, CD consulted, plan for residential treatment at discharge.  OTHER IMPORTANT INFO: PRN Seroquel ordered for anxiety given x1, pt. Reporting he still feels anxious. Sitter and VPM at bedside. Pt. On 72 hr. Hold started 8/7/2019 11:58 AM.

## 2019-08-08 NOTE — PROGRESS NOTES
"SW:  D:    Per Care Plan rounds, [facility] sent a referral to [facility] today.  Care Coordinator explains MD feels patient is stable to discharge today and a direct transfer to [facility] is recommended.  Patient in agreement.    I:  Writer Call placed to [facility]. [phone] admission contact, Miko, is out today and tomorrow.    directed writer to Anmol. Writer left Anmol a message regarding bed availability    P:  Waiting to hear back from [facility].    [facility] is able to admit patient on Monday August 12 and will provide transport patient.  Their  will call when he is close to the hospital and writer will ask staff to bring patient to door #6.   Patient needs to bring a 30 day supply of medications, clothes and tolietries.   Reivewed discharge date with Dr Davis.     Met with patient to review the above arrangements.  Patient wants to go home first to pack and move his car from the Extended Stay lot in [place].   Writer explained the recommendation is for a direct transfer and asked if family or friends can help with obtaining clothes and moving his car.  He reports his parents live op [place] and have \"pretty much disowned me and my girlfriend is upset with me\".  Recommended he will need to speak with Dr Stringer regarding his request to go home briefly and then go to [facility].  During the brief visit his speech was pressured and he appeared anxious.  He did admit he is anxious over his car being towed.  Reviewed with the bedside nurse.  She will try to facilitate patient calling the Extended Stay and explain he is in the hospital and he will try to make arrangements for his car to be moved.   Writer encouraged patient to reach out to parents or girlfriend to help out if needed.  Per bedside nurse Dr Stringer will be by in the morning to see patient.  "

## 2019-08-08 NOTE — PROVIDER NOTIFICATION
MD Notification    Notified Person: MD     Notified Person Name: Chucky    Notification Date/Time: 8/8/19 8679    Notification Interaction: Web-based message    Purpose of Notification: Patient is requesting to have stitches remove from laceration on right eyebrow.     Orders Received: Can remove if they have been in place for at least five days.    Comments:

## 2019-08-08 NOTE — PLAN OF CARE
Cognitive Concerns/ Orientation : A&O x 4   BEHAVIOR & AGGRESSION TOOL COLOR: Green  CIWA SCORE: 4, 2        ABNL VS/O2: VSS on RA  MOBILITY: SBA  PAIN MANAGMENT: Denied pain  DIET: Regular  BOWEL/BLADDER: Continent  ABNL LAB/BG: N/a  DRAIN/DEVICES: PIV, IVF @ 75ml/hr  TELEMETRY RHYTHM: NSR.  SKIN: Bruises, abrasions and scabs. Sutures to right eyebrow.  TESTS/PROCEDURES: N/a  D/C DAY/GOALS/PLACE: Psych following, CD consulted, plan for residential treatment at discharge.  OTHER IMPORTANT INFO: PRN Seroquel given for anxiety x1, effective. Sitter at bedside. VPM in use. Pt. On 72 hr. Hold started 8/7/2019 11:58 AM.

## 2019-08-08 NOTE — PROGRESS NOTES
SW:  CARL:  Received a call from Pauline at [facility].  She reports patient has been approved for admission. The earliest they can admit patient on Monday at 1430.  Writer will updated Dr Locke.    Pauline is calling back to let writer know if [facility] will transport of if hospital needs to arrange transport.

## 2019-08-08 NOTE — PROVIDER NOTIFICATION
To Dr. Locke:    E,P  Pt. vital signs stable and is requesting IV out. Pt has been in NSR. Can tele be d/c'd?

## 2019-08-08 NOTE — PROGRESS NOTES
[facility]  Hospitalist Progress Note        Herman Locke MD   08/08/2019        Interval History:      -no acute issues overnight; received 20 mg of Valium yesterday , and has been getting Seroquel PRN; anxious to go home         Assessment and Plan:        Mr Mohit Porter is a 29-year-old gentleman with past medical history of anxiety, alcohol abuse/dependence, history of alcohol withdrawal and alcoholic hepatitis who was brought in by EMS after he was found unresponsive at extended stay in [place].      # Acute toxic encephalopathy.   # Alcohol intoxication.   # Concern for ETOH witdrawal  # abnormal LFTs, likely alcoholic hepatitis  # Anxiety  - long history of alcohol abuse/dependence; he had multiple ER visits this month only for alcohol intoxication  - ETOH level on admission 0.58  - on CIWA protocol with Valium  - evaluated by psychiatry  - psychiatry started Remeron 7.5 mg bedtime and Seroquel 25 mg Q2 hours PRN, thorazine 10 mg tid  - Chem dep evaluated -- suggested residential treatment, referral sent for 8/8/19; per CD If pt does not follow through on completing treatment, commitment should be considered due to his ongoing alcohol use and coming into the ED with high JOANA levels repeatedly  - SW following, has a bed available at [facility] on Monday 8/12/19   - elevated transaminases, normal bilirubin; improving AST//241---480/186---394/195  - US RUQ shows fatty liver, otherwise unremarkable  - will monitor LFTs  - continue IV multivitamin  - tolerating PO well; will discontinue IVF   - patient wanting to leave; put on 72 hrs hold starting 8/7/19    # Right supra-orbital laceration from recent fall injury  - patient requesting sutures to be removed, has been >5 days, will remove sutures today     # Leukocytosis  # elevated lactate  - Suspect reactive  - wbc trended down 16.5---12.7---6.4  - elevated lactate is slightly secondary to alcohol abuse and intoxication; no  "clinical concern for sepsis    # Hypokalemia  - sec to poor po from alcohol abuse; K 3.2; replaced on K supple protocol     # Deep venous thrombosis prophylaxis:  Pneumatic compression device.      CODE STATUS:  full code    Disposition: Monday 8/12/19 to [facility] for residential treatment if cleared by psych                     Physical Exam:      Blood pressure (!) 133/96, pulse 123, temperature 98.4  F (36.9  C), temperature source Oral, resp. rate 16, height 1.727 m (5' 8\"), weight 81.6 kg (180 lb), SpO2 98 %.  Vitals:    08/05/19 2157   Weight: 81.6 kg (180 lb)     Vital Signs with Ranges  Temp:  [97.6  F (36.4  C)-98.7  F (37.1  C)] 98.4  F (36.9  C)  Heart Rate:  [] 80  Resp:  [16-18] 16  BP: (133-149)/() 133/96  SpO2:  [97 %-100 %] 98 %  I/O's Last 24 hours  I/O last 3 completed shifts:  In: 4580 [P.O.:2680; I.V.:1900]  Out: 4251 [Urine:4250; Emesis/NG output:1]    Constitutional: Calm and cooperative; less tremulous; oriented X 3; lying comfortably in bed in no apparent distress   HEENT: Pupils equal and reactive to light and accomodation, EOMI intact; neck supple no raised JVD or rigidity    Oral cavity: Moist mucosa   Cardiovascular: Normal s1 s2, regular rate and rhythm, no murmur   Lungs: B/l clear to auscultation, no wheezes or crepitations   Abdomen: Soft, nt, nd, no guarding, rigidity or rebound; BS +   LE : No edema   Musculoskeletal: Power 5/5 in all extremities   Neuro: No focal neurological deficits noted, CN II to XII grossly intact   Psychiatry: normal mood and affect                Medications:          chlorproMAZINE  10 mg Oral TID     mirtazapine  7.5 mg Oral At Bedtime     sodium chloride (PF)  3 mL Intracatheter Q8H     IV Fluid with vitamins   Intravenous Q24H     PRN Meds: diazepam **OR** diazepam, lidocaine 4%, lidocaine (buffered or not buffered), magnesium sulfate, naloxone, ondansetron **OR** ondansetron, potassium chloride, potassium chloride with lidocaine, potassium " chloride, potassium chloride, potassium chloride, potassium phosphate (KPHOS) in D5W IV, potassium phosphate (KPHOS) in D5W IV, potassium phosphate (KPHOS) in D5W IV, potassium phosphate (KPHOS) in D5W IV, QUEtiapine, sodium chloride (PF)         Data:      All new lab and imaging data was reviewed.   Recent Labs   Lab Test 08/08/19  0811 08/07/19  0835 08/06/19  0823   WBC 7.0 6.4 12.7*   HGB 13.2* 12.6* 14.3   MCV 92 91 89   * 136* 233      Recent Labs   Lab Test 08/08/19  0811 08/07/19  1725 08/07/19  0835 08/06/19  0823     --  137 141   POTASSIUM 3.7 3.8 3.2* 3.5   CHLORIDE 108  --  98 103   CO2 32  --  33* 29   BUN 8  --  6* 4*   CR 0.49*  --  0.59* 0.50*   ANIONGAP 2*  --  6 9   ARYA 8.5  --  8.4* 7.8*   GLC 89  --  157* 80     No lab results found.    Invalid input(s): TROP, TROPONINIES

## 2019-08-09 LAB
MAGNESIUM SERPL-MCNC: 1.4 MG/DL (ref 1.6–2.3)
MAGNESIUM SERPL-MCNC: 2.2 MG/DL (ref 1.6–2.3)
PHOSPHATE SERPL-MCNC: 2.8 MG/DL (ref 2.5–4.5)

## 2019-08-09 PROCEDURE — 99232 SBSQ HOSP IP/OBS MODERATE 35: CPT | Performed by: PSYCHIATRY & NEUROLOGY

## 2019-08-09 PROCEDURE — 99232 SBSQ HOSP IP/OBS MODERATE 35: CPT | Performed by: HOSPITALIST

## 2019-08-09 PROCEDURE — 36415 COLL VENOUS BLD VENIPUNCTURE: CPT | Performed by: HOSPITALIST

## 2019-08-09 PROCEDURE — 83735 ASSAY OF MAGNESIUM: CPT | Performed by: HOSPITALIST

## 2019-08-09 PROCEDURE — 25000132 ZZH RX MED GY IP 250 OP 250 PS 637: Performed by: HOSPITALIST

## 2019-08-09 PROCEDURE — 25000128 H RX IP 250 OP 636: Performed by: INTERNAL MEDICINE

## 2019-08-09 PROCEDURE — 25000132 ZZH RX MED GY IP 250 OP 250 PS 637: Performed by: PSYCHIATRY & NEUROLOGY

## 2019-08-09 PROCEDURE — 12000000 ZZH R&B MED SURG/OB

## 2019-08-09 PROCEDURE — 84100 ASSAY OF PHOSPHORUS: CPT | Performed by: HOSPITALIST

## 2019-08-09 RX ORDER — AMLODIPINE BESYLATE 5 MG/1
5 TABLET ORAL DAILY
Status: DISCONTINUED | OUTPATIENT
Start: 2019-08-09 | End: 2019-08-12 | Stop reason: HOSPADM

## 2019-08-09 RX ORDER — QUETIAPINE FUMARATE 25 MG/1
25 TABLET, FILM COATED ORAL 3 TIMES DAILY
Status: DISCONTINUED | OUTPATIENT
Start: 2019-08-09 | End: 2019-08-10

## 2019-08-09 RX ORDER — MULTIVITAMIN,THERAPEUTIC
1 TABLET ORAL ONCE
Status: COMPLETED | OUTPATIENT
Start: 2019-08-09 | End: 2019-08-09

## 2019-08-09 RX ORDER — QUETIAPINE FUMARATE 50 MG/1
50 TABLET, FILM COATED ORAL EVERY 6 HOURS PRN
Status: DISCONTINUED | OUTPATIENT
Start: 2019-08-09 | End: 2019-08-12 | Stop reason: HOSPADM

## 2019-08-09 RX ORDER — LANOLIN ALCOHOL/MO/W.PET/CERES
100 CREAM (GRAM) TOPICAL ONCE
Status: COMPLETED | OUTPATIENT
Start: 2019-08-09 | End: 2019-08-09

## 2019-08-09 RX ORDER — MIRTAZAPINE 15 MG/1
15 TABLET, FILM COATED ORAL AT BEDTIME
Status: DISCONTINUED | OUTPATIENT
Start: 2019-08-09 | End: 2019-08-10

## 2019-08-09 RX ORDER — MAGNESIUM OXIDE 400 MG/1
800 TABLET ORAL ONCE
Status: COMPLETED | OUTPATIENT
Start: 2019-08-09 | End: 2019-08-09

## 2019-08-09 RX ORDER — HYDRALAZINE HYDROCHLORIDE 20 MG/ML
10 INJECTION INTRAMUSCULAR; INTRAVENOUS EVERY 4 HOURS PRN
Status: DISCONTINUED | OUTPATIENT
Start: 2019-08-09 | End: 2019-08-12 | Stop reason: HOSPADM

## 2019-08-09 RX ORDER — FOLIC ACID 1 MG/1
1 TABLET ORAL ONCE
Status: COMPLETED | OUTPATIENT
Start: 2019-08-09 | End: 2019-08-09

## 2019-08-09 RX ADMIN — AMLODIPINE BESYLATE 5 MG: 5 TABLET ORAL at 14:15

## 2019-08-09 RX ADMIN — QUETIAPINE 25 MG: 25 TABLET ORAL at 11:38

## 2019-08-09 RX ADMIN — QUETIAPINE 25 MG: 25 TABLET ORAL at 19:45

## 2019-08-09 RX ADMIN — QUETIAPINE FUMARATE 50 MG: 50 TABLET ORAL at 08:38

## 2019-08-09 RX ADMIN — THERA TABS 1 TABLET: TAB at 14:15

## 2019-08-09 RX ADMIN — Medication 100 MG: at 14:15

## 2019-08-09 RX ADMIN — MIRTAZAPINE 15 MG: 15 TABLET, FILM COATED ORAL at 21:59

## 2019-08-09 RX ADMIN — FOLIC ACID 1 MG: 1 TABLET ORAL at 14:15

## 2019-08-09 RX ADMIN — DIAZEPAM 10 MG: 5 TABLET ORAL at 20:52

## 2019-08-09 RX ADMIN — MAGNESIUM OXIDE TAB 400 MG (241.3 MG ELEMENTAL MG) 800 MG: 400 (241.3 MG) TAB at 14:15

## 2019-08-09 RX ADMIN — QUETIAPINE 25 MG: 25 TABLET ORAL at 00:41

## 2019-08-09 RX ADMIN — MAGNESIUM SULFATE IN WATER 4 G: 40 INJECTION, SOLUTION INTRAVENOUS at 10:48

## 2019-08-09 RX ADMIN — DIAZEPAM 10 MG: 5 TABLET ORAL at 00:41

## 2019-08-09 RX ADMIN — QUETIAPINE 25 MG: 25 TABLET ORAL at 23:03

## 2019-08-09 RX ADMIN — DIAZEPAM 10 MG: 5 TABLET ORAL at 18:41

## 2019-08-09 RX ADMIN — QUETIAPINE FUMARATE 50 MG: 50 TABLET ORAL at 15:13

## 2019-08-09 ASSESSMENT — ACTIVITIES OF DAILY LIVING (ADL)
ADLS_ACUITY_SCORE: 17

## 2019-08-09 NOTE — PLAN OF CARE
Cognitive Concerns/ Orientation : A&Ox4   BEHAVIOR & AGGRESSION TOOL COLOR: Yellow; anxious  CIWA SCORE: 10, 4, 8, 0(sleeping) Valium x2   ABNL VS/O2: VSS on RA  MOBILITY: Independent in room  PAIN MANAGMENT: Denies  DIET: Regular  BOWEL/BLADDER: Continent  ABNL LAB/BG:   DRAIN/DEVICES: PIV- infuvite infusing  TELEMETRY RHYTHM: NA  SKIN: Healing abrasions to maddie knees and Rt eye brow laceration improving. Scabs  TESTS/PROCEDURES:   D/C DAY/GOALS/PLACE: Pending; Possible Monday, 8/12/19 to [facility]  OTHER IMPORTANT INFO: Bedside sitter was discontinued at 8/8/19 @ 7961; moved to private room 9-1. VPM in place and door alarm on.

## 2019-08-09 NOTE — PLAN OF CARE
DATE & TIME: 8/8/19 3736-5943               Cognitive Concerns/ Orientation : A&O x 4; anxious but cooperative  BEHAVIOR & AGGRESSION TOOL COLOR: Green  CIWA SCORE: 8 and 6 - received Valium 10mg po x1     ABNL VS/O2: BP up to 150's/100's this pm (was very anxious at the time) - recheck 130's/90'-100's.  MOBILITY: SBA/GB  PAIN MANAGMENT: Denied pain  DIET: Regular  BOWEL/BLADDER: Continent  ABNL LAB/BG: ALT/AST are improving   DRAIN/DEVICES: PIV, IVF saline locked  TELEMETRY RHYTHM: D/C'd  SKIN: Bruises, abrasions and scabs. Sutures to right eyebrow - removed this pm.  TESTS/PROCEDURES: N/A  D/C DAY/GOALS/PLACE: Psych following, CD consulted, plan for residential treatment at discharge.  OTHER IMPORTANT INFO: PRN Seroquel ordered for anxiety given x2 this pm; takes frequent showers.  VPM and sitter at bedside; 72 hr. Hold - started 8/7/2019 11:58 AM.

## 2019-08-09 NOTE — PROGRESS NOTES
[facility]  Hospitalist Progress Note        Herman Locke MD   08/09/2019        Interval History:      - no acute issues overnight; still reports of some anxiety, psych meds adjusted by psychiatry         Assessment and Plan:        Mr Mohit Porter is a 29-year-old gentleman with past medical history of anxiety, alcohol abuse/dependence, history of alcohol withdrawal and alcoholic hepatitis who was brought in by EMS after he was found unresponsive at extended stay in [place].      # Acute toxic encephalopathy.   # Alcohol intoxication.   # Concern for ETOH witdrawal  # abnormal LFTs, likely alcoholic hepatitis  # Anxiety  - long history of alcohol abuse/dependence; he had multiple ER visits this month only for alcohol intoxication  - ETOH level on admission 0.58  - on CIWA protocol with Valium  - evaluated by psychiatry  - psychiatry started Remeron bedtime and Seroquel  PRN, thorazine 10 mg tid; revaluated on 8/9-- increased remeron to 15 mg and seroquel scheduled 25 mg tid and 50 mg po q 6 hrs prn, thorazine d/josesito  - Chem dep evaluated -- suggested residential treatment, referral sent for 8/8/19; per CD If pt does not follow through on completing treatment, commitment should be considered due to his ongoing alcohol use and coming into the ED with high JOANA levels repeatedly  - [facility], has a bed available at [facility] on Monday 8/12/19   - elevated transaminases, normal bilirubin; improving AST//241---480/186---394/195  - US RUQ shows fatty liver, otherwise unremarkable  - tolerating PO, will switch IV vitamins to PO  - patient wanting to leave; put on 72 hrs hold starting 8/7/19    # Right supra-orbital laceration from recent fall injury  - removed sutures 8/8    # Elevated BP  - no prior h/o HTN; BP persistently elevated in 130s---150s; will start amlodipine 5 mg po daily  - hydralazine prn for SBP>180     # Leukocytosis  # elevated lactate  - Suspect reactive  - wbc  "trended down 16.5---12.7---6.4  - elevated lactate is slightly secondary to alcohol abuse and intoxication; no clinical concern for sepsis    # Hypokalemia  # Hypomagnesemia  - sec to poor po from alcohol abuse; K 3.2; replaced on K supple protocol  - Mg 1.4, replete per Mg supple protocol  - BMP in am     # Deep venous thrombosis prophylaxis:  Pneumatic compression device.      CODE STATUS:  full code    Disposition: Monday 8/12/19 to [facility] for residential treatment if cleared by psych                     Physical Exam:      Blood pressure (!) 141/111, pulse 97, temperature 97.8  F (36.6  C), temperature source Oral, resp. rate 16, height 1.727 m (5' 8\"), weight 81.6 kg (180 lb), SpO2 99 %.  Vitals:    08/05/19 2157   Weight: 81.6 kg (180 lb)     Vital Signs with Ranges  Temp:  [97.8  F (36.6  C)-99.3  F (37.4  C)] 97.8  F (36.6  C)  Pulse:  [97] 97  Heart Rate:  [105-150] 105  Resp:  [16] 16  BP: (138-156)/() 141/111  SpO2:  [96 %-99 %] 99 %  I/O's Last 24 hours  I/O last 3 completed shifts:  In: 1580 [P.O.:1580]  Out: 2300 [Urine:2300]    Constitutional: Calm and cooperative; less tremulous; oriented X 3; lying comfortably in bed in no apparent distress   HEENT: Pupils equal and reactive to light and accomodation, EOMI intact; neck supple no raised JVD or rigidity    Oral cavity: Moist mucosa   Cardiovascular: Normal s1 s2, regular rate and rhythm, no murmur   Lungs: B/l clear to auscultation, no wheezes or crepitations   Abdomen: Soft, nt, nd, no guarding, rigidity or rebound; BS +   LE : No edema   Musculoskeletal: Power 5/5 in all extremities   Neuro: No focal neurological deficits noted, CN II to XII grossly intact   Psychiatry: normal mood and affect                Medications:          mirtazapine  15 mg Oral At Bedtime     QUEtiapine  25 mg Oral TID     sodium chloride (PF)  3 mL Intracatheter Q8H     IV Fluid with vitamins   Intravenous Q24H     PRN Meds: diazepam **OR** diazepam, lidocaine 4%, " lidocaine (buffered or not buffered), magnesium sulfate, naloxone, ondansetron **OR** ondansetron, potassium chloride, potassium chloride with lidocaine, potassium chloride, potassium chloride, potassium chloride, potassium phosphate (KPHOS) in D5W IV, potassium phosphate (KPHOS) in D5W IV, potassium phosphate (KPHOS) in D5W IV, potassium phosphate (KPHOS) in D5W IV, QUEtiapine, sodium chloride (PF)         Data:      All new lab and imaging data was reviewed.   Recent Labs   Lab Test 08/08/19  0811 08/07/19  0835 08/06/19  0823   WBC 7.0 6.4 12.7*   HGB 13.2* 12.6* 14.3   MCV 92 91 89   * 136* 233      Recent Labs   Lab Test 08/08/19  0811 08/07/19  1725 08/07/19  0835 08/06/19  0823     --  137 141   POTASSIUM 3.7 3.8 3.2* 3.5   CHLORIDE 108  --  98 103   CO2 32  --  33* 29   BUN 8  --  6* 4*   CR 0.49*  --  0.59* 0.50*   ANIONGAP 2*  --  6 9   ARYA 8.5  --  8.4* 7.8*   GLC 89  --  157* 80     No lab results found.    Invalid input(s): TROP, TROPONINIES

## 2019-08-09 NOTE — PLAN OF CARE
DATE & TIME: 8/9 1447  Cognitive Concerns/ Orientation : AO x 4  BEHAVIOR & AGGRESSION TOOL COLOR: Green  CIWA SCORE: 7, 7  ABNL VS/O2: BP elevated 150/101,   MOBILITY: Independent  PAIN MANAGMENT: N/A  DIET: Regular, has a good appetite  BOWEL/BLADDER: WNL  ABNL LAB/BG: Magnesium 1.4, started Magnesium protocol  DRAIN/DEVICES: PIV, Saline locked  TELEMETRY RHYTHM: N/A  SKIN: Right eyebrow laceration  TESTS/PROCEDURES: N/A  D/C DAY/GOALS/PLACE: 72 hour hold started 8/7 at 11:58  OTHER IMPORTANT INFO: VPM and door alarm maintained. Pt independent in room, will call appropriately. Pt reported anxiety throughout the shift, asking for medication - seroquel provided.
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
## 2019-08-10 LAB
ALBUMIN SERPL-MCNC: 3.1 G/DL (ref 3.4–5)
ALP SERPL-CCNC: 129 U/L (ref 40–150)
ALT SERPL W P-5'-P-CCNC: 316 U/L (ref 0–70)
ANION GAP SERPL CALCULATED.3IONS-SCNC: 3 MMOL/L (ref 3–14)
AST SERPL W P-5'-P-CCNC: 364 U/L (ref 0–45)
BILIRUB DIRECT SERPL-MCNC: 0.2 MG/DL (ref 0–0.2)
BILIRUB SERPL-MCNC: 0.6 MG/DL (ref 0.2–1.3)
BUN SERPL-MCNC: 7 MG/DL (ref 7–30)
CALCIUM SERPL-MCNC: 9 MG/DL (ref 8.5–10.1)
CHLORIDE SERPL-SCNC: 101 MMOL/L (ref 94–109)
CO2 SERPL-SCNC: 33 MMOL/L (ref 20–32)
CREAT SERPL-MCNC: 0.67 MG/DL (ref 0.66–1.25)
GFR SERPL CREATININE-BSD FRML MDRD: >90 ML/MIN/{1.73_M2}
GLUCOSE SERPL-MCNC: 85 MG/DL (ref 70–99)
LACTATE BLD-SCNC: 1.2 MMOL/L (ref 0.7–2)
MAGNESIUM SERPL-MCNC: 1.9 MG/DL (ref 1.6–2.3)
POTASSIUM SERPL-SCNC: 3.9 MMOL/L (ref 3.4–5.3)
PROT SERPL-MCNC: 6.5 G/DL (ref 6.8–8.8)
SODIUM SERPL-SCNC: 137 MMOL/L (ref 133–144)

## 2019-08-10 PROCEDURE — 99207 ZZC CDG-MDM COMPONENT: MEETS MODERATE - UP CODED: CPT | Performed by: HOSPITALIST

## 2019-08-10 PROCEDURE — 80048 BASIC METABOLIC PNL TOTAL CA: CPT | Performed by: HOSPITALIST

## 2019-08-10 PROCEDURE — 83735 ASSAY OF MAGNESIUM: CPT | Performed by: HOSPITALIST

## 2019-08-10 PROCEDURE — 80076 HEPATIC FUNCTION PANEL: CPT | Performed by: HOSPITALIST

## 2019-08-10 PROCEDURE — 25000132 ZZH RX MED GY IP 250 OP 250 PS 637

## 2019-08-10 PROCEDURE — 25000132 ZZH RX MED GY IP 250 OP 250 PS 637: Performed by: PSYCHIATRY & NEUROLOGY

## 2019-08-10 PROCEDURE — 12000000 ZZH R&B MED SURG/OB

## 2019-08-10 PROCEDURE — 83605 ASSAY OF LACTIC ACID: CPT | Performed by: HOSPITALIST

## 2019-08-10 PROCEDURE — 36415 COLL VENOUS BLD VENIPUNCTURE: CPT | Performed by: HOSPITALIST

## 2019-08-10 PROCEDURE — 25000132 ZZH RX MED GY IP 250 OP 250 PS 637: Performed by: HOSPITALIST

## 2019-08-10 PROCEDURE — 87040 BLOOD CULTURE FOR BACTERIA: CPT | Performed by: HOSPITALIST

## 2019-08-10 PROCEDURE — 99232 SBSQ HOSP IP/OBS MODERATE 35: CPT | Performed by: HOSPITALIST

## 2019-08-10 PROCEDURE — 99232 SBSQ HOSP IP/OBS MODERATE 35: CPT | Performed by: PSYCHIATRY & NEUROLOGY

## 2019-08-10 RX ORDER — QUETIAPINE FUMARATE 100 MG/1
100 TABLET, FILM COATED ORAL 2 TIMES DAILY
Status: DISCONTINUED | OUTPATIENT
Start: 2019-08-10 | End: 2019-08-10 | Stop reason: DRUGHIGH

## 2019-08-10 RX ORDER — QUETIAPINE FUMARATE 100 MG/1
100 TABLET, FILM COATED ORAL 2 TIMES DAILY
Status: DISCONTINUED | OUTPATIENT
Start: 2019-08-10 | End: 2019-08-12 | Stop reason: HOSPADM

## 2019-08-10 RX ORDER — QUETIAPINE FUMARATE 100 MG/1
200 TABLET, FILM COATED ORAL AT BEDTIME
Status: DISCONTINUED | OUTPATIENT
Start: 2019-08-10 | End: 2019-08-11

## 2019-08-10 RX ORDER — MIRTAZAPINE 30 MG/1
30 TABLET, FILM COATED ORAL AT BEDTIME
Status: DISCONTINUED | OUTPATIENT
Start: 2019-08-10 | End: 2019-08-12 | Stop reason: HOSPADM

## 2019-08-10 RX ORDER — QUETIAPINE FUMARATE 100 MG/1
100 TABLET, FILM COATED ORAL 2 TIMES DAILY
Status: DISCONTINUED | OUTPATIENT
Start: 2019-08-10 | End: 2019-08-10

## 2019-08-10 RX ADMIN — MIRTAZAPINE 30 MG: 30 TABLET, FILM COATED ORAL at 21:56

## 2019-08-10 RX ADMIN — QUETIAPINE FUMARATE 200 MG: 100 TABLET ORAL at 21:56

## 2019-08-10 RX ADMIN — DIAZEPAM 10 MG: 5 TABLET ORAL at 00:18

## 2019-08-10 RX ADMIN — QUETIAPINE FUMARATE 50 MG: 50 TABLET ORAL at 06:40

## 2019-08-10 RX ADMIN — QUETIAPINE FUMARATE 100 MG: 100 TABLET ORAL at 14:00

## 2019-08-10 RX ADMIN — QUETIAPINE 25 MG: 25 TABLET ORAL at 10:08

## 2019-08-10 RX ADMIN — AMLODIPINE BESYLATE 5 MG: 5 TABLET ORAL at 10:07

## 2019-08-10 ASSESSMENT — ACTIVITIES OF DAILY LIVING (ADL)
ADLS_ACUITY_SCORE: 16

## 2019-08-10 ASSESSMENT — MIFFLIN-ST. JEOR: SCORE: 1676.61

## 2019-08-10 NOTE — PLAN OF CARE
Cognitive Concerns/ Orientation : Alert and oriented x 4   BEHAVIOR & AGGRESSION TOOL COLOR: Green   CIWA SCORE: 3-8-8 mainly for feeling anxious      ABNL VS/O2: VSS HR tachy  MOBILITY: Independent in room   PAIN MANAGMENT: Complains of bilateral leg pain from lying around too much and right eye discomfort from eye laceration, sutures removed Friday  DIET: Regular   BOWEL/BLADDER: Continent   ABNL LAB/BG: ETOH 0.58 on admission   DRAIN/DEVICES: Peripheral IV SL left hand   TELEMETRY RHYTHM: n/a   SKIN: Laceration above right eye  TESTS/PROCEDURES: n/a   D/C DAY/GOALS/PLACE: pending.  Currently on 72 hour hold that ends 8-12-19 at 11:58am.  Will then go to [facility]?   OTHER IMPORTANT INFO: VPM and door alarm in place.

## 2019-08-10 NOTE — PLAN OF CARE
DATE & TIME: 8/10 1448  Cognitive Concerns/ Orientation : AO x 4  BEHAVIOR & AGGRESSION TOOL COLOR: Green  CIWA SCORE: Pt sleeping majority of day, psychiatry discontinued CIWA protocol  ABNL VS/O2: HR elevated  MOBILITY: Independent  PAIN MANAGMENT: N/A  DIET: Regular  BOWEL/BLADDER: WDL  ABNL LAB/BG: ,   DRAIN/DEVICES: PIV left hand, saline locked  TELEMETRY RHYTHM: N/A  SKIN: right eye laceration  TESTS/PROCEDURES: N/A  D/C DAY/GOALS/PLACE: 72 hr hold expires on 8/12 at 1200, anticipated discharge to [place] on Monday   OTHER IMPORTANT INFO: Pt reported anxiety. Seroquel dosage adjusted per psychiatry. Door alarmed removed per psychiatry. VPM active in room.

## 2019-08-10 NOTE — CONSULTS
[facility] Psychiatric Consult Progress Note    Interval History:   Pt seen, chart reviewed, case discussed with nursing staff and treating clinicians. Patient seen on 8/10/19 for a follow-up psychiatric consutlation. On interview, the patient is found in bed asleep. When awoken, the patient notes he is tired from not being able to sleep last night. Dr. Stringer would like to increase patients Remeron dose to 30mg at bedtime along with making Seroquel 200mg at bedtime and 100mg BID to aid in sleep and anxiety experiencing throughout the day. Patient in agreement with these medication adjustments. In regards of treatment, the patient      Review of systems:   10 point Review of Systems completed by Dr. Street, and is  is negative other than noted in the HPI     Medications:       amLODIPine  5 mg Oral Daily     mirtazapine  15 mg Oral At Bedtime     QUEtiapine  25 mg Oral TID     sodium chloride (PF)  3 mL Intracatheter Q8H     diazepam **OR** diazepam, hydrALAZINE, lidocaine 4%, lidocaine (buffered or not buffered), magnesium sulfate, naloxone, ondansetron **OR** ondansetron, potassium chloride, potassium chloride with lidocaine, potassium chloride, potassium chloride, potassium chloride, potassium phosphate (KPHOS) in D5W IV, potassium phosphate (KPHOS) in D5W IV, potassium phosphate (KPHOS) in D5W IV, potassium phosphate (KPHOS) in D5W IV, QUEtiapine, sodium chloride (PF)    Mental Status Examination:     Appearance:  awake, alert, dressed in hospital scrubs, appeared as age stated, poorly groomed and uncooperative  Eye Contact:  good  Speech:  clear, coherent and anxious intonation  Language:Normal  Psychomotor Behavior:  no evidence of tardive dyskinesia, dystonia, or tics  Mood:  anxious  Affect:  mood congruent and intensity is heightened  Thought Process:  logical, linear and goal oriented no loose associations  Thought Content:  no evidence of suicidal ideation or homicidal ideation  and no evidence of psychotic thought  Oriented to:  time, person, and place  Attention Span and Concentration:  intact  Recent and Remote Memory:  fair  Fund of Knowledge: appropriate  Muscle Strength and Tone: normal  Gait and Station: Normal  Insight:  limited  Judgment:  poor        Labs/Vitals:     Recent Results (from the past 24 hour(s))   Magnesium    Collection Time: 08/09/19  3:48 PM   Result Value Ref Range    Magnesium 2.2 1.6 - 2.3 mg/dL   Basic metabolic panel    Collection Time: 08/10/19  8:58 AM   Result Value Ref Range    Sodium 137 133 - 144 mmol/L    Potassium 3.9 3.4 - 5.3 mmol/L    Chloride 101 94 - 109 mmol/L    Carbon Dioxide 33 (H) 20 - 32 mmol/L    Anion Gap 3 3 - 14 mmol/L    Glucose 85 70 - 99 mg/dL    Urea Nitrogen 7 7 - 30 mg/dL    Creatinine 0.67 0.66 - 1.25 mg/dL    GFR Estimate >90 >60 mL/min/[1.73_m2]    GFR Estimate If Black >90 >60 mL/min/[1.73_m2]    Calcium 9.0 8.5 - 10.1 mg/dL   Hepatic panel    Collection Time: 08/10/19  8:58 AM   Result Value Ref Range    Bilirubin Direct 0.2 0.0 - 0.2 mg/dL    Bilirubin Total 0.6 0.2 - 1.3 mg/dL    Albumin 3.1 (L) 3.4 - 5.0 g/dL    Protein Total 6.5 (L) 6.8 - 8.8 g/dL    Alkaline Phosphatase 129 40 - 150 U/L     (H) 0 - 70 U/L     (H) 0 - 45 U/L   Magnesium    Collection Time: 08/10/19  8:58 AM   Result Value Ref Range    Magnesium 1.9 1.6 - 2.3 mg/dL     B/P: 150/106, T: 99.3, P: 97, R: 16    Impression:   Mohit is a 29-year-old gentleman presenting with acute alcohol intoxication and subsequent issues with withdrawal, anxiety.        DIagnoses:   1.  Major depression, recurrent, severe, without psychotic features.  2.  Alcohol use disorder, severe          Plan:   1. Written information given on medications. Side effects, risks, benefits reviewed.  2.  Medication changes: Increased Seroquel 100mg BID and 200mg at bedtime and increased Remeron to 30mg at bedtime   3.  Continue 72-hour hold for observation and stabilization of his  withdrawal  4.  Anticipate direct discharge to chemical dependency treatment at [facility] in [place], [place]  5. Re-consult psych as needed      Attestation:  I, Lyric Chaudhari, am serving as a scribe on 8/10/2019 to document services personally performed by Samuel Stringer MD based on my observations and the provider's statements to me.

## 2019-08-10 NOTE — PROVIDER NOTIFICATION
MD Notification    Notified Person: MD    Notified Person Name:Chucky    Notification Date/Time:8/9 at 1745    Notification Interaction:text    Purpose of Notification:temp 101.3. Sepsis alert fired. Pt asymptomatic    Orders Received:yes for blood culture x2    Comments:

## 2019-08-10 NOTE — PROGRESS NOTES
[facility]  Hospitalist Progress Note        Herman Locke MD   08/10/2019        Interval History:      - no acute issues overnight; BP better         Assessment and Plan:        Mr Mohit Porter is a 29-year-old gentleman with past medical history of anxiety, alcohol abuse/dependence, history of alcohol withdrawal and alcoholic hepatitis who was brought in by EMS after he was found unresponsive at extended stay in [place].      # Acute toxic encephalopathy.   # Alcohol intoxication.   # Concern for ETOH witdrawal  # abnormal LFTs, likely alcoholic hepatitis  # Anxiety  - long history of alcohol abuse/dependence; he had multiple ER visits this month only for alcohol intoxication  - ETOH level on admission 0.58  - on CIWA protocol with Valium  - evaluated by psychiatry  - psychiatry started Remeron bedtime and Seroquel  PRN, thorazine 10 mg tid; revaluated on 8/9-- increased remeron to 15 mg and seroquel scheduled 25 mg tid and 50 mg po q 6 hrs prn, thorazine d/josesito  - Chem dep evaluated -- suggested residential treatment, referral sent for 8/8/19; per CD If pt does not follow through on completing treatment, commitment should be considered due to his ongoing alcohol use and coming into the ED with high JOANA levels repeatedly  -  following, has a bed available at [place] on Monday 8/12/19   - elevated transaminases, normal bilirubin; improving AST//241---480/186---394/195---364/316  - US RUQ shows fatty liver, otherwise unremarkable  - tolerating PO, switch vitamins to PO  - patient wanting to leave; put on 72 hrs hold starting 8/7/19    # Right supra-orbital laceration from recent fall injury  - removed sutures 8/8    # Elevated BP  - no prior h/o HTN  - BP better after starting amlodipine 5 mg po daily, continue  - hydralazine prn for SBP>180     # Leukocytosis  # elevated lactate  - Suspect reactive  - wbc trended down 16.5---12.7---6.4  - elevated lactate is slightly  "secondary to alcohol abuse and intoxication; no clinical concern for sepsis    # Hypokalemia  # Hypomagnesemia  - sec to poor po from alcohol abuse; K 3.2; replaced on K supple protocol  - Mg 1.4---1.9, repleted per Mg supple protocol     # Deep venous thrombosis prophylaxis:  Pneumatic compression device.      CODE STATUS:  full code    Disposition: Monday 8/12/19 to [place] for residential treatment if cleared by psych                     Physical Exam:      Blood pressure 118/76, pulse 97, temperature 98.8  F (37.1  C), temperature source Oral, resp. rate 14, height 1.727 m (5' 8\"), weight 73.2 kg (161 lb 6.4 oz), SpO2 97 %.  Vitals:    08/05/19 2157 08/10/19 0627   Weight: 81.6 kg (180 lb) 73.2 kg (161 lb 6.4 oz)     Vital Signs with Ranges  Temp:  [98.8  F (37.1  C)-99.5  F (37.5  C)] 98.8  F (37.1  C)  Heart Rate:  [102-123] 102  Resp:  [14-16] 14  BP: (118-150)/() 118/76  SpO2:  [97 %-99 %] 97 %  I/O's Last 24 hours  I/O last 3 completed shifts:  In: 3480 [P.O.:3480]  Out: -     Constitutional: Calm and cooperative; oriented X 3; lying comfortably in bed in no apparent distress   HEENT: Pupils equal and reactive to light and accomodation, EOMI intact; neck supple no raised JVD or rigidity    Oral cavity: Moist mucosa   Cardiovascular: Normal s1 s2, regular rate and rhythm, no murmur   Lungs: B/l clear to auscultation, no wheezes or crepitations   Abdomen: Soft, nt, nd, no guarding, rigidity or rebound; BS +   LE : No edema   Musculoskeletal: Power 5/5 in all extremities   Neuro: No focal neurological deficits noted, CN II to XII grossly intact   Psychiatry: normal mood and affect                Medications:          amLODIPine  5 mg Oral Daily     mirtazapine  15 mg Oral At Bedtime     QUEtiapine  25 mg Oral TID     sodium chloride (PF)  3 mL Intracatheter Q8H     PRN Meds: diazepam **OR** diazepam, hydrALAZINE, lidocaine 4%, lidocaine (buffered or not buffered), magnesium sulfate, naloxone, " ondansetron **OR** ondansetron, potassium chloride, potassium chloride with lidocaine, potassium chloride, potassium chloride, potassium chloride, potassium phosphate (KPHOS) in D5W IV, potassium phosphate (KPHOS) in D5W IV, potassium phosphate (KPHOS) in D5W IV, potassium phosphate (KPHOS) in D5W IV, QUEtiapine, sodium chloride (PF)         Data:      All new lab and imaging data was reviewed.   Recent Labs   Lab Test 08/08/19  0811 08/07/19  0835 08/06/19  0823   WBC 7.0 6.4 12.7*   HGB 13.2* 12.6* 14.3   MCV 92 91 89   * 136* 233      Recent Labs   Lab Test 08/10/19  0858 08/08/19  0811 08/07/19  1725 08/07/19  0835    142  --  137   POTASSIUM 3.9 3.7 3.8 3.2*   CHLORIDE 101 108  --  98   CO2 33* 32  --  33*   BUN 7 8  --  6*   CR 0.67 0.49*  --  0.59*   ANIONGAP 3 2*  --  6   ARYA 9.0 8.5  --  8.4*   GLC 85 89  --  157*     No lab results found.    Invalid input(s): TROP, TROPONINIES

## 2019-08-11 LAB
ALBUMIN SERPL-MCNC: 3 G/DL (ref 3.4–5)
ALP SERPL-CCNC: 115 U/L (ref 40–150)
ALT SERPL W P-5'-P-CCNC: 368 U/L (ref 0–70)
ANION GAP SERPL CALCULATED.3IONS-SCNC: 7 MMOL/L (ref 3–14)
AST SERPL W P-5'-P-CCNC: 311 U/L (ref 0–45)
BASOPHILS # BLD AUTO: 0 10E9/L (ref 0–0.2)
BASOPHILS NFR BLD AUTO: 0.2 %
BILIRUB DIRECT SERPL-MCNC: 0.1 MG/DL (ref 0–0.2)
BILIRUB SERPL-MCNC: 0.5 MG/DL (ref 0.2–1.3)
BUN SERPL-MCNC: 7 MG/DL (ref 7–30)
CALCIUM SERPL-MCNC: 9.3 MG/DL (ref 8.5–10.1)
CHLORIDE SERPL-SCNC: 103 MMOL/L (ref 94–109)
CO2 SERPL-SCNC: 30 MMOL/L (ref 20–32)
CREAT SERPL-MCNC: 0.6 MG/DL (ref 0.66–1.25)
DIFFERENTIAL METHOD BLD: ABNORMAL
EOSINOPHIL # BLD AUTO: 0.1 10E9/L (ref 0–0.7)
EOSINOPHIL NFR BLD AUTO: 0.3 %
ERYTHROCYTE [DISTWIDTH] IN BLOOD BY AUTOMATED COUNT: 15.1 % (ref 10–15)
GFR SERPL CREATININE-BSD FRML MDRD: >90 ML/MIN/{1.73_M2}
GLUCOSE SERPL-MCNC: 106 MG/DL (ref 70–99)
HCT VFR BLD AUTO: 39.9 % (ref 40–53)
HGB BLD-MCNC: 13.5 G/DL (ref 13.3–17.7)
IMM GRANULOCYTES # BLD: 0.1 10E9/L (ref 0–0.4)
IMM GRANULOCYTES NFR BLD: 0.5 %
LACTATE BLD-SCNC: 1 MMOL/L (ref 0.7–2)
LYMPHOCYTES # BLD AUTO: 2.2 10E9/L (ref 0.8–5.3)
LYMPHOCYTES NFR BLD AUTO: 14.7 %
MCH RBC QN AUTO: 31.1 PG (ref 26.5–33)
MCHC RBC AUTO-ENTMCNC: 33.8 G/DL (ref 31.5–36.5)
MCV RBC AUTO: 92 FL (ref 78–100)
MONOCYTES # BLD AUTO: 2.9 10E9/L (ref 0–1.3)
MONOCYTES NFR BLD AUTO: 19.9 %
NEUTROPHILS # BLD AUTO: 9.5 10E9/L (ref 1.6–8.3)
NEUTROPHILS NFR BLD AUTO: 64.4 %
NRBC # BLD AUTO: 0 10*3/UL
NRBC BLD AUTO-RTO: 0 /100
PLATELET # BLD AUTO: 153 10E9/L (ref 150–450)
POTASSIUM SERPL-SCNC: 3.8 MMOL/L (ref 3.4–5.3)
PROT SERPL-MCNC: 7 G/DL (ref 6.8–8.8)
RBC # BLD AUTO: 4.34 10E12/L (ref 4.4–5.9)
SODIUM SERPL-SCNC: 140 MMOL/L (ref 133–144)
WBC # BLD AUTO: 14.7 10E9/L (ref 4–11)

## 2019-08-11 PROCEDURE — 99232 SBSQ HOSP IP/OBS MODERATE 35: CPT | Performed by: HOSPITALIST

## 2019-08-11 PROCEDURE — 83605 ASSAY OF LACTIC ACID: CPT | Performed by: HOSPITALIST

## 2019-08-11 PROCEDURE — 25000132 ZZH RX MED GY IP 250 OP 250 PS 637: Performed by: PSYCHIATRY & NEUROLOGY

## 2019-08-11 PROCEDURE — 25000132 ZZH RX MED GY IP 250 OP 250 PS 637: Performed by: HOSPITALIST

## 2019-08-11 PROCEDURE — 25000132 ZZH RX MED GY IP 250 OP 250 PS 637

## 2019-08-11 PROCEDURE — 80048 BASIC METABOLIC PNL TOTAL CA: CPT | Performed by: HOSPITALIST

## 2019-08-11 PROCEDURE — 99232 SBSQ HOSP IP/OBS MODERATE 35: CPT | Performed by: PSYCHIATRY & NEUROLOGY

## 2019-08-11 PROCEDURE — 12000000 ZZH R&B MED SURG/OB

## 2019-08-11 PROCEDURE — 85025 COMPLETE CBC W/AUTO DIFF WBC: CPT | Performed by: HOSPITALIST

## 2019-08-11 PROCEDURE — 80076 HEPATIC FUNCTION PANEL: CPT | Performed by: HOSPITALIST

## 2019-08-11 PROCEDURE — 36415 COLL VENOUS BLD VENIPUNCTURE: CPT | Performed by: HOSPITALIST

## 2019-08-11 RX ORDER — QUETIAPINE FUMARATE 100 MG/1
400 TABLET, FILM COATED ORAL AT BEDTIME
Status: DISCONTINUED | OUTPATIENT
Start: 2019-08-11 | End: 2019-08-12 | Stop reason: HOSPADM

## 2019-08-11 RX ADMIN — AMLODIPINE BESYLATE 5 MG: 5 TABLET ORAL at 08:08

## 2019-08-11 RX ADMIN — QUETIAPINE FUMARATE 400 MG: 100 TABLET ORAL at 21:51

## 2019-08-11 RX ADMIN — QUETIAPINE FUMARATE 100 MG: 100 TABLET ORAL at 08:09

## 2019-08-11 RX ADMIN — MIRTAZAPINE 30 MG: 30 TABLET, FILM COATED ORAL at 21:51

## 2019-08-11 RX ADMIN — QUETIAPINE FUMARATE 100 MG: 100 TABLET ORAL at 15:57

## 2019-08-11 ASSESSMENT — ACTIVITIES OF DAILY LIVING (ADL)
ADLS_ACUITY_SCORE: 16

## 2019-08-11 NOTE — PLAN OF CARE
DATE & TIME: 8/11 1418   Cognitive Concerns/ Orientation : AO x 4  BEHAVIOR & AGGRESSION TOOL COLOR: Green  CIWA SCORE: N/A  ABNL VS/O2: HR elevated 105  MOBILITY: Independent  PAIN MANAGMENT: denies  DIET: Regular  BOWEL/BLADDER: WNL  ABNL LAB/BG: WBC 14.7, ,   DRAIN/DEVICES: N/A  TELEMETRY RHYTHM: N/A  SKIN: Small laceration right eyebrow, open to air  TESTS/PROCEDURES: N/A  D/C DAY/GOALS/PLACE: 72 hour hold expires, 8/12 on 11:58 discharging to [place]   OTHER IMPORTANT INFO: VPM in place.

## 2019-08-11 NOTE — PLAN OF CARE
DATE & TIME: 8/11/19 1683-7140  Cognitive Concerns/ Orientation : A&Ox4  BEHAVIOR & AGGRESSION TOOL COLOR: Green  CIWA SCORE: NA  ABNL VS/O2: HR elevated 125, otherwise VSS on RA  MOBILITY: Independent  PAIN MANAGMENT: Denied pain  DIET: Regular  BOWEL/BLADDER: WDL  ABNL LAB/BG: ,   DRAIN/DEVICES: NA  TELEMETRY RHYTHM: NA  SKIN: Right eye laceration, otherwise intact.   TESTS/PROCEDURES: NA  D/C DAY/GOALS/PLACE: 72 hr hold expires on 8/12 at 1200, anticipated discharge to [place] on Monday   OTHER IMPORTANT INFO: VPM active in room. Psych following.

## 2019-08-11 NOTE — CONSULTS
[facility] Psychiatric Consult Progress Note    Interval History:   Pt seen, chart reviewed, case discussed with nursing staff and treating clinicians. Seroquel was increase to 100mg bid and 200gm at bedtime. Patient seen on 8/11/19 for a follow-up psychiatric consultation by Dr. Stringer. On interview, the patient is found in bed asleep. When awoken, the patient admits that he continues to struggle remaining asleep throughout the night. Appropriate sleep hygiene was reviewed, including why the patient should not take naps throughout the day. Despite this, he admits that increase Seroquel to 100mg twice daily has helped reduce his anxiety. He is visibly more calm, collected and alert.      Review of systems:   10 point Review of Systems completed by Dr. Street, and is  is negative other than noted in the HPI     Medications:       amLODIPine  5 mg Oral Daily     mirtazapine  30 mg Oral At Bedtime     QUEtiapine  100 mg Oral BID     QUEtiapine  200 mg Oral At Bedtime     sodium chloride (PF)  3 mL Intracatheter Q8H     hydrALAZINE, lidocaine 4%, lidocaine (buffered or not buffered), magnesium sulfate, naloxone, ondansetron **OR** ondansetron, potassium chloride, potassium chloride with lidocaine, potassium chloride, potassium chloride, potassium chloride, potassium phosphate (KPHOS) in D5W IV, potassium phosphate (KPHOS) in D5W IV, potassium phosphate (KPHOS) in D5W IV, potassium phosphate (KPHOS) in D5W IV, QUEtiapine, sodium chloride (PF)    Mental Status Examination:     Appearance:  awake, alert, dressed in hospital scrubs, appeared as age stated, poorly groomed and uncooperative  Eye Contact:  good  Speech:  clear, coherent and anxious intonation  Language:Normal  Psychomotor Behavior:  no evidence of tardive dyskinesia, dystonia, or tics  Mood:  anxious  Affect:  mood congruent and intensity is heightened  Thought Process:  logical, linear and goal oriented no loose associations  Thought  Content:  no evidence of suicidal ideation or homicidal ideation and no evidence of psychotic thought  Oriented to:  time, person, and place  Attention Span and Concentration:  intact  Recent and Remote Memory:  fair  Fund of Knowledge: appropriate  Muscle Strength and Tone: normal  Gait and Station: Normal  Insight:  limited  Judgment:  poor        Labs/Vitals:     Recent Results (from the past 24 hour(s))   Lactic acid level STAT for sepsis protocol    Collection Time: 08/10/19  5:56 PM   Result Value Ref Range    Lactate for Sepsis Protocol 1.2 0.7 - 2.0 mmol/L   Blood culture    Collection Time: 08/10/19  6:00 PM   Result Value Ref Range    Specimen Description Blood Left Arm     Special Requests Received in aerobic bottle only     Culture Micro No growth after 9 hours    Blood culture    Collection Time: 08/10/19  6:10 PM   Result Value Ref Range    Specimen Description Blood Left Arm     Special Requests Aerobic and anaerobic bottles received     Culture Micro No growth after 9 hours      B/P: 150/106, T: 99.3, P: 97, R: 16    Impression:   Mohit is a 29-year-old gentleman presenting with acute alcohol intoxication and subsequent issues with withdrawal, anxiety. He responded well to the increased dose of Seroquel but continues to struggle with sleep. At this point, will continue Seroquel 100mg twice daily but will increase Seroquel to 400mg at bedtime. Patient is in agreement. No other medication changes.       DIagnoses:   1.  Major depression, recurrent, severe, without psychotic features.  2.  Alcohol use disorder, severe          Plan:   1. Written information given on medications. Side effects, risks, benefits reviewed.  2.  Medication changes: Continue patient on Seroquel 100 bid but increase Seroquel to 400mg at bedtime. No other medication changes.  3.  Continue 72-hour hold for observation and stabilization of his withdrawal  4.  Anticipate direct discharge to chemical dependency treatment at [facility] in [place], [place]  5. Re-consult psych as needed      Attestation:  I, Lyric Chaudhari, am serving as a scribe on 8/10/2019 to document services personally performed by Samuel Stringer MD based on my observations and the provider's statements to me.

## 2019-08-11 NOTE — PROGRESS NOTES
[facility]  Hospitalist Progress Note        Herman Locke MD   08/11/2019        Interval History:      - was febrile to 101.3 last evening---afebrile this am; no active complaints, denies any cough, chest pain, shortness of breath, pain abdomen or urinary symptoms         Assessment and Plan:        Mr Mohit Porter is a 29-year-old gentleman with past medical history of anxiety, alcohol abuse/dependence, history of alcohol withdrawal and alcoholic hepatitis who was brought in by EMS after he was found unresponsive at extended stay in [place].      # Acute toxic encephalopathy.   # Alcohol intoxication.   # Concern for ETOH witdrawal  # abnormal LFTs, likely alcoholic hepatitis  # Anxiety  - long history of alcohol abuse/dependence; he had multiple ER visits this month only for alcohol intoxication  - ETOH level on admission 0.58  - on CIWA protocol with Valium  - evaluated by psychiatry  - psychiatry evaluated and following; started Remeron bedtime and Seroquel  PRN; revaluated on 8/10-- increased remeron to 30 mg HS and seroquel scheduled changed to 100 mg bid and 200 mg bedtime  - Chem dep evaluated -- suggested residential treatment, referral sent for 8/8/19; per CD If pt does not follow through on completing treatment, commitment should be considered due to his ongoing alcohol use and coming into the ED with high JOANA levels repeatedly  - SW following, has a bed available at [place] on Monday 8/12/19   - elevated transaminases, normal bilirubin; improving AST//241---480/186---394/195---364/316, ALT slightly trending up--368 other LFTs improving including normal bili  - US RUQ shows fatty liver, otherwise unremarkable  - tolerating PO, switched vitamins to PO  - patient wanting to leave; put on 72 hrs hold starting 8/7/19    # Right supra-orbital laceration from recent fall injury  - removed sutures 8/8    # Elevated BP  - no prior h/o HTN  - BP controlled; continue  "amlodipine 5 mg po daily  - hydralazine prn for SBP>180     # Leukocytosis  # elevated lactate--resolved  - Suspect reactive; wbc trended down 16.5---12.7---6.4, now up again on 8/10  - elevated lactate was slightly secondary to alcohol abuse and intoxication; no clinical concern for sepsis  - was febrile to 101.3 on deep 8/10--no active complaints, afebrile later, slightly tacycardic, wbc up to 14; no clear source of infection, lactate was 1.2; BCx drawn 8/10, no growth so far, not hypoxic, denies any cough, chest pain, shortness of breath, pain abdomen or urinary symptoms; monitor clinically  - repeat CBC in am    # Hypokalemia  # Hypomagnesemia  - sec to poor po from alcohol abuse; K 3.2; replaced on K supple protocol  - Mg 1.4---1.9, repleted per Mg supple protocol     # Deep venous thrombosis prophylaxis:  Pneumatic compression device.      CODE STATUS:  full code    Disposition: Monday 8/12/19 to [facility] treatment                     Physical Exam:      Blood pressure (!) 127/91, pulse 97, temperature 99.6  F (37.6  C), temperature source Oral, resp. rate 18, height 1.727 m (5' 8\"), weight 73.2 kg (161 lb 6.4 oz), SpO2 98 %.  Vitals:    08/05/19 2157 08/10/19 0627   Weight: 81.6 kg (180 lb) 73.2 kg (161 lb 6.4 oz)     Vital Signs with Ranges  Temp:  [99.6  F (37.6  C)-101.3  F (38.5  C)] 99.6  F (37.6  C)  Heart Rate:  [105-132] 105  Resp:  [18-26] 18  BP: (118-138)/(76-97) 127/91  SpO2:  [98 %-99 %] 98 %  I/O's Last 24 hours  I/O last 3 completed shifts:  In: 720 [P.O.:720]  Out: -     Constitutional: Calm and cooperative; oriented X 3; lying comfortably in bed in no apparent distress   HEENT: Pupils equal and reactive to light and accomodation, EOMI intact; neck supple no raised JVD or rigidity    Oral cavity: Moist mucosa   Cardiovascular: Normal s1 s2, regular rate and rhythm, no murmur   Lungs: B/l clear to auscultation, no wheezes or crepitations   Abdomen: Soft, nt, nd, no guarding, " rigidity or rebound; BS +   LE : No edema   Musculoskeletal: Power 5/5 in all extremities   Neuro: No focal neurological deficits noted, CN II to XII grossly intact   Psychiatry: normal mood and affect                Medications:          amLODIPine  5 mg Oral Daily     mirtazapine  30 mg Oral At Bedtime     QUEtiapine  100 mg Oral BID     QUEtiapine  200 mg Oral At Bedtime     sodium chloride (PF)  3 mL Intracatheter Q8H     PRN Meds: hydrALAZINE, lidocaine 4%, lidocaine (buffered or not buffered), magnesium sulfate, naloxone, ondansetron **OR** ondansetron, potassium chloride, potassium chloride with lidocaine, potassium chloride, potassium chloride, potassium chloride, potassium phosphate (KPHOS) in D5W IV, potassium phosphate (KPHOS) in D5W IV, potassium phosphate (KPHOS) in D5W IV, potassium phosphate (KPHOS) in D5W IV, QUEtiapine, sodium chloride (PF)         Data:      All new lab and imaging data was reviewed.   Recent Labs   Lab Test 08/08/19  0811 08/07/19  0835 08/06/19  0823   WBC 7.0 6.4 12.7*   HGB 13.2* 12.6* 14.3   MCV 92 91 89   * 136* 233      Recent Labs   Lab Test 08/10/19  0858 08/08/19  0811 08/07/19  1725 08/07/19  0835    142  --  137   POTASSIUM 3.9 3.7 3.8 3.2*   CHLORIDE 101 108  --  98   CO2 33* 32  --  33*   BUN 7 8  --  6*   CR 0.67 0.49*  --  0.59*   ANIONGAP 3 2*  --  6   ARYA 9.0 8.5  --  8.4*   GLC 85 89  --  157*     No lab results found.    Invalid input(s): TROP, TROPONINIES

## 2019-08-12 VITALS
BODY MASS INDEX: 24.46 KG/M2 | DIASTOLIC BLOOD PRESSURE: 85 MMHG | HEART RATE: 97 BPM | TEMPERATURE: 97.5 F | OXYGEN SATURATION: 97 % | RESPIRATION RATE: 18 BRPM | HEIGHT: 68 IN | WEIGHT: 161.4 LBS | SYSTOLIC BLOOD PRESSURE: 124 MMHG

## 2019-08-12 LAB
ERYTHROCYTE [DISTWIDTH] IN BLOOD BY AUTOMATED COUNT: 15 % (ref 10–15)
HCT VFR BLD AUTO: 41.5 % (ref 40–53)
HGB BLD-MCNC: 13.7 G/DL (ref 13.3–17.7)
MCH RBC QN AUTO: 30.8 PG (ref 26.5–33)
MCHC RBC AUTO-ENTMCNC: 33 G/DL (ref 31.5–36.5)
MCV RBC AUTO: 93 FL (ref 78–100)
PLATELET # BLD AUTO: 167 10E9/L (ref 150–450)
RBC # BLD AUTO: 4.45 10E12/L (ref 4.4–5.9)
WBC # BLD AUTO: 9.8 10E9/L (ref 4–11)

## 2019-08-12 PROCEDURE — 25000132 ZZH RX MED GY IP 250 OP 250 PS 637: Performed by: HOSPITALIST

## 2019-08-12 PROCEDURE — 25000132 ZZH RX MED GY IP 250 OP 250 PS 637

## 2019-08-12 PROCEDURE — 85027 COMPLETE CBC AUTOMATED: CPT | Performed by: HOSPITALIST

## 2019-08-12 PROCEDURE — 99231 SBSQ HOSP IP/OBS SF/LOW 25: CPT | Performed by: PSYCHIATRY & NEUROLOGY

## 2019-08-12 PROCEDURE — 99239 HOSP IP/OBS DSCHRG MGMT >30: CPT | Performed by: HOSPITALIST

## 2019-08-12 PROCEDURE — 36415 COLL VENOUS BLD VENIPUNCTURE: CPT | Performed by: HOSPITALIST

## 2019-08-12 RX ORDER — MIRTAZAPINE 30 MG/1
30 TABLET, FILM COATED ORAL AT BEDTIME
Qty: 30 TABLET | Refills: 0 | Status: SHIPPED | OUTPATIENT
Start: 2019-08-12 | End: 2020-05-26

## 2019-08-12 RX ORDER — AMLODIPINE BESYLATE 5 MG/1
5 TABLET ORAL DAILY
Qty: 30 TABLET | Refills: 0 | Status: SHIPPED | OUTPATIENT
Start: 2019-08-13 | End: 2020-05-26

## 2019-08-12 RX ORDER — QUETIAPINE FUMARATE 400 MG/1
400 TABLET, FILM COATED ORAL AT BEDTIME
Qty: 30 TABLET | Refills: 0 | Status: ON HOLD | OUTPATIENT
Start: 2019-08-12 | End: 2020-05-27

## 2019-08-12 RX ORDER — QUETIAPINE FUMARATE 100 MG/1
100 TABLET, FILM COATED ORAL 2 TIMES DAILY
Qty: 60 TABLET | Refills: 0 | Status: ON HOLD | OUTPATIENT
Start: 2019-08-12 | End: 2020-05-27

## 2019-08-12 RX ADMIN — QUETIAPINE FUMARATE 100 MG: 100 TABLET ORAL at 08:59

## 2019-08-12 RX ADMIN — AMLODIPINE BESYLATE 5 MG: 5 TABLET ORAL at 08:59

## 2019-08-12 ASSESSMENT — ACTIVITIES OF DAILY LIVING (ADL)
ADLS_ACUITY_SCORE: 16

## 2019-08-12 NOTE — PLAN OF CARE
DATE & TIME: 8/12/19 6955-7303  Cognitive Concerns/ Orientation : A&Ox4   BEHAVIOR & AGGRESSION TOOL COLOR: Green; slightly anxious, but cooperative  CIWA SCORE: n/a   ABNL VS/O2: VSS on RA ex tachycardic  MOBILITY: Independent   PAIN MANAGMENT: Denies pain  DIET: Regular diet, good appetite  BOWEL/BLADDER: Continent   ABNL LAB/BG: n/a  DRAIN/DEVICES: No IV   TELEMETRY RHYTHM: n/a   SKIN: Bruising   TESTS/PROCEDURES: n/a   D/C DAY/GOALS/PLACE: Discharged at 1100 to [facility]  OTHER IMPORTANT INFO: VPM in place.      Discharge    Patient discharged to [facility] at 1100, transportation provided by [facility]    Listed belongings gathered and returned to patient. Yes  Care Plan and Patient education resolved: Yes  Prescriptions if needed, hard copies sent with patient  Yes  Home and hospital acquired medications returned to patient: No  Medication Bin checked and emptied on discharge Yes  Follow up appointment made for patient: No

## 2019-08-12 NOTE — PLAN OF CARE
Cognitive Concerns/ Orientation : Alert and oriented x 4   BEHAVIOR & AGGRESSION TOOL COLOR: Green   CIWA SCORE: n/a   ABNL VS/O2: Temp 99.1,   MOBILITY: Independent   PAIN MANAGMENT: Denies   DIET: Regular   BOWEL/BLADDER: Continent   ABNL LAB/BG: WBC 14.7  Lactic Acid 1.0 at 8pm   DRAIN/DEVICES: No IV   TELEMETRY RHYTHM: n/a   SKIN: Bruising   TESTS/PROCEDURES: n/a   D/C DAY/GOALS/PLACE: plan to discharge to [place] at 2pm Monday   OTHER IMPORTANT INFO: 72 hour hold in place, ends 8-12 at noon.  VPM in place.

## 2019-08-12 NOTE — CONSULTS
[facility] Psychiatric Consult Progress Note    Interval History:   Pt seen, chart reviewed, case discussed with nursing staff and treating clinicians. Seroquel was increase to 100mg bid and 200gm at bedtime. Patient seen on 8/12/19 for a follow-up psychiatric consultation by Dr. Stringer. On interview, the patient is again found in bed asleep. He reports he slept well last night, however reports he has difficulties in falling asleep (which is most likely from the patient laying in bed all day). Dr. Stringer informs the patient that faculty from [facility] will be picking him up from the hospital at 11:30am to bring patient to the treatment center in [place]. Patient in agreement with this.      Review of systems:   10 point Review of Systems completed by Dr. Street, and is  is negative other than noted in the HPI     Medications:       amLODIPine  5 mg Oral Daily     mirtazapine  30 mg Oral At Bedtime     QUEtiapine  100 mg Oral BID     QUEtiapine  400 mg Oral At Bedtime     sodium chloride (PF)  3 mL Intracatheter Q8H     hydrALAZINE, lidocaine 4%, lidocaine (buffered or not buffered), magnesium sulfate, naloxone, ondansetron **OR** ondansetron, potassium chloride, potassium chloride with lidocaine, potassium chloride, potassium chloride, potassium chloride, potassium phosphate (KPHOS) in D5W IV, potassium phosphate (KPHOS) in D5W IV, potassium phosphate (KPHOS) in D5W IV, potassium phosphate (KPHOS) in D5W IV, QUEtiapine, sodium chloride (PF)    Mental Status Examination:     Appearance:  awake, alert, dressed in hospital scrubs, appeared as age stated, poorly groomed and uncooperative  Eye Contact:  good  Speech:  clear, coherent and anxious intonation  Language:Normal  Psychomotor Behavior:  no evidence of tardive dyskinesia, dystonia, or tics  Mood: less anxious   Affect:  mood congruent and intensity is heightened  Thought Process:  logical, linear and goal oriented no loose  associations  Thought Content:  no evidence of suicidal ideation or homicidal ideation and no evidence of psychotic thought  Oriented to:  time, person, and place  Attention Span and Concentration:  intact  Recent and Remote Memory:  fair  Fund of Knowledge: appropriate  Muscle Strength and Tone: normal  Gait and Station: Normal  Insight:  limited  Judgment:  poor        Labs/Vitals:     Recent Results (from the past 24 hour(s))   CBC with platelets differential    Collection Time: 08/11/19 11:14 AM   Result Value Ref Range    WBC 14.7 (H) 4.0 - 11.0 10e9/L    RBC Count 4.34 (L) 4.4 - 5.9 10e12/L    Hemoglobin 13.5 13.3 - 17.7 g/dL    Hematocrit 39.9 (L) 40.0 - 53.0 %    MCV 92 78 - 100 fl    MCH 31.1 26.5 - 33.0 pg    MCHC 33.8 31.5 - 36.5 g/dL    RDW 15.1 (H) 10.0 - 15.0 %    Platelet Count 153 150 - 450 10e9/L    Diff Method Automated Method     % Neutrophils 64.4 %    % Lymphocytes 14.7 %    % Monocytes 19.9 %    % Eosinophils 0.3 %    % Basophils 0.2 %    % Immature Granulocytes 0.5 %    Nucleated RBCs 0 0 /100    Absolute Neutrophil 9.5 (H) 1.6 - 8.3 10e9/L    Absolute Lymphocytes 2.2 0.8 - 5.3 10e9/L    Absolute Monocytes 2.9 (H) 0.0 - 1.3 10e9/L    Absolute Eosinophils 0.1 0.0 - 0.7 10e9/L    Absolute Basophils 0.0 0.0 - 0.2 10e9/L    Abs Immature Granulocytes 0.1 0 - 0.4 10e9/L    Absolute Nucleated RBC 0.0    Hepatic panel    Collection Time: 08/11/19 11:14 AM   Result Value Ref Range    Bilirubin Direct 0.1 0.0 - 0.2 mg/dL    Bilirubin Total 0.5 0.2 - 1.3 mg/dL    Albumin 3.0 (L) 3.4 - 5.0 g/dL    Protein Total 7.0 6.8 - 8.8 g/dL    Alkaline Phosphatase 115 40 - 150 U/L     (H) 0 - 70 U/L     (H) 0 - 45 U/L   Basic metabolic panel    Collection Time: 08/11/19 11:14 AM   Result Value Ref Range    Sodium 140 133 - 144 mmol/L    Potassium 3.8 3.4 - 5.3 mmol/L    Chloride 103 94 - 109 mmol/L    Carbon Dioxide 30 20 - 32 mmol/L    Anion Gap 7 3 - 14 mmol/L    Glucose 106 (H) 70 - 99 mg/dL    Urea  Nitrogen 7 7 - 30 mg/dL    Creatinine 0.60 (L) 0.66 - 1.25 mg/dL    GFR Estimate >90 >60 mL/min/[1.73_m2]    GFR Estimate If Black >90 >60 mL/min/[1.73_m2]    Calcium 9.3 8.5 - 10.1 mg/dL   Lactic acid level STAT for sepsis protocol    Collection Time: 08/11/19  7:56 PM   Result Value Ref Range    Lactate for Sepsis Protocol 1.0 0.7 - 2.0 mmol/L   CBC with platelets    Collection Time: 08/12/19  8:29 AM   Result Value Ref Range    WBC 9.8 4.0 - 11.0 10e9/L    RBC Count 4.45 4.4 - 5.9 10e12/L    Hemoglobin 13.7 13.3 - 17.7 g/dL    Hematocrit 41.5 40.0 - 53.0 %    MCV 93 78 - 100 fl    MCH 30.8 26.5 - 33.0 pg    MCHC 33.0 31.5 - 36.5 g/dL    RDW 15.0 10.0 - 15.0 %    Platelet Count 167 150 - 450 10e9/L     B/P: 150/106, T: 99.3, P: 97, R: 16    Impression:   Mohit is a 29-year-old gentleman presenting with acute alcohol intoxication and subsequent issues with withdrawal, anxiety. Dr. Contreras met with patient this morning for a follow-up psychiatric consultation. Patient reported obtaining a better nights rest last night. He did however have difficulties falling asleep, which simply could have been from patient staying in bed all day. Dr. Stringer informed the patient that a ride from [facility] will be picking patient up at 11:30am to bring him to the treatment center in [place].       DIagnoses:   1.  Major depression, recurrent, severe, without psychotic features.  2.  Alcohol use disorder, severe          Plan:   1. Written information given on medications. Side effects, risks, benefits reviewed.  2.  Medication changes: Continue patient on Seroquel 100 bid  3.  Anticipate direct discharge to chemical dependency treatment at [facility] treatment center in [place], [place]      Attestation:  Lyric MUNIZ, am serving as a scribe on 8/12/2019 to document services personally performed by Samuel Stringer MD based on my observations and the provider's statements to  me.

## 2019-08-12 NOTE — PROGRESS NOTES
SPIRITUAL HEALTH SERVICES Progress Note  [facility]    Initial visit with pt Mohit per LOS. Pt looks bright and is copying well.     had a brief conversation with pt. Pt states that he has been here about 6 days, he feels much better.     Pt expects to go back home in a short period of time, and there was no need addressed.  let pt know that SH is available.     SH remains available per pt/family request.       Bryanna Padron  Chaplain Resident

## 2019-08-12 NOTE — PROGRESS NOTES
Patient seen and examined    No acute issues overnight  has been afebrile for more than 24 hours  blood pressure stable    Plan to discharge to [place] for residential treatment    Please see discharge summary for details

## 2019-08-12 NOTE — DISCHARGE SUMMARY
[facility]  Discharge Summary        Mohit Porter MRN# [id number]   YOB: 1990 Age: 29 year old     Date of Admission:  8/5/2019  Date of Discharge:  8/12/2019  Admitting Physician:  Lupe Gleason MD  Discharge Physician: Herman Locke MD  Discharging Service: Hospitalist     Primary Provider: No Ref-Primary, Physician  Primary Care Physician Phone Number: None         Discharge Diagnoses/Problem Oriented Hospital Course (Providers):    Mohit Porter was admitted on 8/5/2019 by Lupe Gleason MD and I would refer you to their history and physical.  The following problems were addressed during his hospitalization:    Mr Mohit Porter is a 29-year-old gentleman with past medical history of anxiety, alcohol abuse/dependence, history of alcohol withdrawal and alcoholic hepatitis who was brought in by EMS after he was found unresponsive at extended stay in [place].      # Acute toxic encephalopathy- resolved.   # Acute Alcohol intoxication.   # ETOH witdrawal  # abnormal LFTs, likely alcoholic hepatitis  # Anxiety  - long history of alcohol abuse/dependence; he had multiple ER visits this month only for alcohol intoxication  - ETOH level on admission 0.58  - was started on CIWA protocol with Valium  - evaluated by psychiatry; started Remeron bedtime and Seroquel  PRN; revaluated and increased remeron to 30 mg HS and seroquel scheduled changed to 100 mg bid and 400 mg bedtime  - Chem dep evaluated -- suggested residential treatment; per CD If pt does not follow through on completing treatment, commitment should be considered due to his ongoing alcohol use and coming into the ED with high JOANA levels repeatedly  - SW following, being discharged to [facility] treatment center in [place], [place]   - on admission had elevated transaminases, normal bilirubin; improving AST//241---480/186---394/195---364/316, ALT  slightly trending up--368 other LFTs improving including normal bili  - US RUQ shows fatty liver, otherwise unremarkable     # Right supra-orbital laceration from recent fall injury  - removed sutures 8/8     # Elevated BP  - no prior h/o HTN  - BP controlled after starting amlodipine 5 mg po daily     # Leukocytosis  # elevated lactate--resolved  - Suspect reactive; wbc trended down 16.5---12.7---6.4, up on 8/10 to 14.7--->9.8  - elevated lactate was slightly secondary to alcohol abuse and intoxication; no clinical concern for sepsis  - was febrile to 101.3 on deep 8/10--no active complaints, afebrile later, has been afebrile for >24 hrs, tacycardia improved; no clear source of infection, lactate was 1.2; BCx drawn 8/10, no growth so far, not hypoxic, denies any cough, chest pain, shortness of breath, pain abdomen or urinary symptoms; monitor clinically     # Hypokalemia--resolved   # Hypomagnesemia----resolved   - sec to poor po from alcohol abuse; K 3.2; replaced on K supple protocol  - Mg 1.4---1.9, repleted per Mg supple protocol     CODE STATUS:  full code            Brief Hospital Stay Summary Sent Home With Patient in AVS:               Pending Results:        Unresulted Labs Ordered in the Past 30 Days of this Admission     Date and Time Order Name Status Description    8/10/2019 1754 Blood culture Preliminary     8/10/2019 1753 Blood culture Preliminary             Discharge Instructions and Follow-Up:      Follow-up Appointments     Follow-up and recommended labs and tests       Follow up with primary care provider, Physician No Ref-Primary, within 7   days following discharge from the residential treatment program for   hospital follow- up.  The following labs/tests are recommended: repeat   CBC, BMP.                 Discharge Disposition:      Discharged to home        Discharge Medications:        Current Discharge Medication List      START taking these medications    Details   amLODIPine (NORVASC) 5  "MG tablet Take 1 tablet (5 mg) by mouth daily  Qty: 30 tablet, Refills: 0    Comments: Future refills by PCP  Physician No Ref-Primary with phone number None.  Associated Diagnoses: Benign essential hypertension      mirtazapine (REMERON) 30 MG tablet Take 1 tablet (30 mg) by mouth At Bedtime  Qty: 30 tablet, Refills: 0    Associated Diagnoses: Severe episode of recurrent major depressive disorder, without psychotic features (H)      !! QUEtiapine (SEROQUEL) 100 MG tablet Take 1 tablet (100 mg) by mouth 2 times daily  Qty: 60 tablet, Refills: 0    Associated Diagnoses: Severe episode of recurrent major depressive disorder, without psychotic features (H)      !! QUEtiapine (SEROQUEL) 400 MG tablet Take 1 tablet (400 mg) by mouth At Bedtime  Qty: 30 tablet, Refills: 0    Associated Diagnoses: Severe episode of recurrent major depressive disorder, without psychotic features (H)       !! - Potential duplicate medications found. Please discuss with provider.            Allergies:       No Known Allergies        Consultations This Hospital Stay:      Consultation during this admission received from psychiatry        Condition and Physical on Discharge:      Discharge condition: Stable   Vitals: Blood pressure 124/85, pulse 97, temperature 97.5  F (36.4  C), temperature source Axillary, resp. rate 18, height 1.727 m (5' 8\"), weight 73.2 kg (161 lb 6.4 oz), SpO2 97 %.     Constitutional: Calm and cooperative; oriented X 3; lying comfortably in bed in no apparent distress   HEENT: Pupils equal and reactive to light and accomodation, EOMI intact; neck supple no raised JVD or rigidity    Oral cavity: Moist mucosa   Cardiovascular: Normal s1 s2, regular rate and rhythm, no murmur   Lungs: B/l clear to auscultation, no wheezes or crepitations   Abdomen: Soft, nt, nd, no guarding, rigidity or rebound; BS +   LE : No edema   Musculoskeletal: Power 5/5 in all extremities   Neuro: No focal neurological deficits noted, CN II to " XII grossly intact   Psychiatry: normal mood and affect               Discharge Time:      Greater than 30 minutes.        Image Results From This Hospital Stay (For Non-EPIC Providers):        Results for orders placed or performed during the hospital encounter of 08/05/19   Abdomen US, limited (RUQ only)    Narrative    ULTRASOUND ABDOMEN LIMITED RIGHT UPPER QUADRANT  8/6/2019 1:12 AM     HISTORY: Alcohol abuse. Elevated liver function tests.    COMPARISON: None.    FINDINGS: Mild diffuse increased hepatic echogenicity, likely  representing fatty infiltration. No hepatic masses. The gallbladder is  unremarkable without gallstones, wall thickening or pericholecystic  fluid. No focal tenderness over the gallbladder. No intra- or  extrahepatic biliary dilatation. The common duct measures 0.5 cm in  diameter. The right kidney has normal size and echogenicity, measuring  13.0 cm in length. No right intrarenal collecting system dilatation,  calculi or masses. No free fluid in the upper right hemiabdomen.      Impression    IMPRESSION:  1. Unremarkable appearance of the gallbladder.  2. No biliary dilatation.  3. Mild diffuse fatty infiltration of the liver.    ALEX RAINES MD           Most Recent Lab Results In EPIC (For Non-EPIC Providers):    Most Recent 3 CBC's:  Recent Labs   Lab Test 08/12/19  0829 08/11/19  1114 08/08/19  0811   WBC 9.8 14.7* 7.0   HGB 13.7 13.5 13.2*   MCV 93 92 92    153 115*      Most Recent 3 BMP's:  Recent Labs   Lab Test 08/11/19  1114 08/10/19  0858 08/08/19  0811    137 142   POTASSIUM 3.8 3.9 3.7   CHLORIDE 103 101 108   CO2 30 33* 32   BUN 7 7 8   CR 0.60* 0.67 0.49*   ANIONGAP 7 3 2*   ARYA 9.3 9.0 8.5   * 85 89     Most Recent 3 Troponin's:No lab results found.    Invalid input(s): TROP, TROPONINIES  Most Recent 3 INR's:No lab results found.  Most Recent 2 LFT's:  Recent Labs   Lab Test 08/11/19  1114 08/10/19  0858   * 364*   * 316*   ALKPHOS 115  129   BILITOTAL 0.5 0.6     Most Recent Cholesterol Panel:  Recent Labs   Lab Test 02/17/19  0730   CHOL 114   LDL 38   HDL 63   TRIG 63     Most Recent 6 Bacteria Isolates From Any Culture (See EPIC Reports for Culture Details):  Recent Labs   Lab Test 08/10/19  1810 08/10/19  1800 02/17/19  1045   CULT No growth after 2 days No growth after 2 days No growth     Most Recent TSH, T4 and HgbA1c:   Recent Labs   Lab Test 02/17/19  0730   TSH 2.26

## 2019-08-13 ENCOUNTER — PATIENT OUTREACH (OUTPATIENT)
Dept: FAMILY MEDICINE | Facility: CLINIC | Age: 29
End: 2019-08-13

## 2019-08-13 ENCOUNTER — TELEPHONE (OUTPATIENT)
Dept: INTERNAL MEDICINE | Facility: CLINIC | Age: 29
End: 2019-08-13

## 2019-08-13 DIAGNOSIS — F10.929 ALCOHOL INTOXICATION (H): Primary | ICD-10-CM

## 2019-08-13 NOTE — PROGRESS NOTES
"Clinic Care Coordination Contact  Care Coordination Transition Communication    Clinical Data: Patient was hospitalized at [facility] from 8/05/19 to 8/12/19 with diagnoses of:    Acute toxic encephalopathy--resolved  Acute Alcohol intoxication  ETOH withdrawal  Abnormal LFTs, likely alcoholic hepatitis  Anxiety  Right supra-orbital laceration from recent fall injury  Elevated BP  Leukocytosis  Elevated Lactate--resolved  Hypokalemia--resolved  Hypomagnesemia--resolved      Per chart review, patient was in the Emergency Room on the following dates since April of this year. (Note: During this time, patient was hospitalized in [facility] overnight 7/25-7/26.)    4/07/19  [facility] for \"Alcoholic intoxication without complication\", \"Hypokalemia\" and \"Alcoholic hepatitis without ascites\"  4/11/19  [facility] for \"Acute alcoholic intoxication in alcoholism without complication\" and \"Depression, unspecified depression type\".  4/14/19  [facility] for \"Overdoes, accidental or unintentional, initial encounter\".  5/02/19  [facility] for \"ETOH Intoxication\"  5/15/19  [facility] for \"ETOH Intoxication\"  5/30/19  [facility] for \"Acute alcoholic intoxication in alcoholism without complication\".  7/05/19  [facility] for \"Alcohol Problem\".  7/14/19  [facility] for \"ETOH Intoxication\".  7/25/19--7/26/19  [facility] for \"Alcoholic intoxication with complication\".  7/27/19  [facility] for \"Altered Mental Status\", \"Alcohol Intoxication\" and \"Laceration, Other\".  7/28/19--7/29/19  [facility] for \"Alcoholic intoxication with complication\".  7/30/19  [facility] for \"Altered Mental Status\".  7/31/19  [facility] for \"Altered Mental Status\".  8/03/19-8/04/19  [facility] for \"Transaminitis\" and \"Acute alcoholic intoxication in alcoholism without complication\".       Transition to Facility:   "            Facility Name: [facility] [place]              Contact name and phone number/fax: [phone]      Plan: Patient went to inpatient chemical dependency treatment from [facility]. -[facility] will plan to follow up with patient in 3-4 weeks to offer support and further community resources, as indicated.      JADEN Jimenez  [facility] Care Coordination  Clinics: [facility], [facility]   Email: [email or web address]  Tele: [phone]

## 2019-08-16 LAB
BACTERIA SPEC CULT: NO GROWTH
BACTERIA SPEC CULT: NO GROWTH
Lab: NORMAL
Lab: NORMAL
SPECIMEN SOURCE: NORMAL
SPECIMEN SOURCE: NORMAL

## 2019-10-18 ENCOUNTER — PATIENT OUTREACH (OUTPATIENT)
Dept: FAMILY MEDICINE | Facility: CLINIC | Age: 29
End: 2019-10-18

## 2019-10-18 NOTE — PROGRESS NOTES
Clinic Care Coordination Contact  Outreach/Follow up    Data: Per chart review, patient has not been in the Emergency Room since 8/03/19 nor in the hospital since patient's hospitalization at [facility] 8/5/19--8/12/19.    Writer called patient today for follow up. Writer left a voice message with call back information.    Plan: Await patient's return call. Will get an update from patient and offer community resources as indicated.    [facility]  JADEN Jimenez, Social Work Care Coordinator  [facility] and [address]  [address]  [email or web address]   Office: [phone]  Gender Pronouns: she/her  Employed by [employer]          Addendum  10/26/19  Data: Patient has not returned writer's call since [facility] documentation above. Writer mailed a letter introducing Care Coordination and contact information to patient today.    Plan: [facility] plans no further outreach at this time but remains available if patient wishes to discuss community resources or a Care Coordination referral is recevied.    [facility]  JADEN Jimenez, Social Work Care Coordinator  [facility] and [address]  [address]  [email or web address]   Office: [phone]  Gender Pronouns: she/her  Employed by [employer]

## 2019-10-18 NOTE — LETTER
[place] CARE COORDINATION  [facility]  [address]  [address]    October 26, 2019    Mohit Porter  [address]   [facility] [address]      Dear Mohit,    I am a Social Work Clinic Care Coordinator who works with Dr. Gerardo Rock at the [facility] (formerly the [facility]). I have been trying to reach you recently to introduce Clinic Care Coordination and to see if there was anything I might assist you with. Below is a description of clinic care coordination.     The clinic care coordinator is a registered nurse and/or  who understand the health care system. The goal of clinic care coordination is to help you manage your health and improve access to the [facility] in the most efficient manner. The registered nurse can assist you in meeting your health care goals by providing education, coordinating services, and strengthening the communication among your providers. The  can assist you with financial, behavioral, psychosocial, chemical dependency, counseling, and/or psychiatric resources.    As I will be transitioning out of the [facility] in a few days, I would like to give you the name of my colleague as a contact for you, if you would like to talk to a Care Coordinator--Kyle Gonzalez ([phone]). We at [facility] are focused on providing you with the highest-quality healthcare experience possible and that all starts with you.     Sincerely,       [facility]  JADEN Jimenez, Social Work Care Coordinator  [facility] [address] and [employer]   [address]  [address]  [email or web address]   Office: [phone]  Gender Pronouns: she/her  Employed by [facility]

## 2019-12-07 ENCOUNTER — HOSPITAL ENCOUNTER (EMERGENCY)
Facility: CLINIC | Age: 29
Discharge: HOME | End: 2019-12-08
Attending: EMERGENCY MEDICINE | Admitting: EMERGENCY MEDICINE
Payer: COMMERCIAL

## 2019-12-07 DIAGNOSIS — I95.1 ORTHOSTASIS: ICD-10-CM

## 2019-12-07 DIAGNOSIS — F10.929 ALCOHOLIC INTOXICATION WITH COMPLICATION (H): ICD-10-CM

## 2019-12-07 LAB
ALBUMIN SERPL-MCNC: 4.4 G/DL (ref 3.4–5)
ALP SERPL-CCNC: 110 U/L (ref 40–150)
ALT SERPL W P-5'-P-CCNC: 422 U/L (ref 0–70)
ANION GAP SERPL CALCULATED.3IONS-SCNC: 11 MMOL/L (ref 3–14)
AST SERPL W P-5'-P-CCNC: 221 U/L (ref 0–45)
BASOPHILS # BLD AUTO: 0 10E9/L (ref 0–0.2)
BASOPHILS NFR BLD AUTO: 0.1 %
BILIRUB SERPL-MCNC: 0.5 MG/DL (ref 0.2–1.3)
BUN SERPL-MCNC: 11 MG/DL (ref 7–30)
CALCIUM SERPL-MCNC: 8.8 MG/DL (ref 8.5–10.1)
CHLORIDE SERPL-SCNC: 99 MMOL/L (ref 94–109)
CO2 SERPL-SCNC: 29 MMOL/L (ref 20–32)
CREAT SERPL-MCNC: 0.7 MG/DL (ref 0.66–1.25)
DIFFERENTIAL METHOD BLD: ABNORMAL
EOSINOPHIL # BLD AUTO: 0 10E9/L (ref 0–0.7)
EOSINOPHIL NFR BLD AUTO: 0.1 %
ERYTHROCYTE [DISTWIDTH] IN BLOOD BY AUTOMATED COUNT: 14.6 % (ref 10–15)
GFR SERPL CREATININE-BSD FRML MDRD: >90 ML/MIN/{1.73_M2}
GLUCOSE SERPL-MCNC: 138 MG/DL (ref 70–99)
HCT VFR BLD AUTO: 50.2 % (ref 40–53)
HGB BLD-MCNC: 17.4 G/DL (ref 13.3–17.7)
IMM GRANULOCYTES # BLD: 0.1 10E9/L (ref 0–0.4)
IMM GRANULOCYTES NFR BLD: 0.3 %
LIPASE SERPL-CCNC: 234 U/L (ref 73–393)
LYMPHOCYTES # BLD AUTO: 0.7 10E9/L (ref 0.8–5.3)
LYMPHOCYTES NFR BLD AUTO: 4.8 %
MCH RBC QN AUTO: 30.6 PG (ref 26.5–33)
MCHC RBC AUTO-ENTMCNC: 34.7 G/DL (ref 31.5–36.5)
MCV RBC AUTO: 88 FL (ref 78–100)
MONOCYTES # BLD AUTO: 1.1 10E9/L (ref 0–1.3)
MONOCYTES NFR BLD AUTO: 7.3 %
NEUTROPHILS # BLD AUTO: 13.1 10E9/L (ref 1.6–8.3)
NEUTROPHILS NFR BLD AUTO: 87.4 %
NRBC # BLD AUTO: 0 10*3/UL
NRBC BLD AUTO-RTO: 0 /100
PLATELET # BLD AUTO: 281 10E9/L (ref 150–450)
POTASSIUM SERPL-SCNC: 4 MMOL/L (ref 3.4–5.3)
PROT SERPL-MCNC: 8.5 G/DL (ref 6.8–8.8)
RBC # BLD AUTO: 5.69 10E12/L (ref 4.4–5.9)
SODIUM SERPL-SCNC: 139 MMOL/L (ref 133–144)
WBC # BLD AUTO: 15 10E9/L (ref 4–11)

## 2019-12-07 PROCEDURE — 83690 ASSAY OF LIPASE: CPT | Performed by: EMERGENCY MEDICINE

## 2019-12-07 PROCEDURE — 99285 EMERGENCY DEPT VISIT HI MDM: CPT | Mod: 25

## 2019-12-07 PROCEDURE — 80053 COMPREHEN METABOLIC PANEL: CPT | Performed by: EMERGENCY MEDICINE

## 2019-12-07 PROCEDURE — 25000128 H RX IP 250 OP 636: Performed by: EMERGENCY MEDICINE

## 2019-12-07 PROCEDURE — 25800030 ZZH RX IP 258 OP 636: Performed by: EMERGENCY MEDICINE

## 2019-12-07 PROCEDURE — 85025 COMPLETE CBC W/AUTO DIFF WBC: CPT | Performed by: EMERGENCY MEDICINE

## 2019-12-07 PROCEDURE — 96361 HYDRATE IV INFUSION ADD-ON: CPT

## 2019-12-07 PROCEDURE — 96374 THER/PROPH/DIAG INJ IV PUSH: CPT

## 2019-12-07 PROCEDURE — 25000132 ZZH RX MED GY IP 250 OP 250 PS 637: Performed by: EMERGENCY MEDICINE

## 2019-12-07 RX ORDER — MULTIVITAMIN,THERAPEUTIC
1 TABLET ORAL ONCE
Status: COMPLETED | OUTPATIENT
Start: 2019-12-07 | End: 2019-12-07

## 2019-12-07 RX ORDER — LORAZEPAM 1 MG/1
1 TABLET ORAL ONCE
Status: COMPLETED | OUTPATIENT
Start: 2019-12-07 | End: 2019-12-07

## 2019-12-07 RX ORDER — MAGNESIUM OXIDE 400 MG/1
800 TABLET ORAL ONCE
Status: COMPLETED | OUTPATIENT
Start: 2019-12-07 | End: 2019-12-07

## 2019-12-07 RX ORDER — ONDANSETRON 2 MG/ML
4 INJECTION INTRAMUSCULAR; INTRAVENOUS ONCE
Status: COMPLETED | OUTPATIENT
Start: 2019-12-07 | End: 2019-12-07

## 2019-12-07 RX ORDER — FOLIC ACID 1 MG/1
1 TABLET ORAL ONCE
Status: COMPLETED | OUTPATIENT
Start: 2019-12-07 | End: 2019-12-07

## 2019-12-07 RX ORDER — LORAZEPAM 2 MG/1
2 TABLET ORAL ONCE
Status: COMPLETED | OUTPATIENT
Start: 2019-12-07 | End: 2019-12-07

## 2019-12-07 RX ORDER — LANOLIN ALCOHOL/MO/W.PET/CERES
100 CREAM (GRAM) TOPICAL ONCE
Status: COMPLETED | OUTPATIENT
Start: 2019-12-07 | End: 2019-12-07

## 2019-12-07 RX ADMIN — FOLIC ACID 1 MG: 1 TABLET ORAL at 17:49

## 2019-12-07 RX ADMIN — ONDANSETRON 4 MG: 2 INJECTION INTRAMUSCULAR; INTRAVENOUS at 17:49

## 2019-12-07 RX ADMIN — LORAZEPAM 2 MG: 2 TABLET ORAL at 23:55

## 2019-12-07 RX ADMIN — Medication 100 MG: at 17:49

## 2019-12-07 RX ADMIN — Medication 800 MG: at 17:49

## 2019-12-07 RX ADMIN — THERA TABS 1 TABLET: TAB at 17:49

## 2019-12-07 RX ADMIN — SODIUM CHLORIDE 1000 ML: 9 INJECTION, SOLUTION INTRAVENOUS at 17:49

## 2019-12-07 RX ADMIN — LORAZEPAM 1 MG: 1 TABLET ORAL at 19:54

## 2019-12-07 ASSESSMENT — ENCOUNTER SYMPTOMS
ABDOMINAL PAIN: 0
BLOOD IN STOOL: 0
SHORTNESS OF BREATH: 0
VOMITING: 0
HALLUCINATIONS: 0
TREMORS: 0
NAUSEA: 0

## 2019-12-07 ASSESSMENT — MIFFLIN-ST. JEOR: SCORE: 1770.04

## 2019-12-07 NOTE — ED AVS SNAPSHOT
Emergency Department  [address] [place] [address]  Phone:  [phone]  Fax:  [phone]                                    Mohit Porter   MRN: [id number]    Department:   Emergency Department   Date of Visit:  12/7/2019           After Visit Summary Signature Page    I have received my discharge instructions, and my questions have been answered. I have discussed any challenges I see with this plan with the nurse or doctor.    ..........................................................................................................................................  Patient/Patient Representative Signature      ..........................................................................................................................................  Patient Representative Print Name and Relationship to Patient    ..................................................               ................................................  Date                                   Time    ..........................................................................................................................................  Reviewed by Signature/Title    ...................................................              ..............................................  Date                                               Time          22EPIC Rev 08/18

## 2019-12-07 NOTE — ED PROVIDER NOTES
History     Chief Complaint:  Alcohol Intoxication    HPI   Mohit Porter is a 29 year old male who presents with alcohol intoxication. The patient reports that he had  too much to drink  today. He has been binge drinking for the past three days and has drank approximately one liter of vodka in the past 24 hours. He reports that he has struggled with alcohol abuse in the past and was in treatment  a while ago . Currently, the patient denies nausea, vomiting, or abdominal pain as well as any recent black or bloody stools. He did fall recently and has an abrasion to his chin. The patient denies any other injury related to this fall including headache or dental injury. He denies any shakiness as well as chest pain or SOB. The patient states that he is  pretty much homeless . He is in contact with family but reports that his drinking as affected his relationship with them. The patient is currently working as a substitute . He has a history of mental health issues but is not currently medication for management of these. The patient denies any suicidal ideation as well as auditory or visual hallucinations.       Allergies:  NKDA    Medications:    No Current Medications    Past Medical History:    Alcohol abuse  Anxiety   Hepatitis C    Past Surgical History:    No Surgical History     Family History:    The patient denies any relevant family history.     Social History:  The patient is single. He denies drug use. He reports smokeless tobacco use and daily alcohol use.     Review of Systems   Respiratory: Negative for shortness of breath.    Cardiovascular: Negative for chest pain.   Gastrointestinal: Negative for abdominal pain, blood in stool, nausea and vomiting.   Neurological: Negative for tremors.   Psychiatric/Behavioral: Negative for hallucinations and suicidal ideas.        Intoxicated.   All other systems reviewed and are negative.    Physical Exam   First Vitals:  BP: (!)  "144/104  Heart Rate: 125  Temp: 97.9  F (36.6  C)  Resp: 16  Height: 177.8 cm (5' 10\")  Weight: 79.4 kg (175 lb)  SpO2: 97 %    Physical Exam  SKIN:  Warm, dry.  Atraumatic.  No jaundice.  HEMATOLOGIC/IMMUNOLOGIC/LYMPHATIC:  No pallor.  HENT:  Breath smells of alcohol.  Painless active range of motion of head and neck.  Cervical spine nontender.  EYES:  Conjunctivae injected.  Anicteric.  CARDIOVASCULAR: Tachycardic rate with rhythm.  No murmur.  No rub.  RESPIRATORY:  No respiratory distress, breath sounds equal and normal.  GASTROINTESTINAL:  Soft, nontender abdomen with active bowel sounds.  No mass or distention.  No hepatomegaly.  MUSCULOSKELETAL: Normal body habitus.  Painless passive range of motion of the upper and lower extremities.  NEUROLOGIC:  Alert, conversant.  No gross motor or sensory deficit.  No asterixis or upper extremity extension tremor.  PSYCHIATRIC: Tearful during the interview.  No psychotic features.    Emergency Department Course     Laboratory:  CBC: WBC 15.0 (high) o/w WNL. (HGB 17.4, )   CMP: Glucose 138 (high) o/w WNL (Creatinine 0.7)   Lipase: 234    Interventions:  Normal Saline 1L IV injection   Zofran 4mg IV injection    Magnesium Oxide 800 mg tablet PO   Vitamin B1 100 mg tablet PO   Folvite 1mg tablet PO   Multivitamin tablet PO   Ativan 1 mg tablet PO     Emergency Department Course:  Nursing notes and vitals reviewed. I performed an exam of the patient as documented above.   IV inserted and blood drawn. The patient was placed on continuous cardiac monitoring and pulse oximetry.   5:49 PM The patient was given NLS IV, Zofran IV, Multivitamin PO, Folvite PO, Vitamin B1 PO, and Magnesium Oxide PO, dosages as noted above.     7:54 PM The patient was given Ativan PO, dosage as noted above.    10:40 PM Recheck.  12:00 AM Patient was signed out to overnight provider for further management and care.     Impression & Plan      Medical Decision Making:  Mohit Porter is a 29 " year old male who presents quite intoxicated and admits to drinking heavily. He is homeless. He denies any distinct symptoms. Lab work revealed elevated liver function but similarly elevated in comparison to prior tests. Leukocytosis of unclear etiology. He is afebrile. He was administered vitamins with respect to alcoholism. He refused transfer to detox. The patient will board overnight in the ED SC garrett up as he. I signed the patient out to my colleauge Dr. Maldonado at shift change.     Diagnosis:    ICD-10-CM    1. Alcoholic intoxication with complication (H) F10.929    2. Orthostasis I95.1        Disposition:  Signed out to overnight provider.       I, Vikram Desai, am serving as a scribe at 5:00 PM on 12/7/2019 to document services personally performed by Alfredo Domínguez MD, based on my observations and the provider's statements to me.         Alfredo Domínguez MD  12/08/19 1111

## 2019-12-07 NOTE — ED NOTES
Patient placed on hold due to intoxication. Denies SI/HI therefore sitter not needed. Room locked to external hallway

## 2019-12-07 NOTE — ED NOTES
Bed: ED22  Expected date:   Expected time:   Means of arrival:   Comments:  [place] 518-29M ETOH - ETA 3min

## 2019-12-07 NOTE — ED TRIAGE NOTES
Arrives via EMS from [address] where PD was called due to patient passed out in hallway of hotel. Aroused to vigorous sternal rub. Room found soiled with urine to bed and floor. PBT=0.266 for PD on scene. Wearing spice scrub top from prior ED visit. Denies drug use states only ETOH. Denies pain. Homeless per EMS

## 2019-12-08 VITALS
HEART RATE: 97 BPM | SYSTOLIC BLOOD PRESSURE: 157 MMHG | WEIGHT: 175 LBS | BODY MASS INDEX: 25.05 KG/M2 | DIASTOLIC BLOOD PRESSURE: 84 MMHG | HEIGHT: 70 IN | OXYGEN SATURATION: 98 % | TEMPERATURE: 97.9 F | RESPIRATION RATE: 18 BRPM

## 2019-12-08 PROCEDURE — 25800030 ZZH RX IP 258 OP 636: Performed by: EMERGENCY MEDICINE

## 2019-12-08 PROCEDURE — 25000132 ZZH RX MED GY IP 250 OP 250 PS 637: Performed by: EMERGENCY MEDICINE

## 2019-12-08 PROCEDURE — 96361 HYDRATE IV INFUSION ADD-ON: CPT

## 2019-12-08 PROCEDURE — 25000128 H RX IP 250 OP 636: Performed by: EMERGENCY MEDICINE

## 2019-12-08 RX ORDER — SODIUM CHLORIDE 9 MG/ML
1000 INJECTION, SOLUTION INTRAVENOUS CONTINUOUS
Status: DISCONTINUED | OUTPATIENT
Start: 2019-12-08 | End: 2019-12-08 | Stop reason: HOSPADM

## 2019-12-08 RX ORDER — ONDANSETRON 4 MG/1
4 TABLET, ORALLY DISINTEGRATING ORAL
Status: COMPLETED | OUTPATIENT
Start: 2019-12-08 | End: 2019-12-08

## 2019-12-08 RX ORDER — DIAZEPAM 5 MG
10 TABLET ORAL ONCE
Status: COMPLETED | OUTPATIENT
Start: 2019-12-08 | End: 2019-12-08

## 2019-12-08 RX ADMIN — SODIUM CHLORIDE 1000 ML: 9 INJECTION, SOLUTION INTRAVENOUS at 08:37

## 2019-12-08 RX ADMIN — DIAZEPAM 10 MG: 5 TABLET ORAL at 05:47

## 2019-12-08 RX ADMIN — ONDANSETRON 4 MG: 4 TABLET, ORALLY DISINTEGRATING ORAL at 06:23

## 2019-12-08 RX ADMIN — SODIUM CHLORIDE 1000 ML: 9 INJECTION, SOLUTION INTRAVENOUS at 05:46

## 2019-12-08 NOTE — ED NOTES
I took over care of this patient from my partner, Dr. Domínguez, at approximately midnight.    Briefly, patient presented with alcohol intoxication, having binged for a number of days.  I was asked to assume care on the overnight shift, given concern for intoxication and lack of housing on this call December night.  He had received 2 doses of oral Ativan so far for anxiety.    I checked on the patient in room 17 at 0330.  He was resting in bed.  Vital signs satisfactory on the monitors.  I spoke with nurse, he did not have any concerns.  We discussed watching him closely for any vital sign change or other evidence of developing withdrawal or clinical deterioration.  A while thereafter, he was given a snack and walked to the bathroom.  I went to check on him at 530.  He states he feels okay.  His pulse was 90 at rest, but upon standing, it went up to 150.  Blood pressure but is not hypotensive.  No syncope.  He does not feel like he has withdrawal, though he does have mild tongue fasciculations and extremely minimal hand tremors bilaterally.  Mental status is normal.  He desires to be discharged to take a Lyft to  his car which is at a nearby liquor store.  He agreed to additional oral intake as well as a liter of IV fluid and some oral Valium to minimize developing withdrawal symptoms and hopefully improve his orthostatic tachycardia prior to likely discharge.  He specifically declines pursuing treatment for his alcohol use including detox or hospitalization.  Care signed out to Dr. Red at 6am shift change.     MD Erica Amador, Nick Lerner MD  12/08/19 0612

## 2019-12-08 NOTE — ED NOTES
Report received. Patient visualized. Patient on his call light requesting anxiety medications. Patient given water per request. Will give dose of 2mg of ativan with MD permission.

## 2019-12-08 NOTE — ED PROVIDER NOTES
The patient was signed out to me by Dr. Maldonado.    0946 I was notified the patient was clinically sober. I reassessed the patient at this time, and patient was requesting discharge for home, which I believe to be reasonable.    I, Demond Cardenas, am serving as a scribe on 12/8/2019 at 9:47 AM to personally document services performed by Dano Red MD based on my observations and the provider's statements to me.

## 2019-12-08 NOTE — ED NOTES
Bed: ED17  Expected date: 12/8/19  Expected time: 2:28 AM  Means of arrival: Ambulance  Comments:  Hold BH3   Yes

## 2019-12-08 NOTE — ED NOTES
Assumed care.  Pt ambulated to the bathroom independently. Gait steady.  Water pitcher refilled.  Pt remains on an NICOLASA with staff and security watch.  Plan will be to discharge

## 2020-05-26 ENCOUNTER — HOSPITAL ENCOUNTER (INPATIENT)
Facility: CLINIC | Age: 30
LOS: 5 days | Discharge: LEFT BEFORE DISCHARGE | End: 2020-05-31
Attending: EMERGENCY MEDICINE | Admitting: INTERNAL MEDICINE
Payer: COMMERCIAL

## 2020-05-26 ENCOUNTER — APPOINTMENT (OUTPATIENT)
Dept: CT IMAGING | Facility: CLINIC | Age: 30
End: 2020-05-26
Attending: EMERGENCY MEDICINE
Payer: COMMERCIAL

## 2020-05-26 DIAGNOSIS — F10.921 ALCOHOL INTOXICATION, WITH DELIRIUM (H): ICD-10-CM

## 2020-05-26 DIAGNOSIS — R11.2 NON-INTRACTABLE VOMITING WITH NAUSEA, UNSPECIFIED VOMITING TYPE: ICD-10-CM

## 2020-05-26 LAB
ALBUMIN SERPL-MCNC: 4.1 G/DL (ref 3.4–5)
ALP SERPL-CCNC: 79 U/L (ref 40–150)
ALT SERPL W P-5'-P-CCNC: 54 U/L (ref 0–70)
ANION GAP SERPL CALCULATED.3IONS-SCNC: 19 MMOL/L (ref 3–14)
AST SERPL W P-5'-P-CCNC: 80 U/L (ref 0–45)
BASOPHILS # BLD AUTO: 0 10E9/L (ref 0–0.2)
BASOPHILS NFR BLD AUTO: 0.1 %
BILIRUB SERPL-MCNC: 0.7 MG/DL (ref 0.2–1.3)
BUN SERPL-MCNC: 12 MG/DL (ref 7–30)
CALCIUM SERPL-MCNC: 8.4 MG/DL (ref 8.5–10.1)
CHLORIDE SERPL-SCNC: 94 MMOL/L (ref 94–109)
CO2 SERPL-SCNC: 23 MMOL/L (ref 20–32)
CREAT SERPL-MCNC: 0.59 MG/DL (ref 0.66–1.25)
DIFFERENTIAL METHOD BLD: ABNORMAL
EOSINOPHIL # BLD AUTO: 0 10E9/L (ref 0–0.7)
EOSINOPHIL NFR BLD AUTO: 0 %
ERYTHROCYTE [DISTWIDTH] IN BLOOD BY AUTOMATED COUNT: 14.2 % (ref 10–15)
ETHANOL SERPL-MCNC: 0.49 G/DL
GFR SERPL CREATININE-BSD FRML MDRD: >90 ML/MIN/{1.73_M2}
GLUCOSE SERPL-MCNC: 85 MG/DL (ref 70–99)
HCT VFR BLD AUTO: 49 % (ref 40–53)
HGB BLD-MCNC: 17.2 G/DL (ref 13.3–17.7)
IMM GRANULOCYTES # BLD: 0 10E9/L (ref 0–0.4)
IMM GRANULOCYTES NFR BLD: 0.3 %
INTERPRETATION ECG - MUSE: NORMAL
LYMPHOCYTES # BLD AUTO: 0.7 10E9/L (ref 0.8–5.3)
LYMPHOCYTES NFR BLD AUTO: 5.3 %
MAGNESIUM SERPL-MCNC: 2.3 MG/DL (ref 1.6–2.3)
MCH RBC QN AUTO: 30.9 PG (ref 26.5–33)
MCHC RBC AUTO-ENTMCNC: 35.1 G/DL (ref 31.5–36.5)
MCV RBC AUTO: 88 FL (ref 78–100)
MONOCYTES # BLD AUTO: 0.6 10E9/L (ref 0–1.3)
MONOCYTES NFR BLD AUTO: 4.3 %
NEUTROPHILS # BLD AUTO: 11.7 10E9/L (ref 1.6–8.3)
NEUTROPHILS NFR BLD AUTO: 90 %
NRBC # BLD AUTO: 0 10*3/UL
NRBC BLD AUTO-RTO: 0 /100
PLATELET # BLD AUTO: 296 10E9/L (ref 150–450)
POTASSIUM SERPL-SCNC: 3.8 MMOL/L (ref 3.4–5.3)
PROT SERPL-MCNC: 7.9 G/DL (ref 6.8–8.8)
RBC # BLD AUTO: 5.56 10E12/L (ref 4.4–5.9)
SODIUM SERPL-SCNC: 136 MMOL/L (ref 133–144)
WBC # BLD AUTO: 13 10E9/L (ref 4–11)

## 2020-05-26 PROCEDURE — 99222 1ST HOSP IP/OBS MODERATE 55: CPT | Mod: AI | Performed by: INTERNAL MEDICINE

## 2020-05-26 PROCEDURE — 85025 COMPLETE CBC W/AUTO DIFF WBC: CPT | Performed by: EMERGENCY MEDICINE

## 2020-05-26 PROCEDURE — HZ2ZZZZ DETOXIFICATION SERVICES FOR SUBSTANCE ABUSE TREATMENT: ICD-10-PCS | Performed by: INTERNAL MEDICINE

## 2020-05-26 PROCEDURE — 80320 DRUG SCREEN QUANTALCOHOLS: CPT | Performed by: EMERGENCY MEDICINE

## 2020-05-26 PROCEDURE — 80053 COMPREHEN METABOLIC PANEL: CPT | Performed by: EMERGENCY MEDICINE

## 2020-05-26 PROCEDURE — 96375 TX/PRO/DX INJ NEW DRUG ADDON: CPT

## 2020-05-26 PROCEDURE — 25800030 ZZH RX IP 258 OP 636: Performed by: EMERGENCY MEDICINE

## 2020-05-26 PROCEDURE — 96376 TX/PRO/DX INJ SAME DRUG ADON: CPT

## 2020-05-26 PROCEDURE — 70450 CT HEAD/BRAIN W/O DYE: CPT

## 2020-05-26 PROCEDURE — 99285 EMERGENCY DEPT VISIT HI MDM: CPT | Mod: 25

## 2020-05-26 PROCEDURE — 83735 ASSAY OF MAGNESIUM: CPT | Performed by: EMERGENCY MEDICINE

## 2020-05-26 PROCEDURE — 25000128 H RX IP 250 OP 636: Performed by: EMERGENCY MEDICINE

## 2020-05-26 PROCEDURE — 96365 THER/PROPH/DIAG IV INF INIT: CPT

## 2020-05-26 PROCEDURE — 96361 HYDRATE IV INFUSION ADD-ON: CPT

## 2020-05-26 PROCEDURE — 25000128 H RX IP 250 OP 636

## 2020-05-26 PROCEDURE — 93005 ELECTROCARDIOGRAM TRACING: CPT

## 2020-05-26 PROCEDURE — 12000000 ZZH R&B MED SURG/OB

## 2020-05-26 PROCEDURE — 25000125 ZZHC RX 250: Performed by: EMERGENCY MEDICINE

## 2020-05-26 RX ORDER — ONDANSETRON 2 MG/ML
4 INJECTION INTRAMUSCULAR; INTRAVENOUS EVERY 30 MIN PRN
Status: DISCONTINUED | OUTPATIENT
Start: 2020-05-26 | End: 2020-05-26

## 2020-05-26 RX ORDER — GABAPENTIN 800 MG/1
800 TABLET ORAL 3 TIMES DAILY
Status: DISCONTINUED | OUTPATIENT
Start: 2020-05-27 | End: 2020-05-28

## 2020-05-26 RX ORDER — NALOXONE HYDROCHLORIDE 0.4 MG/ML
.1-.4 INJECTION, SOLUTION INTRAMUSCULAR; INTRAVENOUS; SUBCUTANEOUS
Status: DISCONTINUED | OUTPATIENT
Start: 2020-05-26 | End: 2020-05-31 | Stop reason: HOSPADM

## 2020-05-26 RX ORDER — POTASSIUM CHLORIDE 1.5 G/1.58G
20-40 POWDER, FOR SOLUTION ORAL
Status: DISCONTINUED | OUTPATIENT
Start: 2020-05-26 | End: 2020-05-31 | Stop reason: HOSPADM

## 2020-05-26 RX ORDER — ONDANSETRON 4 MG/1
4 TABLET, ORALLY DISINTEGRATING ORAL EVERY 6 HOURS PRN
Status: DISCONTINUED | OUTPATIENT
Start: 2020-05-26 | End: 2020-05-31 | Stop reason: HOSPADM

## 2020-05-26 RX ORDER — LIDOCAINE 40 MG/G
CREAM TOPICAL
Status: DISCONTINUED | OUTPATIENT
Start: 2020-05-26 | End: 2020-05-31 | Stop reason: HOSPADM

## 2020-05-26 RX ORDER — POTASSIUM CHLORIDE 7.45 MG/ML
10 INJECTION INTRAVENOUS
Status: DISCONTINUED | OUTPATIENT
Start: 2020-05-26 | End: 2020-05-31 | Stop reason: HOSPADM

## 2020-05-26 RX ORDER — ONDANSETRON 2 MG/ML
INJECTION INTRAMUSCULAR; INTRAVENOUS
Status: COMPLETED
Start: 2020-05-26 | End: 2020-05-26

## 2020-05-26 RX ORDER — CLONIDINE 0.2 MG/24H
1 PATCH, EXTENDED RELEASE TRANSDERMAL WEEKLY
Status: DISCONTINUED | OUTPATIENT
Start: 2020-05-27 | End: 2020-05-28

## 2020-05-26 RX ORDER — ONDANSETRON 2 MG/ML
4 INJECTION INTRAMUSCULAR; INTRAVENOUS EVERY 6 HOURS PRN
Status: DISCONTINUED | OUTPATIENT
Start: 2020-05-26 | End: 2020-05-31 | Stop reason: HOSPADM

## 2020-05-26 RX ORDER — POTASSIUM CHLORIDE 29.8 MG/ML
20 INJECTION INTRAVENOUS
Status: DISCONTINUED | OUTPATIENT
Start: 2020-05-26 | End: 2020-05-31 | Stop reason: HOSPADM

## 2020-05-26 RX ORDER — IBUPROFEN 600 MG/1
600 TABLET, FILM COATED ORAL EVERY 6 HOURS PRN
Status: DISCONTINUED | OUTPATIENT
Start: 2020-05-26 | End: 2020-05-28

## 2020-05-26 RX ORDER — POTASSIUM CL/LIDO/0.9 % NACL 10MEQ/0.1L
10 INTRAVENOUS SOLUTION, PIGGYBACK (ML) INTRAVENOUS
Status: DISCONTINUED | OUTPATIENT
Start: 2020-05-26 | End: 2020-05-31 | Stop reason: HOSPADM

## 2020-05-26 RX ORDER — LANOLIN ALCOHOL/MO/W.PET/CERES
100 CREAM (GRAM) TOPICAL DAILY
Status: COMPLETED | OUTPATIENT
Start: 2020-05-27 | End: 2020-05-30

## 2020-05-26 RX ORDER — FOLIC ACID 1 MG/1
1 TABLET ORAL DAILY
Status: DISCONTINUED | OUTPATIENT
Start: 2020-05-27 | End: 2020-05-31 | Stop reason: HOSPADM

## 2020-05-26 RX ORDER — CLONIDINE HYDROCHLORIDE 0.1 MG/1
0.1 TABLET ORAL 2 TIMES DAILY
Status: COMPLETED | OUTPATIENT
Start: 2020-05-27 | End: 2020-05-27

## 2020-05-26 RX ORDER — DIAZEPAM 10 MG/2ML
2.5 INJECTION, SOLUTION INTRAMUSCULAR; INTRAVENOUS ONCE
Status: COMPLETED | OUTPATIENT
Start: 2020-05-26 | End: 2020-05-26

## 2020-05-26 RX ORDER — POTASSIUM CHLORIDE 1500 MG/1
20-40 TABLET, EXTENDED RELEASE ORAL
Status: DISCONTINUED | OUTPATIENT
Start: 2020-05-26 | End: 2020-05-31 | Stop reason: HOSPADM

## 2020-05-26 RX ORDER — POLYETHYLENE GLYCOL 3350 17 G/17G
17 POWDER, FOR SOLUTION ORAL DAILY PRN
Status: DISCONTINUED | OUTPATIENT
Start: 2020-05-26 | End: 2020-05-31 | Stop reason: HOSPADM

## 2020-05-26 RX ORDER — MULTIPLE VITAMINS W/ MINERALS TAB 9MG-400MCG
1 TAB ORAL DAILY
Status: DISCONTINUED | OUTPATIENT
Start: 2020-05-27 | End: 2020-05-31 | Stop reason: HOSPADM

## 2020-05-26 RX ORDER — ONDANSETRON 2 MG/ML
INJECTION INTRAMUSCULAR; INTRAVENOUS
Status: DISCONTINUED
Start: 2020-05-26 | End: 2020-05-26 | Stop reason: HOSPADM

## 2020-05-26 RX ADMIN — ONDANSETRON 4 MG: 2 INJECTION INTRAMUSCULAR; INTRAVENOUS at 16:33

## 2020-05-26 RX ADMIN — FOLIC ACID: 5 INJECTION, SOLUTION INTRAMUSCULAR; INTRAVENOUS; SUBCUTANEOUS at 17:01

## 2020-05-26 RX ADMIN — ONDANSETRON 4 MG: 2 INJECTION INTRAMUSCULAR; INTRAVENOUS at 20:50

## 2020-05-26 RX ADMIN — DIAZEPAM 2.5 MG: 5 INJECTION, SOLUTION INTRAMUSCULAR; INTRAVENOUS at 20:07

## 2020-05-26 RX ADMIN — ONDANSETRON 4 MG: 2 INJECTION INTRAMUSCULAR; INTRAVENOUS at 19:01

## 2020-05-26 RX ADMIN — SODIUM CHLORIDE 1000 ML: 9 INJECTION, SOLUTION INTRAVENOUS at 16:32

## 2020-05-26 RX ADMIN — SODIUM CHLORIDE 1000 ML: 9 INJECTION, SOLUTION INTRAVENOUS at 20:06

## 2020-05-26 ASSESSMENT — ACTIVITIES OF DAILY LIVING (ADL)
NUMBER_OF_TIMES_PATIENT_HAS_FALLEN_WITHIN_LAST_SIX_MONTHS: 1
COGNITION: 0 - NO COGNITION ISSUES REPORTED
RETIRED_COMMUNICATION: 0-->UNDERSTANDS/COMMUNICATES WITHOUT DIFFICULTY
TRANSFERRING: 0-->INDEPENDENT
DRESS: 0-->INDEPENDENT
BATHING: 0-->INDEPENDENT
FALL_HISTORY_WITHIN_LAST_SIX_MONTHS: YES
RETIRED_EATING: 0-->INDEPENDENT
SWALLOWING: 0-->SWALLOWS FOODS/LIQUIDS WITHOUT DIFFICULTY
AMBULATION: 0-->INDEPENDENT
TOILETING: 0-->INDEPENDENT

## 2020-05-26 ASSESSMENT — MIFFLIN-ST. JEOR: SCORE: 1747.36

## 2020-05-26 NOTE — ED NOTES
Bed: FT03  Expected date:   Expected time:   Means of arrival:   Comments:  429 29M ETOH ETA 1551 outside of room 17

## 2020-05-26 NOTE — ED PROVIDER NOTES
"  History     Chief Complaint:  Alcohol Intoxication    The history is limited by the condition of the patient.      Mohit Porter is a 29 year old male with a history of alcohol abuse who presents via EMS with alcohol intoxication. Per nursing report, the patient was staying in a hotel in [place] for the past 6 days. Today, the hotel decided to check on him and saw he was on the floor, wrapped in sheets with Vodka bottles near by with altered mental status and nonverbal. Here in the ED, the patient is unable to speech.     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 No pertinent surgical history.    Family History:    Hypertension (Father)  Lipids (Father)    Social History:  Smoking status: No, current chew user   Alcohol use: Yes, 1 LT Vodka per week  Drug use: No  PCP: Physician No Ref-Primary  Marital Status:  Single [1]    Review of Systems   Unable to perform ROS: Mental status change     Physical Exam     Patient Vitals for the past 24 hrs:   BP Pulse Heart Rate Resp SpO2 Height Weight   05/26/20 1950 -- -- -- -- 92 % -- --   05/26/20 1949 -- -- -- -- 94 % -- --   05/26/20 1948 -- -- -- -- 96 % -- --   05/26/20 1947 -- -- -- -- 94 % -- --   05/26/20 1919 -- -- 115 12 98 % -- --   05/26/20 1918 -- -- 121 9 99 % -- --   05/26/20 1917 -- -- 120 11 96 % 1.778 m (5' 10\") 77.1 kg (170 lb)   05/26/20 1916 137/85 118 116 13 96 % -- --   05/26/20 1915 -- -- 105 15 95 % -- --   05/26/20 1914 -- -- 104 15 97 % -- --   05/26/20 1913 -- -- 112 10 97 % -- --   05/26/20 1912 -- -- 112 (!) 7 97 % -- --   05/26/20 1911 -- -- 120 16 97 % -- --   05/26/20 1901 -- -- 113 15 98 % -- --   05/26/20 1900 137/86 " 112 115 15 99 % -- --   05/26/20 1859 -- -- 118 13 -- -- --   05/26/20 1858 -- -- 117 15 -- -- --   05/26/20 1857 -- -- 125 14 -- -- --   05/26/20 1856 -- -- 141 (!) 35 -- -- --   05/26/20 1855 (!) 140/87 133 -- -- -- -- --   05/26/20 1830 (!) 150/132 126 -- -- -- -- --   05/26/20 1826 -- -- 131 26 99 % -- --   05/26/20 1825 -- -- 126 15 98 % -- --   05/26/20 1824 -- -- 130 30 96 % -- --   05/26/20 1823 -- -- 130 13 97 % -- --   05/26/20 1822 -- -- 129 18 97 % -- --   05/26/20 1821 -- -- 125 9 98 % -- --   05/26/20 1820 -- -- 128 12 98 % -- --   05/26/20 1819 -- -- 129 11 98 % -- --   05/26/20 1818 -- -- 126 17 96 % -- --   05/26/20 1817 -- -- 120 8 97 % -- --   05/26/20 1816 -- -- 121 19 98 % -- --   05/26/20 1815 -- -- 118 13 97 % -- --   05/26/20 1814 -- -- 120 13 97 % -- --   05/26/20 1813 -- -- 114 13 97 % -- --   05/26/20 1812 -- -- 112 9 98 % -- --   05/26/20 1811 -- -- 118 10 97 % -- --   05/26/20 1810 -- -- 118 (!) 32 98 % -- --   05/26/20 1809 -- -- 122 21 98 % -- --   05/26/20 1808 -- -- 125 (!) 33 98 % -- --   05/26/20 1807 -- -- 128 17 97 % -- --   05/26/20 1806 -- -- 128 21 98 % -- --   05/26/20 1805 -- -- 124 15 98 % -- --   05/26/20 1804 -- -- 126 11 99 % -- --   05/26/20 1803 -- -- 133 16 100 % -- --   05/26/20 1802 -- -- 122 14 97 % -- --   05/26/20 1801 -- -- 124 10 98 % -- --   05/26/20 1800 (!) 150/97 128 129 17 98 % -- --   05/26/20 1730 (!) 150/94 124 123 11 100 % -- --   05/26/20 1700 (!) 140/93 116 122 10 99 % -- --   05/26/20 1630 (!) 145/112 118 130 17 95 % -- --   05/26/20 1611 (!) 142/84 -- 126 24 99 % -- --   05/26/20 1605 -- -- 129 22 98 % -- --       Physical Exam  Nursing note and vitals reviewed.  Constitutional:  Appears well-developed and well-nourished.   HENT:   Head:    Atraumatic.   Mouth/Throat:   Oropharynx is clear and moist. No oropharyngeal exudate.   Eyes:    Pupils are 4 mm and equal, round, and reactive to light.   Neck:    Normal range of motion. Neck supple.  Non  tender.     No tracheal deviation present. No thyromegaly present.   Cardiovascular:  Normal rate, regular rhythm, no murmur   Pulmonary/Chest: Breath sounds are clear and equal without wheezes or crackles.  Abdominal:   Soft. Bowel sounds are normal. Exhibits no distension and      no mass. There is no tenderness.      There is no rebound and no guarding.   Musculoskeletal:  Exhibits no edema.   Lymphadenopathy:  No cervical adenopathy.   Neurological:   Awake, attempts to speak, but not intelligible.  Makes eye contact, but appears dazed.  Moves all extremities and crossed his legs.  Skin:    Skin is warm and dry. Rash to both sides of his back over each scapular area which appear as small brownish/pink grouped plaques with slight blanching.  No other rash, no petechia.    Emergency Department Course   ECG (16:43:51):  Rate 106 bpm. AZ interval 146. QRS duration 94. QT/QTc 364/483. P-R-T axes 44 68 19. Sinus tachycardia. Otherwise normal ECG. Interpreted at 1649 by Antonette Coker MD.     Imaging:  Radiographic findings were communicated with the patient who voiced understanding of the findings.    Head CT w/o Contrast  Normal head CT.   As read by Radiology.    Laboratory:  CBC: WBC 13.0 (H) o/w WNL (HGB 17.2, )  CMP: Anion gap 19 (H), Creatinine 0.59 (L), Calcium 8.4 (L). AST 80 (H) o/w WNL   Alcohol level blood: 0.49 (HH)  Magnesium: 2.3    Procedures:  None    Interventions:  1632: NS 1L IV Bolus   1633: Zofran 4 mg IV  1702: Infuvite IV Infusion  1901: Zofran 4 mg IV  2006: NS 1L IV Bolus     Emergency Department Course:  Past medical records, nursing notes, and vitals reviewed.  1613: I performed an exam of the patient and obtained history, as documented above.  EKG performed, results above.  The patient was sent for a head CT while in the emergency department, findings above.  IV inserted and blood drawn.    Findings and plan explained to the Patient who consents to admission.     2044: Discussed  the patient with Dr. Harding, who will admit the patient to a inpatient medical SUDS bed for further monitoring, evaluation, and treatment.     Impression & Plan    Medical Decision Making:  Mohit Porter is a 29 year old male who presents to the emergency department today for evaluation of alcohol intoxication. I found the patient to have altered mental status due to alcoholic encephalopathy due to acute alcohol intoxication. There was no intracranial bleed or skull fracture on CT imaging. He has been binge drinking Vodka for at least 6 days with multiple empty vodka bottle around homocidal ideation min his hotel room. He was tachycardic here which I feel is likely due to dehydration and hypokalemia in the setting of severe alcohol intoxication. He did vomit once here and I did not feel that he was safe to be discharged to detox due to his sinus tachycardia and vomiting. He was admitted to the medical telemetry floor in care of the hospitalist, Dr. Harding. I did not find any concern for sepsis or infection at this time.     Diagnosis:    ICD-10-CM    1. Alcohol intoxication, with delirium (H)  F10.921    2. Non-intractable vomiting with nausea, unspecified vomiting type  R11.2      Disposition:  Admitted to inpatient medical bed SUDS bed      David Wolff  5/26/2020    EMERGENCY DEPARTMENT  I, David Wolff, am serving as a scribe at 4:13 PM on 5/26/2020 to document services personally performed by Antonette Coker MD based on my observations and the provider's statements to me.        Antonette Coker MD  05/27/20 0015

## 2020-05-26 NOTE — ED TRIAGE NOTES
Pt found in hotel rolled up in sheets on floor. Several liters of vodka noted around him. Pt mostly nonverbal.

## 2020-05-26 NOTE — ED NOTES
DATE:  5/26/2020   TIME OF RECEIPT FROM LAB:  5:21 PM  LAB TEST:  ETOH  LAB VALUE:  0.49  RESULTS GIVEN WITH READ-BACK TO (PROVIDER):  Antonette Coker MD  TIME LAB VALUE REPORTED TO PROVIDER:   8643

## 2020-05-27 LAB
ALBUMIN SERPL-MCNC: 3.3 G/DL (ref 3.4–5)
ALP SERPL-CCNC: 56 U/L (ref 40–150)
ALT SERPL W P-5'-P-CCNC: 40 U/L (ref 0–70)
AMPHETAMINES UR QL SCN: NEGATIVE
ANION GAP SERPL CALCULATED.3IONS-SCNC: 11 MMOL/L (ref 3–14)
AST SERPL W P-5'-P-CCNC: 60 U/L (ref 0–45)
BARBITURATES UR QL: NEGATIVE
BENZODIAZ UR QL: NEGATIVE
BILIRUB SERPL-MCNC: 0.9 MG/DL (ref 0.2–1.3)
BUN SERPL-MCNC: 7 MG/DL (ref 7–30)
CALCIUM SERPL-MCNC: 8.3 MG/DL (ref 8.5–10.1)
CANNABINOIDS UR QL SCN: NEGATIVE
CHLORIDE SERPL-SCNC: 96 MMOL/L (ref 94–109)
CO2 SERPL-SCNC: 28 MMOL/L (ref 20–32)
COCAINE UR QL: NEGATIVE
CREAT SERPL-MCNC: 0.63 MG/DL (ref 0.66–1.25)
ERYTHROCYTE [DISTWIDTH] IN BLOOD BY AUTOMATED COUNT: 14.5 % (ref 10–15)
GFR SERPL CREATININE-BSD FRML MDRD: >90 ML/MIN/{1.73_M2}
GLUCOSE SERPL-MCNC: 79 MG/DL (ref 70–99)
HCT VFR BLD AUTO: 38 % (ref 40–53)
HGB BLD-MCNC: 13.1 G/DL (ref 13.3–17.7)
KETONES BLD-SCNC: 1 MMOL/L (ref 0–0.6)
KETONES BLD-SCNC: 1.7 MMOL/L (ref 0–0.6)
LACTATE BLD-SCNC: 0.9 MMOL/L (ref 0.7–2)
LACTATE BLD-SCNC: 3.2 MMOL/L (ref 0.7–2)
LACTATE BLD-SCNC: 5.5 MMOL/L (ref 0.7–2)
LACTATE BLD-SCNC: 6.4 MMOL/L (ref 0.7–2)
MAGNESIUM SERPL-MCNC: 2 MG/DL (ref 1.6–2.3)
MCH RBC QN AUTO: 30.4 PG (ref 26.5–33)
MCHC RBC AUTO-ENTMCNC: 34.5 G/DL (ref 31.5–36.5)
MCV RBC AUTO: 88 FL (ref 78–100)
OPIATES UR QL SCN: NEGATIVE
PCP UR QL SCN: NEGATIVE
PLATELET # BLD AUTO: 207 10E9/L (ref 150–450)
POTASSIUM SERPL-SCNC: 3.7 MMOL/L (ref 3.4–5.3)
PROT SERPL-MCNC: 6.3 G/DL (ref 6.8–8.8)
RBC # BLD AUTO: 4.31 10E12/L (ref 4.4–5.9)
SODIUM SERPL-SCNC: 135 MMOL/L (ref 133–144)
WBC # BLD AUTO: 10.7 10E9/L (ref 4–11)

## 2020-05-27 PROCEDURE — 85027 COMPLETE CBC AUTOMATED: CPT | Performed by: INTERNAL MEDICINE

## 2020-05-27 PROCEDURE — 80307 DRUG TEST PRSMV CHEM ANLYZR: CPT | Performed by: INTERNAL MEDICINE

## 2020-05-27 PROCEDURE — 83735 ASSAY OF MAGNESIUM: CPT | Performed by: INTERNAL MEDICINE

## 2020-05-27 PROCEDURE — 36415 COLL VENOUS BLD VENIPUNCTURE: CPT | Performed by: INTERNAL MEDICINE

## 2020-05-27 PROCEDURE — 25000132 ZZH RX MED GY IP 250 OP 250 PS 637: Performed by: INTERNAL MEDICINE

## 2020-05-27 PROCEDURE — 82010 KETONE BODYS QUAN: CPT | Performed by: NURSE PRACTITIONER

## 2020-05-27 PROCEDURE — 83605 ASSAY OF LACTIC ACID: CPT | Performed by: INTERNAL MEDICINE

## 2020-05-27 PROCEDURE — 25800030 ZZH RX IP 258 OP 636: Performed by: INTERNAL MEDICINE

## 2020-05-27 PROCEDURE — 12000000 ZZH R&B MED SURG/OB

## 2020-05-27 PROCEDURE — 25800030 ZZH RX IP 258 OP 636: Performed by: NURSE PRACTITIONER

## 2020-05-27 PROCEDURE — 99232 SBSQ HOSP IP/OBS MODERATE 35: CPT | Performed by: INTERNAL MEDICINE

## 2020-05-27 PROCEDURE — 25000132 ZZH RX MED GY IP 250 OP 250 PS 637: Performed by: HOSPITALIST

## 2020-05-27 PROCEDURE — 25000128 H RX IP 250 OP 636: Performed by: INTERNAL MEDICINE

## 2020-05-27 PROCEDURE — 80053 COMPREHEN METABOLIC PANEL: CPT | Performed by: INTERNAL MEDICINE

## 2020-05-27 PROCEDURE — 99207 ZZC NON-BILLABLE SERV PER CHARTING: CPT | Performed by: NURSE PRACTITIONER

## 2020-05-27 PROCEDURE — 36415 COLL VENOUS BLD VENIPUNCTURE: CPT | Performed by: NURSE PRACTITIONER

## 2020-05-27 PROCEDURE — 83605 ASSAY OF LACTIC ACID: CPT | Performed by: NURSE PRACTITIONER

## 2020-05-27 PROCEDURE — 25000128 H RX IP 250 OP 636: Performed by: HOSPITALIST

## 2020-05-27 RX ORDER — SODIUM CHLORIDE, SODIUM LACTATE, POTASSIUM CHLORIDE, CALCIUM CHLORIDE 600; 310; 30; 20 MG/100ML; MG/100ML; MG/100ML; MG/100ML
INJECTION, SOLUTION INTRAVENOUS CONTINUOUS
Status: DISCONTINUED | OUTPATIENT
Start: 2020-05-27 | End: 2020-05-27

## 2020-05-27 RX ORDER — DIAZEPAM 5 MG
10 TABLET ORAL EVERY 30 MIN PRN
Status: DISCONTINUED | OUTPATIENT
Start: 2020-05-27 | End: 2020-05-30

## 2020-05-27 RX ORDER — DIAZEPAM 10 MG/2ML
5-10 INJECTION, SOLUTION INTRAMUSCULAR; INTRAVENOUS EVERY 30 MIN PRN
Status: DISCONTINUED | OUTPATIENT
Start: 2020-05-27 | End: 2020-05-30

## 2020-05-27 RX ORDER — QUETIAPINE FUMARATE 100 MG/1
100 TABLET, FILM COATED ORAL 2 TIMES DAILY
Status: ON HOLD | COMMUNITY
End: 2020-06-02

## 2020-05-27 RX ORDER — QUETIAPINE FUMARATE 100 MG/1
100 TABLET, FILM COATED ORAL
Status: DISCONTINUED | OUTPATIENT
Start: 2020-05-27 | End: 2020-05-31 | Stop reason: HOSPADM

## 2020-05-27 RX ORDER — PROCHLORPERAZINE 25 MG
25 SUPPOSITORY, RECTAL RECTAL EVERY 12 HOURS PRN
Status: DISCONTINUED | OUTPATIENT
Start: 2020-05-27 | End: 2020-05-31 | Stop reason: HOSPADM

## 2020-05-27 RX ORDER — NALTREXONE HYDROCHLORIDE 50 MG/1
100 TABLET, FILM COATED ORAL DAILY
COMMUNITY
End: 2020-10-17

## 2020-05-27 RX ORDER — QUETIAPINE FUMARATE 400 MG/1
400 TABLET, FILM COATED ORAL AT BEDTIME
Status: DISCONTINUED | OUTPATIENT
Start: 2020-05-27 | End: 2020-05-31 | Stop reason: HOSPADM

## 2020-05-27 RX ORDER — QUETIAPINE FUMARATE 400 MG/1
400 TABLET, FILM COATED ORAL AT BEDTIME
COMMUNITY
End: 2020-10-17

## 2020-05-27 RX ORDER — PROCHLORPERAZINE MALEATE 5 MG
5 TABLET ORAL EVERY 6 HOURS PRN
Status: DISCONTINUED | OUTPATIENT
Start: 2020-05-27 | End: 2020-05-31 | Stop reason: HOSPADM

## 2020-05-27 RX ORDER — MAGNESIUM SULFATE HEPTAHYDRATE 40 MG/ML
2 INJECTION, SOLUTION INTRAVENOUS DAILY PRN
Status: DISCONTINUED | OUTPATIENT
Start: 2020-05-27 | End: 2020-05-31 | Stop reason: HOSPADM

## 2020-05-27 RX ORDER — HYDROXYZINE HYDROCHLORIDE 25 MG/1
25-50 TABLET, FILM COATED ORAL EVERY 4 HOURS PRN
COMMUNITY
End: 2020-06-01

## 2020-05-27 RX ORDER — MAGNESIUM SULFATE HEPTAHYDRATE 40 MG/ML
4 INJECTION, SOLUTION INTRAVENOUS EVERY 4 HOURS PRN
Status: DISCONTINUED | OUTPATIENT
Start: 2020-05-27 | End: 2020-05-31 | Stop reason: HOSPADM

## 2020-05-27 RX ADMIN — SODIUM CHLORIDE, POTASSIUM CHLORIDE, SODIUM LACTATE AND CALCIUM CHLORIDE 2000 ML: 600; 310; 30; 20 INJECTION, SOLUTION INTRAVENOUS at 00:47

## 2020-05-27 RX ADMIN — ONDANSETRON 4 MG: 2 INJECTION INTRAMUSCULAR; INTRAVENOUS at 00:25

## 2020-05-27 RX ADMIN — DIAZEPAM 10 MG: 5 TABLET ORAL at 08:49

## 2020-05-27 RX ADMIN — QUETIAPINE 400 MG: 400 TABLET ORAL at 21:08

## 2020-05-27 RX ADMIN — PROCHLORPERAZINE MALEATE 5 MG: 5 TABLET ORAL at 15:09

## 2020-05-27 RX ADMIN — DEXTROSE AND SODIUM CHLORIDE: 5; 900 INJECTION, SOLUTION INTRAVENOUS at 08:07

## 2020-05-27 RX ADMIN — GABAPENTIN 800 MG: 800 TABLET, FILM COATED ORAL at 15:45

## 2020-05-27 RX ADMIN — PROCHLORPERAZINE EDISYLATE 5 MG: 5 INJECTION INTRAMUSCULAR; INTRAVENOUS at 03:45

## 2020-05-27 RX ADMIN — CLONIDINE HYDROCHLORIDE 0.1 MG: 0.1 TABLET ORAL at 00:30

## 2020-05-27 RX ADMIN — MULTIPLE VITAMINS W/ MINERALS TAB 1 TABLET: TAB at 08:19

## 2020-05-27 RX ADMIN — GABAPENTIN 800 MG: 800 TABLET, FILM COATED ORAL at 00:30

## 2020-05-27 RX ADMIN — CLONIDINE 1 PATCH: 0.2 PATCH TRANSDERMAL at 00:27

## 2020-05-27 RX ADMIN — ONDANSETRON 4 MG: 4 TABLET, ORALLY DISINTEGRATING ORAL at 14:02

## 2020-05-27 RX ADMIN — GABAPENTIN 800 MG: 800 TABLET, FILM COATED ORAL at 08:20

## 2020-05-27 RX ADMIN — PROCHLORPERAZINE MALEATE 5 MG: 5 TABLET ORAL at 08:20

## 2020-05-27 RX ADMIN — ONDANSETRON 4 MG: 2 INJECTION INTRAMUSCULAR; INTRAVENOUS at 06:27

## 2020-05-27 RX ADMIN — SODIUM CHLORIDE, POTASSIUM CHLORIDE, SODIUM LACTATE AND CALCIUM CHLORIDE: 600; 310; 30; 20 INJECTION, SOLUTION INTRAVENOUS at 03:33

## 2020-05-27 RX ADMIN — Medication 1 MG: at 00:30

## 2020-05-27 RX ADMIN — Medication 100 MG: at 08:20

## 2020-05-27 RX ADMIN — FOLIC ACID 1 MG: 1 TABLET ORAL at 08:20

## 2020-05-27 RX ADMIN — DEXTROSE AND SODIUM CHLORIDE: 5; 900 INJECTION, SOLUTION INTRAVENOUS at 14:46

## 2020-05-27 RX ADMIN — GABAPENTIN 800 MG: 800 TABLET, FILM COATED ORAL at 21:08

## 2020-05-27 RX ADMIN — CLONIDINE HYDROCHLORIDE 0.1 MG: 0.1 TABLET ORAL at 08:20

## 2020-05-27 RX ADMIN — QUETIAPINE FUMARATE 100 MG: 100 TABLET ORAL at 15:45

## 2020-05-27 RX ADMIN — QUETIAPINE FUMARATE 100 MG: 100 TABLET ORAL at 09:21

## 2020-05-27 RX ADMIN — CLONIDINE HYDROCHLORIDE 0.1 MG: 0.1 TABLET ORAL at 21:08

## 2020-05-27 ASSESSMENT — ACTIVITIES OF DAILY LIVING (ADL)
ADLS_ACUITY_SCORE: 17
ADLS_ACUITY_SCORE: 16
ADLS_ACUITY_SCORE: 17
ADLS_ACUITY_SCORE: 17

## 2020-05-27 NOTE — PROVIDER NOTIFICATION
MD Notification    Notified Person: NP    Notified Person Name: Cristóbal    Notification Date/Time: 5/27/2020 0320    Notification Interaction: Amcom     Purpose of Notification:    Room 601 E.P.     Critical lab value:    Ketone 1.7    Georgia Edwards RN    Orders Received:    Fluids ordered    Comments:

## 2020-05-27 NOTE — PROGRESS NOTES
Sepsis Evaluation Progress Note    I was called to see Mohit Porter due to abnormal vital signs triggering the Sepsis SIRS screening alert. He is not known to have an infection.     Physical Exam   Vital Signs:  Temp: 97.3  F (36.3  C)   BP: (!) 147/90 Pulse: 117 Heart Rate: 115 Resp: 17 SpO2: 97 % O2 Device: None (Room air)      Lab:  Lactic Acid   Date Value Ref Range Status   08/06/2019 2.1 (H) 0.7 - 2.0 mmol/L Final     Lactate for Sepsis Protocol   Date Value Ref Range Status   05/27/2020 6.4 (HH) 0.7 - 2.0 mmol/L Final     Comment:     Critical Value called to and read back by  TAMICA HO ON 66 AT 0030 BY II         The patient has signs of altered level of consciousness suspicious for alcohol intoxication.     The rest of their physical exam is significant for:    Gen: Pt sitting upright in bed, awake, alert, in no apparent distress, hiccups  Neuro: Awake, alert, clear speech  Resp: In no apparent respiratory distress, clear to auscultation bilaterally, no crackles or wheezes noted  CV: Mildly tachycardic, regular rate and rhythm, normal S1S2, no murmur, rub or gallop noted  GI: Soft, nondistended, nontender to palpation, no guarding or rebound tenderness noted  Skin: Warm, dry, no jaundice noted  Ext: No peripheral edema    Assessment & Plan   NO EVIDENCE OF SEPSIS at this time.  Vital sign, physical exam, and lab findings are likely due to alcohol use disorder, alcoholic hepatitis, alcoholic ketoacidosis, dehydration, vomiting.    Noted pt has received 3L IVF since admission.  Will administer an additional 2 L LR bolus now over 2 hours.  Will repeat lactic acid after completion of bolus.  Encourage PO intake as pt able.      Addendum: 0307: Pt's lactic acid slightly downtrending to 5.5 with noted ketones 1.7 after 2L LR bolus.  Will place pt on MIVF at 150 ml/hr.  Will repeat lactic acid and ketones in 4 hours.  Labs will likely take time to stabilize in setting of acute alcohol intoxication.   Encourage PO intake.      Disposition: The patient will remain on the current unit. We will continue to monitor this patient closely.     MILAGRO Mccarthy [facility]   [facility]    Sepsis Criteria   Sepsis: 2+ SIRS criteria due to infection  Severe Sepsis: Sepsis AND 1+ new sign of acute organ dysfunction (Note: lactate >2 is organ dysfunction)  Septic Shock: Sepsis AND hypotension despite volume resuscitation with 30 ml/kg crystalloid

## 2020-05-27 NOTE — PROGRESS NOTES
House NP note    I am following up on hyperlactatemia from overnight RRT.  Lactic acid now down trending to 3.2 suspect this is related to alcohol use & associated hypovolemia.     No charge.     Jammie Bowen CNP  Hospitalist - House [facility]  [phone]     Text Page

## 2020-05-27 NOTE — PLAN OF CARE
DATE & TIME: 5/26/2020 3916-4970   Cognitive Concerns/ Orientation : A&Ox3-4, disorient to time. Forgetful     BEHAVIOR & AGGRESSION TOOL COLOR: green  CIWA SCORE: 4, 6  ABNL VS/O2: VSS ex. HTN and tachycardic at times  MOBILITY: Ax1, GB  PAIN MANAGMENT: Denies  DIET: Regular  BOWEL/BLADDER: Voiding in urinal/ BR  ABNL LAB/BG:  Lactic 6.4---> 5.5. Ketone: 1.7.   Ethanol 0.49  DRAIN/DEVICES:  PIV infusing   TELEMETRY RHYTHM: N/a  SKIN: Generalized rash on back  TESTS/PROCEDURES: N/a  D/C DAY/GOALS/PLACE: Continue CIWA  OTHER IMPORTANT INFO:  PRN Zofran and compazine given for nausea. RRT called for lactic 6.4 see note. NP notified with repeat lactic result and critical ketone level. PRN melatonin given for sleep. Continue to monitor.

## 2020-05-27 NOTE — PROVIDER NOTIFICATION
MD Notification    Notified Person: MD    Notified Person Name: Kesha    Notification Date/Time: 5/27/2020 0800    Notification Interaction: Text     Purpose of Notification: Lactic 3.2    Orders Received: IVF changed to D5 with NS    Comments:

## 2020-05-27 NOTE — PROGRESS NOTES
RECEIVING UNIT ED HANDOFF REVIEW    ED Nurse Handoff Report was reviewed by: Vita Lobato RN on May 26, 2020 at 10:49 PM

## 2020-05-27 NOTE — H&P
"[facility]    History and Physical  Hospitalist       Date of Admission:  5/26/2020    Assessment & Plan   Mohit Porter is a 29 year old male with PMH of alcohol use disorder, alcoholic hepatitis, who presents with alcohol intoxication.    Alcohol intoxication  Alcohol use disorder  Patient has multiple ED visits and admissions for alcohol intoxication and withdrawal, including ED 3/12, 3/8, 2/28, 1/4, and admissions 12/2019 and 8/2019 per our records and CareEverywhere. He was found by hotel staff to be unresponsive and surround by alcohol. He reports binge drinking for 4 days. His ethanol level here is 0.49. AST is mildly elevated. He is significantly intoxicated from alcohol and does not appear to be in withdrawal to me. His story is not reliable as it does not correlate with his multiple health care visits for alcohol use. He asked for Ativan/Valium multiple times this admission in the absence of any alcohol withdrawal, so he is substance seeking in my opinion. There have been discussions in the past about commitment and this should be significantly considered this admission.  - Urine drug screen  - Give gabapentin 800mg TID + Clonidine 0.1mg x3 doses + Clonidine 0.2mg patch to prophylactically treat withdrawal  - CIWA, thiamine, folate. Will not order Ativan or Valium PRN, I think he needs to be reassessed by a provider before it is given  - Psychiatry consult  - Consider chem dep consult once patient detoxes    DVT Prophylaxis: Pneumatic Compression Devices  Code Status: Full Code    Disposition: Expected discharge 2+ days    Silver Harding MD    Primary Care Physician   Physician No Ref-Primary    Chief Complaint   Alcohol intoxication    History is obtained from the patient    History of Present Illness   Mohit Porter is a 29 year old male who presents with alcohol intoxication. He reports he was at a sober house up until 4 days ago when he was \"kicked out\" for drinking " alcohol. He has then been staying [place] [employer] and has been binge drinking. He reports drinking 1L of vodka a day, possibly more. He denies using other drugs, denies smoking. He reports other than drinking and being intoxicated he does not have any other concerns currently. He reports he was sober for 3 months prior to this recent binge. He denies chest pain, fevers, chills, abdominal pain. Reported vomiting earlier by ED provider however he denies any nausea currently. Not working currently. No sick contacts. He requested Ativan and Valium multiple times during our conversation.    He reports his mom is Darling and she would be the primary contact for him.    Past Medical History    I have reviewed this patient's medical history and updated it with pertinent information if needed.   Past Medical History:   Diagnosis Date     Alcohol abuse      Anxiety      Hepatitis C        Past Surgical History   I have reviewed this patient's surgical history and updated it with pertinent information if needed.  History reviewed. No pertinent surgical history.    Prior to Admission Medications   Prior to Admission Medications   Prescriptions Last Dose Informant Patient Reported? Taking?   QUEtiapine (SEROQUEL) 100 MG tablet   No No   Sig: Take 1 tablet (100 mg) by mouth 2 times daily   QUEtiapine (SEROQUEL) 400 MG tablet   No No   Sig: Take 1 tablet (400 mg) by mouth At Bedtime      Facility-Administered Medications: None     Allergies   No Known Allergies    Social History   I have reviewed this patient's social history and updated it with pertinent information if needed. Mohit Crouch [place]  reports that he has never smoked. His smokeless tobacco use includes chew. He reports current alcohol use. He reports that he does not use drugs.    Family History   I have reviewed this patient's family history and updated it with pertinent information if needed.   History reviewed. No pertinent family history.    Review of Systems    The 10 point Review of Systems is negative other than noted in the HPI or here.    Physical Exam       BP: (!) 150/90 Pulse: 118 Heart Rate: 131 Resp: 11 SpO2: 98 %      Vital Signs with Ranges  Pulse:  [112-133] 118  Heart Rate:  [104-141] 131  Resp:  [7-35] 11  BP: (137-150)/() 150/90  SpO2:  [92 %-100 %] 98 %  170 lbs 0 oz    Constitutional: Male, intoxicated, not tremulous  Eyes: PERRL, nonicteric  HEENT: Normocephalic, atraumatic, oral mucosa moist, no buccal fasciculations  Respiratory: CTAB, no wheezing or crackles  Cardiovascular: Tachycardic, no m/r/g  GI: No organomegaly, normoactive bowel sounds, nontender  Vascular: Palpable peripheral pulses in upper and lower extremities, no lower extremity pitting edema  Skin: No rashes or scars  Musculoskeletal: Moves all extremities, not tremulous  Neurologic: A&Ox3, slurring words and sentences  Psychiatric: Intoxicated    Data   Data reviewed today:  I personally reviewed CT head.  Recent Labs   Lab 05/26/20  1631   WBC 13.0*   HGB 17.2   MCV 88         POTASSIUM 3.8   CHLORIDE 94   CO2 23   BUN 12   CR 0.59*   ANIONGAP 19*   ARYA 8.4*   GLC 85   ALBUMIN 4.1   PROTTOTAL 7.9   BILITOTAL 0.7   ALKPHOS 79   ALT 54   AST 80*       Imaging:  Recent Results (from the past 24 hour(s))   Head CT w/o contrast    Narrative    CT OF THE HEAD WITHOUT CONTRAST 5/26/2020 5:02 PM     COMPARISON: None.    HISTORY: Check for bleed, altered mental status, found on floor in  hotel room, history of alcoholism.    TECHNIQUE: Axial CT images of the head from the skull base to the  vertex were acquired without IV contrast.    FINDINGS: The ventricles and basal cisterns are within normal limits  in configuration. There is no midline shift. There are no extra-axial  fluid collections.  Gray-white differentiation is well maintained.    No intracranial hemorrhage, mass or recent infarct.    The visualized paranasal sinuses are well-aerated. There is no  mastoiditis.  There are no fractures of the visualized bones.      Impression    IMPRESSION:  Normal head CT.      Radiation dose for this scan was reduced using automated exposure  control, adjustment of the mA and/or kV according to patient size, or  iterative reconstruction technique    MELQUIADES BOUDREAUX MD

## 2020-05-27 NOTE — PHARMACY-ADMISSION MEDICATION HISTORY
Pharmacy Medication History  Admission medication history interview status for the 5/26/2020  admission is complete. See EPIC admission navigator for prior to admission medications     Medication history sources: [facility] [phone]  Medication history source reliability: good   Adherence assessment: unknown    Significant changes made to the medication list:  Added All meds      Additional medication history information:   Patient received 30 day supply of these meds upon discharge from [facility]. He states these are his medications    Medication reconciliation completed by provider prior to medication history? Yes    Time spent in this activity: 30 min      Prior to Admission medications    Medication Sig Last Dose Taking? Auth Provider   hydrOXYzine (ATARAX) 25 MG tablet Take 25-50 mg by mouth every 4 hours as needed for anxiety  at prn Yes Unknown, Entered By History   naltrexone (DEPADE/REVIA) 50 MG tablet Take 100 mg by mouth daily  at unknown Yes Unknown, Entered By History   QUEtiapine (SEROQUEL) 100 MG tablet Take 100 mg by mouth daily as needed   at prn Yes Unknown, Entered By History   QUEtiapine (SEROQUEL) 400 MG tablet Take 400 mg by mouth At Bedtime  at unknown Yes Unknown, Entered By History

## 2020-05-27 NOTE — PROVIDER NOTIFICATION
Called an RRT for lactic acid of 6.4. New admit, with alcohol withdrawals. Patient's nurse notified.

## 2020-05-27 NOTE — ED NOTES
Cooperative with care - ST on monitor - denies pain - medicated per MAR.  Continues to request water - has had 4 glasses from this writer.

## 2020-05-27 NOTE — PROVIDER NOTIFICATION
MD Notification    Notified Person: MD    Notified Person Name: Kesha    Notification Date/Time: 5/27/2020 0812    Notification Interaction: Text     Purpose of Notification: Patient scoring 11 on CIWA, no order for PRN Ativan or Valium     Orders Received: Valium protocol ordered    Comments:

## 2020-05-27 NOTE — ED NOTES
[facility]  ED Nurse Handoff Report    ED Chief complaint: Alcohol Intoxication      ED Diagnosis:   Final diagnoses:   Alcohol intoxication, with delirium (H)   Non-intractable vomiting with nausea, unspecified vomiting type       Code Status: Full Code    Allergies: No Known Allergies    Patient Story: hotel staff called 911 after finding him lying on the floor with numerous bottles of vodka around him.  Brought in via EMS - non-verbal at that time.  Hx of ETOH abuse  Focused Assessment:  Cooperative - denies pain - slurred speech - periodic vomiting despite anti emetics - continually using call light to request water and meds.  Repetitive questioning.    Treatments and/or interventions provided: IV NS 2L - Zofran - Valium - multiple bed baths given for frequent vomiting and urinary incontinence - depends applied.   Largely cooperative with care  Patient's response to treatments and/or interventions: vomiting less - sinus tach decreased after Valium IVP -     To be done/followed up on inpatient unit:  continue to monitor for DT    Does this patient have any cognitive concerns?: Alcohol/Drugs temporary cognitive concerns    Activity level - Baseline/Home:  Stand with assist x2  Activity Level - Current:   Stand with assist x2    Patient's Preferred language: English   Needed?: No    Isolation: None  Infection: Not Applicable  Bariatric?: No    Vital Signs:   Vitals:    05/26/20 2220 05/26/20 2225 05/26/20 2230 05/26/20 2235   BP:       Pulse:   117    Resp: 13 10 18 12   SpO2: 92% 96% 97% 96%   Weight:       Height:           Cardiac Rhythm:Cardiac Rhythm: Sinus tachycardia    Was the PSS-3 completed:   Yes  What interventions are required if any?               Family Comments: unknown - was staying at a hotel by himself  OBS brochure/video discussed/provided to patient/family: N/A              Name of person given brochure if not patient: NA              Relationship to patient:  NA    For the majority of the shift this patient's behavior was Green.   Behavioral interventions performed were NA.    ED NURSE PHONE NUMBER: [phone]

## 2020-05-27 NOTE — PROGRESS NOTES
[facility]    Hospitalist Progress Note    Date of Service (when I saw the patient): 05/27/2020    Assessment & Plan   Mohit Porter is a 29 year old male who was admitted on 5/26/2020.  Assessment & Plan     Mohit Porter is a 29 year old male with PMH of alcohol use disorder, alcoholic hepatitis, who presents with alcohol intoxication.     Alcohol intoxication  Alcohol use disorder  Patient has multiple ED visits and admissions for alcohol intoxication and withdrawal, including ED 3/12, 3/8, 2/28, 1/4, and admissions 12/2019 and 8/2019 per our records and CareEverywhere. He was found by hotel staff to be unresponsive and surround by alcohol. He reports binge drinking for 4 days. His ethanol level here is 0.49. AST is mildly elevated. He is significantly intoxicated from alcohol and does not appear to be in withdrawal to me. His story is not reliable as it does not correlate with his multiple health care visits for alcohol use. He asked for Ativan/Valium multiple times this admission in the absence of any alcohol withdrawal, so he is substance seeking in my opinion. There have been discussions in the past about commitment and this should be significantly considered this admission.  - Urine drug screen  - Give gabapentin 800mg TID + Clonidine 0.1mg x3 doses + Clonidine 0.2mg patch to prophylactically treat withdrawal  - CIWA, thiamine, folate.   Vallium PRN dosing per CIWA protocol ordered as pt scoring high on CIWA   - Psychiatry consult  - Consider chem dep consult once patient detoxes     DVT Prophylaxis: Pneumatic Compression Devices  Code Status: Full Code     Disposition: Expected discharge 2+ days       Mariah Rebolledo MD  [phone] (P)      Interval History   Resting comfortbly in bed. Asking for more vallium. No N/V. No SOB or chest pain     -Data reviewed today: I reviewed all new labs and imaging results over the last 24 hours. I personally reviewed no images or EKG's  today.    Physical Exam   Temp: 98.4  F (36.9  C) Temp src: Oral BP: 124/66 Pulse: 117 Heart Rate: 71 Resp: 16 SpO2: 99 % O2 Device: None (Room air)    Vitals:    05/26/20 1917   Weight: 77.1 kg (170 lb)     Vital Signs with Ranges  Temp:  [97.3  F (36.3  C)-98.4  F (36.9  C)] 98.4  F (36.9  C)  Pulse:  [112-133] 117  Heart Rate:  [] 71  Resp:  [7-35] 16  BP: (124-150)/() 124/66  SpO2:  [91 %-100 %] 99 %  I/O last 3 completed shifts:  In: 1220 [P.O.:1220]  Out: 2250 [Urine:2250]    Constitutional: Awake, alert, cooperative, no apparent distress  Respiratory: Clear to auscultation bilaterally, no crackles or wheezing  Cardiovascular: Regular rate and rhythm, normal S1 and S2, and no murmur noted  GI: Normal bowel sounds, soft, non-distended, non-tender  Skin/Integumen: No rashes, no cyanosis, no edema  Other:     Medications     dextrose 5% and 0.9% NaCl 150 mL/hr at 05/27/20 0807       cloNIDine  0.1 mg Oral BID     cloNIDine   Transdermal Q8H     cloNIDine  1 patch Transdermal Weekly     folic acid  1 mg Oral Daily     gabapentin  800 mg Oral TID     multivitamin w/minerals  1 tablet Oral Daily     QUEtiapine  100 mg Oral BID     QUEtiapine  400 mg Oral At Bedtime     sodium chloride (PF)  3 mL Intracatheter Q8H     vitamin B1  100 mg Oral Daily       Data   Recent Labs   Lab 05/27/20  0746 05/26/20  1631   WBC 10.7 13.0*   HGB 13.1* 17.2   MCV 88 88    296    136   POTASSIUM 3.7 3.8   CHLORIDE 96 94   CO2 28 23   BUN 7 12   CR 0.63* 0.59*   ANIONGAP 11 19*   ARYA 8.3* 8.4*   GLC 79 85   ALBUMIN 3.3* 4.1   PROTTOTAL 6.3* 7.9   BILITOTAL 0.9 0.7   ALKPHOS 56 79   ALT 40 54   AST 60* 80*       Recent Results (from the past 24 hour(s))   Head CT w/o contrast    Narrative    CT OF THE HEAD WITHOUT CONTRAST 5/26/2020 5:02 PM     COMPARISON: None.    HISTORY: Check for bleed, altered mental status, found on floor in  hotel room, history of alcoholism.    TECHNIQUE: Axial CT images of the head from  the skull base to the  vertex were acquired without IV contrast.    FINDINGS: The ventricles and basal cisterns are within normal limits  in configuration. There is no midline shift. There are no extra-axial  fluid collections.  Gray-white differentiation is well maintained.    No intracranial hemorrhage, mass or recent infarct.    The visualized paranasal sinuses are well-aerated. There is no  mastoiditis. There are no fractures of the visualized bones.      Impression    IMPRESSION:  Normal head CT.      Radiation dose for this scan was reduced using automated exposure  control, adjustment of the mA and/or kV according to patient size, or  iterative reconstruction technique    MELQUIADES BOUDREAUX MD

## 2020-05-27 NOTE — PROVIDER NOTIFICATION
MD Notification    Notified Person: NP    Notified Person Name: Cristóbal    Notification Date/Time: 5/27/2020 0307    Notification Interaction: Phone paged    Purpose of Notification:    Lactic 5.5    Orders Received:    Fluids ordered    Comments:

## 2020-05-27 NOTE — PLAN OF CARE
DATE & TIME: 5/27/2020 4256-7723          Cognitive Concerns/ Orientation : A&O x4, anxious at times and asking consistently for Ativan, Atarax, Valium, and Seroquel                 BEHAVIOR & AGGRESSION TOOL COLOR: Green  CIWA SCORE: 11 (Valium given x1), 0 (sleeping), 0 (sleeping), 7 (pt reported having a headache which is from hitting his head at the hotel)  ABNL VS/O2: VSS on RA, tachy at times  MOBILITY: SBA with gait belt   PAIN MANAGMENT: Denies  DIET: Regular  BOWEL/BLADDER: Voiding in urinal; urine sample sent  ABNL LAB/BG:  Lactic 5.5--> 3.2. Ketone: 1.0.   Ethanol 0.49 on admission 5/26  DRAIN/DEVICES:  IVF running at 150mL/hr  SKIN: Flushed, pale, bruised  TESTS/PROCEDURES: None  D/C DAY/GOALS/PLACE: Discharge 2+ days pending chem dep, and improvement of alcohol withdrawal symptoms  OTHER IMPORTANT INFO: LS clear, BS active x4. Compazine given x1  and Zofran given x1 for nausea. Pt reports numbness and tingling in hands which is baseline.Clonidine patch in place on L arm to help with withdrawal symptoms.

## 2020-05-27 NOTE — ED NOTES
Zofran given after an episode of emesis.  Pt attempted to get out of bed and keep asking for water.   Chantell Eng RN,.......................................... 5/26/2020   7:00 PM

## 2020-05-28 LAB
ANION GAP SERPL CALCULATED.3IONS-SCNC: 5 MMOL/L (ref 3–14)
BUN SERPL-MCNC: 5 MG/DL (ref 7–30)
CALCIUM SERPL-MCNC: 8.4 MG/DL (ref 8.5–10.1)
CHLORIDE SERPL-SCNC: 99 MMOL/L (ref 94–109)
CO2 SERPL-SCNC: 34 MMOL/L (ref 20–32)
CREAT SERPL-MCNC: 0.63 MG/DL (ref 0.66–1.25)
ERYTHROCYTE [DISTWIDTH] IN BLOOD BY AUTOMATED COUNT: 14.6 % (ref 10–15)
GFR SERPL CREATININE-BSD FRML MDRD: >90 ML/MIN/{1.73_M2}
GLUCOSE SERPL-MCNC: 77 MG/DL (ref 70–99)
HCT VFR BLD AUTO: 41.5 % (ref 40–53)
HGB BLD-MCNC: 13.4 G/DL (ref 13.3–17.7)
MAGNESIUM SERPL-MCNC: 2 MG/DL (ref 1.6–2.3)
MCH RBC QN AUTO: 29.8 PG (ref 26.5–33)
MCHC RBC AUTO-ENTMCNC: 32.3 G/DL (ref 31.5–36.5)
MCV RBC AUTO: 92 FL (ref 78–100)
PLATELET # BLD AUTO: 157 10E9/L (ref 150–450)
POTASSIUM SERPL-SCNC: 2.8 MMOL/L (ref 3.4–5.3)
POTASSIUM SERPL-SCNC: 3.2 MMOL/L (ref 3.4–5.3)
POTASSIUM SERPL-SCNC: 3.7 MMOL/L (ref 3.4–5.3)
RBC # BLD AUTO: 4.5 10E12/L (ref 4.4–5.9)
SODIUM SERPL-SCNC: 138 MMOL/L (ref 133–144)
WBC # BLD AUTO: 8.6 10E9/L (ref 4–11)

## 2020-05-28 PROCEDURE — 25000132 ZZH RX MED GY IP 250 OP 250 PS 637: Performed by: INTERNAL MEDICINE

## 2020-05-28 PROCEDURE — 85027 COMPLETE CBC AUTOMATED: CPT | Performed by: INTERNAL MEDICINE

## 2020-05-28 PROCEDURE — 83735 ASSAY OF MAGNESIUM: CPT | Performed by: INTERNAL MEDICINE

## 2020-05-28 PROCEDURE — 25000128 H RX IP 250 OP 636: Performed by: INTERNAL MEDICINE

## 2020-05-28 PROCEDURE — 99232 SBSQ HOSP IP/OBS MODERATE 35: CPT | Performed by: INTERNAL MEDICINE

## 2020-05-28 PROCEDURE — 36415 COLL VENOUS BLD VENIPUNCTURE: CPT | Performed by: INTERNAL MEDICINE

## 2020-05-28 PROCEDURE — 80048 BASIC METABOLIC PNL TOTAL CA: CPT | Performed by: INTERNAL MEDICINE

## 2020-05-28 PROCEDURE — 25800030 ZZH RX IP 258 OP 636: Performed by: INTERNAL MEDICINE

## 2020-05-28 PROCEDURE — 99221 1ST HOSP IP/OBS SF/LOW 40: CPT | Performed by: PSYCHIATRY & NEUROLOGY

## 2020-05-28 PROCEDURE — 84132 ASSAY OF SERUM POTASSIUM: CPT | Performed by: INTERNAL MEDICINE

## 2020-05-28 PROCEDURE — 25000132 ZZH RX MED GY IP 250 OP 250 PS 637: Performed by: PSYCHIATRY & NEUROLOGY

## 2020-05-28 PROCEDURE — 12000000 ZZH R&B MED SURG/OB

## 2020-05-28 RX ORDER — PANTOPRAZOLE SODIUM 40 MG/1
40 TABLET, DELAYED RELEASE ORAL
Status: DISCONTINUED | OUTPATIENT
Start: 2020-05-28 | End: 2020-05-31 | Stop reason: HOSPADM

## 2020-05-28 RX ORDER — ESCITALOPRAM OXALATE 5 MG/1
5 TABLET ORAL DAILY
Status: DISCONTINUED | OUTPATIENT
Start: 2020-05-28 | End: 2020-05-31 | Stop reason: HOSPADM

## 2020-05-28 RX ADMIN — MULTIPLE VITAMINS W/ MINERALS TAB 1 TABLET: TAB at 08:00

## 2020-05-28 RX ADMIN — POTASSIUM CHLORIDE 40 MEQ: 1500 TABLET, EXTENDED RELEASE ORAL at 17:07

## 2020-05-28 RX ADMIN — Medication 100 MG: at 08:00

## 2020-05-28 RX ADMIN — PANTOPRAZOLE SODIUM 40 MG: 40 TABLET, DELAYED RELEASE ORAL at 09:59

## 2020-05-28 RX ADMIN — ONDANSETRON 4 MG: 4 TABLET, ORALLY DISINTEGRATING ORAL at 08:08

## 2020-05-28 RX ADMIN — QUETIAPINE FUMARATE 100 MG: 100 TABLET ORAL at 08:00

## 2020-05-28 RX ADMIN — DEXTROSE AND SODIUM CHLORIDE: 5; 900 INJECTION, SOLUTION INTRAVENOUS at 01:13

## 2020-05-28 RX ADMIN — POTASSIUM CHLORIDE 40 MEQ: 1500 TABLET, EXTENDED RELEASE ORAL at 09:55

## 2020-05-28 RX ADMIN — ESCITALOPRAM 5 MG: 5 TABLET, FILM COATED ORAL at 17:07

## 2020-05-28 RX ADMIN — QUETIAPINE FUMARATE 100 MG: 100 TABLET ORAL at 15:15

## 2020-05-28 RX ADMIN — POTASSIUM CHLORIDE 40 MEQ: 1500 TABLET, EXTENDED RELEASE ORAL at 11:50

## 2020-05-28 RX ADMIN — POTASSIUM CHLORIDE 20 MEQ: 1500 TABLET, EXTENDED RELEASE ORAL at 19:04

## 2020-05-28 RX ADMIN — GABAPENTIN 800 MG: 800 TABLET, FILM COATED ORAL at 08:01

## 2020-05-28 RX ADMIN — FOLIC ACID 1 MG: 1 TABLET ORAL at 08:00

## 2020-05-28 RX ADMIN — QUETIAPINE 400 MG: 400 TABLET ORAL at 21:44

## 2020-05-28 RX ADMIN — DEXTROSE AND SODIUM CHLORIDE: 5; 900 INJECTION, SOLUTION INTRAVENOUS at 08:02

## 2020-05-28 ASSESSMENT — ACTIVITIES OF DAILY LIVING (ADL)
ADLS_ACUITY_SCORE: 15
ADLS_ACUITY_SCORE: 15
ADLS_ACUITY_SCORE: 16
ADLS_ACUITY_SCORE: 15
ADLS_ACUITY_SCORE: 16
ADLS_ACUITY_SCORE: 16

## 2020-05-28 NOTE — CONSULTS
Pt seen for initial psychiatric evaluation, please see my dictation for details and recommendations. Patient states he is interested in CD treatment.     Aaron Hagen MD

## 2020-05-28 NOTE — CONSULTS
"Consult Date:  05/28/2020      REASON FOR CONSULTATION:  Alcohol use.      REQUESTING PHYSICIAN:  Mariah Rebolledo MD      IDENTIFYING DATA:  Mohit Porter is a 29-year-old man who reports he is single and has an almost 3-year-old daughter.  He is currently unemployed and presented to [facility] ED via EMS on account of alcohol intoxication.  He reportedly was staying at a hotel in [place] for the preceding 6 days, had been found on the floor wrapped in sheets with vodka bottles nearby, with altered mental status, and was said to have been nonverbal.  He was found to be in mild alcohol withdrawal and was admitted for stabilization.  Psychiatry was consulted on account of the patient's significant alcohol use disorder.      CHIEF COMPLAINT:  \"I really want to quit.  Drinking just gets me quite depressed.\"      HISTORY OF PRESENT ILLNESS:  Mohit Porter reports an established history of alcohol use disorder and major depressive disorder.  He tells me that he has been through residential CD treatment on 3 separate occasions, the most recent being at [facility].  He reports he completed that program and was in their medium intensity program where he unfortunately relapsed on alcohol use and was kicked out from the program.  He states he then went to a hotel and started drinking again, as he could not figure out a way around his relapse.  Most recently, he states he was in outpatient treatment at [address] [place] [address], also relapsed in that program, and was kicked out.  He was in a hotel for about 6 days where he had been binge drinking vodka until he passed out.  Of note, in the ED, his blood alcohol was recorded as 0.49.  Of note, he has had ED visits on account of his alcohol use on multiple occasions in the past 6 months, numbering at least 5-6.  Patient tells me that he is interested in resuming CD treatment, either in a medium intensity outpatient program or a residential program.  " He tells me that his ex-partner has not allowed him to see his daughter, and this is a source of stress for him.  He reports neurovegetative symptoms of depression, positive for insomnia, depressed mood and feelings of hopelessness.  However, he denies experiencing any self-harm thoughts, plans or intent and denies that he has ever been psychiatrically hospitalized.  He reports that he was once on mirtazapine, but it caused him to gain a significant amount of weight and rendered him impotent.  He is currently taking Seroquel at 400 mg at bedtime and also takes a couple of doses as needed during the course of the day.  He denies history consistent with lauren or psychosis and denies history consistent with panic attacks, obsessions, compulsions or symptoms suggestive of PTSD.  He does not endorse history of ADHD or an eating disorder.  The patient reports that his alcohol use got out of control while he was attending college and was taking night classes.  He states he was using alcohol to facilitate sleep at the time, but he has since become more dependent.  He denies that he has ever had any alcohol withdrawal seizures.      PAST PSYCHIATRIC HISTORY:  As described above, the patient has never had inpatient psychiatric treatment.  He denies ever undergoing electroconvulsive therapy, and he has now been civilly committed.      CHEMICAL USE HISTORY:  The patient has significant alcohol use disorder.  He has been through detox multiple times including at [facility] before he was sent to [facility].  He admits to drinking on a daily basis and drinks vodka preferentially.  He is unable to quantify the amount he drinks heavily and reports that he has failed multiple attempts at quitting alcohol use.  He admits to experiencing blackouts but denies alcohol withdrawal seizures or DTs.  He started drinking alcohol when he was 18, and it became a problem when he was 23.  He has obtained at least 1 DWI and  has lost employment on account of his alcohol use, in addition to being estranged from his daughter's mother.      PAST MEDICAL HISTORY:  The patient reports being in good physical health and denies any chronic illnesses apart from a history of hepatitis C.      PAST SURGICAL HISTORY:  None reported.      ALLERGIES:  NO KNOWN DRUG ALLERGIES.      MEDICATIONS PRIOR TO ADMISSION:   1.  Naltrexone 100 mg p.o. daily.   2.  Seroquel 100 mg p.o. daily p.r.n.   3.  Seroquel 400 mg p.o. at bedtime.   4.  Atarax 25-50 mg q.4 hours p.r.n. anxiety.      FAMILY PSYCHIATRIC HISTORY:  The patient reports that he has 2 maternal uncles with alcohol use disorder.      SOCIAL HISTORY:  The patient reports that he was born and raised in [place].  He has 2 younger brothers.  He denies history of physical, emotional or sexual abuse.  He reports attending [employer] for his undergraduate studies and states he started post-graduate studies but never completed the program.  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  He subsequently worked as a  and  but is currently unemployed.  He has obtained 1 DWI in the past and denies any  history.      REVIEW OF SYSTEMS:  I refer the reader to the 10-point review of systems documented by Dr. Silver Harding on 05/26/2020 at 9:52 p.m.      VITAL SIGNS:  Blood pressure 139/99, pulse 117, respirations 16, temperature 97.6, weight 170 pounds.      MENTAL STATUS EXAMINATION:  This is a young man who appears his stated age of 29.  He wears a light beard and is dressed in hospital scrubs.  He makes good eye contact and speaks clearly and coherently.  His mood is described as depressed, but his affect is full range.  His thought process is logical, relevant and goal directed.  He does not endorse the presence of auditory or visual hallucinations, and there are no delusions elicited.  He is future oriented.  His language is  appropriate.  His muscle strength is adequate.  His recall of recent and remote events is fair.  Risk assessment at this time is considered moderate on account of his continued use of alcohol despite the consequences.  His attention and concentration are good.  He displays fair insight and limited judgment.      DIAGNOSTIC IMPRESSION:  Mohit Porter is a 29-year-old single father of 1 with established history of alcohol use disorder who presents to the hospital on account of alcohol intoxication.  Psychiatry was consulted to render an opinion on his alcohol use disorder and recidivism.  The patient states he is willing to pursue either outpatient or inpatient treatment and is willing to complete a CD assessment to facilitate this.      DIAGNOSES:   1.  Alcohol use disorder, severe.   2.  Adjustment disorder with mixed anxiety and depressed mood.   3.  History of hepatitis C.      RECOMMENDATIONS:   1.  Medical management as you are.   2.  The patient to complete a CD assessment and follow through with recommendations.  Continue PTA medications and add escitalopram 10 mg daily.      Thank you for the consultation.         ARELI WOODS MD             D: 2020   T: 2020   MT:       Name:     MOHIT PORTER   MRN:      [id number]        Account:       [id number]   :      1990           Consult Date:  2020      Document: [id number]       cc: Mariah Rebolledo MD

## 2020-05-28 NOTE — PLAN OF CARE
DATE & TIME: 5/28/2020 0142-5544    Cognitive Concerns/ Orientation : A&O x4, flat affect.    BEHAVIOR & AGGRESSION TOOL COLOR: green, calm and cooperative with care.   CIWA SCORE: 2/2 for nausea and anxiety   ABNL VS/O2: vss, on RA, HR tachycardic in low 100s.   MOBILITY: ind  PAIN MANAGMENT: denied pain   DIET: good appetite, ordering multiple meals.   BOWEL/BLADDER: reported several diarrhea during shift.   ABNL LAB/BG: K+2.8, replaced, recheck at 1600-3.2, replaced again, recheck at 2300 and am.   DRAIN/DEVICES: PIV on left arm, Sl  TELEMETRY RHYTHM: na  SKIN: intact.   TESTS/PROCEDURES: CD/psych consult.   D/C DAY/GOALS/PLACE: pending   OTHER IMPORTANT INFO: na

## 2020-05-28 NOTE — CONSULTS
5/28/20 chem dep consult acknowledged. Expect consult will be completed by 5/30/20 or earlier. If patient is appropriate for discharge prior to being seen by chem dep, and they have active insurance, an outpatient evaluation can be scheduled by calling [facility] at [phone].    [facility], [facility]

## 2020-05-28 NOTE — PROGRESS NOTES
[facility]    Hospitalist Progress Note    Date of Service (when I saw the patient): 05/28/2020    Assessment & Plan   Mohit Porter is a 29 year old male who was admitted on 5/26/2020.  Assessment & Plan     Mohit Porter is a 29 year old male with PMH of alcohol use disorder, alcoholic hepatitis, who presents with alcohol intoxication.     Alcohol intoxication  Alcohol use disorder  Lactic and ketoacidosis due to alcohol intoxication and starvation ketosis  Patient has multiple ED visits and admissions for alcohol intoxication and withdrawal, including ED 3/12, 3/8, 2/28, 1/4, and admissions 12/2019 and 8/2019 per our records and CareEverywhere. He was found by hotel staff to be unresponsive and surround by alcohol. He reports binge drinking for 4 days. His ethanol level here is 0.49. AST is mildly elevated. He is significantly intoxicated from alcohol and does not appear to be in withdrawal to me. His story is not reliable as it does not correlate with his multiple health care visits for alcohol use. He asked for Ativan/Valium multiple times this admission in the absence of any alcohol withdrawal, so he is substance seeking in my opinion. There have been discussions in the past about commitment and this should be significantly considered this admission.  - Urine drug screen returned negative  - Give gabapentin 800mg TID + Clonidine 0.1mg x3 doses + Clonidine 0.2mg patch to prophylactically treat withdrawal.  Will DC clonidine patch, gabapentin and clonidine today as withdrawal seem  to be improved  - CIWA, thiamine, folate.   Vallium PRN dosing per CIWA protocol .  He is scoring less today on the protocol  - Psychiatry consult  CHEM Depp consult placed    Lactic acidosis and ketoacidosis resolved with IV fluid hydration with  glucose     DVT Prophylaxis: Pneumatic Compression Devices  Code Status: Full Code     Disposition: Expected discharge 1- 2+ days after CHEM Depp and psych  mirna Rebolledo MD  [phone] (P)      Interval History   Resting comfortbly in bed.  Alcohol withdrawal improved.  Scoring low on the CIWA protocol.  Lactic acidosis resolved    -Data reviewed today: I reviewed all new labs and imaging results over the last 24 hours. I personally reviewed no images or EKG's today.    Physical Exam   Temp: 97.6  F (36.4  C) Temp src: Oral BP: (!) 139/99   Heart Rate: 101 Resp: 16 SpO2: 99 % O2 Device: None (Room air)    Vitals:    05/26/20 1917   Weight: 77.1 kg (170 lb)     Vital Signs with Ranges  Temp:  [97.4  F (36.3  C)-98.5  F (36.9  C)] 97.6  F (36.4  C)  Heart Rate:  [] 101  Resp:  [16] 16  BP: (116-139)/(72-99) 139/99  SpO2:  [95 %-99 %] 99 %  I/O last 3 completed shifts:  In: 6415 [P.O.:1480; I.V.:4935]  Out: 4275 [Urine:4275]    Constitutional: Awake, alert, cooperative, no apparent distress  Respiratory: Clear to auscultation bilaterally, no crackles or wheezing  Cardiovascular: Regular rate and rhythm, normal S1 and S2, and no murmur noted  GI: Normal bowel sounds, soft, non-distended, non-tender  Skin/Integumen: No rashes, no cyanosis, no edema  Other:     Medications       cloNIDine   Transdermal Q8H     cloNIDine  1 patch Transdermal Weekly     folic acid  1 mg Oral Daily     gabapentin  800 mg Oral TID     multivitamin w/minerals  1 tablet Oral Daily     pantoprazole  40 mg Oral QAM AC     QUEtiapine  100 mg Oral BID     QUEtiapine  400 mg Oral At Bedtime     sodium chloride (PF)  3 mL Intracatheter Q8H     vitamin B1  100 mg Oral Daily       Data   Recent Labs   Lab 05/28/20  0840 05/27/20  0746 05/26/20  1631   WBC 8.6 10.7 13.0*   HGB 13.4 13.1* 17.2   MCV 92 88 88    207 296    135 136   POTASSIUM 2.8* 3.7 3.8   CHLORIDE 99 96 94   CO2 34* 28 23   BUN 5* 7 12   CR 0.63* 0.63* 0.59*   ANIONGAP 5 11 19*   ARYA 8.4* 8.3* 8.4*   GLC 77 79 85   ALBUMIN  --  3.3* 4.1   PROTTOTAL  --  6.3* 7.9   BILITOTAL  --  0.9 0.7   ALKPHOS  --  56 79    ALT  --  40 54   AST  --  60* 80*       No results found for this or any previous visit (from the past 24 hour(s)).

## 2020-05-28 NOTE — PLAN OF CARE
DATE & TIME: 5/27/2020 4858-7751        Cognitive Concerns/ Orientation : A&O x4, anxious at times               BEHAVIOR & AGGRESSION TOOL COLOR: Green  CIWA SCORE: 5, 4- for tremor and headache.   ABNL VS/O2: VSS on RA; tachy at times  MOBILITY: independent   PAIN MANAGMENT: Denies  DIET: Regular  BOWEL/BLADDER: cont. B/B. BM today.   ABNL LAB/BG:  AST 60  DRAIN/DEVICES: PIV NS infusing@ 150mL/hr  SKIN: Flushed, pale, bruised  TESTS/PROCEDURES: Drug screen (-).  D/C DAY/GOALS/PLACE: Discharge 2+ days pending chem dep consult  OTHER IMPORTANT INFO: Pt reports numbness and tingling in hands which is baseline.Clonidine patch in place on L arm to help with withdrawal symptoms.

## 2020-05-28 NOTE — PLAN OF CARE
DATE & TIME: 5/28/2020 5669-8151        Cognitive Concerns/ Orientation : A&O x4, anxious at times               BEHAVIOR & AGGRESSION TOOL COLOR: Green  CIWA SCORE: 3, _- for tremor, headache, and moist skin.  ABNL VS/O2: Q4h VSS on RA  MOBILITY: Independent   PAIN MANAGMENT: Denies  DIET: Regular  BOWEL/BLADDER: Continent of B&B.   ABNL LAB/BG:  N/A  DRAIN/DEVICES: PIV infusing@ 150mL/hr  SKIN: Flushed, pale, bruised  TESTS/PROCEDURES: N/A  D/C DAY/GOALS/PLACE: Discharge pending chem dep consult.  OTHER IMPORTANT INFO: Pt reports baseline numbness in fingertips.Clonidine patch in place.

## 2020-05-29 ENCOUNTER — APPOINTMENT (OUTPATIENT)
Dept: BEHAVIORAL HEALTH | Facility: CLINIC | Age: 30
End: 2020-05-29
Attending: FAMILY MEDICINE
Payer: COMMERCIAL

## 2020-05-29 LAB — POTASSIUM SERPL-SCNC: 3.6 MMOL/L (ref 3.4–5.3)

## 2020-05-29 PROCEDURE — 12000000 ZZH R&B MED SURG/OB

## 2020-05-29 PROCEDURE — H0001 ALCOHOL AND/OR DRUG ASSESS: HCPCS | Mod: HF

## 2020-05-29 PROCEDURE — 25000132 ZZH RX MED GY IP 250 OP 250 PS 637: Performed by: INTERNAL MEDICINE

## 2020-05-29 PROCEDURE — 25000132 ZZH RX MED GY IP 250 OP 250 PS 637: Performed by: PSYCHIATRY & NEUROLOGY

## 2020-05-29 PROCEDURE — 84132 ASSAY OF SERUM POTASSIUM: CPT | Performed by: INTERNAL MEDICINE

## 2020-05-29 PROCEDURE — 25000128 H RX IP 250 OP 636: Performed by: INTERNAL MEDICINE

## 2020-05-29 PROCEDURE — 99232 SBSQ HOSP IP/OBS MODERATE 35: CPT | Performed by: INTERNAL MEDICINE

## 2020-05-29 PROCEDURE — 36415 COLL VENOUS BLD VENIPUNCTURE: CPT | Performed by: INTERNAL MEDICINE

## 2020-05-29 RX ADMIN — MULTIPLE VITAMINS W/ MINERALS TAB 1 TABLET: TAB at 08:50

## 2020-05-29 RX ADMIN — ONDANSETRON 4 MG: 4 TABLET, ORALLY DISINTEGRATING ORAL at 13:34

## 2020-05-29 RX ADMIN — QUETIAPINE FUMARATE 100 MG: 100 TABLET ORAL at 15:22

## 2020-05-29 RX ADMIN — Medication 100 MG: at 08:50

## 2020-05-29 RX ADMIN — FOLIC ACID 1 MG: 1 TABLET ORAL at 08:50

## 2020-05-29 RX ADMIN — Medication 1 MG: at 22:57

## 2020-05-29 RX ADMIN — ESCITALOPRAM 5 MG: 5 TABLET, FILM COATED ORAL at 08:50

## 2020-05-29 RX ADMIN — PANTOPRAZOLE SODIUM 40 MG: 40 TABLET, DELAYED RELEASE ORAL at 08:49

## 2020-05-29 RX ADMIN — QUETIAPINE FUMARATE 100 MG: 100 TABLET ORAL at 05:48

## 2020-05-29 RX ADMIN — QUETIAPINE 400 MG: 400 TABLET ORAL at 21:25

## 2020-05-29 ASSESSMENT — ACTIVITIES OF DAILY LIVING (ADL)
ADLS_ACUITY_SCORE: 16
ADLS_ACUITY_SCORE: 15
ADLS_ACUITY_SCORE: 15
ADLS_ACUITY_SCORE: 16
ADLS_ACUITY_SCORE: 15
ADLS_ACUITY_SCORE: 15

## 2020-05-29 NOTE — PLAN OF CARE
Cognitive Concerns/ Orientation : A&O x4, flat affect.    BEHAVIOR & AGGRESSION TOOL COLOR: green  CIWA SCORE: 0, 2    ABNL VS/O2: VSS on RA  MOBILITY: ind  PAIN MANAGMENT: denied pain   DIET: good appetite, ordering multiple meals.   BOWEL/BLADDER:   ABNL LAB/BG: K+2.8, replaced, recheck 3.7   DRAIN/DEVICES: PIV on left arm, Sl  TELEMETRY RHYTHM: na  SKIN: intact.   TESTS/PROCEDURES:    D/C DAY/GOALS/PLACE: pending   OTHER IMPORTANT INFO: Chemical dependence consult pending

## 2020-05-29 NOTE — PLAN OF CARE
DATE & TIME: 5/28/20201930 to 2330                   Cognitive Concerns/ Orientation : A&O x4, flat affect.    BEHAVIOR & AGGRESSION TOOL COLOR: green, calm and cooperative with care.   CIWA SCORE: 2/2 for nausea and anxiety    ABNL VS/O2: vss, on RA, HR tachycardic in low 100s.   MOBILITY: ind  PAIN MANAGMENT: denied pain   DIET: good appetite, ordering multiple meals.   BOWEL/BLADDER: reported several diarrhea during shift.   ABNL LAB/BG: K+3.2, replaced, recheck at 2300 replaced again, recheck at 2300 and am.   DRAIN/DEVICES: PIV on left arm, Sl  TELEMETRY RHYTHM: na  SKIN: intact.   TESTS/PROCEDURES: CD/psych consult.   D/C DAY/GOALS/PLACE: pending   OTHER IMPORTANT INFO: na

## 2020-05-29 NOTE — PLAN OF CARE
Addendum              []Hide copied text    []Hover for details  Cognitive Concerns/ Orientation : A&O x4, flat affect.    BEHAVIOR & AGGRESSION TOOL COLOR: green  CIWA SCORE: 2, 2       ABNL VS/O2: VSS on RA  MOBILITY: ind  PAIN MANAGMENT: denied pain   DIET: c/o intermittent nausea-given PO zofran-tolerating diet well.  BOWEL/BLADDER: independent-reports no issues.  ABNL LAB/BG: K+2.8, replaced, recheck was 3.7- now 3.6.  DRAIN/DEVICES: PIV on left arm discontinued-irritating and drainage.  TELEMETRY RHYTHM: na  SKIN: intact.   TESTS/PROCEDURES:    D/C DAY/GOALS/PLACE: pending   OTHER IMPORTANT INFO: Chemical dependence consult done today-plan in progress-see Chem Dep note.

## 2020-05-29 NOTE — PROGRESS NOTES
[facility]    Hospitalist Progress Note    Date of Service (when I saw the patient): 05/29/2020    Assessment & Plan   Mohit Porter is a 29 year old male who was admitted on 5/26/2020.  Assessment & Plan     Mohit Porter is a 29 year old male with PMH of alcohol use disorder, alcoholic hepatitis, who presents with alcohol intoxication.     Alcohol intoxication  Alcohol use disorder  Lactic and ketoacidosis due to alcohol intoxication and starvation ketosis  Patient has multiple ED visits and admissions for alcohol intoxication and withdrawal, including ED 3/12, 3/8, 2/28, 1/4, and admissions 12/2019 and 8/2019 per our records and CareEverywhere. He was found by hotel staff to be unresponsive and surround by alcohol. He reports binge drinking for 4 days. His ethanol level here is 0.49. AST is mildly elevated. He is significantly intoxicated from alcohol and does not appear to be in withdrawal to me. His story is not reliable as it does not correlate with his multiple health care visits for alcohol use. He asked for Ativan/Valium multiple times this admission in the absence of any alcohol withdrawal, so he is substance seeking in my opinion. There have been discussions in the past about commitment and this should be significantly considered this admission.  - Urine drug screen returned negative  - Give gabapentin 800mg TID + Clonidine 0.1mg x3 doses + Clonidine 0.2mg patch to prophylactically treat withdrawal.  Will DC clonidine patch, gabapentin and clonidine today as withdrawal seem  to be improved  - CIWA, thiamine, folate.   Vallium PRN dosing per CIWA protocol .  He is scoring less today on the protocol  Alcohol withdrawal improved   - Psychiatry consulted and recommended chem dep   CHEM Depp consult placed    Lactic acidosis and ketoacidosis resolved with IV fluid hydration with  glucose     DVT Prophylaxis: Pneumatic Compression Devices  Code Status: Full Code     Disposition:  Expected discharge 1- 2+ days after CHEM Depp mirna Rebolledo MD  [phone] (P)      Interval History   Resting comfortbly in bed.  Alcohol withdrawal improved.  Scoring low on the CIWA protocol.  Lactic acidosis resolved. No new issues today.     -Data reviewed today: I reviewed all new labs and imaging results over the last 24 hours. I personally reviewed no images or EKG's today.    Physical Exam   Temp: 98.1  F (36.7  C) Temp src: Oral BP: (!) 133/94 Pulse: 96 Heart Rate: 97 Resp: 16 SpO2: 99 % O2 Device: None (Room air)    Vitals:    05/26/20 1917   Weight: 77.1 kg (170 lb)     Vital Signs with Ranges  Temp:  [97.6  F (36.4  C)-98.2  F (36.8  C)] 98.1  F (36.7  C)  Pulse:  [89-96] 96  Heart Rate:  [] 97  Resp:  [16] 16  BP: (125-140)/(87-99) 133/94  SpO2:  [97 %-99 %] 99 %  I/O last 3 completed shifts:  In: 2275 [P.O.:1680; I.V.:595]  Out: 800 [Urine:800]    Constitutional: Awake, alert, cooperative, no apparent distress  Respiratory: Clear to auscultation bilaterally, no crackles or wheezing  Cardiovascular: Regular rate and rhythm, normal S1 and S2, and no murmur noted  GI: Normal bowel sounds, soft, non-distended, non-tender  Skin/Integumen: No rashes, no cyanosis, no edema  Other:     Medications       escitalopram  5 mg Oral Daily     folic acid  1 mg Oral Daily     multivitamin w/minerals  1 tablet Oral Daily     pantoprazole  40 mg Oral QAM AC     QUEtiapine  100 mg Oral BID     QUEtiapine  400 mg Oral At Bedtime     sodium chloride (PF)  3 mL Intracatheter Q8H     vitamin B1  100 mg Oral Daily       Data   Recent Labs   Lab 05/29/20  1001 05/28/20  2239 05/28/20  1631 05/28/20  0840 05/27/20  0746 05/26/20  1631   WBC  --   --   --  8.6 10.7 13.0*   HGB  --   --   --  13.4 13.1* 17.2   MCV  --   --   --  92 88 88   PLT  --   --   --  157 207 296   NA  --   --   --  138 135 136   POTASSIUM 3.6 3.7 3.2* 2.8* 3.7 3.8   CHLORIDE  --   --   --  99 96 94   CO2  --   --   --  34* 28 23   BUN   --   --   --  5* 7 12   CR  --   --   --  0.63* 0.63* 0.59*   ANIONGAP  --   --   --  5 11 19*   ARYA  --   --   --  8.4* 8.3* 8.4*   GLC  --   --   --  77 79 85   ALBUMIN  --   --   --   --  3.3* 4.1   PROTTOTAL  --   --   --   --  6.3* 7.9   BILITOTAL  --   --   --   --  0.9 0.7   ALKPHOS  --   --   --   --  56 79   ALT  --   --   --   --  40 54   AST  --   --   --   --  60* 80*       No results found for this or any previous visit (from the past 24 hour(s)).

## 2020-05-29 NOTE — CONSULTS
5/29/2020    CD consult completed. Writer is familiar with pt. Pt reported he wants a medium intensity program, reports he does well in residential tx, needs more support in a medium intensity environment. Pt is willing to complete residential treatment again if needed.   Pt expressed concerns discharging from the hospital as he has no where to go to once he is discharged. Pt expressed he would like to go directly to tx. Pt open to referral wherever there is opening. We discussed [facility], [facility] and [facility] specifically.     [facility]  Phone: [phone]   Fax: [phone]    [facility]  Ph - [phone]  Fax - [phone]    [address] [address]  Phone:  [phone]        Fax:  [phone]    VINAYAK Mixon

## 2020-05-29 NOTE — PROGRESS NOTES
"The patient has been notified of the following:     \"We have found that certain health care needs can be provided without the need for a face to face visit.  This service lets us provide the care you need with a phone conversation.    I will have full access to your [facility] medical record during this entire phone call.   I will be taking notes for your medical record.   Since this is like an office visit, we will bill your insurance company for this service.  If your insurance denies the charge we will appeal and/or write off the cost of the service.  The Governor's executive order may result in expanded health insurance coverage for this service, which would be paid by your insurance.    There are potential benefits and risks of telephone visits (e.g. limits to patient confidentiality) that differ from in-person visits.?  Confidentiality still applies for telephone services, and nobody will record the visit.  It is important to be in a quiet, private space that is free of distractions (including cell phone or other devices) during the visit.??     If during the course of the call I believe a telephone visit is not appropriate, you will not be charged for this service\"    Consent has been obtained for this service by care team member: Yes    Phone visit start time:  1:33pm  Phone visit end time:  1:45pm    Rule 25 Assessment               Background Information   1. Date of Assessment Request  2. Date of Assessment  5/29/2020 3. Date Service Authorized      4.   VINAYAK Mixon 5.  Phone Number   [phone] 6. Referent  [facility] 7. Assessment Site  [place] [address] MED[facility]IT      8. Client Name   Mohit Porter 9. Date of Birth  1990 Age  29 year old 10. Gender  male  11. PMI/ Insurance No.  [id number]   12. Client's Primary Language:  English 13. Do you require special accommodations, such as an  or assistance with written material? " No   14. Current Address: [facility]   15. Client Phone Numbers: [phone] (home)       16. Tell me what has happened to bring you here today.     Per H&P:  Mohit Porter is a 29 year old male with PMH of alcohol use disorder, alcoholic hepatitis, who presents with alcohol intoxication     17. Have you had other rule 25 assessments?      Yes. When, Where, and What circumstances: Multiple in past year     DIMENSION I - Acute Intoxication /Withdrawal Potential   1. Chemical use most recent 12 months outside a facility and other significant use history (client self-report)                    X = Primary Drug Used    Age of First Use Most Recent Pattern of Use and Duration   Need enough information to show pattern (both frequency and amounts) and to show tolerance for each chemical that has a diagnosis    Date of last use and time, if needed    Withdrawal Potential? Requiring special care Method of use  (oral, smoked, snort, IV, etc)    x    Alcohol       18 Pt reports similar pattern below. Pt's JOANA upon admission was 0.49    Previous assessment:  Off and on use.   HU: 1-2 days, 1.75 in days.  Few times per week, vodka use. Isolate and drink      Per chart review: pt drinks a liter to 1.75 liters a day and has numerous binges. He has been found with 5 1.75L bottles of vodka during a 5 day binge. Pt's JOANA was 0.61 at time of admission to the hospital.      5/26/2020 Yes Oral        Marijuana/  Hashish    No use                Cocaine/Crack       No use                Meth/  Amphetamines    No use                Heroin       No use                Other Opiates/  Synthetics    No use                Inhalants       No use                Benzodiazepines       No use                Hallucinogens       No use                Barbiturates/  Sedatives/  Hypnotics No use                Over-the-Counter Drugs    No use                Other       No use                Nicotine       21 Chewing tobacco  5/2020 Yes Oral      2. Do  you use greater amounts of alcohol/other drugs to feel intoxicated or achieve the desired effect?  Yes.  Or use the same amount and get less of an effect?  Yes.  Example: The patient reported having increased use and tolerance issues with alcohol.     3A. Have you ever been to detox?      Yes     3B. When was the first time?      2019     3C. How many times since then?      Unsure - but chart reports at least a few detox stays at [facility]     3D. Date of most recent detox:      Earlier      4.  Withdrawal symptoms: Have you had any of the following withdrawal symptoms?  Past 12 months Recent (past 30 days)   Sweating (Rapid Pulse)  Shaky / Jittery / Tremors  Unable to Sleep  Agitation  Headache  Fatigue / Extremely Tired  Vivid / Unpleasant Dreams  Irritability  Sensitivity to Noise  High Blood Pressure  Nausea / Vomiting  Diarrhea  Diminished Appetite  Unable to Eat  Anxiety / Worried Sweating (Rapid Pulse)  Shaky / Jittery / Tremors  Unable to Sleep  Agitation  Headache  Fatigue / Extremely Tired  Vivid / Unpleasant Dreams  Irritability  Sensitivity to Noise  High Blood Pressure  Nausea / Vomiting  Diarrhea  Diminished Appetite  Unable to Eat  Anxiety / Worried      's Visual Observations and Symptoms: No signs of withdrawal at time of assessment      Based on the above information, is withdrawal likely to require attention as part of treatment participation?  No     Dimension I Ratings   Acute intoxication/Withdrawal potential - The placing authority must use the criteria in Dimension I to determine a client s acute intoxication and withdrawal potential.    RISK DESCRIPTIONS - Severity ratin Client displays full functioning with good ability to tolerate and cope with withdrawal discomfort. No signs or symptoms of intoxication or withdrawal or resolving signs or symptoms.     REASONS SEVERITY WAS ASSIGNED (What about the amount of the person s use and date of most recent use  and history of withdrawal problems suggests the potential of withdrawal symptoms requiring professional assistance? )      Patient reports using alcohol, last date of use reported as 5/26/2020. Patient reported withdrawal symptoms in past 12 months and also with in the past 30 days. Patient is currently going through withdrawal protocol while admitted to [facility].  Pt's JOANA upon this hospital admission was 0.49.          DIMENSION II - Biomedical Complications and Conditions   1a. Do you have any current health/medical conditions?(Include any infectious diseases, allergies, or chronic or acute pain, history of chronic conditions)       No     Past Medical History:   Diagnosis Date     Alcohol abuse      Anxiety      Hepatitis C      1b. On a scale of mild, moderate to severe please specify the severity of the patient's diabetes and/or neuropathy.     The patient denied having a history of being diagnosed with diabetes or neuropathy.     2. Do you have a health care provider? When was your most recent appointment? What concerns were identified?      The patient does not have a PCP at this time.     3. If indicated by answers to items 1 or 2: How do you deal with these concerns? Is that working for you? If you are not receiving care for this problem, why not?       The patient denied having any current clinical health issues.     4A. List current medication(s) including over-the-counter or herbal supplements--including pain management:      Current Facility-Administered Medications   Medication     diazepam (VALIUM) tablet 10 mg    Or     diazepam (VALIUM) injection 5-10 mg     escitalopram (LEXAPRO) tablet 5 mg     folic acid (FOLVITE) tablet 1 mg     lidocaine (LMX4) cream     lidocaine 1 % 0.1-1 mL     magnesium sulfate 2 g in water intermittent infusion     magnesium sulfate 4 g in 100 mL sterile water (premade)     melatonin tablet 1 mg     multivitamin w/minerals (THERA-VIT-M) tablet 1 tablet      naloxone (NARCAN) injection 0.1-0.4 mg     ondansetron (ZOFRAN-ODT) ODT tab 4 mg    Or     ondansetron (ZOFRAN) injection 4 mg     pantoprazole (PROTONIX) EC tablet 40 mg     polyethylene glycol (MIRALAX) Packet 17 g     potassium chloride (KLOR-CON) Packet 20-40 mEq     potassium chloride 10 mEq in 100 mL intermittent infusion with 10 mg lidocaine     potassium chloride 10 mEq in 100 mL sterile water intermittent infusion (premix)     potassium chloride 20 mEq in 50 mL intermittent infusion     potassium chloride ER (KLOR-CON M) CR tablet 20-40 mEq     prochlorperazine (COMPAZINE) injection 5 mg    Or     prochlorperazine (COMPAZINE) tablet 5 mg    Or     prochlorperazine (COMPAZINE) Suppository 25 mg     QUEtiapine (SEROquel) tablet 100 mg     QUEtiapine (SEROquel) tablet 400 mg     sodium chloride (PF) 0.9% PF flush 3 mL     sodium chloride (PF) 0.9% PF flush 3 mL     vitamin B1 (THIAMINE) tablet 100 mg      4B. Do you follow current medical recommendations/take medications as prescribed?      The patient denied taking any prescription or over the counter medications at this time.     4C. When did you last take your medication?      5/29/2020 - administered by hospital staff.      4D. Do you need a referral to have a follow up with a primary care physician?     Yes, Recommendations:   physical exam     5. Has a health care provider/healer ever recommended that you reduce or quit alcohol/drug use?      Yes     6. Are you pregnant?      NA, because the patient is male     7. Have you had any injuries, assaults/violence towards you, accidents, health related issues, overdose(s) or hospitalizations related to your use of alcohol or other drugs:     Current:  Patient has multiple ED visits and admissions for alcohol intoxication and withdrawal, including ED 3/12, 3/8, 2/28, 1/4, and admissions 12/2019 and 8/2019 per our records and CareEverywhere    Previous assessment 8/2019:  Yes, explain: Per chart review, pt  "has had 27 ED visits/hospitalizations due to alcohol intoxication or alcohol withdrawal since 2017. Of those encounters, 22 have been in the year 2019, and 9 of them have been since 2019.      8. Do you have any specific physical needs/accommodations? No     Dimension II Ratings   Biomedical Conditions and Complications - The placing authority must use the criteria in Dimension II to determine a client s biomedical conditions and complications.   RISK DESCRIPTIONS - Severity ratin Client displays full functioning with good ability to cope with physical discomfort.     REASONS SEVERITY WAS ASSIGNED (What physical/medical problems does this person have that would inhibit his or her ability to participate in treatment? What issues does he or she have that require assistance to address?)     The patient reported being in good overall physical health and he denied having any history of chronic medical problems.  The patient denied taking any prescription or OTC medications at this time.  The patient would benefit from following all of the recommendations of his medical providers. If pt continues to drink the way he has been, he will most likely develop physical health concerns.          DIMENSION III - Emotional, Behavioral, Cognitive Conditions and Complications   1. (Optional) Tell me what it was like growing up in your family. (substance use, mental health, discipline, abuse, support)     Parents raised him, still together.   Siblings: 2 younger brothers.      MH: Anxiety  RYAN: Pt denies.      Support/Discipline: Pt reports he felt supported growing up, denies abuse.      2. When was the last time that you had significant problems...  A. with feeling very trapped, lonely, sad, blue, depressed or hopeless  about the future? Past Month - with his use     B. with sleep trouble, such as bad dreams, sleeping restlessly, or falling  asleep during the day? \"Sometimes\"     C. with feeling very anxious, nervous, " tense, scared, panicked, or like  something bad was going to happen? Past Month - anxious      D. with becoming very distressed and upset when something reminded  you of the past? Past Month - past stuff when using      E. with thinking about ending your life or committing suicide? Never     3. When was the last time that you did the following things two or more times?  A. Lied or conned to get things you wanted or to avoid having to do  something? Past Month     B. Had a hard time paying attention at school, work, or home? 1+ years ago     C. Had a hard time listening to instructions at school, work, or home? 1+ years ago     D. Were a bully or threatened other people? Never     E. Started physical fights with other people? Never     Note: These questions are from the Global Appraisal of Individual Needs--Short Screener. Any item marked  past month  or  2 to 12 months ago  will be scored with a severity rating of at least 2.      For each item that has occurred in the past month or past year ask follow up questions to determine how often the person has felt this way or has the behavior occurred? How recently? How has it affected their daily living? And, whether they were using or in withdrawal at the time?     See above.      4A. If the person has answered item 2E with  in the past year  or  the past month , ask about frequency and history of suicide in the family or someone close and whether they were under the influence.      The patient denied any family member or someone close to the patient had ever completed suicide.     Any history of suicide in your family? Or someone close to you?      The patient denied any family member or someone close to the patient had ever completed suicide.     4B. If the person answered item 2E  in the past month  ask about  intent, plan, means and access and any other follow-up information  to determine imminent risk. Document any actions taken to intervene  on any identified  imminent risk.       The patient denied having any suicide ideation within the past month.     5A. Have you ever been diagnosed with a mental health problem?      Yes, explain: generalized anxiety       5B. Are you receiving care for any mental health issues? If yes, what is the focus of that care or treatment?  Are you satisfied with the service? Most recent appointment?  How has it been helpful?      The patient reported having prior treatment for mental health issues, but denied receiving any current treatment for mental health issues.     6. Have you been prescribed medications for emotional/psychological problems?     The patient reported a history of being prescribed psychotropic medications, but denied being prescribed any psychotropic medications at this time.     7. Does your MH provider know about your use?      The patient does not currently have any mental health providers.     8A. Have you ever been verbally, emotionally, physically or sexually abused?      The patient denied having any history of being verbally, emotionally, physically or sexually abused.      Follow up questions to learn current risk, continuing emotional impact.       The patient denied having any history of being verbally, emotionally, physically or sexually abused.     8B. Have you received counseling for abuse?       The patient denied having any history of being verbally, emotionally, physically or sexually abused.     9. Have you ever experienced or been part of a group that experienced community violence, historical trauma, rape or assault?      No     10A. [place]:     No     11. Do you have problems with any of the following things in your daily life?     Remembering, In relationships with others and Fights, being fired, arrests       Note: If the person has any of the above problems, follow up with items 12, 13, and 14. If none of the issues in item 11 are a problem for the person, skip to item 15.     The patient would  benefit from developing sober coping skills.     12. Have you been diagnosed with traumatic brain injury or Alzheimer s?  No     13. If the answer to #12 is no, ask the following questions:     Have you ever hit your head or been hit on the head? No     Were you ever seen in the Emergency Room, hospital or by a doctor because of an injury to your head? No     Have you had any significant illness that affected your brain (brain tumor, meningitis, West Nile Virus, stroke or seizure, heart attack, near drowning or near suffocation)? No     14. If the answer to #12 is yes, ask if any of the problems identified in #11 occurred since the head injury or loss of oxygen. The patient had never had a head injury or a loss of oxygen.     15A. Highest grade of school completed:      Graduate/professional degree     15B. Do you have a learning disability? No     15C. Did you ever have tutoring in Math or English? No     15D. Have you ever been diagnosed with Fetal Alcohol Effects or Fetal Alcohol Syndrome? No     16. If yes to item 15 B, C, or D: How has this affected your use or been affected by your use?      The patient denied having any history of a learning disability, tutoring in math or English or being diagnosed with Fetal Alcohol Effects or Fetal Alcohol Syndrome.     Dimension III Ratings   Emotional/Behavioral/Cognitive - The placing authority must use the criteria in Dimension III to determine a client s emotional, behavioral, and cognitive conditions and complications.   RISK DESCRIPTIONS - Severity ratin Client has difficulty with impulse control and lacks coping skills. Client has thoughts of suicide or harm to others without means; however, the thoughts may interfere with participation in some treatment activities. Client has difficulty functioning in significant life areas. Client has moderate symptoms of emotional, behavioral, or cognitive problems. Client is able to participate in most treatment  "activities.     REASONS SEVERITY WAS ASSIGNED - What current issues might with thinking, feelings or behavior pose barriers to participation in a treatment program? What coping skills or other assets does the person have to offset those issues? Are these problems that can be initially accommodated by a treatment provider? If not, what specialized skills or attributes must a provider have?     Pt reports a diagnosis of anxiety. Pt denies medications for his mental health, or mental health providers. Pt denies that RYAN runs in his family, reports depression and anxiety run in his family. Pt denies past suicide attempts. Pt denies any past or current SI/SIB. Pt appears to lack coping skills for his anxiety. Pt lacks insight of his use and mental health issues.          DIMENSION IV - Readiness for Change   1. You ve told me what brought you here today. (first section) What do you think the problem really is?      Pt reports he has been binge drinking in a hotel.      2. Tell me how things are going. Ask enough questions to determine whether the person has use related problems or assets that can be built upon in the following areas: Family/friends/relationships; Legal; Financial; Emotional; Educational; Recreational/ leisure; Vocational/employment; Living arrangements (DSM)       \"Not good\"  Friends/Family/Relationships: \"not good, strained, due to use\"   Living situation: Homeless  Legal: None currently  Financial: Not employed  Emotional: Pt did not answer.      3. What activities have you engaged in when using alcohol/other drugs that could be hazardous to you or others (i.e. driving a car/motorcycle/boat, operating machinery, unsafe sex, sharing needles for drugs or tattoos, etc      The patient reported having a history of driving while under the influence of alcohol or drugs.     4. How much time do you spend getting, using or getting over using alcohol or drugs? (DSM)      It appears pt drinks daily, all day. " "     5. Reasons for drinking/drug use (Use the space below to record answers. It may not be necessary to ask each item.)  Like the feeling Yes   Trying to forget problems Yes   To cope with stress Yes   To relieve physical pain No   To cope with anxiety Yes   To cope with depression Yes   To relax or unwind Yes   Makes it easier to talk with people No   Partner encourages use No   Most friends drink or use Yes   To cope with family problems Yes   Afraid of withdrawal symptoms/to feel better Yes   Other (specify)  No      A. What concerns other people about your alcohol or drug use/Has anyone told you that you use too much? What did they say? (DSM)      Who and what concerns: \"Girlfriend, ex-fiance, parents, brothers\". Pt reports that they don't want him drinking.      B. What did you think about that/ do you think you have a problem with alcohol or drug use?      Pt agrees with having a problem.      6. What changes are you willing to make? What substance are you willing to stop using? How are you going to do that? Have you tried that before? What interfered with your success with that goal?       His plan and goal is to abstain from non-prescribed all mood altering chemicals.      7. What would be helpful to you in making this change?      Pt is open to attend residential treatment, but would prefer a medium intensity program.      Dimension IV Ratings   Readiness for Change - The placing authority must use the criteria in Dimension IV to determine a client s readiness for change.   RISK DESCRIPTIONS - Severity ratin Client displays verbal compliance, but lacks consistent behaviors; has low motivation for change; and is passively involved in treatment.     REASONS SEVERITY WAS ASSIGNED - (What information did the person provide that supports your assessment of his or her readiness to change? How aware is the person of problems caused by continued use? How willing is she or he to make changes? What does the " person feel would be helpful? What has the person been able to do without help?)       Patient admits to having a problem with alcohol use. Patient reports that his family have expressed concerns about use of alcohol. Patient appears early in the contemplation stage of change based on his verbal reports and behaviors. Patient is willing to enter residential programming for treatment of his substance abuse.          DIMENSION V - Relapse, Continued Use, and Continued Problem Potential   1A. In what ways have you tried to control, cut-down or quit your use? If you have had periods of sobriety, how did you accomplish that? What was helpful? What happened to prevent you from continuing your sobriety? (DSM)      Longest period of sobriety: 6 months after DWI - off and on sobriety in last 8 months  What was helpful: Stayed in a sober home.      1B. What were the circumstances of your most recent relapse with mood altering chemicals?     Pt did not report.      2. Have you experienced cravings? If yes, ask follow up questions to determine if the person recognizes triggers and if the person has had any success in dealing with them.      The patient reported having cravings to use mood altering chemicals on an almost daily basis.     3. Have you been treated for alcohol/other drug abuse/dependence? Yes.    3B. Number of times(lifetime) (over what period) 5.    3C. Number of times completed treatment (lifetime) 2.    3D. During the past three years have you participated in outpatient and/or residential?  Yes.    3E. When and where? [facility] inpatient and outpatient.       Since 08/2019 - he went to [place] [id number] most recently.     4. Support group participation: Have you/do you attend support group meetings to reduce/stop your alcohol/drug use? How recently? What was your experience? Are you willing to restart? If the person has not participated, is he or she willing?      The patient denied having  any history of attending 12-step or other support group meetings.     5. What would assist you in staying sober/straight?      Pt is unsure at this time.      Dimension V Ratings   Relapse/Continued Use/Continued problem potential - The placing authority must use the criteria in Dimension V to determine a client s relapse, continued use, and continued problem potential.   RISK DESCRIPTIONS - Severity ratin No awareness of the negative impact of mental health problems or substance abuse. No coping skills to arrest mental health or addiction illnesses, or prevent relapse.     REASONS SEVERITY WAS ASSIGNED - (What information did the person provide that indicates his or her understanding of relapse issues? What about the person s experience indicates how prone he or she is to relapse? What coping skills does the person have that decrease relapse potential?)       The patient appears to lack sober coping skills and he lacks long-term sober maintenance skills.  The patient reported being socially isolated which could be a barrier to his recovery.  The patient reported his relationship conflict with his parents, family, girlfriend could be a potential trigger for him to abuse mood altering chemicals.  The patient reported his current living environment could be a barrier to his recovery.  The patient reported his current unemployment is a potential trigger for him to abuse mood altering chemicals.  The patient reported his current financial problems are a potential trigger for him to abuse mood altering chemicals.  The patient lacks awareness regarding the disease model of addiction.  The patient lacks a sober peer support network.         DIMENSION VI - Recovery Environment   1. Are you employed/attending school? Tell me about that.      Pt not employed at this time.     If not employed, last time worked: Last summer     2A. Describe a typical day; evening for you. Work, school, social, leisure, volunteer, spiritual  "practices. Include time spent obtaining, using, recovering from drugs or alcohol. (DSM)      Pt did not answer.      Please describe what leisure activities have been associated with your substance abuse:     The patient denied having any leisure activities which had been associated with his substance abuse.     2B. How often do you spend more time than you planned using or use more than you planned? (DSM)      Pt appears to binge drink, doesn't stop unless he is at the ED/hospital or detox.      3. How important is using to your social connections? Do many of your family or friends use?      Pt denies, \"not really no, not important\".      4A. Are you currently in a significant relationship?      No     4C. Sexual Orientation:      Heterosexual     5A. Who do you live with?       Pt is homeless.     5B. Tell me about their alcohol/drug use and mental health issues.      Pt lives alone.     5C. Are you concerned for your safety there? No     5D. Are you concerned about the safety of anyone else who lives with you? No     6A. Do you have children who live with you?      No     6B. Do you have children who do not live with you?      Yes.  (Ask follow-up questions to determine the person's relationship and responsibility, both legal and care giving; and what arrangements are made for supervision for the children when the person is not available.) 1 Daughter, little over 2 years old. Haven't seen her for awhile.      7A. Who supports you in making changes in your alcohol or drug use? What are they willing to do to support you? Who is upset or angry about you making changes in your alcohol or drug use? How big a problem is this for you?       Parents/ family are supportive       7B. This table is provided to record information about the person s relationships and available support It is not necessary to ask each item; only to get a comprehensive picture of their support system.      How often can you count on the " following people when you need someone?   Partner / Spouse Does not have a current partner   Parent(s)/Aunt(s)/Uncle(s)/Grandparents Usually supportive   Sibling(s)/Cousin(s) Usually supportive   Child(davi) Never supportive - she is 2 yrs old.   Other relative(s) The patient doesn't have any current contact with other relatives.   Friend(s)/neighbor(s) The patient doesn't have any current contact with friends.   Child(davi) s father(s)/mother(s) Rarely supportive   Support group member(s) The patient denied having any current involvement with 12-step or other support group meetings.   Community of alex members The patient denied having any current involvement with community alex members.   /counselor/therapist/healer The patient denied having any current involvement with a , counselor, therapist or healer.   Other (specify) No      8A. What is your current living situation?      Pt reports he is homeless.      8B. What is your long term plan for where you will be living?      Pt reports hopefully sober housing in the future.     8C. Tell me about your living environment/neighborhood? Ask enough follow up questions to determine safety, criminal activity, availability of alcohol and drugs, supportive or antagonistic to the person making changes.       Pt reports it is safe.      9. Criminal justice history: Gather current/recent history and any significant history related to substance use--Arrests? Convictions? Circumstances? Alcohol or drug involvement? Sentences? Still on probation or parole? Expectations of the court? Current court order? Any sex offenses - lifetime? What level? (DSM)     DWI 10/2017 - Probation Right now  Probation violation.      Commitment: Denies   Registered Sex Offender: Denies     10. What obstacles exist to participating in treatment? (Time off work, childcare, funding, transportation, pending [facility] time, living situation)      The patient denied having any  obstacles for participating in substance abuse treatment.     Dimension VI Ratings   Recovery environment - The placing authority must use the criteria in Dimension VI to determine a client s recovery environment.   RISK DESCRIPTIONS - Severity ratin Client has (A) Chronically antagonistic significant other, living environment, family, peer group or long-term criminal justice involvement that is harmful to recovery or treatment progress, or (B) Client has an actively antagonistic significant other, family, work, or living environment with immediate threat to the client's safety and well-being.     REASONS SEVERITY WAS ASSIGNED - (What support does the person have for making changes? What structure/stability does the person have in his or her daily life that will increase the likelihood that changes can be sustained? What problems exist in the person s environment that will jeopardize getting/staying clean and sober?)      Patient is homeless and unemployed. Pt reports his relationship with his family is strained due to his use, that his parents have kicked him out of their house before. Pt reports he has one daughter, who is 2 years old. Pt denied a CPS case, but per chart records, he had one open in 2019.  Patient denies current legals.         Client Choice/Exceptions   What is your cultural identification?  White    Would you like services specific to language, age, gender, culture, Moravian preference, race, ethnicity, sexual orientation or disability?  No     What particular treatment choices and options would you like to have? Residential treatment     Do you have a preference for a particular treatment program? [facility]     Criteria for Diagnosis      Criteria for Diagnosis  DSM-5 Criteria for Substance Use Disorder  Instructions: Determine whether the client currently meets the criteria for Substance Use Disorder using the diagnostic criteria in the DSM-V pp.[phone]. Current  means during the most recent 12 months outside a facility that controls access to substances     Category of Substance Severity (ICD-10 Code / DSM 5 Code)      Alcohol Use Disorder Severe  (10.20) (303.90)   Cannabis Use Disorder The patient does not meet the criteria for a Cannabis use disorder.   Hallucinogen Use Disorder The patient does not meet the criteria for a Hallucinogen use disorder.   Inhalant Use Disorder The patient does not meet the criteria for an Inhalant use disorder.   Opioid Use Disorder The patient does not meet the criteria for an Opioid use disorder.   Sedative, Hypnotic, or Anxiolytic Use Disorder The patient does not meet the criteria for a Sedative/Hypnotic use disorder.   Stimulant Related Disorder The patient does not meet the criteria for a Stimulant use disorder.   Tobacco Use Disorder Moderate   (F17.200) (305.1)   Other (or unknown) Substance Use Disorder The patient does not meet the criteria for a Other (or unknown) Substance use disorder.         Collateral Contact Summary   Number of contacts made: 1     Contact with referring person:  No     If court related records were reviewed, summarize here: No court records had been reviewed at the time of this documentation.     Information from collateral contacts supported/largely agreed with information from the client and associated risk ratings.        Rule 25 Assessment Summary and Plan   's Recommendation     1) Abstain from alcohol and all illicit drugs and mood altering drugs unless prescribed by a healthcare giver familiar with their addiction.  2) Enter and complete a medium level intensity treatment program or residential program   3) Develop a sober supportive network.  4) Continue to receive appropriate medical and psychiatric services as needed.   5) Follow all recommendations upon discharge from treatment. Recommendations may include, but are not limited to: extended treatment, outpatient treatment and/or sober  housing.     Collateral Contacts      Name:     EMR    Relationship:     Medical Records    Phone Number:     None Releases:     Yes      From H&P:  Alcohol intoxication  Alcohol use disorder  Patient has multiple ED visits and admissions for alcohol intoxication and withdrawal, including ED 3/12, 3/8, 2/28, 1/4, and admissions 12/2019 and 8/2019 per our records and [facility]. He was found by hotel staff to be unresponsive and surround by alcohol. He reports binge drinking for 4 days. His ethanol level here is 0.49. AST is mildly elevated. He is significantly intoxicated from alcohol and does not appear to be in withdrawal to me. His story is not reliable as it does not correlate with his multiple health care visits for alcohol use. He asked for Ativan/Valium multiple times this admission in the absence of any alcohol withdrawal, so he is substance seeking in my opinion. There have been discussions in the past about commitment and this should be significantly considered this admission.  - Urine drug screen  - Give gabapentin 800mg TID + Clonidine 0.1mg x3 doses + Clonidine 0.2mg patch to prophylactically treat withdrawal  - CIWA, thiamine, folate. Will not order Ativan or Valium PRN, I think he needs to be reassessed by a provider before it is given  - Psychiatry consult  - Consider chem dep consult once patient detoxes    From psych consult:  REASON FOR CONSULTATION:  Alcohol use.      REQUESTING PHYSICIAN:  Mariah Rebolledo MD      IDENTIFYING DATA:  Mohit Porter is a 29-year-old man who reports he is single and has an almost 3-year-old daughter.  He is currently unemployed and presented to [facility] ED via EMS on account of alcohol intoxication.  He reportedly was staying at a hotel in [place] for the preceding 6 days, had been found on the floor wrapped in sheets with vodka bottles nearby, with altered mental status, and was said to have been nonverbal.  He was found to be in mild  "alcohol withdrawal and was admitted for stabilization.  Psychiatry was consulted on account of the patient's significant alcohol use disorder.      CHIEF COMPLAINT:  \"I really want to quit.  Drinking just gets me quite depressed.\"      HISTORY OF PRESENT ILLNESS:  Mohit Porter reports an established history of alcohol use disorder and major depressive disorder.  He tells me that he has been through residential CD treatment on 3 separate occasions, the most recent being at [facility].  He reports he completed that program and was in their medium intensity program where he unfortunately relapsed on alcohol use and was kicked out from the program.  He states he then went to a hotel and started drinking again, as he could not figure out a way around his relapse.  Most recently, he states he was in outpatient treatment at [address] [place] [address], also relapsed in that program, and was kicked out.  He was in a hotel for about 6 days where he had been binge drinking vodka until he passed out.  Of note, in the ED, his blood alcohol was recorded as 0.49.  Of note, he has had ED visits on account of his alcohol use on multiple occasions in the past 6 months, numbering at least 5-6.  Patient tells me that he is interested in resuming CD treatment, either in a medium intensity outpatient program or a residential program.  He tells me that his ex-partner has not allowed him to see his daughter, and this is a source of stress for him.  He reports neurovegetative symptoms of depression, positive for insomnia, depressed mood and feelings of hopelessness.  However, he denies experiencing any self-harm thoughts, plans or intent and denies that he has ever been psychiatrically hospitalized.  He reports that he was once on mirtazapine, but it caused him to gain a significant amount of weight and rendered him impotent.  He is currently taking Seroquel at 400 mg at bedtime and also takes a couple of doses as needed during the " course of the day.  He denies history consistent with lauren or psychosis and denies history consistent with panic attacks, obsessions, compulsions or symptoms suggestive of PTSD.  He does not endorse history of ADHD or an eating disorder.  The patient reports that his alcohol use got out of control while he was attending college and was taking night classes.  He states he was using alcohol to facilitate sleep at the time, but he has since become more dependent.  He denies that he has ever had any alcohol withdrawal seizures.      CHEMICAL USE HISTORY:  The patient has significant alcohol use disorder.  He has been through detox multiple times including at [facility] before he was sent to [facility].  He admits to drinking on a daily basis and drinks vodka preferentially.  He is unable to quantify the amount he drinks heavily and reports that he has failed multiple attempts at quitting alcohol use.  He admits to experiencing blackouts but denies alcohol withdrawal seizures or DTs.  He started drinking alcohol when he was 18, and it became a problem when he was 23.  He has obtained at least 1 DWI and has lost employment on account of his alcohol use, in addition to being estranged from his daughter's mother.      MEDICATIONS PRIOR TO ADMISSION:   1.  Naltrexone 100 mg p.o. daily.   2.  Seroquel 100 mg p.o. daily p.r.n.   3.  Seroquel 400 mg p.o. at bedtime.   4.  Atarax 25-50 mg q.4 hours p.r.n. anxiety.      FAMILY PSYCHIATRIC HISTORY:  The patient reports that he has 2 maternal uncles with alcohol use disorder.      SOCIAL HISTORY:  The patient reports that he was born and raised in [place].  He has 2 younger brothers.  He denies history of physical, emotional or sexual abuse.  He reports attending [employer] for his undergraduate studies and states he started post-graduate studies but never completed the program.  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  He subsequently worked as a  and  but is currently unemployed.  He has obtained 1 DWI in the past and denies any  history.      MENTAL STATUS EXAMINATION:  This is a young man who appears his stated age of 29.  He wears a light beard and is dressed in hospital scrubs.  He makes good eye contact and speaks clearly and coherently.  His mood is described as depressed, but his affect is full range.  His thought process is logical, relevant and goal directed.  He does not endorse the presence of auditory or visual hallucinations, and there are no delusions elicited.  He is future oriented.  His language is appropriate.  His muscle strength is adequate.  His recall of recent and remote events is fair.  Risk assessment at this time is considered moderate on account of his continued use of alcohol despite the consequences.  His attention and concentration are good.  He displays fair insight and limited judgment.      DIAGNOSTIC IMPRESSION:  Mohit Porter is a 29-year-old single father of 1 with established history of alcohol use disorder who presents to the hospital on account of alcohol intoxication.  Psychiatry was consulted to render an opinion on his alcohol use disorder and recidivism.  The patient states he is willing to pursue either outpatient or inpatient treatment and is willing to complete a CD assessment to facilitate this.      DIAGNOSES:   1.  Alcohol use disorder, severe.   2.  Adjustment disorder with mixed anxiety and depressed mood.   3.  History of hepatitis C.      RECOMMENDATIONS:   1.  Medical management as you are.   2.  The patient to complete a CD assessment and follow through with recommendations.  Continue PTA medications and add escitalopram 10 mg daily.     ollateral Contacts       A problematic pattern of alcohol/drug use leading to clinically significant impairment or distress, as manifested by at least two of the following, occurring  within a 12-month period:     1.) Alcohol/drug is often taken in larger amounts or over a longer period than was intended.  2.) There is a persistent desire or unsuccessful efforts to cut down or control alcohol/drug use  3.) A great deal of time is spent in activities necessary to obtain alcohol, use alcohol, or recover from its effects.  4.) Craving, or a strong desire or urge to use alcohol/drug  5.) Recurrent alcohol/drug use resulting in a failure to fulfill major role obligations at work, school or home.  6.) Continued alcohol use despite having persistent or recurrent social or interpersonal problems caused or exacerbated by the effects of alcohol/drug.  7.) Important social, occupational, or recreational activities are given up or reduced because of alcohol/drug use.  8.) Recurrent alcohol/drug use in situations in which it is physically hazardous.  9.) Alcohol/drug use is continued despite knowledge of having a persistent or recurrent physical or psychological problem that is likely to have been caused or exacerbated by alcohol.  10.) Tolerance, as defined by either of the following: A need for markedly increased amounts of alcohol/drug to achieve intoxication or desired effect. and A markedly diminished effect with continued use of the same amount of alcohol/drug.  11.) Withdrawal, as manifested by either of the following: The characteristic withdrawal syndrome for alcohol/drug (refer to Criteria A and B of the criteria set for alcohol/drug withdrawal). and Alcohol/drug (or a closely related substance, such as a benzodiazepine) is taken to relieve or avoid withdrawal symptoms.     Specify if: In early remission:  After full criteria for alcohol/drug use disorder were previously met, none of the criteria for alcohol/drug use disorder have been met for at least 3 months but for less than 12 months (with the exception that Criterion A4,  Craving or a strong desire or urge to use alcohol/drug  may be met).       In sustained remission:   After full criteria for alcohol use disorder were previously met, none of the criteria for alcohol/drug use disorder have been met at any time during a period of 12 months or longer (with the exception that Criterion A4,  Craving or strong desire or urge to use alcohol/drug  may be met).   Specify if:   This additional specifier is used if the individual is in an environment where access to alcohol is restricted.     Mild: Presence of 2-3 symptoms  Moderate: Presence of 4-5 symptoms  Severe: Presence of 6 or more symptoms

## 2020-05-30 LAB — MAGNESIUM SERPL-MCNC: 2 MG/DL (ref 1.6–2.3)

## 2020-05-30 PROCEDURE — 99232 SBSQ HOSP IP/OBS MODERATE 35: CPT | Performed by: INTERNAL MEDICINE

## 2020-05-30 PROCEDURE — 36415 COLL VENOUS BLD VENIPUNCTURE: CPT | Performed by: INTERNAL MEDICINE

## 2020-05-30 PROCEDURE — 25000132 ZZH RX MED GY IP 250 OP 250 PS 637: Performed by: INTERNAL MEDICINE

## 2020-05-30 PROCEDURE — 25000132 ZZH RX MED GY IP 250 OP 250 PS 637: Performed by: PSYCHIATRY & NEUROLOGY

## 2020-05-30 PROCEDURE — 12000000 ZZH R&B MED SURG/OB

## 2020-05-30 PROCEDURE — 83735 ASSAY OF MAGNESIUM: CPT | Performed by: INTERNAL MEDICINE

## 2020-05-30 PROCEDURE — 25000128 H RX IP 250 OP 636: Performed by: INTERNAL MEDICINE

## 2020-05-30 RX ADMIN — FOLIC ACID 1 MG: 1 TABLET ORAL at 21:35

## 2020-05-30 RX ADMIN — QUETIAPINE FUMARATE 100 MG: 100 TABLET ORAL at 15:29

## 2020-05-30 RX ADMIN — Medication 100 MG: at 21:35

## 2020-05-30 RX ADMIN — MULTIPLE VITAMINS W/ MINERALS TAB 1 TABLET: TAB at 21:35

## 2020-05-30 RX ADMIN — ESCITALOPRAM 5 MG: 5 TABLET, FILM COATED ORAL at 21:35

## 2020-05-30 RX ADMIN — QUETIAPINE FUMARATE 100 MG: 100 TABLET ORAL at 06:11

## 2020-05-30 RX ADMIN — ONDANSETRON 4 MG: 4 TABLET, ORALLY DISINTEGRATING ORAL at 06:04

## 2020-05-30 RX ADMIN — QUETIAPINE 400 MG: 400 TABLET ORAL at 21:34

## 2020-05-30 RX ADMIN — PANTOPRAZOLE SODIUM 40 MG: 40 TABLET, DELAYED RELEASE ORAL at 06:10

## 2020-05-30 RX ADMIN — Medication 1 MG: at 23:48

## 2020-05-30 ASSESSMENT — ACTIVITIES OF DAILY LIVING (ADL)
ADLS_ACUITY_SCORE: 16

## 2020-05-30 NOTE — PLAN OF CARE
5/30/2020 [id number]  Cognitive Concerns/ Orientation : A&O x4, flat affect.    BEHAVIOR & AGGRESSION TOOL COLOR: green  CIWA SCORE: 2, 2       ABNL VS/O2: VSS on RA  MOBILITY: Independent  PAIN MANAGMENT: Denies pain   DIET: c/o intermittent nausea-given PO zofran-tolerating diet well.  BOWEL/BLADDER: independent-reports no issues.  ABNL LAB/BG: K+2.8, replaced, recheck was 3.7- now 3.6. Mg+ 2.0  DRAIN/DEVICES: No IV access.  TELEMETRY RHYTHM: na  SKIN: intact.   TESTS/PROCEDURES:    D/C DAY/GOALS/PLACE: pending   OTHER IMPORTANT INFO: Chemical dependence consult done, awaiting placement in treatment program.

## 2020-05-30 NOTE — PLAN OF CARE
DATE & TIME: 5/29/20, 8927 - [id number]   Cognitive Concerns/ Orientation : A&O x 4   BEHAVIOR & AGGRESSION TOOL COLOR: Green  CIWA SCORE: 5   ABNL VS/O2: /92, Other VSS on room air  MOBILITY: Independent  PAIN MANAGMENT: Denied  DIET: Regular  BOWEL/BLADDER: Continent  ABNL LAB/BG: Labs pending  DRAIN/DEVICES: N/a  TELEMETRY RHYTHM: N/a  SKIN: Intact  TESTS/PROCEDURES: N/a  D/C DAY/GOALS/PLACE: Pending  OTHER IMPORTANT INFO: CD following

## 2020-05-30 NOTE — PROGRESS NOTES
A&Ox4. Pleasant. Ind. Denies pain. Eating and drinking well. No N/V/D. Voiding. Gets anxious at times, per pt's own report. Scheduled Seroquel helping. K stable @ 3.6. Afebrile. AVSS.

## 2020-05-30 NOTE — PROGRESS NOTES
[facility]    Hospitalist Progress Note    Date of Service (when I saw the patient): 05/30/2020    Assessment & Plan   Mohit Porter is a 29 year old male who was admitted on 5/26/2020.  Assessment & Plan     Mohit Porter is a 29 year old male with PMH of alcohol use disorder, alcoholic hepatitis, who presents with alcohol intoxication.     Alcohol intoxication  Alcohol use disorder  Lactic and ketoacidosis due to alcohol intoxication and starvation ketosis  Patient has multiple ED visits and admissions for alcohol intoxication and withdrawal, including ED 3/12, 3/8, 2/28, 1/4, and admissions 12/2019 and 8/2019 per our records and CareEverywhere. He was found by hotel staff to be unresponsive and surround by alcohol. He reports binge drinking for 4 days. His ethanol level here is 0.49. AST is mildly elevated. He is significantly intoxicated from alcohol and does not appear to be in withdrawal to me. His story is not reliable as it does not correlate with his multiple health care visits for alcohol use. He asked for Ativan/Valium multiple times this admission in the absence of any alcohol withdrawal, so he is substance seeking in my opinion. There have been discussions in the past about commitment and this should be significantly considered this admission.  - Urine drug screen returned negative  - Give gabapentin 800mg TID + Clonidine 0.1mg x3 doses + Clonidine 0.2mg patch to prophylactically treat withdrawal.  Will DC clonidine patch, gabapentin and clonidine today as withdrawal seem  to be improved  - CIWA, thiamine, folate.   Vallium PRN dosing per CIWA protocol .  He is scoring less today on the protocol  Alcohol withdrawal improved   - Psychiatry consulted and recommended chem dep   CHEM Depp consulted and recommended inpatient detox transfer and referrals sent     Lactic acidosis and ketoacidosis resolved with IV fluid hydration with  glucose     DVT Prophylaxis: Pneumatic  Compression Devices  Code Status: Full Code     Disposition: Expected discharge in the next 2 days once detox place available         Mariah Rebolledo MD  [phone] (P)      Interval History   Resting comfortbly in bed.  Alcohol withdrawal improved.  Scoring low on the CIWA protocol.  Lactic acidosis resolved. No new issues today.     -Data reviewed today: I reviewed all new labs and imaging results over the last 24 hours. I personally reviewed no images or EKG's today.    Physical Exam   Temp: 98.2  F (36.8  C) Temp src: Oral BP: 125/75 Pulse: 70 Heart Rate: 78 Resp: 18 SpO2: 99 % O2 Device: None (Room air)    Vitals:    05/26/20 1917   Weight: 77.1 kg (170 lb)     Vital Signs with Ranges  Temp:  [98  F (36.7  C)-98.3  F (36.8  C)] 98.2  F (36.8  C)  Pulse:  [70-87] 70  Heart Rate:  [78-98] 78  Resp:  [18] 18  BP: (125-130)/(74-92) 125/75  SpO2:  [97 %-99 %] 99 %  I/O last 3 completed shifts:  In: 500 [P.O.:500]  Out: -     Constitutional: Awake, alert, cooperative, no apparent distress  Respiratory: Clear to auscultation bilaterally, no crackles or wheezing  Cardiovascular: Regular rate and rhythm, normal S1 and S2, and no murmur noted  GI: Normal bowel sounds, soft, non-distended, non-tender  Skin/Integumen: No rashes, no cyanosis, no edema  Other:     Medications       escitalopram  5 mg Oral Daily     folic acid  1 mg Oral Daily     multivitamin w/minerals  1 tablet Oral Daily     pantoprazole  40 mg Oral QAM AC     QUEtiapine  100 mg Oral BID     QUEtiapine  400 mg Oral At Bedtime     vitamin B1  100 mg Oral Daily       Data   Recent Labs   Lab 05/29/20  1001 05/28/20  2239 05/28/20  1631 05/28/20  0840 05/27/20  0746 05/26/20  1631   WBC  --   --   --  8.6 10.7 13.0*   HGB  --   --   --  13.4 13.1* 17.2   MCV  --   --   --  92 88 88   PLT  --   --   --  157 207 296   NA  --   --   --  138 135 136   POTASSIUM 3.6 3.7 3.2* 2.8* 3.7 3.8   CHLORIDE  --   --   --  99 96 94   CO2  --   --   --  34* 28 23   BUN  --    --   --  5* 7 12   CR  --   --   --  0.63* 0.63* 0.59*   ANIONGAP  --   --   --  5 11 19*   ARYA  --   --   --  8.4* 8.3* 8.4*   GLC  --   --   --  77 79 85   ALBUMIN  --   --   --   --  3.3* 4.1   PROTTOTAL  --   --   --   --  6.3* 7.9   BILITOTAL  --   --   --   --  0.9 0.7   ALKPHOS  --   --   --   --  56 79   ALT  --   --   --   --  40 54   AST  --   --   --   --  60* 80*       No results found for this or any previous visit (from the past 24 hour(s)).

## 2020-05-31 ENCOUNTER — HOSPITAL ENCOUNTER (OUTPATIENT)
Facility: CLINIC | Age: 30
Setting detail: OBSERVATION
Discharge: PSYCHIATRIC OR SUBSTANCE USE FACILITY | End: 2020-06-03
Attending: EMERGENCY MEDICINE | Admitting: HOSPITALIST
Payer: COMMERCIAL

## 2020-05-31 VITALS
TEMPERATURE: 98.1 F | WEIGHT: 170 LBS | RESPIRATION RATE: 16 BRPM | SYSTOLIC BLOOD PRESSURE: 122 MMHG | HEART RATE: 132 BPM | OXYGEN SATURATION: 97 % | DIASTOLIC BLOOD PRESSURE: 83 MMHG | HEIGHT: 70 IN | BODY MASS INDEX: 24.34 KG/M2

## 2020-05-31 DIAGNOSIS — F10.288 ALCOHOL DEPENDENCE WITH OTHER ALCOHOL-INDUCED DISORDER (H): ICD-10-CM

## 2020-05-31 DIAGNOSIS — Z72.0 TOBACCO ABUSE: Primary | ICD-10-CM

## 2020-05-31 DIAGNOSIS — F10.920 ALCOHOLIC INTOXICATION WITHOUT COMPLICATION (H): ICD-10-CM

## 2020-05-31 LAB
ALBUMIN SERPL-MCNC: 3.8 G/DL (ref 3.4–5)
ALP SERPL-CCNC: 108 U/L (ref 40–150)
ALT SERPL W P-5'-P-CCNC: 893 U/L (ref 0–70)
ANION GAP SERPL CALCULATED.3IONS-SCNC: 5 MMOL/L (ref 3–14)
AST SERPL W P-5'-P-CCNC: 870 U/L (ref 0–45)
BASOPHILS # BLD AUTO: 0 10E9/L (ref 0–0.2)
BASOPHILS NFR BLD AUTO: 0.4 %
BILIRUB SERPL-MCNC: 0.2 MG/DL (ref 0.2–1.3)
BUN SERPL-MCNC: 8 MG/DL (ref 7–30)
CALCIUM SERPL-MCNC: 8.9 MG/DL (ref 8.5–10.1)
CHLORIDE SERPL-SCNC: 106 MMOL/L (ref 94–109)
CO2 SERPL-SCNC: 30 MMOL/L (ref 20–32)
CREAT SERPL-MCNC: 0.69 MG/DL (ref 0.66–1.25)
DIFFERENTIAL METHOD BLD: NORMAL
EOSINOPHIL # BLD AUTO: 0.1 10E9/L (ref 0–0.7)
EOSINOPHIL NFR BLD AUTO: 1 %
ERYTHROCYTE [DISTWIDTH] IN BLOOD BY AUTOMATED COUNT: 14.2 % (ref 10–15)
ETHANOL SERPL-MCNC: 0.52 G/DL
GFR SERPL CREATININE-BSD FRML MDRD: >90 ML/MIN/{1.73_M2}
GLUCOSE BLDC GLUCOMTR-MCNC: 123 MG/DL (ref 70–99)
GLUCOSE SERPL-MCNC: 119 MG/DL (ref 70–99)
HCT VFR BLD AUTO: 44.3 % (ref 40–53)
HGB BLD-MCNC: 15.2 G/DL (ref 13.3–17.7)
IMM GRANULOCYTES # BLD: 0 10E9/L (ref 0–0.4)
IMM GRANULOCYTES NFR BLD: 0.4 %
LYMPHOCYTES # BLD AUTO: 1.6 10E9/L (ref 0.8–5.3)
LYMPHOCYTES NFR BLD AUTO: 19.4 %
MCH RBC QN AUTO: 31.3 PG (ref 26.5–33)
MCHC RBC AUTO-ENTMCNC: 34.3 G/DL (ref 31.5–36.5)
MCV RBC AUTO: 91 FL (ref 78–100)
MONOCYTES # BLD AUTO: 0.8 10E9/L (ref 0–1.3)
MONOCYTES NFR BLD AUTO: 9.6 %
NEUTROPHILS # BLD AUTO: 5.8 10E9/L (ref 1.6–8.3)
NEUTROPHILS NFR BLD AUTO: 69.2 %
NRBC # BLD AUTO: 0 10*3/UL
NRBC BLD AUTO-RTO: 0 /100
PLATELET # BLD AUTO: 194 10E9/L (ref 150–450)
POTASSIUM SERPL-SCNC: 4 MMOL/L (ref 3.4–5.3)
PROT SERPL-MCNC: 7.7 G/DL (ref 6.8–8.8)
RBC # BLD AUTO: 4.86 10E12/L (ref 4.4–5.9)
SODIUM SERPL-SCNC: 141 MMOL/L (ref 133–144)
WBC # BLD AUTO: 8.3 10E9/L (ref 4–11)

## 2020-05-31 PROCEDURE — 96366 THER/PROPH/DIAG IV INF ADDON: CPT

## 2020-05-31 PROCEDURE — 80053 COMPREHEN METABOLIC PANEL: CPT | Performed by: EMERGENCY MEDICINE

## 2020-05-31 PROCEDURE — 96361 HYDRATE IV INFUSION ADD-ON: CPT

## 2020-05-31 PROCEDURE — 25000128 H RX IP 250 OP 636: Performed by: EMERGENCY MEDICINE

## 2020-05-31 PROCEDURE — 96375 TX/PRO/DX INJ NEW DRUG ADDON: CPT

## 2020-05-31 PROCEDURE — 00000146 ZZHCL STATISTIC GLUCOSE BY METER IP

## 2020-05-31 PROCEDURE — 99285 EMERGENCY DEPT VISIT HI MDM: CPT | Mod: 25

## 2020-05-31 PROCEDURE — 85025 COMPLETE CBC W/AUTO DIFF WBC: CPT | Performed by: EMERGENCY MEDICINE

## 2020-05-31 PROCEDURE — 80320 DRUG SCREEN QUANTALCOHOLS: CPT | Performed by: EMERGENCY MEDICINE

## 2020-05-31 PROCEDURE — 96376 TX/PRO/DX INJ SAME DRUG ADON: CPT

## 2020-05-31 PROCEDURE — 96365 THER/PROPH/DIAG IV INF INIT: CPT

## 2020-05-31 PROCEDURE — 80329 ANALGESICS NON-OPIOID 1 OR 2: CPT | Performed by: EMERGENCY MEDICINE

## 2020-05-31 PROCEDURE — 25000132 ZZH RX MED GY IP 250 OP 250 PS 637: Performed by: INTERNAL MEDICINE

## 2020-05-31 PROCEDURE — 25800030 ZZH RX IP 258 OP 636: Performed by: EMERGENCY MEDICINE

## 2020-05-31 PROCEDURE — 99207 ZZC NO CHARGE LOS: CPT | Performed by: INTERNAL MEDICINE

## 2020-05-31 PROCEDURE — 25000125 ZZHC RX 250: Performed by: EMERGENCY MEDICINE

## 2020-05-31 PROCEDURE — 99232 SBSQ HOSP IP/OBS MODERATE 35: CPT | Performed by: INTERNAL MEDICINE

## 2020-05-31 RX ORDER — LORAZEPAM 2 MG/ML
1 INJECTION INTRAMUSCULAR ONCE
Status: COMPLETED | OUTPATIENT
Start: 2020-05-31 | End: 2020-05-31

## 2020-05-31 RX ORDER — SODIUM CHLORIDE 9 MG/ML
1000 INJECTION, SOLUTION INTRAVENOUS CONTINUOUS
Status: DISCONTINUED | OUTPATIENT
Start: 2020-05-31 | End: 2020-06-01

## 2020-05-31 RX ADMIN — QUETIAPINE FUMARATE 100 MG: 100 TABLET ORAL at 07:20

## 2020-05-31 RX ADMIN — SODIUM CHLORIDE 1000 ML: 9 INJECTION, SOLUTION INTRAVENOUS at 19:48

## 2020-05-31 RX ADMIN — FOLIC ACID: 5 INJECTION, SOLUTION INTRAMUSCULAR; INTRAVENOUS; SUBCUTANEOUS at 20:50

## 2020-05-31 RX ADMIN — LORAZEPAM 1 MG: 2 INJECTION INTRAMUSCULAR; INTRAVENOUS at 20:00

## 2020-05-31 RX ADMIN — PANTOPRAZOLE SODIUM 40 MG: 40 TABLET, DELAYED RELEASE ORAL at 06:41

## 2020-05-31 ASSESSMENT — ACTIVITIES OF DAILY LIVING (ADL)
ADLS_ACUITY_SCORE: 16

## 2020-05-31 ASSESSMENT — ENCOUNTER SYMPTOMS: CONFUSION: 1

## 2020-05-31 NOTE — PROGRESS NOTES
[facility]    Hospitalist Progress Note    Date of Service (when I saw the patient): 05/31/2020    Assessment & Plan   Mohit Porter is a 29 year old male who was admitted on 5/26/2020.  Assessment & Plan     Mohit Porter is a 29 year old male with PMH of alcohol use disorder, alcoholic hepatitis, who presents with alcohol intoxication.     Alcohol intoxication  Alcohol use disorder  Lactic and ketoacidosis due to alcohol intoxication and starvation ketosis  Patient has multiple ED visits and admissions for alcohol intoxication and withdrawal, including ED 3/12, 3/8, 2/28, 1/4, and admissions 12/2019 and 8/2019 per our records and CareEverywhere. He was found by hotel staff to be unresponsive and surround by alcohol. He reports binge drinking for 4 days. His ethanol level here is 0.49. AST is mildly elevated. He is significantly intoxicated from alcohol and does not appear to be in withdrawal to me. His story is not reliable as it does not correlate with his multiple health care visits for alcohol use. He asked for Ativan/Valium multiple times this admission in the absence of any alcohol withdrawal, so he is substance seeking in my opinion. There have been discussions in the past about commitment and this should be significantly considered this admission.  - Urine drug screen returned negative  - Give gabapentin 800mg TID + Clonidine 0.1mg x3 doses + Clonidine 0.2mg patch to prophylactically treat withdrawal.  Will DC clonidine patch, gabapentin and clonidine today as withdrawal seem  to be improved  - CIWA, thiamine, folate.   Vallium PRN dosing per CIWA protocol .  He is scoring less today on the protocol  Alcohol withdrawal improved   Stopped vallium   - Psychiatry consulted and recommended chem dep   CHEM Depp consulted and recommended inpatient detox transfer and referrals sent     Lactic acidosis and ketoacidosis resolved with IV fluid hydration with  glucose     DVT Prophylaxis:  Pneumatic Compression Devices  Code Status: Full Code     Disposition: Expected discharge in the next 2 days once alcohol treatment place available         Mariah Rebolledo MD  [phone] (P)      Interval History   Resting comfortbly in bed.  Alcohol withdrawal improved.    -Data reviewed today: I reviewed all new labs and imaging results over the last 24 hours. I personally reviewed no images or EKG's today.    Physical Exam   Temp: 98.1  F (36.7  C) Temp src: Oral BP: (!) 123/90 Pulse: 92 Heart Rate: 92 Resp: 16 SpO2: 99 % O2 Device: None (Room air)    Vitals:    05/26/20 1917   Weight: 77.1 kg (170 lb)     Vital Signs with Ranges  Temp:  [97.8  F (36.6  C)-98.4  F (36.9  C)] 98.1  F (36.7  C)  Pulse:  [92] 92  Heart Rate:  [90-92] 92  Resp:  [16-18] 16  BP: (123-125)/(85-95) 123/90  SpO2:  [97 %-99 %] 99 %  I/O last 3 completed shifts:  In: 760 [P.O.:760]  Out: -     Constitutional: Awake, alert, cooperative, no apparent distress  Respiratory: Clear to auscultation bilaterally, no crackles or wheezing  Cardiovascular: Regular rate and rhythm, normal S1 and S2, and no murmur noted  GI: Normal bowel sounds, soft, non-distended, non-tender  Skin/Integumen: No rashes, no cyanosis, no edema  Other:     Medications       escitalopram  5 mg Oral Daily     folic acid  1 mg Oral Daily     multivitamin w/minerals  1 tablet Oral Daily     pantoprazole  40 mg Oral QAM AC     QUEtiapine  100 mg Oral BID     QUEtiapine  400 mg Oral At Bedtime       Data   Recent Labs   Lab 05/29/20  1001 05/28/20  2239 05/28/20  1631 05/28/20  0840 05/27/20  0746 05/26/20  1631   WBC  --   --   --  8.6 10.7 13.0*   HGB  --   --   --  13.4 13.1* 17.2   MCV  --   --   --  92 88 88   PLT  --   --   --  157 207 296   NA  --   --   --  138 135 136   POTASSIUM 3.6 3.7 3.2* 2.8* 3.7 3.8   CHLORIDE  --   --   --  99 96 94   CO2  --   --   --  34* 28 23   BUN  --   --   --  5* 7 12   CR  --   --   --  0.63* 0.63* 0.59*   ANIONGAP  --   --   --  5 11  19*   ARYA  --   --   --  8.4* 8.3* 8.4*   GLC  --   --   --  77 79 85   ALBUMIN  --   --   --   --  3.3* 4.1   PROTTOTAL  --   --   --   --  6.3* 7.9   BILITOTAL  --   --   --   --  0.9 0.7   ALKPHOS  --   --   --   --  56 79   ALT  --   --   --   --  40 54   AST  --   --   --   --  60* 80*       No results found for this or any previous visit (from the past 24 hour(s)).

## 2020-05-31 NOTE — PROGRESS NOTES
Patient was seen leaving in an Uber by another staff member that recognized the patient and alerted the charge RN.  Writer tried calling the patient at number provided in chart [phone].  Message left.  Patient returned to the unit on his own.  He stated that he wasn't aware that he couldn't go off of the unit and he wanted to go out to get fresh air.  Writer called the Dr. Rebolledo letting her know patient requesting to go off the unit, she said absolutely not, he needs to stay in his room and on the unit. Writer relayed this to the patient and he reported he understood, then the next time a staff member went to check on him he was gone and charge RN found an empty bottle of vodka in his bedding.  He was discharged AMA.

## 2020-05-31 NOTE — PROGRESS NOTES
"Patient was seen leaving in an Uber by another staff member that recognized the patient and alerted the charge RN. Writer tried calling the patient at number provided in chart [phone]. Message left. Patient returned to the unit on his own. He stated that he wasn't aware that he couldn't go off the unit and he wanted to get fresh air. Staff talked to patient about not being able to go outside or off the unit in general. Charge was called by manpreet watts and informed that patient was trying to leave again. Patient was escorted back to him room 601-2. Patient was irritated and stated \"I can walk on my own.\" Staff when to check on patient and patient was no longer in his room. Charge RN called down to the doors to see if a patient had left. Patient did leave and was recognized by door staff. Staff tried to tell pt that he could not leave but patient told staff \"My nurse said I could leave.\" When stripping the patients bed, a pint of smirnoff vodka was empty and rolled up in his sheets. Charge RN called PPM and explained the situation, contacted security, and let the MD know.  "

## 2020-05-31 NOTE — PROGRESS NOTES
D: SW following for discharge planning.   I: Left voicemail's at all three facilities where referrals were sent. No return calls.   P: SW following.     ALIX Castelan, [facility]  [phone]  [facility]

## 2020-05-31 NOTE — ED TRIAGE NOTES
Pt was a patient on 66 that left ama, went to local liquor store and was found drinking and intoxicated by staff.

## 2020-05-31 NOTE — ED NOTES
Bed: [facility]  Expected date: 5/31/20  Expected time: 6:48 PM  Means of arrival: Ambulance  Comments:  Francisco  29m etoh  ETA [id number]

## 2020-05-31 NOTE — PLAN OF CARE
5/30/2020 [id number]  Cognitive Concerns/Orientation: A/O x 4  BEHAVIOR & AGGRESSION TOOL COLOR: green  CIWA SCORE: 2.1, sleeping.      ABNL VS/O2: VSS on RA  MOBILITY: Independent  PAIN MANAGMENT: Denies pain   DIET: Regular, good appetite  BOWEL/BLADDER: independent  ABNL LAB/BG: K+2.8, replaced, recheck was 3.7- now 3.6. Mg+ 2.0, a.m. pending  DRAIN/DEVICES: No IV access.  TELEMETRY RHYTHM: na  SKIN: intact.   TESTS/PROCEDURES:    D/C DAY/GOALS/PLACE: pending   OTHER IMPORTANT INFO: Chemical dependence consult done, awaiting placement in treatment program. WERO is a RN at [facility]

## 2020-06-01 PROBLEM — F10.20 ALCOHOL DEPENDENCE (H): Status: ACTIVE | Noted: 2020-06-01

## 2020-06-01 LAB
APAP SERPL-MCNC: <2 MG/L (ref 10–20)
INR PPP: 0.94 (ref 0.86–1.14)
LACTATE BLD-SCNC: 0.9 MMOL/L (ref 0.7–2)

## 2020-06-01 PROCEDURE — 36415 COLL VENOUS BLD VENIPUNCTURE: CPT | Performed by: PHYSICIAN ASSISTANT

## 2020-06-01 PROCEDURE — 25000132 ZZH RX MED GY IP 250 OP 250 PS 637: Performed by: PHYSICIAN ASSISTANT

## 2020-06-01 PROCEDURE — 25000128 H RX IP 250 OP 636: Performed by: EMERGENCY MEDICINE

## 2020-06-01 PROCEDURE — 83605 ASSAY OF LACTIC ACID: CPT | Performed by: HOSPITALIST

## 2020-06-01 PROCEDURE — 12000000 ZZH R&B MED SURG/OB

## 2020-06-01 PROCEDURE — 36415 COLL VENOUS BLD VENIPUNCTURE: CPT | Performed by: HOSPITALIST

## 2020-06-01 PROCEDURE — G0378 HOSPITAL OBSERVATION PER HR: HCPCS

## 2020-06-01 PROCEDURE — 99221 1ST HOSP IP/OBS SF/LOW 40: CPT | Performed by: PSYCHIATRY & NEUROLOGY

## 2020-06-01 PROCEDURE — 80329 ANALGESICS NON-OPIOID 1 OR 2: CPT | Performed by: PHYSICIAN ASSISTANT

## 2020-06-01 PROCEDURE — 85610 PROTHROMBIN TIME: CPT | Performed by: PHYSICIAN ASSISTANT

## 2020-06-01 PROCEDURE — 25800030 ZZH RX IP 258 OP 636: Performed by: EMERGENCY MEDICINE

## 2020-06-01 PROCEDURE — 99223 1ST HOSP IP/OBS HIGH 75: CPT | Mod: AI | Performed by: PHYSICIAN ASSISTANT

## 2020-06-01 RX ORDER — POLYETHYLENE GLYCOL 3350 17 G/17G
17 POWDER, FOR SOLUTION ORAL DAILY PRN
Status: DISCONTINUED | OUTPATIENT
Start: 2020-06-01 | End: 2020-06-03 | Stop reason: HOSPADM

## 2020-06-01 RX ORDER — PANTOPRAZOLE SODIUM 40 MG/1
40 TABLET, DELAYED RELEASE ORAL DAILY
COMMUNITY
End: 2020-10-17

## 2020-06-01 RX ORDER — ONDANSETRON 2 MG/ML
4 INJECTION INTRAMUSCULAR; INTRAVENOUS EVERY 6 HOURS PRN
Status: DISCONTINUED | OUTPATIENT
Start: 2020-06-01 | End: 2020-06-03 | Stop reason: HOSPADM

## 2020-06-01 RX ORDER — BISACODYL 10 MG
10 SUPPOSITORY, RECTAL RECTAL DAILY PRN
Status: DISCONTINUED | OUTPATIENT
Start: 2020-06-01 | End: 2020-06-03 | Stop reason: HOSPADM

## 2020-06-01 RX ORDER — FOLIC ACID 1 MG/1
1 TABLET ORAL DAILY
Status: DISCONTINUED | OUTPATIENT
Start: 2020-06-01 | End: 2020-06-03 | Stop reason: HOSPADM

## 2020-06-01 RX ORDER — MULTIPLE VITAMINS W/ MINERALS TAB 9MG-400MCG
1 TAB ORAL DAILY
Status: DISCONTINUED | OUTPATIENT
Start: 2020-06-01 | End: 2020-06-03 | Stop reason: HOSPADM

## 2020-06-01 RX ORDER — PROCHLORPERAZINE MALEATE 5 MG
10 TABLET ORAL EVERY 6 HOURS PRN
Status: DISCONTINUED | OUTPATIENT
Start: 2020-06-01 | End: 2020-06-03 | Stop reason: HOSPADM

## 2020-06-01 RX ORDER — QUETIAPINE FUMARATE 100 MG/1
100 TABLET, FILM COATED ORAL 2 TIMES DAILY
Status: DISCONTINUED | OUTPATIENT
Start: 2020-06-01 | End: 2020-06-03 | Stop reason: HOSPADM

## 2020-06-01 RX ORDER — ESCITALOPRAM OXALATE 5 MG/1
5 TABLET ORAL DAILY
COMMUNITY
End: 2020-10-17

## 2020-06-01 RX ORDER — QUETIAPINE FUMARATE 100 MG/1
100 TABLET, FILM COATED ORAL 2 TIMES DAILY PRN
Status: DISCONTINUED | OUTPATIENT
Start: 2020-06-01 | End: 2020-06-01

## 2020-06-01 RX ORDER — NALOXONE HYDROCHLORIDE 0.4 MG/ML
.1-.4 INJECTION, SOLUTION INTRAMUSCULAR; INTRAVENOUS; SUBCUTANEOUS
Status: DISCONTINUED | OUTPATIENT
Start: 2020-06-01 | End: 2020-06-03 | Stop reason: HOSPADM

## 2020-06-01 RX ORDER — ONDANSETRON 4 MG/1
4 TABLET, ORALLY DISINTEGRATING ORAL EVERY 6 HOURS PRN
Status: DISCONTINUED | OUTPATIENT
Start: 2020-06-01 | End: 2020-06-03 | Stop reason: HOSPADM

## 2020-06-01 RX ORDER — PROCHLORPERAZINE 25 MG
25 SUPPOSITORY, RECTAL RECTAL EVERY 12 HOURS PRN
Status: DISCONTINUED | OUTPATIENT
Start: 2020-06-01 | End: 2020-06-03 | Stop reason: HOSPADM

## 2020-06-01 RX ORDER — LORAZEPAM 2 MG/ML
1 INJECTION INTRAMUSCULAR ONCE
Status: COMPLETED | OUTPATIENT
Start: 2020-06-01 | End: 2020-06-01

## 2020-06-01 RX ORDER — SODIUM CHLORIDE, SODIUM LACTATE, POTASSIUM CHLORIDE, CALCIUM CHLORIDE 600; 310; 30; 20 MG/100ML; MG/100ML; MG/100ML; MG/100ML
INJECTION, SOLUTION INTRAVENOUS CONTINUOUS
Status: DISCONTINUED | OUTPATIENT
Start: 2020-06-01 | End: 2020-06-03 | Stop reason: HOSPADM

## 2020-06-01 RX ORDER — AMOXICILLIN 250 MG
1 CAPSULE ORAL 2 TIMES DAILY PRN
Status: DISCONTINUED | OUTPATIENT
Start: 2020-06-01 | End: 2020-06-03 | Stop reason: HOSPADM

## 2020-06-01 RX ORDER — LANOLIN ALCOHOL/MO/W.PET/CERES
100 CREAM (GRAM) TOPICAL DAILY
Status: DISCONTINUED | OUTPATIENT
Start: 2020-06-01 | End: 2020-06-03 | Stop reason: HOSPADM

## 2020-06-01 RX ORDER — AMOXICILLIN 250 MG
2 CAPSULE ORAL 2 TIMES DAILY PRN
Status: DISCONTINUED | OUTPATIENT
Start: 2020-06-01 | End: 2020-06-03 | Stop reason: HOSPADM

## 2020-06-01 RX ORDER — SODIUM CHLORIDE 9 MG/ML
INJECTION, SOLUTION INTRAVENOUS CONTINUOUS
Status: CANCELLED | OUTPATIENT
Start: 2020-06-01

## 2020-06-01 RX ORDER — PANTOPRAZOLE SODIUM 40 MG/1
40 TABLET, DELAYED RELEASE ORAL
Status: DISCONTINUED | OUTPATIENT
Start: 2020-06-02 | End: 2020-06-03 | Stop reason: HOSPADM

## 2020-06-01 RX ORDER — QUETIAPINE FUMARATE 100 MG/1
400 TABLET, FILM COATED ORAL AT BEDTIME
Status: DISCONTINUED | OUTPATIENT
Start: 2020-06-01 | End: 2020-06-03 | Stop reason: HOSPADM

## 2020-06-01 RX ORDER — QUETIAPINE FUMARATE 100 MG/1
100 TABLET, FILM COATED ORAL DAILY PRN
Status: DISCONTINUED | OUTPATIENT
Start: 2020-06-01 | End: 2020-06-01

## 2020-06-01 RX ORDER — LIDOCAINE 40 MG/G
CREAM TOPICAL
Status: DISCONTINUED | OUTPATIENT
Start: 2020-06-01 | End: 2020-06-03 | Stop reason: HOSPADM

## 2020-06-01 RX ADMIN — FOLIC ACID 1 MG: 1 TABLET ORAL at 18:00

## 2020-06-01 RX ADMIN — QUETIAPINE FUMARATE 400 MG: 100 TABLET ORAL at 22:13

## 2020-06-01 RX ADMIN — MULTIPLE VITAMINS W/ MINERALS TAB 1 TABLET: TAB at 18:00

## 2020-06-01 RX ADMIN — Medication 1 MG: at 23:58

## 2020-06-01 RX ADMIN — SODIUM CHLORIDE, POTASSIUM CHLORIDE, SODIUM LACTATE AND CALCIUM CHLORIDE 1000 ML: 600; 310; 30; 20 INJECTION, SOLUTION INTRAVENOUS at 06:28

## 2020-06-01 RX ADMIN — QUETIAPINE FUMARATE 100 MG: 50 TABLET ORAL at 09:07

## 2020-06-01 RX ADMIN — LORAZEPAM 1 MG: 2 INJECTION INTRAMUSCULAR; INTRAVENOUS at 00:55

## 2020-06-01 RX ADMIN — Medication 100 MG: at 18:00

## 2020-06-01 RX ADMIN — QUETIAPINE FUMARATE 100 MG: 100 TABLET ORAL at 18:00

## 2020-06-01 ASSESSMENT — ACTIVITIES OF DAILY LIVING (ADL)
ADLS_ACUITY_SCORE: 16
DRESS: 0-->INDEPENDENT
RETIRED_COMMUNICATION: 0-->UNDERSTANDS/COMMUNICATES WITHOUT DIFFICULTY
SWALLOWING: 0-->SWALLOWS FOODS/LIQUIDS WITHOUT DIFFICULTY
BATHING: 0-->INDEPENDENT
TOILETING: 0-->INDEPENDENT
TRANSFERRING: 0-->INDEPENDENT
FALL_HISTORY_WITHIN_LAST_SIX_MONTHS: NO
AMBULATION: 0-->INDEPENDENT
COGNITION: 0 - NO COGNITION ISSUES REPORTED
RETIRED_EATING: 0-->INDEPENDENT

## 2020-06-01 NOTE — ED NOTES
MD in to eval pt, watched video of incident and MD noted pt threw himself to floor and did not see head hit the ground. Did not want to do CT at this time. But did say it was ok for pt to be in restraints for cares and pt safety.

## 2020-06-01 NOTE — ED NOTES
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 Left 5/31 via uber and returning on own before eventually leaving AM. An empty bottle of alcohol was found in his linens after leaving. He later was found intoxicated and drinking at a local liquor store and brought to ED via EMS. After a lengthy stay in the ED pt wants to be readmitted to SUDS bed.  Focused Assessment:  Pt initially intoxicated and on health officer hold. Is currently been up ambulatory to restroom and had courtesy meal, juice, and water.     Treatments and/or interventions provided: IV ativan, banana bag, IV fluids, labs  Patient's response to treatments and/or interventions: sobered    To be done/followed up on inpatient unit:  continue to monitor for withdrawal    Does this patient have any cognitive concerns?: none    Activity level - Baseline/Home:  Independent  Activity Level - Current:   Independent    Patient's Preferred language: English   Needed?: No    Isolation: None  Infection: Not Applicable  Bariatric?: No    Vital Signs:   Vitals:    06/01/20 0100 06/01/20 0115 06/01/20 0200 06/01/20 0300   BP: 112/75 117/75 126/79 (!) 123/90   Pulse: 107  125 120   Resp: 11 15 12 28   Temp:       TempSrc:       SpO2: 100% 99% 98% 100%       Cardiac Rhythm:Cardiac Rhythm: Sinus tachycardia;Normal sinus rhythm    Was the PSS-3 completed:   No -refused  What interventions are required if any?       Required Interventions: Belongings removed;Room searched  High Risk Required Interventions: On continuous in person observation    Family Comments: none present  OBS brochure/video discussed/provided to patient/family: N/A              Name of person given  brochure if not patient:               Relationship to patient:     For the majority of the shift this patient's behavior was Red.   Behavioral interventions performed were restraints and oral medications.    ED NURSE PHONE NUMBER: [phone]

## 2020-06-01 NOTE — ED NOTES
Pt placed on bed and cleaned up and restrained in 5 points with security in room. Diaper on pt. Pt covered with blanket at this time. Belongings done and in locker by previous shift.

## 2020-06-01 NOTE — PHARMACY-ADMISSION MEDICATION HISTORY
Pharmacy Medication History  Admission medication history interview status for the 5/31/2020  admission is complete. See EPIC admission navigator for prior to admission medications     Medication history sources: Care Everywhere and MAR from admission 5/26-5/31  Medication history source reliability: Moderate  Adherence assessment: Unknown    Significant changes made to the medication list:  Added lexapro, pantoprazole  Changed quetiapine to 100 bid and 400 at bedtime      Additional medication history information:   none    Medication reconciliation completed by provider prior to medication history? Yes    Time spent in this activity: 15 minutes      Prior to Admission medications    Medication Sig Last Dose Taking? Auth Provider   escitalopram (LEXAPRO) 5 MG tablet Take 5 mg by mouth daily  Yes Unknown, Entered By History   naltrexone (DEPADE/REVIA) 50 MG tablet Take 100 mg by mouth daily  Yes Unknown, Entered By History   pantoprazole (PROTONIX) 40 MG EC tablet Take 40 mg by mouth daily  Yes Unknown, Entered By History   QUEtiapine (SEROQUEL) 100 MG tablet Take 100 mg by mouth 2 times daily   Yes Unknown, Entered By History   QUEtiapine (SEROQUEL) 400 MG tablet Take 400 mg by mouth At Bedtime  Yes Unknown, Entered By History     Padmini Escobedo, PharmD  Inpatient Clinical Pharmacist  [phone]

## 2020-06-01 NOTE — ED NOTES
Pt slept during shift and ate lunch. No issues. Continue plan for admission, signed over to Dr. Billingsley at 4:15pm       Huseyin Boykin MD  06/01/20 3904

## 2020-06-01 NOTE — H&P
[facility]    History and Physical - Hospitalist Service       Date of Admission:  5/31/2020    Assessment & Plan   Mohit Porter is a 29 year old male with PMHx of severe alcohol use disorder, adjustment disorder with mixed anxiety and depressed mood, and history of hepatitis C admitted on 6/1/2020 for acute alcohol intoxication after recent hospitalization 5/26/2020 - 5/31/2020 for acute alcohol intoxication, withdrawal monitoring with plan for discharge to CD treatment. Ultimately, pt left hospital AMA 5/31/2020, re-presented 6/1/2020 with altered metal status in the setting of alcohol intoxication with BAL 0.52. Registered under inpatient status for further evaluation and treatment.     Acute alcohol intoxication   Encephalopathy related to above, improved   Alcohol use disorder, severe: CBC and BMP were unremarkable. , . Ethanol 0.52. Pt was hydrated with IVF and administered Ativan 1 mg at 0055. Due to intoxication, he was ultimately placed in restraints last evening (since discontinued).  -- Admit under inpatient status   -- Psychiatry consulted, appreciate recommendations, ? Commitment given numerous, recurrent hospitalizations for alcohol intoxication. Referred to initial consult 5/28/2020 by Dr. Hagen.   -- If patient tries to leave AMA, a 72 hour hold should be placed until seen by Psychiatry.   -- CD consulted, see note 5/29/2020. Pt agreeable to treatment. Referrals sent at that time   -- Social work consulted to help facilitate transfer to CD treatment   -- [facility] protocol in place, pt does not appear to be in active withdrawal on my interview. Hold off on PRN benzodiazepines at this time   -- Daily multivitamin, thiamine, folic acid   -- Protonix for GI prophylaxis     Elevated transaminitis: ,  on admission. Transaminases had been essentially normal 5/27/2020. No abdominal pain, nausea, vomiting.   -- IVF hydration   -- Recheck hepatic panel in the  AM, INR in the AM     Adjustment disorder with mixed anxiety and depressed mood:   -- Continue Lexapro 10 mg po every day (uptitrated per Psychiatry 5/28/2020)   -- Continue PTA Seroquel 400 mg at bedtime, will also continue with Seroquel 100 mg BID as ordered during most recent hospitalization (PTA, had been taking 100 mg PRN)     Tobacco use: Reports chewing tobacco.   -- PRN nicotine gum available     Hx of hepatitis C: See this documented in notes, but no confirmatory labs appreciated. No indication of prior treatment. Antibody obtained 2/17/2019 nonreactive.     Diet: Regular diet   DVT Prophylaxis: Ambulate every shift  Emerson Catheter: not present  Code Status: Full Code        Disposition Plan   Expected discharge: 1-2 days, recommended to CD treatment center once monitored for acute alcohol withdrawal.  Entered: My Grimes PA-C 06/01/2020, 8:27 AM     The patient's care was discussed with the Attending Physician, Dr. Ortiz, Bedside Nurse and Patient.    My Grimes PA-C  [facility]  ______________________________________________________________________    Chief Complaint   Alcohol intoxication    History is obtained from the patient and extensive chart review.     History of Present Illness   Mohit Porter is a 29 year old male with PMHx of severe alcohol use disorder, adjustment disorder with mixed anxiety and depressed mood, and history of hepatitis C admitted on 6/1/2020 for acute alcohol intoxication after recent hospitalization 5/26/2020 - 5/31/2020 for acute alcohol intoxication, withdrawal monitoring with plan for discharge to CD treatment. Ultimately, pt left hospital AMA 5/31/2020, re-presented 6/1/2020 with altered metal status in the setting of alcohol intoxication with BAL 0.52. Registered under inpatient status for further evaluation and treatment.     Note ED visits for alcohol intoxication (not all fully accounted for, please  refer to [facility]) 3/12/2020, 3/8/2020, 1/4/2020, 12/7/2019, 8/5/2019, 8/3/2019, 7/28/2019, 5/20/2019, 4/14/2019, 4/11/2019, 4/7/2019. Hospitalizations 5/26/2020 - 5/31/2020, 2/29/2020 - 3/1/2020, 12/16/2019 - 12/16/2019, 12/16/2019 - 12/18/2019, 8/5/2019 - 8/12/2019. Ultimately, after 2019 hospitalization, pt was discharged to [facility] in [place]. Per Psych notes, reported CD treatment in the past year at [facility]. He apparently completed that program and was in their medium intensity program where he unfortunately relapsed on alcohol use and was kicked out of the program. Additionally, more recently, pt was in outpatient treatment at [address] [place] [address], but relapsed in that program and was kicked out.     During most recently hospitalization from 5/26/2020 - 5/31/2020, pt was treated with gabapentin 800 mg TID for essentially one day (5/27/2020), Clonidine 0.1 mg X3 and clonidine patch 0.2 mg X1 day (5/27/2020), and one dose of Valium. He was maintained on his PTA Seroquel 400 mg at bedtime was administered and  mg daily was changed to BID dosing. Psychiatry saw the patient, increased Lexapro to 10 mg po every day. CD assessed pt and sent referrals for CD treatment and social work followed. Ultimately, on 5/31/2020, per review of documentation, pt was seen leaving in an Uber by staff. He ultimately returned to the unit, but subsequently left. Upon stripping his bed, an empty pint of Smirnoff vodka was found.     Pt returned to the ED within 12 hours. He had been found at a local liquor store, drinking and intoxicated. Pt was seen and assessed by Dr. Billingsley who obtained basic labs and imaging. CBC and BMP were unremarkable. , . Ethanol 0.52. Pt was hydrated with IVF and administered Ativan 1 mg at 0055. Due to intoxication, he was ultimately placed in restraints last evening (since discontinued). Hospitalist  service was contacted for admission.     On my interview, pt is sitting at the edge of the bed. Oriented X4. Does not appear to be actively withdrawing. Requesting water and Seroquel. Denies recent illness including cough, ST, fevers, myalgias, diarrhea.     Review of Systems    The 10 point Review of Systems is negative other than noted in the HPI.    Past Medical History    1. Alcohol use disorder   2. Adjustment disorder with mixed anxiety and depressed mood  3. Hepatitis C, see this documented in notes, but no confirmatory labs appreciated. No indication of prior treatment. Antibody obtained 2/17/2019 nonreactive.  4. Tobacco use disorder     Past Surgical History   None.     Social History   Chews tobacco. Reports drinking 0.5 -1 pint of vodka daily. Unemployed. Has 3 year old daughter.     Family History   2 maternal uncles with alcohol use disorder.     Prior to Admission Medications   Prior to Admission Medications   Prescriptions Last Dose Informant Patient Reported? Taking?   QUEtiapine (SEROQUEL) 100 MG tablet  Other Yes No   Sig: Take 100 mg by mouth daily as needed    QUEtiapine (SEROQUEL) 400 MG tablet  Other Yes No   Sig: Take 400 mg by mouth At Bedtime   hydrOXYzine (ATARAX) 25 MG tablet  Other Yes No   Sig: Take 25-50 mg by mouth every 4 hours as needed for anxiety   naltrexone (DEPADE/REVIA) 50 MG tablet  Other Yes No   Sig: Take 100 mg by mouth daily      Facility-Administered Medications: None     Allergies   No Known Allergies    Physical Exam   Vital Signs: Temp: 97.6  F (36.4  C) Temp src: Temporal BP: 126/85 Pulse: 114 Heart Rate: 121 Resp: 28 SpO2: 97 % O2 Device: Nasal cannula Oxygen Delivery: 3 LPM  Weight: 0 lbs 0 oz    CONSTITUTIONAL: Pt sitting at the edge of the bed, dressed in hospital garb. Appears comfortable. Cooperative with interview.   HEENT: Normocephalic, atraumatic. Pupils equal, round, and reactive to light. Negative for conjunctival redness or scleral  icterus.  CARDIOVASCULAR: RRR, no murmurs, rubs, or extra heart sounds appreciated. Pulses +2/4 and regular in upper and lower extremities, bilaterally.   RESPIRATORY: No increased work of breathing.  CTA, bilat; no wheezes, rales, or rhonchi appreciated.  GASTROINTESTINAL:  Abdomen soft, non-distended. BS auscultated in all four quadrants. Negative for tenderness to palpation.  No masses or organomegaly noted.  MUSCULOSKELETAL: Strength +5/5 in upper and lower extremities, bilaterally. No gross deformities noted. Normal muscle tone.   HEMATOLOGIC/LYMPHATIC/IMMUNOLOGIC: Negative for lower extremity edema, bilaterally.  NEUROLOGIC: Alert and oriented to person, place, and time.  strength intact. No tremors noted. No focal neuro deficits.   SKIN: Warm, dry, intact. No jaundice noted. Negative for suspicious lesions, rashes, bruising, open sores or abrasions.     Data   Data reviewed today: I reviewed all medications, new labs and imaging results over the last 24 hours. I personally reviewed all labs and imaging to date.     Recent Labs   Lab 05/31/20  1944 05/29/20  1001 05/28/20  2239  05/28/20  0840 05/27/20  0746   WBC 8.3  --   --   --  8.6 10.7   HGB 15.2  --   --   --  13.4 13.1*   MCV 91  --   --   --  92 88     --   --   --  157 207     --   --   --  138 135   POTASSIUM 4.0 3.6 3.7   < > 2.8* 3.7   CHLORIDE 106  --   --   --  99 96   CO2 30  --   --   --  34* 28   BUN 8  --   --   --  5* 7   CR 0.69  --   --   --  0.63* 0.63*   ANIONGAP 5  --   --   --  5 11   ARYA 8.9  --   --   --  8.4* 8.3*   *  --   --   --  77 79   ALBUMIN 3.8  --   --   --   --  3.3*   PROTTOTAL 7.7  --   --   --   --  6.3*   BILITOTAL 0.2  --   --   --   --  0.9   ALKPHOS 108  --   --   --   --  56   *  --   --   --   --  40   *  --   --   --   --  60*    < > = values in this interval not displayed.     No results found for this or any previous visit (from the past 24 hour(s)).

## 2020-06-01 NOTE — ED NOTES
Pt agreeable to listen to direction and the situation about the need for restraints was explained to him. Restraints off at this time.

## 2020-06-01 NOTE — ED NOTES
[facility]  ED Nurse Handoff Report    ED Chief complaint: Alcohol Intoxication      ED Diagnosis:   Final diagnoses:   Alcoholic intoxication without complication (H)       Code Status: Full Code    Allergies: No Known Allergies    Patient Story:  29-year-old gentleman with history of severe alcohol use disorder and adjustment disorder with depression and anxiety, was recently hospitalized for alcohol intoxication and withdrawal, [facility] between 05/26 and 05/29.  He was seen by Dr. Aaron Hagen during that admission and I refer to his consultation note from 05/28/2020.  The patient became severely intoxicated again, presented back to the emergency room overnight.  Breath alcohol of 0.52 (it was 0.496 days ago prior to last admission). Pt to be admitted again pending admission to rehab.            Focused Assessment: Pt has been cooperative today and slept most of day after seroquel    Treatments and/or interventions provided: fluids, seroquel  Patient's response to treatments and/or interventions: sleep    To be done/followed up on inpatient unit:      Does this patient have any cognitive concerns?:     Activity level - Baseline/Home:  Independent  Activity Level - Current:   Independent    Patient's Preferred language: English   Needed?: No    Isolation: None  Infection: Not Applicable  Bariatric?: No    Vital Signs:   Vitals:    06/01/20 0720 06/01/20 0730 06/01/20 0740 06/01/20 1250   BP:    116/67   Pulse:    107   Resp:       Temp:       TempSrc:       SpO2: 98% 98% 98% 95%       Cardiac Rhythm:Cardiac Rhythm: Sinus tachycardia;Normal sinus rhythm    Was the PSS-3 completed:   Yes  What interventions are required if any?       Required Interventions: Belongings removed;Room searched  High Risk Required Interventions: On continuous in person observation    Family Comments:   OBS brochure/video discussed/provided to patient/family: No              Name of person given  brochure if not patient:               Relationship to patient:     For the majority of the shift this patient's behavior was Green.   Behavioral interventions performed were .    ED NURSE PHONE NUMBER: [phone]

## 2020-06-01 NOTE — CONSULTS
Consult Date:  06/01/2020      PSYCHIATRIC CONSULTATION       REASON FOR REFERRAL:  Alcohol use disorder, recurrent hospitalization, question commitment.      REQUESTING PHYSICIAN:  My Grmies PA-C      HISTORY OF PRESENT ILLNESS:  Mr. Mohit Porter is a pleasant 29-year-old gentleman currently single father of a 3-year-old daughter, unemployed, previously a , with history of severe alcohol use disorder and adjustment disorder with depression and anxiety, was recently hospitalized for alcohol intoxication and withdrawal, [facility] between 05/26 and 05/29.  He was seen by Dr. Aaron Hagen during that admission and I refer to his consultation note from 05/28/2020.  The patient became severely intoxicated again, presented back to the emergency room overnight.  Breath alcohol of 0.52 (it was 0.496 days ago prior to last admission).      The patient has a long history of alcohol use disorder and has been in chemical dependency treatment approximately 3 times, most recently in [facility] in [place].  He also was in treatment at [facility].  He has had previous treatment failure.  He reports that he has had substantial depression, which he attributes to stressors including amount of time he can see his daughter, unemployment and his alcohol use disorder.  He does believe that his alcohol use disorder significantly contributes to his depression.  He reports depressed mood, anhedonia, low energy and substantial sleep disruption.  He does find that the Seroquel is very helpful and well tolerated for sleep and mood.  He takes this more consistently when he is not drinking, but reports that he has been taking it recently consistently last few months and tolerating it well.  He also has recently started Lexapro 10 mg and has taken this on a few occasions so far.  Denies any hopelessness or suicidal thoughts.  He does report generalized anxiety with excessive worry,  difficulty relaxing.  He has had a few panic attacks lifetime, most recently about a week ago.  He denies a history of bipolar symptomatology.  There are no psychosis symptoms present.  Denies any prior OCD, ADHD, PTSD or eating disorder symptom concerns.      PAST PSYCHIATRIC HISTORY:  No prior psychiatric admissions or prior suicide attempts.  Prior diagnoses of adjustment disorder with depression, anxiety and severe alcohol use disorder.  He has been in treatment several times, most recently [facility] in [place]. He was an outpatient program in [place] and apparently continued to drink and was kicked out.      PAST MEDICAL HISTORY:  Hepatitis C.      PAST SURGICAL HISTORY:  No surgical history reported.      ALLERGIES:  NO KNOWN DRUG ALLERGIES.      MEDICATIONS:  Escitalopram 10 mg daily, Quetiapine 400 mg at night and 100 mg b.i.d. p.r.n. and naltrexone 100 mg daily.      REVIEW OF SYSTEMS:  Ten-point review of system completed and negative other than described in the HPI.  Denies any current alcohol withdrawal symptoms.      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   He has 2 younger brothers.  He attended [employer].  One DWI in the past.  No  experience.  No physical, emotional or sexual abuse.      VITAL SIGNS:  Blood pressure 116/67, pulse 107, temperature 97.6, SpO2 at 95% on room air.      MENTAL STATUS EXAMINATION:  He is dressed in hospital scrubs, lying down in bed in the fetal position.  He is very tired, but is able to arouse to voice and has a conversation without falling asleep, but remains in a position facing away and keeping his eyes closed.  He appears adequately groomed for situation.  No involuntary movements.  No agitation or aggressive behavior.  Speech is nonpressured of normal volume, tone and prosody.  Use of language, articulate.  Mood is described as  depressed.  Affect is mood congruent, blunted, minimal reactivity.  Thought form linear, logical, goal directed.  No flight of ideas.  He is not responding to internal stimuli  and no evidence of visual or auditory hallucinations or paranoia present.  He is future oriented.  Cognition is intact.  Appears well oriented self, circumstances, time and place.  Insight poor to fair with behaviors suggesting limited insight, although at this time he is verbally stating he has a significant problem and needs to go to treatment.  Judgment recently poor in the setting of continued alcohol relapses and not succeeding in treatment without further relapses.  He is denying suicidal or homicidal ideation.  Short and long-term memory appear intact based on conversation.      IMPRESSION, REPORT AND PLAN:   1.  Alcohol use disorder, severe.   2.  Adjustment disorder with anxiety and depression.   3.  History of hepatitis C.      FORMULATION:  This is a very pleasant 29-year-old single father of one previously a , although currently unemployed.  History of severe alcohol use disorder and adjustment disorder, depression, anxiety, presents now for the second time in the past week for severe alcohol intoxication.  He left against medical advice last admission prior to having treatment in place, essentially eloping.  At this time, the patient stating a strong desire to go directly to treatment, would like to get back on Vivitrol injection once back in treatment.  He states that he does not desire to leave the hospital.  He does report substantial depression due to stressors including lack of contact with his daughter, unemployment, and his ongoing alcohol use disorder.  Had partial benefit from Seroquel, though he takes it more consistently when he is not drinking.  He recently started Lexapro for depression and anxiety and would like to also continue this.      PLAN:     1.  Continue medication management of  alcohol withdrawal treatment including CIWA protocol.   2.  Continue Seroquel 400 mg at bedtime and 100 mg b.i.d. p.r.n.   3.  Can continue Escitalopram 10 mg daily.   4.  The patient at this time is voluntarily seeking help.  He is on a health officer hold, although I think he could be a voluntary initially and if he attempts to leave the hospital should be placed on a 72-hour hold at that time and commitment proceedings should begin at that point.  However, at this time he is seemingly genuinely very interested in going to treatment and would like to work collaboratively with the treatment team and thus despite leaving the hospital last time I think he deserves a chance to demonstrate willingness to follow up with treatment, though I do not think it would be [place] for him to discharge from the hospital without going directly to treatment.   5.  Please reconsult Psychiatry as needed.   6.  Would recommend obtaining an EKG to follow up his QTc interval.      RESULTS REVIEW:  CBC unremarkable, including platelets 194, MCV was 91.  Complete metabolic panel showed , alkaline phosphatase 108, creatinine 0.69,  Electrolytes were within normal limits including calcium 8.9, potassium was 4.0.  Alcohol admission was 0.52.  Most recent EKG from 2020, QTc 483.         ELIZABETH MESSINA MD             D: 2020   T: 2020   MT: VIOLET      Name:     AMAYA GUSTAFSON   MRN:      [id number]        Account:       [id number]   :      1990           Consult Date:  2020      Document: [id number]

## 2020-06-01 NOTE — ED NOTES
Pt refusing to keep pulse oximeter and BP cuff monitoring on. He continues ripping them off and will not respond to staff questions.

## 2020-06-01 NOTE — PROGRESS NOTES
SW:  D:  Patient received a message from [facility],  Pauline  Can be reached at [phone] They were responding to a referral made last week.    William at [facility] also returned our call and he can be reached at [phone].  Writer noted patient is in our ED and will be admitted.  P:  Social Work staff will work with either of these programs and with patient to coordinate a direct transfer if possible.

## 2020-06-01 NOTE — ED PROVIDER NOTES
History     Chief Complaint:  Alcohol Intoxication      HPI   History limited by intoxication.  Mohit Porter is a 29 year old male with a history of alcohol abuse who presents with alcohol intoxication.  The patient was admitted to [facility] 5/26/2020 for alcoholic encephalopathy.  He subsequently left the hospital earlier today via Uber, later returning on his own before leaving the hospital again AMA.  An empty pint of vodka was found in his bedding after his departure.  He returns via EMS after he was found to be intoxicated and drinking at a local liquor store.  No other substances, needles, or medications were found near him.  There was no trauma prior to arrival reported.      Allergies:  No Known Allergies     Medications:    Hydroxyzine   Naltrexone  Quetiapine     Past Medical History:    Alcohol abuse  Anxiety  Hepatitis C  Cerebellar ataxia     Past Surgical History:    History reviewed. No pertinent past surgical history.     Family History:    Hypertension - father   Hyperlipidemia - father     Social History:  Presents to the ED alone  Tobacco Use: Current chew user  Alcohol Use: current alcohol abuse history     Review of Systems   Psychiatric/Behavioral: Positive for confusion.   ROS limited by intoxication    Physical Exam   First Vitals:  BP: 120/73  Pulse: 135  Heart Rate: 123  Temp: 97.6  F (36.4  C)  Resp: 18  SpO2: 98 %    Patient Vitals for the past 24 hrs:   BP Temp Temp src Pulse Heart Rate Resp SpO2   05/31/20 2054 -- -- -- -- 128 8 99 %   05/31/20 2020 110/64 -- -- 141 137 11 99 %   05/31/20 2000 117/71 -- -- -- 116 18 99 %   05/31/20 1946 -- -- -- -- 141 14 98 %   05/31/20 1940 118/70 -- -- 123 123 11 98 %   05/31/20 1916 -- -- -- -- -- -- 95 %   05/31/20 1900 120/73 97.6  F (36.4  C) Temporal 135 -- 18 98 %       Physical Exam  Physical Exam   General: Laying on mattress on floor.   HENT:  No obvious trauma to head  Right Ear:  External ear normal.   Left Ear:  External  ear normal.   Nose:  Nose normal.   Eyes:  Conjunctivae and EOM are normal. Pupils are equal, round, and reactive.   Neck: Normal range of motion. Neck supple. No tracheal deviation present.   CV:  Normal heart sounds. No murmur heard.  Pulm/Chest: Effort normal and breath sounds normal.   Abd: Soft. No distension. There is no tenderness. There is no rigidity, no rebound and no guarding.   M/S: Normal range of motion. No tenderness or deformity to shoulders.  Neuro: Alert. Does not communicate much, spontaneous eye opening, follows some directions, cranial nerves II to XII grossly intact, moving all extremities.  When patient attempts to ambulate he is very unsteady on his feet.  Skin: Skin is warm and dry. No rash noted. Not diaphoretic.   Psych: Intoxicated    Emergency Department Course      Laboratory:  CBC: WNL (WBC 8.3, HGB 15.2, )   CMP: Glucose 119 (H),  (HH),  (HH), otherwise WNL (Creatinine 0.69)   Ethyl alcohol: 0.52 (HH)   (1947) POCT Glucose: 127 (H)     Interventions:   (1948) Normal Saline, 1 liter, IV bolus   (2000) Ativan, 1 mg, IV injection   (2050) Normal Saline, 1 liter, IV bolus with multivitamin 10 mL, thiamine 100 mg, and folic acid 1 mg    Emergency Department Course:  (1915) Patient not cooperative, taking pants off, and sleeping only with his scrub top shirt on, trying to get out of bed.  Given intoxication, he was deemed not safe and decision was made to place mattress on the floor.    Prior to my seeing the patient, he got up and tried to ambulate from mattress in the floor, then stumbled and fell onto his left shoulder.  After he landed on left shoulder, his head bumped the ground, although there was no significant impact.  He was redirected into bed which is a mattress on the floor.     Nursing notes and vitals reviewed.  I performed an exam of the patient as documented above.      (1922) Patient not redirectable.  Patient is now peeing on himself, trying to get up,  and slipping in his own urine.  Patient not redirectable.  Decision was made to place patient in restraints for his own safety.      A peripheral IV was established.  Blood was drawn and sent for laboratory testing, results as above. The patient was placed on continuous cardiac monitoring and pulse oximetry.      (2015) Patient was reevaluated.      (2255) I reassessed the patient.  He is still in restraints, on the cardiac monitor, and is doing well.    Impression & Plan      Within an hour after restraint an in person face to face assessment was completed at 1922, including an evaluation of the patient's immediate reaction to the intervention, behavioral assessment and review/assessment of history, drugs and medications, recent labs, etc., and behavioral condition.  The patient experienced: No adverse physical outcome from seclusion/restraint initiation.  The intervention of restraint or seclusion needs to continue.     Medical Decision Making:  Mohit Porter is a very pleasant 29 year old year old patient who presents to the emergency department with concern of significant alcohol intoxication.  The patient was recently admitted to the hospital for this.  He left AGAINST MEDICAL ADVICE and was found at a liquor store drinking vodka.  There was no reported trauma.  The patient is significantly intoxicated, slurring words and unable to communicate of any significance secondary to the intoxication.  There was no drug paraphernalia or medications found on him.  The patient is noted to be tachycardic, but otherwise is hemodynamically stable.    When he first presented he was placed on a gurney.  He tried to get out and was very unstable not redirectable.  He took his pants off and he did not want them put back on.  He was then placed on a mattress on the floor.  He tried to stand up and ambulate and had a fall secondary to this.  It was witnessed on the security cameras and a  was walking towards  him when this occurred.  The patient fell onto his left shoulder and then his head bumped the ground slightly, but nothing significant enough to cause any significant intracranial bleed.  There was no palpable deformity to the clavicle.  The patient then tried to get up again and urinated on himself.  The patient was then placed in restraints for his own safety.  Labs were checked and he was intoxicated with an alcohol of 0.52.  He was provided IV fluids along with a banana bag.  He will board in the emergency department at which time he is safe to be discharged and/or placed at detox when he is on steady on his feet.  Care of the patient is signed out to the night physician, Dr. Gr.    Diagnosis:    ICD-10-CM    1. Alcoholic intoxication without complication (H)  F10.920        Disposition:  Boarding.    I, Meenakshi Hanson, am serving as a scribe on 5/31/2020 at 7:38 PM to personally document services performed by Dr. Sidney Billingsley based on my observations and the provider's statements to me.    Meenakshi Hanson  5/31/2020    EMERGENCY DEPARTMENT       Sidney Billingsley,   05/31/20 0713

## 2020-06-01 NOTE — PROGRESS NOTES
RECEIVING UNIT ED HANDOFF REVIEW    ED Nurse Handoff Report was reviewed by: Frances Lane RN on June 1, 2020 at 3:52 PM

## 2020-06-01 NOTE — ED NOTES
DATE:  5/31/2020   TIME OF RECEIPT FROM LAB:  8:32  LAB TEST: ETOH .0.5    [id number]  LAB VALUE:  above  RESULTS GIVEN WITH READ-BACK TO (PROVIDER):  Jose Cruz  TIME LAB VALUE REPORTED TO PROVIDER:   8:33 pm

## 2020-06-01 NOTE — ED NOTES
"Just assuming care of pt and it was noted that pt was getting out of bed. Security right in to room with tech but when approaching door pt threw himself to ground. Per security pt hit head on floor \"with a thump\" bed removed from room and mat to floor. Pt placed on mat and covered up, pt not keeping pants on. MD and Charge RN notified.  "

## 2020-06-02 LAB
ALBUMIN SERPL-MCNC: 3.2 G/DL (ref 3.4–5)
ALP SERPL-CCNC: 79 U/L (ref 40–150)
ALT SERPL W P-5'-P-CCNC: 577 U/L (ref 0–70)
ANION GAP SERPL CALCULATED.3IONS-SCNC: 4 MMOL/L (ref 3–14)
AST SERPL W P-5'-P-CCNC: 299 U/L (ref 0–45)
BASOPHILS # BLD AUTO: 0 10E9/L (ref 0–0.2)
BASOPHILS NFR BLD AUTO: 0.1 %
BILIRUB DIRECT SERPL-MCNC: 0.2 MG/DL (ref 0–0.2)
BILIRUB SERPL-MCNC: 0.7 MG/DL (ref 0.2–1.3)
BUN SERPL-MCNC: 9 MG/DL (ref 7–30)
CALCIUM SERPL-MCNC: 8.9 MG/DL (ref 8.5–10.1)
CHLORIDE SERPL-SCNC: 105 MMOL/L (ref 94–109)
CO2 SERPL-SCNC: 31 MMOL/L (ref 20–32)
CREAT SERPL-MCNC: 0.69 MG/DL (ref 0.66–1.25)
DIFFERENTIAL METHOD BLD: ABNORMAL
EOSINOPHIL # BLD AUTO: 0.1 10E9/L (ref 0–0.7)
EOSINOPHIL NFR BLD AUTO: 1.1 %
ERYTHROCYTE [DISTWIDTH] IN BLOOD BY AUTOMATED COUNT: 14.4 % (ref 10–15)
GFR SERPL CREATININE-BSD FRML MDRD: >90 ML/MIN/{1.73_M2}
GLUCOSE SERPL-MCNC: 87 MG/DL (ref 70–99)
HCT VFR BLD AUTO: 40.5 % (ref 40–53)
HGB BLD-MCNC: 13.3 G/DL (ref 13.3–17.7)
IMM GRANULOCYTES # BLD: 0 10E9/L (ref 0–0.4)
IMM GRANULOCYTES NFR BLD: 0.1 %
LYMPHOCYTES # BLD AUTO: 2.3 10E9/L (ref 0.8–5.3)
LYMPHOCYTES NFR BLD AUTO: 28.3 %
MCH RBC QN AUTO: 30 PG (ref 26.5–33)
MCHC RBC AUTO-ENTMCNC: 32.8 G/DL (ref 31.5–36.5)
MCV RBC AUTO: 91 FL (ref 78–100)
MONOCYTES # BLD AUTO: 1.4 10E9/L (ref 0–1.3)
MONOCYTES NFR BLD AUTO: 17.6 %
NEUTROPHILS # BLD AUTO: 4.3 10E9/L (ref 1.6–8.3)
NEUTROPHILS NFR BLD AUTO: 52.8 %
NRBC # BLD AUTO: 0 10*3/UL
NRBC BLD AUTO-RTO: 0 /100
PLATELET # BLD AUTO: 193 10E9/L (ref 150–450)
POTASSIUM SERPL-SCNC: 3.5 MMOL/L (ref 3.4–5.3)
PROT SERPL-MCNC: 6.6 G/DL (ref 6.8–8.8)
RBC # BLD AUTO: 4.43 10E12/L (ref 4.4–5.9)
SODIUM SERPL-SCNC: 140 MMOL/L (ref 133–144)
WBC # BLD AUTO: 8.1 10E9/L (ref 4–11)

## 2020-06-02 PROCEDURE — 25000132 ZZH RX MED GY IP 250 OP 250 PS 637: Performed by: PHYSICIAN ASSISTANT

## 2020-06-02 PROCEDURE — G0378 HOSPITAL OBSERVATION PER HR: HCPCS

## 2020-06-02 PROCEDURE — 25000132 ZZH RX MED GY IP 250 OP 250 PS 637: Performed by: INTERNAL MEDICINE

## 2020-06-02 PROCEDURE — 12000000 ZZH R&B MED SURG/OB

## 2020-06-02 PROCEDURE — 85025 COMPLETE CBC W/AUTO DIFF WBC: CPT | Performed by: PHYSICIAN ASSISTANT

## 2020-06-02 PROCEDURE — 80076 HEPATIC FUNCTION PANEL: CPT | Performed by: PHYSICIAN ASSISTANT

## 2020-06-02 PROCEDURE — 99232 SBSQ HOSP IP/OBS MODERATE 35: CPT | Performed by: INTERNAL MEDICINE

## 2020-06-02 PROCEDURE — 80048 BASIC METABOLIC PNL TOTAL CA: CPT | Performed by: PHYSICIAN ASSISTANT

## 2020-06-02 PROCEDURE — 36415 COLL VENOUS BLD VENIPUNCTURE: CPT | Performed by: PHYSICIAN ASSISTANT

## 2020-06-02 RX ORDER — LANOLIN ALCOHOL/MO/W.PET/CERES
100 CREAM (GRAM) TOPICAL DAILY
Qty: 30 TABLET | Refills: 0 | Status: ON HOLD | OUTPATIENT
Start: 2020-06-03 | End: 2020-10-24

## 2020-06-02 RX ORDER — NICOTINE 21 MG/24HR
1 PATCH, TRANSDERMAL 24 HOURS TRANSDERMAL DAILY
Status: DISCONTINUED | OUTPATIENT
Start: 2020-06-02 | End: 2020-06-03 | Stop reason: HOSPADM

## 2020-06-02 RX ORDER — QUETIAPINE FUMARATE 200 MG/1
400 TABLET, FILM COATED ORAL AT BEDTIME
Refills: 0 | COMMUNITY
Start: 2020-06-02 | End: 2020-10-17

## 2020-06-02 RX ORDER — MULTIPLE VITAMINS W/ MINERALS TAB 9MG-400MCG
1 TAB ORAL DAILY
Qty: 30 EACH | Refills: 0 | Status: ON HOLD | OUTPATIENT
Start: 2020-06-03 | End: 2020-10-24

## 2020-06-02 RX ORDER — NICOTINE 21 MG/24HR
1 PATCH, TRANSDERMAL 24 HOURS TRANSDERMAL DAILY
Qty: 30 PATCH | Refills: 0 | Status: SHIPPED | OUTPATIENT
Start: 2020-06-03 | End: 2020-10-17

## 2020-06-02 RX ADMIN — QUETIAPINE FUMARATE 100 MG: 100 TABLET ORAL at 18:04

## 2020-06-02 RX ADMIN — Medication 1 MG: at 22:18

## 2020-06-02 RX ADMIN — FOLIC ACID 1 MG: 1 TABLET ORAL at 08:39

## 2020-06-02 RX ADMIN — Medication 100 MG: at 08:39

## 2020-06-02 RX ADMIN — MULTIPLE VITAMINS W/ MINERALS TAB 1 TABLET: TAB at 08:40

## 2020-06-02 RX ADMIN — PANTOPRAZOLE SODIUM 40 MG: 40 TABLET, DELAYED RELEASE ORAL at 06:34

## 2020-06-02 RX ADMIN — QUETIAPINE FUMARATE 100 MG: 100 TABLET ORAL at 08:40

## 2020-06-02 RX ADMIN — NICOTINE 1 PATCH: 21 PATCH, EXTENDED RELEASE TRANSDERMAL at 08:38

## 2020-06-02 RX ADMIN — QUETIAPINE FUMARATE 400 MG: 100 TABLET ORAL at 22:18

## 2020-06-02 ASSESSMENT — ACTIVITIES OF DAILY LIVING (ADL)
ADLS_ACUITY_SCORE: 16

## 2020-06-02 NOTE — PROGRESS NOTES
ROGER  I) Asked patient to sign a Release of Information for communication with treatment centers. Signed copy in chart.   Spoke with Pauline with [facility] at [phone] and fax [phone]. Faxed a MAR.   Pauline then stated they would be able to accept patient on Wednesday morning. We set a tentative discharge time of 1000. Orders need to be faxed. Meds filled here with no narcotics.  Transport is no longer available to [facility].  Spoke with patient about this and he will ask his mother or aunt to provide transport. The address there is [address] [place] [address], [facility].    P) Discharge to [facility] on 6/3 at 1000.      Update  Patient reports his aunt will provide transport at 0915 on Wednesday. Updated Pauline at [facility]. Paged Dr. Babin to update him on this plan.

## 2020-06-02 NOTE — CONSULTS
"BRIEF NUTRITION ASSESSMENT      REASON FOR ASSESSMENT:  Mohit Porter is a 29 year old male assessed by Registered Dietitian for Admission Nutrition Risk Screen for unintentional loss of 10# or more in the past two months    CURRENT DIET AND INTAKE:  Diet:  Regular              Chart reviewed  Multiple ED visits for ETOH intoxication  Have attempted to reach pt via phone  - busy or no answer on multiple attempts  Unable to obtain diet hx, however, assume with his binge drinking he is not eating balance meals    Note plans for discharge tomorrow morning to treatment center    Pt has been ordering full meals  Flowsheets reflect pt consumed 100% dinner last night  Breakfast today - omelet, hash browns,  Sausage, blueberry muffin, toast, 2 glasses juice    Folic acid, Thiamine, Multivitamin    ANTHROPOMETRICS:  Height: 5'10\"  Weight:(6/2) 77.9 kg /  171 lbs 11.81 oz  Body mass index is 24.64 kg/m .   Weight Status: Normal BMI  IBW:  75.4 kg  %IBW: 103%  Weight History: Wt hx reflects wt has been fairly stable over the past year  Wt Readings from Last 10 Encounters:   06/02/20 77.9 kg (171 lb 11.8 oz)   05/26/20 77.1 kg (170 lb)   12/07/19 79.4 kg (175 lb)   08/10/19 73.2 kg (161 lb 6.4 oz)   08/03/19 77.1 kg (170 lb)   05/30/19 80.7 kg (178 lb)   04/14/19 79.4 kg (175 lb)   04/11/19 79.4 kg (175 lb)   04/07/19 76.2 kg (168 lb)   02/16/19 77.1 kg (170 lb)         LABS:  Labs noted    MALNUTRITION:  Unable to assess at this time    % Weight Loss:  None noted  % Intake: unable to assess  Subcutaneous Fat Loss:  Deferred (dept policy for pt contact restrictions with COVID-19)  Muscle Loss:  Deferred (dept policy for pt contact restrictions with COVID-19)  Fluid Retention:  None noted    NUTRITION INTERVENTION:  Nutrition Diagnosis:  No nutrition diagnosis at this time.    Implementation:  Nutrition Education ---> Per Provider order if indicated    FOLLOW UP/MONITORING:   Will re-evaluate in 7 - 10 days, or sooner, if " re-consulted.

## 2020-06-02 NOTE — PLAN OF CARE
Summary: Admitted at 1700 for ETOH withdrawal.   DATE & TIME: 6/1-2/2020, Night shift  Cognitive Concerns/ Orientation : A+Ox4   BEHAVIOR & AGGRESSION TOOL COLOR: Green  CIWA SCORE: 2/2  ABNL VS/O2: VSS on RA, ex slightly Tachy  MOBILITY: Up independently in room and halls.   PAIN MANAGMENT: Denies pain  DIET: Tolerating regular diet.  BOWEL/BLADDER: Continent of B&B  ABNL LAB/BG: elevated LFTs, JOANA .52 on admission  DRAIN/DEVICES: PIV, SL  TELEMETRY RHYTHM: NA  SKIN: WDL, many cuts/scratches on chin from shaving.   TESTS/PROCEDURES: Social work to see for rehab placement.  D/C DAY/GOALS/PLACE: Pending resolution of ETOH withdrawal  OTHER IMPORTANT INFO: Refusing IVF, tolerating PO. If developing N/V agrees to start. Holdable per psych note.

## 2020-06-02 NOTE — PROGRESS NOTES
[facility]    Hospitalist Progress Note    Brief Summary:  Mohit Porter is a 29 year old male with PMHx of severe alcohol use disorder, adjustment disorder with mixed anxiety and depressed mood, and history of hepatitis C admitted on 6/1/2020 for acute alcohol intoxication after recent hospitalization 5/26/2020 - 5/31/2020 for acute alcohol intoxication, withdrawal monitoring with plan for discharge to CD treatment. Ultimately, pt left hospital AMA 5/31/2020, re-presented 6/1/2020 with altered metal status in the setting of alcohol intoxication with BAL 0.52.     Assessment & Plan        Acute alcohol intoxication   Encephalopathy related to above, now resolved  Alcohol use disorder, severe:     Intoxication resolved, no more acute encephalopathic at this time.  Keep him on alcohol withdrawal protocol at this time.  IV fluids with multivitamin, check CIWA and give lorazepam according to the CIWA.  CD has evaluated the patient recommend patient treatment, psych recommend transfer patient directly to the facility.  The patient is agreeable at this time.  If he tried to leave AGAINST MEDICAL ADVICE again psych recommend a 72-hour hold at that time, patient is quite reasonable at this time and seeking help.  He is voluntarily wanted to go for treatment at this time..    -- Daily multivitamin, thiamine, folic acid   -- Protonix for GI prophylaxis      Alcoholic hepatitis: Patient had elevated  , and  on admission.   This is most likely secondary to alcohol, trending down at this time.  Continue IV hydration at this time.       Adjustment disorder with mixed anxiety and depressed mood:   -- Continue Lexapro 10 mg po every day   -- Continue PTA Seroquel 400 mg at bedtime, will also continue with Seroquel 100 mg BID as ordered during most recent hospitalization, psych evaluated recommend cervical having a twice daily PRN continue 40 mg at bedtime.  We will switch to scheduled 100 twice  daily PRN twice daily.     Tobacco use: Reports chewing tobacco.   -- PRN nicotine gum available      Hx of hepatitis C: See this documented in notes, but no confirmatory labs appreciated. No indication of prior treatment. Antibody obtained 2/17/2019 nonreactive.          DVT Prophylaxis: Pneumatic Compression Devices  Code Status: Full Code    Disposition: Expected discharge to inpatient treatment center once bed available.      Nam Babin MD  Text Page  (7am - 6pm)    Interval History   Feeling much better this morning, denies any chest pain shortness of breath fever chills nausea vomiting headache dizziness lightheadedness no tremors.  Good appetite, slept well.    No other significant event overnight    -Data reviewed today: I reviewed all new labs and imaging results over the last 24 hours. I personally reviewed no images or EKG's today.    Physical Exam   Temp: 97.6  F (36.4  C) Temp src: Oral BP: (!) 133/92 Pulse: 76 Heart Rate: 101 Resp: 20 SpO2: 98 % O2 Device: None (Room air)    Vitals:    06/02/20 0539   Weight: 77.9 kg (171 lb 11.8 oz)     Vital Signs with Ranges  Temp:  [95.9  F (35.5  C)-98.6  F (37  C)] 97.6  F (36.4  C)  Pulse:  [] 76  Heart Rate:  [101] 101  Resp:  [18-20] 20  BP: (115-149)/(67-98) 133/92  SpO2:  [92 %-100 %] 98 %  I/O last 3 completed shifts:  In: 960 [P.O.:960]  Out: -     Constitutional: awake, alert, cooperative, no apparent distress, and appears stated age  Eyes: Lids and lashes normal, pupils equal, round and reactive to light, extra ocular muscles intact, sclera clear, conjunctiva normal  Respiratory: No increased work of breathing, good air exchange, clear to auscultation bilaterally, no crackles or wheezing  Cardiovascular: Normal apical impulse, regular rate and rhythm, normal S1 and S2, no S3 or S4, and no murmur noted  GI: No scars, normal bowel sounds, soft, non-distended, non-tender, no masses palpated, no hepatosplenomegally  Musculoskeletal: no lower  extremity pitting edema present  Neurologic: No focal deficit    Medications     lactated ringers         folic acid  1 mg Oral Daily     multivitamin w/minerals  1 tablet Oral Daily     nicotine  1 patch Transdermal Daily     nicotine   Transdermal Q8H     pantoprazole  40 mg Oral QAM AC     QUEtiapine  100 mg Oral BID     QUEtiapine  400 mg Oral At Bedtime     sodium chloride (PF)  3 mL Intracatheter Q8H     vitamin B1  100 mg Oral Daily       Data   Recent Labs   Lab 06/02/20  0828 06/01/20  1224 05/31/20  1944 05/29/20  1001  05/28/20  0840   WBC 8.1  --  8.3  --   --  8.6   HGB 13.3  --  15.2  --   --  13.4   MCV 91  --  91  --   --  92     --  194  --   --  157   INR  --  0.94  --   --   --   --      --  141  --   --  138   POTASSIUM 3.5  --  4.0 3.6   < > 2.8*   CHLORIDE 105  --  106  --   --  99   CO2 31  --  30  --   --  34*   BUN 9  --  8  --   --  5*   CR 0.69  --  0.69  --   --  0.63*   ANIONGAP 4  --  5  --   --  5   ARYA 8.9  --  8.9  --   --  8.4*   GLC 87  --  119*  --   --  77   ALBUMIN 3.2*  --  3.8  --   --   --    PROTTOTAL 6.6*  --  7.7  --   --   --    BILITOTAL 0.7  --  0.2  --   --   --    ALKPHOS 79  --  108  --   --   --    *  --  893*  --   --   --    *  --  870*  --   --   --     < > = values in this interval not displayed.       No results found for this or any previous visit (from the past 24 hour(s)).

## 2020-06-02 NOTE — PROGRESS NOTES
Admission    Patient arrives to room 635-2 via cart from ER.  Care plan note: VSS ex tachy, on RA. Denies pain. Regular diet. Refusing IVF, Nursing will continue to monitor.     Inpatient nursing criteria listed below were met:    PCD's Documented: Yes  Skin issues/needs documented :NA  Isolation education started/completed NA  Patient allergies verified with patient: NA  Verified completion of [place] Risk Assessment Tool:  Yes  Verified completion of Guardianship screening tool: Yes  Fall Prevention: Care plan updated, Education given and documented Yes  Care Plan initiated: Yes  Home medications documented in belongings flowsheet: NA  Patient belongings documented in belongings flowsheet: YES  Reminder note (belongings/ medications) placed in discharge instructions: NA (belongings remain in pt room)  Admission profile/ required documentation complete: Yes  Bedside Report Letter given and explained to patient No

## 2020-06-02 NOTE — PLAN OF CARE
Summary: Admitted at 1700 for ETOH withdrawal.   DATE & TIME: 6/1/2020 3381-1340   Cognitive Concerns/ Orientation : A+Ox4   BEHAVIOR & AGGRESSION TOOL COLOR: Green  CIWA SCORE: 5/2.   ABNL VS/O2: VSS ex Tachy, on RA.  MOBILITY: Up independently in room and halls.   PAIN MANAGMENT: Denies pain  DIET: Tolerating regular diet.  BOWEL/BLADDER: Continent of B+B, BM earlier today.  ABNL LAB/BG: BA 0.52, ALT: 893, AST:870. Sepsis fired: LA 0.9.  DRAIN/DEVICES: NA  TELEMETRY RHYTHM: NA  SKIN: WDL, many cuts/scratches on chin from shaving.   TESTS/PROCEDURES: Social work to see for rehab placement.  D/C DAY/GOALS/PLACE: Will discharge to rehab 1-2 days once withdrawal is complete and bed available.  OTHER IMPORTANT INFO: Pt refusing IVF as he is tolerating PO fluids. Explained to the pt if he develops nausea/vomiting will need to start IVF, pt is agreeable. Pt is holdable if he tries to leave per Psych. Nursing will continue to monitor.

## 2020-06-02 NOTE — PROGRESS NOTES
Hospitalist Cross Cover    Ordered nicotine patch 21 mg strength at patient request. Smokes 1 ppd.     Pauline Gunter MD

## 2020-06-03 VITALS
BODY MASS INDEX: 25.1 KG/M2 | TEMPERATURE: 97.8 F | RESPIRATION RATE: 20 BRPM | WEIGHT: 174.9 LBS | HEART RATE: 117 BPM | DIASTOLIC BLOOD PRESSURE: 97 MMHG | OXYGEN SATURATION: 100 % | SYSTOLIC BLOOD PRESSURE: 133 MMHG

## 2020-06-03 PROCEDURE — 25000132 ZZH RX MED GY IP 250 OP 250 PS 637: Performed by: PHYSICIAN ASSISTANT

## 2020-06-03 PROCEDURE — 99239 HOSP IP/OBS DSCHRG MGMT >30: CPT | Performed by: INTERNAL MEDICINE

## 2020-06-03 PROCEDURE — G0378 HOSPITAL OBSERVATION PER HR: HCPCS

## 2020-06-03 RX ADMIN — FOLIC ACID 1 MG: 1 TABLET ORAL at 07:48

## 2020-06-03 RX ADMIN — QUETIAPINE FUMARATE 100 MG: 100 TABLET ORAL at 07:48

## 2020-06-03 RX ADMIN — Medication 100 MG: at 07:48

## 2020-06-03 RX ADMIN — MULTIPLE VITAMINS W/ MINERALS TAB 1 TABLET: TAB at 07:47

## 2020-06-03 RX ADMIN — PANTOPRAZOLE SODIUM 40 MG: 40 TABLET, DELAYED RELEASE ORAL at 06:28

## 2020-06-03 ASSESSMENT — ACTIVITIES OF DAILY LIVING (ADL)
ADLS_ACUITY_SCORE: 16

## 2020-06-03 NOTE — DISCHARGE SUMMARY
[facility]    Discharge Summary  Hospitalist    Date of Admission:  5/31/2020  Date of Discharge:  6/3/2020  9:24 AM  Discharging Provider: Nam Babin MD  Date of Service (when I saw the patient): 06/03/20    Discharge Diagnoses   Please refer below    History of Present Illness   Mohit Porter is an 29 year old male who presented with alcohol  intoxication    Hospital Course      Mohit Porter is a 29 year old male with PMHx of severe alcohol use disorder, adjustment disorder with mixed anxiety and depressed mood, and history of hepatitis C admitted on 6/1/2020 for acute alcohol intoxication after recent hospitalization 5/26/2020 - 5/31/2020 for acute alcohol intoxication, withdrawal monitoring with plan for discharge to  treatment. Ultimately, pt left hospital AMA 5/31/2020, re-presented 6/1/2020 with altered metal status in the setting of alcohol intoxication with BAL 0.52.     She was admitted started on alcohol withdrawal protocol, he was started on IV fluids vitamin thiamine, and psych was consulted again.  Patient at this time seeking more help, and voluntarily accepted to go for that treatment.  Social service was consulted, and the patient is accepted to go to [facility] for his alcohol treatment inpatient.  At this time the patient is doing well, have no withdrawal symptoms.  Denies any headache dizziness lightheadedness no fever chills or cough no abdominal pain back pain dysuria major constipation diarrhea.    The patient will be discharged to inpatient treatment facility in stable condition.        Final discharge diagnoses and Hospital course         Acute alcohol intoxication   Encephalopathy related to above, now resolved  Alcohol use disorder, severe:      Intoxication resolved, no more acute encephalopathic at this time.  Keep him on alcohol withdrawal protocol at this time.  IV fluids with multivitamin, check CIWA and give lorazepam according to the  JIMMY.  CD has evaluated the patient recommend patient treatment, psych recommend transfer patient directly to the facility.  The patient is agreeable at this time.  He is voluntarily wanted to go for treatment at this time..     -- Daily multivitamin, thiamine, folic acid   -- Protonix for GI prophylaxis     At this time the patient will be discharged to inpatient treatment facility in stable condition.     Alcoholic hepatitis: Patient had elevated  , and  on admission.   This is most likely secondary to alcohol, trending down at this time.           Adjustment disorder with mixed anxiety and depressed mood:   -- Continue Lexapro 10 mg po every day   -- Continue PTA Seroquel 400 mg at bedtime, will also continue with Seroquel 100 mg BID as ordered during most recent hospitalization, psych evaluated recommend cervical having a twice daily PRN continue 40 mg at bedtime.  We will switch to scheduled 100 twice daily PRN twice daily.     Tobacco use: Reports chewing tobacco.   -- PRN nicotine gum available      Hx of hepatitis C: See this documented in notes, but no confirmatory labs appreciated. No indication of prior treatment. Antibody obtained 2/17/2019 nonreactive.           Nam Babin MD, MD    Significant Results and Procedures       Pending Results   These results will be followed up by PCP  Unresulted Labs Ordered in the Past 30 Days of this Admission     No orders found from 5/1/2020 to 6/1/2020.          Code Status   Full Code       Primary Care Physician   Physician No Ref-Primary    Physical Exam   Temp: 97.8  F (36.6  C) Temp src: Oral BP: (!) 133/97 Pulse: 117 Heart Rate: 111 Resp: 20 SpO2: 100 % O2 Device: None (Room air)    Vitals:    06/02/20 0539 06/03/20 0659   Weight: 77.9 kg (171 lb 11.8 oz) 79.3 kg (174 lb 14.4 oz)     Vital Signs with Ranges  Temp:  [97.8  F (36.6  C)-98.6  F (37  C)] 97.8  F (36.6  C)  Pulse:  [117] 117  Heart Rate:  [] 111  Resp:  [20] 20  BP:  (133-144)/() 133/97  SpO2:  [99 %-100 %] 100 %  I/O last 3 completed shifts:  In: 3 [I.V.:3]  Out: -     Constitutional: awake, alert, cooperative, no apparent distress, and appears stated age  Eyes: Lids and lashes normal, pupils equal, round and reactive to light, extra ocular muscles intact, sclera clear, conjunctiva normal  Respiratory: No increased work of breathing, good air exchange, clear to auscultation bilaterally, no crackles or wheezing  Cardiovascular: Normal apical impulse, regular rate and rhythm, normal S1 and S2, no S3 or S4, and no murmur noted  GI: No scars, normal bowel sounds, soft, non-distended, non-tender, no masses palpated, no hepatosplenomegally  Skin: no bruising or bleeding  Musculoskeletal: no lower extremity pitting edema present  Neurologic: Awake, alert, oriented to name, place and time.  Cranial nerves II-XII are grossly intact.  Motor is 5 out of 5 bilaterally.  Cerebellar finger to nose, heel to shin intact.  Sensory is intact.  Babinski down going, Romberg negative, and gait is normal.    Discharge Disposition   Discharged to home  Condition at discharge: Stable    Consultations This Hospital Stay   PSYCHIATRY IP CONSULT  SOCIAL WORK IP CONSULT  PSYCHIATRY IP CONSULT    Time Spent on this Encounter   I, Nam Babin MD, personally saw the patient today and spent greater than 30 minutes discharging this patient.    Discharge Orders      Reason for your hospital stay    Alcohol intoxication     Follow-up and recommended labs and tests     Follow up with primary care provider, Physician No Ref-Primary, within 7 days for hospital follow- up.  No follow up labs or test are needed.     Activity    Your activity upon discharge: activity as tolerated     Diet    Follow this diet upon discharge: Orders Placed This Encounter      Combination Diet Regular Diet Adult     Discharge Medications   Discharge Medication List as of 6/3/2020 10:03 AM      START taking these medications     Details   multivitamin w/minerals (THERA-VIT-M) tablet Take 1 tablet by mouth daily, Disp-30 each,R-0, E-Prescribe      nicotine (NICODERM CQ) 21 MG/24HR 24 hr patch Place 1 patch onto the skin daily, Disp-30 patch,R-0, E-Prescribe      vitamin B1 (THIAMINE) 100 MG tablet Take 1 tablet (100 mg) by mouth daily, Disp-30 tablet,R-0, E-Prescribe         CONTINUE these medications which have CHANGED    Details   !! QUEtiapine (SEROQUEL) 100 MG tablet Take 1 tablet (100 mg) by mouth 2 times daily as needed, R-0, Historical       !! - Potential duplicate medications found. Please discuss with provider.      CONTINUE these medications which have NOT CHANGED    Details   escitalopram (LEXAPRO) 5 MG tablet Take 5 mg by mouth daily, Historical      naltrexone (DEPADE/REVIA) 50 MG tablet Take 100 mg by mouth daily, Historical      pantoprazole (PROTONIX) 40 MG EC tablet Take 40 mg by mouth daily, Historical      !! QUEtiapine (SEROQUEL) 400 MG tablet Take 400 mg by mouth At Bedtime, Historical       !! - Potential duplicate medications found. Please discuss with provider.        Allergies   No Known Allergies  Data   Most Recent 3 CBC's:  Recent Labs   Lab Test 06/02/20 0828 05/31/20 1944 05/28/20  0840   WBC 8.1 8.3 8.6   HGB 13.3 15.2 13.4   MCV 91 91 92    194 157      Most Recent 3 BMP's:  Recent Labs   Lab Test 06/02/20 0828 05/31/20 1944 05/29/20  1001  05/28/20  0840    141  --   --  138   POTASSIUM 3.5 4.0 3.6   < > 2.8*   CHLORIDE 105 106  --   --  99   CO2 31 30  --   --  34*   BUN 9 8  --   --  5*   CR 0.69 0.69  --   --  0.63*   ANIONGAP 4 5  --   --  5   ARYA 8.9 8.9  --   --  8.4*   GLC 87 119*  --   --  77    < > = values in this interval not displayed.     Most Recent 2 LFT's:  Recent Labs   Lab Test 06/02/20 0828 05/31/20 1944   * 870*   * 893*   ALKPHOS 79 108   BILITOTAL 0.7 0.2     Most Recent INR's and Anticoagulation Dosing History:  Anticoagulation Dose History     Recent  Dosing and Labs Latest Ref Rng & Units 6/1/2020    INR 0.86 - 1.14 0.94        Most Recent 3 Troponin's:No lab results found.  Most Recent Cholesterol Panel:  Recent Labs   Lab Test 02/17/19  0730   CHOL 114   LDL 38   HDL 63   TRIG 63     Most Recent 6 Bacteria Isolates From Any Culture (See EPIC Reports for Culture Details):  Recent Labs   Lab Test 08/10/19  1810 08/10/19  1800 02/17/19  1045   CULT No growth No growth No growth     Most Recent TSH, T4 and A1c Labs:  Recent Labs   Lab Test 02/17/19  0730   TSH 2.26     Results for orders placed or performed during the hospital encounter of 05/26/20   Head CT w/o contrast    Narrative    CT OF THE HEAD WITHOUT CONTRAST 5/26/2020 5:02 PM     COMPARISON: None.    HISTORY: Check for bleed, altered mental status, found on floor in  hotel room, history of alcoholism.    TECHNIQUE: Axial CT images of the head from the skull base to the  vertex were acquired without IV contrast.    FINDINGS: The ventricles and basal cisterns are within normal limits  in configuration. There is no midline shift. There are no extra-axial  fluid collections.  Gray-white differentiation is well maintained.    No intracranial hemorrhage, mass or recent infarct.    The visualized paranasal sinuses are well-aerated. There is no  mastoiditis. There are no fractures of the visualized bones.      Impression    IMPRESSION:  Normal head CT.      Radiation dose for this scan was reduced using automated exposure  control, adjustment of the mA and/or kV according to patient size, or  iterative reconstruction technique    MELQUIADES BOUDREAUX MD     Most Recent 3 CBC's:  Recent Labs   Lab Test 06/02/20  0828 05/31/20 1944 05/28/20  0840   WBC 8.1 8.3 8.6   HGB 13.3 15.2 13.4   MCV 91 91 92    194 157     Most Recent 3 BMP's:  Recent Labs   Lab Test 06/02/20  0828 05/31/20 1944 05/29/20  1001  05/28/20  0840    141  --   --  138   POTASSIUM 3.5 4.0 3.6   < > 2.8*   CHLORIDE 105 106  --   --  99    CO2 31 30  --   --  34*   BUN 9 8  --   --  5*   CR 0.69 0.69  --   --  0.63*   ANIONGAP 4 5  --   --  5   ARYA 8.9 8.9  --   --  8.4*   GLC 87 119*  --   --  77    < > = values in this interval not displayed.

## 2020-06-03 NOTE — PLAN OF CARE
Pt is Aox4, VSS, tachy and hypertensive. Regular diet, eating snacks and meals all throughout night. Up independently. Pleasant. Tolerating PO. Holdable per psych note. Pt will be Discharging to [facility] for CD tx. Family member to transport at 0930.

## 2020-06-03 NOTE — PLAN OF CARE
Discharge    Patient discharged to [facility] via car with Aunt  Care plan note: Pt alert and oriented. Up independently. Denies any pain. Tolerating diet. Mild anxiety per pt-feels fine overall and ready to go to treatment.    Listed belongings gathered and returned to patient. Yes  Care Plan and Patient education resolved: Yes  Prescriptions if needed, hard copies sent with patient  Yes  Home and hospital acquired medications returned to patient: NA  Medication Bin checked and emptied on discharge Yes  Follow up appointment made for patient: No

## 2020-06-09 NOTE — DISCHARGE SUMMARY
[facility]  Discharge Summary        Mohit Porter MRN# [id number]   YOB: 1990 Age: 29 year old     Date of Admission:  5/26/2020  Date of Discharge:  5/31/2020  Admitting Physician:  Silver Harding MD  Discharge Physician: Mariah Rebolledo MD  Discharging Service: Hospitalist     Primary Provider: No Ref-Primary, Physician  Primary Care Physician Phone Number: None         Discharge Diagnoses/Problem Oriented Hospital Course (Providers):    Mohit Porter was admitted on 5/26/2020 by Silver Harding MD and I would refer you to their history and physical.  The following problems were addressed during his hospitalization:  Assessment & Plan     Mohit Porter is a 29 year old male who was admitted on 5/26/2020.  Assessment & Plan     Mohit Porter is a 29 year old male with PMH of alcohol use disorder, alcoholic hepatitis, who presents with alcohol intoxication.     Alcohol intoxication  Alcohol use disorder  Lactic and ketoacidosis due to alcohol intoxication and starvation ketosis  Patient has multiple ED visits and admissions for alcohol intoxication and withdrawal, including ED 3/12, 3/8, 2/28, 1/4, and admissions 12/2019 and 8/2019 per our records and CareEverywhere. He was found by hotel staff to be unresponsive and surround by alcohol. He reports binge drinking for 4 days. His ethanol level here is 0.49. AST is mildly elevated. He is significantly intoxicated from alcohol and does not appear to be in withdrawal to me. His story is not reliable as it does not correlate with his multiple health care visits for alcohol use. He asked for Ativan/Valium multiple times this admission in the absence of any alcohol withdrawal, so he is substance seeking in my opinion. There have been discussions in the past about commitment and this should be significantly considered this admission.  - Urine drug screen returned negative  - Give gabapentin 800mg TID +  Clonidine 0.1mg x3 doses + Clonidine 0.2mg patch to prophylactically treat withdrawal.  Will DC clonidine patch, gabapentin and clonidine today as withdrawal seem  to be improved  - CIWA, thiamine, folate.   Vallium PRN dosing per CIWA protocol .  He is scoring less today on the protocol  Alcohol withdrawal improved   Stopped vallium   - Psychiatry consulted and recommended chem dep   CHEM Depp consulted and recommended inpatient detox transfer and referrals sent      Lactic acidosis and ketoacidosis resolved with IV fluid hydration with  glucose   he left the hospital AMA   DVT Prophylaxis: Pneumatic Compression Devices  Code Status: Full Code     Disposition: pateint left AMA from hospital. He was also drinking alcohol in hospital as vodka bottle found in his sheets. He was alerta nad oriented and not holdable   Mariah Rebolledo MD  [phone] (P)        Code Status:      Full Code        Brief Hospital Stay Summary Sent Home With Patient in AVS:               Important Results:              Pending Results:        Unresulted Labs Ordered in the Past 30 Days of this Admission     No orders found from 4/26/2020 to 5/27/2020.            Discharge Instructions and Follow-Up:            Discharge Disposition:      Left AMA         Discharge Medications:        Discharge Medication List as of 5/31/2020  4:37 PM      CONTINUE these medications which have NOT CHANGED    Details   naltrexone (DEPADE/REVIA) 50 MG tablet Take 100 mg by mouth daily, Historical      !! QUEtiapine (SEROQUEL) 400 MG tablet Take 400 mg by mouth At Bedtime, Historical      hydrOXYzine (ATARAX) 25 MG tablet Take 25-50 mg by mouth every 4 hours as needed for anxiety, Historical      !! QUEtiapine (SEROQUEL) 100 MG tablet Take 100 mg by mouth daily as needed , Historical       !! - Potential duplicate medications found. Please discuss with provider.            Allergies:       No Known Allergies        Consultations This Hospital Stay:       Consultation during this admission received from Psychaitry and chem dep               Discharge Time:      Greater than 30 minutes.        Image Results From This Hospital Stay (For Non-EPIC Providers):        Results for orders placed or performed during the hospital encounter of 05/26/20   Head CT w/o contrast    Narrative    CT OF THE HEAD WITHOUT CONTRAST 5/26/2020 5:02 PM     COMPARISON: None.    HISTORY: Check for bleed, altered mental status, found on floor in  hotel room, history of alcoholism.    TECHNIQUE: Axial CT images of the head from the skull base to the  vertex were acquired without IV contrast.    FINDINGS: The ventricles and basal cisterns are within normal limits  in configuration. There is no midline shift. There are no extra-axial  fluid collections.  Gray-white differentiation is well maintained.    No intracranial hemorrhage, mass or recent infarct.    The visualized paranasal sinuses are well-aerated. There is no  mastoiditis. There are no fractures of the visualized bones.      Impression    IMPRESSION:  Normal head CT.      Radiation dose for this scan was reduced using automated exposure  control, adjustment of the mA and/or kV according to patient size, or  iterative reconstruction technique    MELQUIADES BOUDREAUX MD             Most Recent Lab Results In EPIC (For Non-EPIC Providers):    Most Recent 3 CBC's:  Recent Labs   Lab Test 06/02/20  0828 05/31/20  1944 05/28/20  0840   WBC 8.1 8.3 8.6   HGB 13.3 15.2 13.4   MCV 91 91 92    194 157      Most Recent 3 BMP's:  Recent Labs   Lab Test 06/02/20  0828 05/31/20  1944 05/29/20  1001  05/28/20  0840    141  --   --  138   POTASSIUM 3.5 4.0 3.6   < > 2.8*   CHLORIDE 105 106  --   --  99   CO2 31 30  --   --  34*   BUN 9 8  --   --  5*   CR 0.69 0.69  --   --  0.63*   ANIONGAP 4 5  --   --  5   ARYA 8.9 8.9  --   --  8.4*   GLC 87 119*  --   --  77    < > = values in this interval not displayed.     Most Recent 3 Troponin's:No  lab results found.    Invalid input(s): TROP, TROPONINIES  Most Recent 3 INR's:  Recent Labs   Lab Test 06/01/20  1224   INR 0.94     Most Recent 2 LFT's:  Recent Labs   Lab Test 06/02/20  0828 05/31/20  1944   * 870*   * 893*   ALKPHOS 79 108   BILITOTAL 0.7 0.2     Most Recent Cholesterol Panel:  Recent Labs   Lab Test 02/17/19  0730   CHOL 114   LDL 38   HDL 63   TRIG 63     Most Recent 6 Bacteria Isolates From Any Culture (See EPIC Reports for Culture Details):  Recent Labs   Lab Test 08/10/19  1810 08/10/19  1800 02/17/19  1045   CULT No growth No growth No growth     Most Recent TSH, T4 and HgbA1c:   Recent Labs   Lab Test 02/17/19  0730   TSH 2.26

## 2020-10-05 ENCOUNTER — HOSPITAL ENCOUNTER (EMERGENCY)
Facility: CLINIC | Age: 30
Discharge: HOME | End: 2020-10-05
Attending: EMERGENCY MEDICINE | Admitting: EMERGENCY MEDICINE
Payer: COMMERCIAL

## 2020-10-05 VITALS
RESPIRATION RATE: 20 BRPM | HEIGHT: 70 IN | BODY MASS INDEX: 25.1 KG/M2 | HEART RATE: 118 BPM | DIASTOLIC BLOOD PRESSURE: 109 MMHG | SYSTOLIC BLOOD PRESSURE: 142 MMHG | TEMPERATURE: 97.3 F | OXYGEN SATURATION: 98 %

## 2020-10-05 DIAGNOSIS — E87.6 HYPOKALEMIA: ICD-10-CM

## 2020-10-05 DIAGNOSIS — F10.920 ALCOHOLIC INTOXICATION WITHOUT COMPLICATION (H): ICD-10-CM

## 2020-10-05 LAB
ALBUMIN SERPL-MCNC: 3.6 G/DL (ref 3.4–5)
ALP SERPL-CCNC: 135 U/L (ref 40–150)
ALT SERPL W P-5'-P-CCNC: 294 U/L (ref 0–70)
ANION GAP SERPL CALCULATED.3IONS-SCNC: 6 MMOL/L (ref 3–14)
APAP SERPL-MCNC: <2 MG/L (ref 10–20)
AST SERPL W P-5'-P-CCNC: 286 U/L (ref 0–45)
BASOPHILS # BLD AUTO: 0 10E9/L (ref 0–0.2)
BASOPHILS NFR BLD AUTO: 0.2 %
BILIRUB SERPL-MCNC: 0.2 MG/DL (ref 0.2–1.3)
BUN SERPL-MCNC: 8 MG/DL (ref 7–30)
CALCIUM SERPL-MCNC: 8.6 MG/DL (ref 8.5–10.1)
CHLORIDE SERPL-SCNC: 107 MMOL/L (ref 94–109)
CO2 SERPL-SCNC: 31 MMOL/L (ref 20–32)
CREAT SERPL-MCNC: 0.71 MG/DL (ref 0.66–1.25)
DIFFERENTIAL METHOD BLD: ABNORMAL
EOSINOPHIL # BLD AUTO: 0.1 10E9/L (ref 0–0.7)
EOSINOPHIL NFR BLD AUTO: 1.3 %
ERYTHROCYTE [DISTWIDTH] IN BLOOD BY AUTOMATED COUNT: 14.5 % (ref 10–15)
ETHANOL SERPL-MCNC: 0.46 G/DL
GFR SERPL CREATININE-BSD FRML MDRD: >90 ML/MIN/{1.73_M2}
GLUCOSE SERPL-MCNC: 99 MG/DL (ref 70–99)
HCT VFR BLD AUTO: 47.1 % (ref 40–53)
HGB BLD-MCNC: 15.9 G/DL (ref 13.3–17.7)
IMM GRANULOCYTES # BLD: 0.1 10E9/L (ref 0–0.4)
IMM GRANULOCYTES NFR BLD: 1.1 %
INTERPRETATION ECG - MUSE: NORMAL
LYMPHOCYTES # BLD AUTO: 2.4 10E9/L (ref 0.8–5.3)
LYMPHOCYTES NFR BLD AUTO: 24.6 %
MCH RBC QN AUTO: 30.1 PG (ref 26.5–33)
MCHC RBC AUTO-ENTMCNC: 33.8 G/DL (ref 31.5–36.5)
MCV RBC AUTO: 89 FL (ref 78–100)
MONOCYTES # BLD AUTO: 1.6 10E9/L (ref 0–1.3)
MONOCYTES NFR BLD AUTO: 16.7 %
NEUTROPHILS # BLD AUTO: 5.4 10E9/L (ref 1.6–8.3)
NEUTROPHILS NFR BLD AUTO: 56.1 %
NRBC # BLD AUTO: 0 10*3/UL
NRBC BLD AUTO-RTO: 0 /100
PLATELET # BLD AUTO: 130 10E9/L (ref 150–450)
POTASSIUM SERPL-SCNC: 3 MMOL/L (ref 3.4–5.3)
PROT SERPL-MCNC: 7.5 G/DL (ref 6.8–8.8)
RBC # BLD AUTO: 5.29 10E12/L (ref 4.4–5.9)
SALICYLATES SERPL-MCNC: <2 MG/DL
SODIUM SERPL-SCNC: 144 MMOL/L (ref 133–144)
WBC # BLD AUTO: 9.7 10E9/L (ref 4–11)

## 2020-10-05 PROCEDURE — 258N000003 HC RX IP 258 OP 636: Performed by: EMERGENCY MEDICINE

## 2020-10-05 PROCEDURE — 80329 ANALGESICS NON-OPIOID 1 OR 2: CPT | Performed by: EMERGENCY MEDICINE

## 2020-10-05 PROCEDURE — 93005 ELECTROCARDIOGRAM TRACING: CPT

## 2020-10-05 PROCEDURE — 250N000009 HC RX 250: Performed by: EMERGENCY MEDICINE

## 2020-10-05 PROCEDURE — 80320 DRUG SCREEN QUANTALCOHOLS: CPT | Performed by: EMERGENCY MEDICINE

## 2020-10-05 PROCEDURE — 96365 THER/PROPH/DIAG IV INF INIT: CPT

## 2020-10-05 PROCEDURE — 250N000013 HC RX MED GY IP 250 OP 250 PS 637: Performed by: EMERGENCY MEDICINE

## 2020-10-05 PROCEDURE — 99285 EMERGENCY DEPT VISIT HI MDM: CPT | Mod: 25

## 2020-10-05 PROCEDURE — 85025 COMPLETE CBC W/AUTO DIFF WBC: CPT | Performed by: EMERGENCY MEDICINE

## 2020-10-05 PROCEDURE — 96361 HYDRATE IV INFUSION ADD-ON: CPT

## 2020-10-05 PROCEDURE — 80053 COMPREHEN METABOLIC PANEL: CPT | Performed by: EMERGENCY MEDICINE

## 2020-10-05 PROCEDURE — 250N000011 HC RX IP 250 OP 636: Performed by: EMERGENCY MEDICINE

## 2020-10-05 RX ORDER — MAGNESIUM OXIDE 400 MG/1
400 TABLET ORAL ONCE
Status: COMPLETED | OUTPATIENT
Start: 2020-10-05 | End: 2020-10-05

## 2020-10-05 RX ORDER — SODIUM CHLORIDE 9 MG/ML
INJECTION, SOLUTION INTRAVENOUS CONTINUOUS
Status: DISCONTINUED | OUTPATIENT
Start: 2020-10-05 | End: 2020-10-05 | Stop reason: HOSPADM

## 2020-10-05 RX ORDER — POTASSIUM CHLORIDE 1500 MG/1
40 TABLET, EXTENDED RELEASE ORAL ONCE
Status: COMPLETED | OUTPATIENT
Start: 2020-10-05 | End: 2020-10-05

## 2020-10-05 RX ADMIN — MAGNESIUM OXIDE 400 MG: 400 TABLET ORAL at 06:50

## 2020-10-05 RX ADMIN — POTASSIUM CHLORIDE 40 MEQ: 1500 TABLET, EXTENDED RELEASE ORAL at 06:49

## 2020-10-05 RX ADMIN — THIAMINE HYDROCHLORIDE: 100 INJECTION, SOLUTION INTRAMUSCULAR; INTRAVENOUS at 05:24

## 2020-10-05 RX ADMIN — SODIUM CHLORIDE 1000 ML: 9 INJECTION, SOLUTION INTRAVENOUS at 00:46
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
## 2020-10-05 NOTE — ED PROVIDER NOTES
Patient signed out to me pending sobriety for discharge.  He has been cooperative, now clinically sober, eating, drinking, and ambulating without assistance.  He will be encouraged to follow-up with his doctor to get help with his alcohol abuse.      ICD-10-CM    1. Alcoholic intoxication without complication (H)  F10.920    2. Hypokalemia  E87.6           Rhonda Solorzano MD  10/05/20 0945

## 2020-10-05 NOTE — ED NOTES
Writer went into room to check monitor. Patient awake and alert, updated on aarrival of breakfast as it is ready.  Denies any further needs at this time

## 2020-10-05 NOTE — ED AVS SNAPSHOT
[facility]  [address] [address]  Phone: [phone]  Fax: [phone]                                    Mohit Porter   MRN: [id number]    Department: [facility]   Date of Visit: 10/5/2020           After Visit Summary Signature Page    I have received my discharge instructions, and my questions have been answered. I have discussed any challenges I see with this plan with the nurse or doctor.    ..........................................................................................................................................  Patient/Patient Representative Signature      ..........................................................................................................................................  Patient Representative Print Name and Relationship to Patient    ..................................................               ................................................  Date                                   Time    ..........................................................................................................................................  Reviewed by Signature/Title    ...................................................              ..............................................  Date                                               Time          22EPIC Rev 08/18

## 2020-10-05 NOTE — ED NOTES
Pt repeatedly getting up at edge of bed. Pt wanting to go back to hotel. Pt told he cannot go back at this time. Pt is too intoxicated and unsure of sprinkler status of hotel. Pt reassured his Belongings will still be in room.

## 2020-10-05 NOTE — ED NOTES
DATE:  10/5/2020   TIME OF RECEIPT FROM LAB:  0120  LAB TEST:  ETOH  LAB VALUE:  0.46  RESULTS GIVEN WITH READ-BACK TO (PROVIDER):  Miko Rangel MD  TIME LAB VALUE REPORTED TO PROVIDER:   0121

## 2020-10-05 NOTE — ED NOTES
Awake.  Trying to get out of bed.  Urinated and soaked the bed.  Bed changed; brief applied, redirection given.

## 2020-10-05 NOTE — ED NOTES
Per Dr. Solorzano, pt is able to be discharged despite no one being willing to pick him up and take him back to his hotel. Pt charging his phone and then can call for an Uber. Pt given paper pants and shirt as he has no shirt with him and his pants are wet. No nausea or vomiting after eating and drinking.

## 2020-10-05 NOTE — ED NOTES
"Pt pulled out his IV, 2nd IV removed by writer.  Pt states he is homeless and there is \"no way my mom will pick me up.\"  Pt states his vehicle is at the [address] in [place].  Gait steady walking independently. Plan to discharge.   "

## 2020-10-05 NOTE — ED NOTES
Pts mother called looking for update on status. Pt mentioned he recently was in treatment for alcohol intoxication. Pt has relapsed, may have told her prior to coming in he wanted to detox.

## 2020-10-05 NOTE — ED NOTES
Multiple attempts to get up.  Water given x2.  Picking at IV's, but redirectable.  Refuses to keep mask on.

## 2020-10-05 NOTE — DISCHARGE INSTRUCTIONS
Eat bananas for your low potassium, follow-up in the clinic with your doctor to get help with your drinking.

## 2020-10-05 NOTE — ED TRIAGE NOTES
"Pt presents by [facility] after being found unresposive in a hotel room. Fire was called to the hotel for sprinklers, found pt unresponsive in the room and administered 2 mg nasal narcan without improvement. EMS was called and they found vodka in the room. Pt has hx of etoh and drug abuse and left treatment one week ago against the programs request. Pt has not been heard from since. Mother reports pt also has been known to abuse seroquel. Pt called mom tonight asking for help and then stated \"nevermind you wont help me\" and hung up. Pt given additional 2 mg IM narcan by EMS without relief. Pt moving extremities while RN touching PIV, large pupils, but not following commands or speaking to staff.   "

## 2020-10-05 NOTE — ED NOTES
Bed: [facility]  Expected date: 10/5/20  Expected time: 12:15 AM  Means of arrival: Ambulance  Comments:  [facility] 441 30M intox; seroquel overdose

## 2020-10-05 NOTE — ED NOTES
Pt walking around and sitting in common area with steady gait. Updated on wait time for breakfast arrival.

## 2020-10-05 NOTE — ED NOTES
Report received. Assumed care of pt. Pt on Health Officer hold with security present. Pt changed into scrubs. Belongings secured by previous staff.

## 2020-10-05 NOTE — ED NOTES
Pt given more water upon discharge. Pt called Uber for ride back to hotel. Pt declined offer of assistance with quitting drinking. Pt ambulating steadily and independently. Normal speech.

## 2020-10-08 ENCOUNTER — APPOINTMENT (OUTPATIENT)
Dept: GENERAL RADIOLOGY | Facility: CLINIC | Age: 30
End: 2020-10-08
Attending: EMERGENCY MEDICINE
Payer: COMMERCIAL

## 2020-10-08 ENCOUNTER — APPOINTMENT (OUTPATIENT)
Dept: CT IMAGING | Facility: CLINIC | Age: 30
End: 2020-10-08
Attending: EMERGENCY MEDICINE
Payer: COMMERCIAL

## 2020-10-08 ENCOUNTER — HOSPITAL ENCOUNTER (EMERGENCY)
Facility: CLINIC | Age: 30
Discharge: ANOTHER HOSPITAL | End: 2020-10-08
Attending: EMERGENCY MEDICINE | Admitting: EMERGENCY MEDICINE
Payer: COMMERCIAL

## 2020-10-08 VITALS
HEART RATE: 115 BPM | SYSTOLIC BLOOD PRESSURE: 136 MMHG | RESPIRATION RATE: 16 BRPM | TEMPERATURE: 98.6 F | OXYGEN SATURATION: 99 % | DIASTOLIC BLOOD PRESSURE: 102 MMHG

## 2020-10-08 DIAGNOSIS — R09.02 HYPOXIA: ICD-10-CM

## 2020-10-08 DIAGNOSIS — R41.82 ALTERED MENTAL STATUS, UNSPECIFIED ALTERED MENTAL STATUS TYPE: ICD-10-CM

## 2020-10-08 DIAGNOSIS — F10.929 ALCOHOLIC INTOXICATION WITH COMPLICATION (H): ICD-10-CM

## 2020-10-08 DIAGNOSIS — Z20.822 PERSON UNDER INVESTIGATION FOR COVID-19: ICD-10-CM

## 2020-10-08 DIAGNOSIS — J18.9 PNEUMONIA DUE TO INFECTIOUS ORGANISM, UNSPECIFIED LATERALITY, UNSPECIFIED PART OF LUNG: ICD-10-CM

## 2020-10-08 LAB
ALBUMIN SERPL-MCNC: 3.3 G/DL (ref 3.4–5)
ALBUMIN UR-MCNC: 100 MG/DL
ALP SERPL-CCNC: 118 U/L (ref 40–150)
ALT SERPL W P-5'-P-CCNC: 329 U/L (ref 0–70)
AMPHETAMINES UR QL SCN: NEGATIVE
ANION GAP SERPL CALCULATED.3IONS-SCNC: 9 MMOL/L (ref 3–14)
APPEARANCE UR: ABNORMAL
AST SERPL W P-5'-P-CCNC: 223 U/L (ref 0–45)
BACTERIA #/AREA URNS HPF: ABNORMAL /HPF
BARBITURATES UR QL: NEGATIVE
BASOPHILS # BLD AUTO: 0 10E9/L (ref 0–0.2)
BASOPHILS NFR BLD AUTO: 0.1 %
BENZODIAZ UR QL: POSITIVE
BILIRUB SERPL-MCNC: 0.3 MG/DL (ref 0.2–1.3)
BILIRUB UR QL STRIP: NEGATIVE
BUN SERPL-MCNC: 11 MG/DL (ref 7–30)
CALCIUM SERPL-MCNC: 8.1 MG/DL (ref 8.5–10.1)
CANNABINOIDS UR QL SCN: NEGATIVE
CHLORIDE SERPL-SCNC: 105 MMOL/L (ref 94–109)
CO2 SERPL-SCNC: 29 MMOL/L (ref 20–32)
COCAINE UR QL: NEGATIVE
COLOR UR AUTO: YELLOW
CREAT SERPL-MCNC: 0.66 MG/DL (ref 0.66–1.25)
DIFFERENTIAL METHOD BLD: ABNORMAL
EOSINOPHIL # BLD AUTO: 0 10E9/L (ref 0–0.7)
EOSINOPHIL NFR BLD AUTO: 0 %
ERYTHROCYTE [DISTWIDTH] IN BLOOD BY AUTOMATED COUNT: 15.1 % (ref 10–15)
ETHANOL SERPL-MCNC: 0.55 G/DL
GFR SERPL CREATININE-BSD FRML MDRD: >90 ML/MIN/{1.73_M2}
GLUCOSE SERPL-MCNC: 87 MG/DL (ref 70–99)
GLUCOSE UR STRIP-MCNC: NEGATIVE MG/DL
HCT VFR BLD AUTO: 45.2 % (ref 40–53)
HGB BLD-MCNC: 15.9 G/DL (ref 13.3–17.7)
HGB UR QL STRIP: ABNORMAL
IMM GRANULOCYTES # BLD: 0 10E9/L (ref 0–0.4)
IMM GRANULOCYTES NFR BLD: 0.2 %
INTERPRETATION ECG - MUSE: NORMAL
KETONES UR STRIP-MCNC: 10 MG/DL
LACTATE BLD-SCNC: 2.2 MMOL/L (ref 0.7–2)
LACTATE BLD-SCNC: 3.3 MMOL/L (ref 0.7–2)
LACTATE BLD-SCNC: 3.5 MMOL/L (ref 0.7–2)
LEUKOCYTE ESTERASE UR QL STRIP: NEGATIVE
LIPASE SERPL-CCNC: 674 U/L (ref 73–393)
LYMPHOCYTES # BLD AUTO: 1.2 10E9/L (ref 0.8–5.3)
LYMPHOCYTES NFR BLD AUTO: 7.7 %
MAGNESIUM SERPL-MCNC: 2 MG/DL (ref 1.6–2.3)
MCH RBC QN AUTO: 31 PG (ref 26.5–33)
MCHC RBC AUTO-ENTMCNC: 35.2 G/DL (ref 31.5–36.5)
MCV RBC AUTO: 88 FL (ref 78–100)
MONOCYTES # BLD AUTO: 1.7 10E9/L (ref 0–1.3)
MONOCYTES NFR BLD AUTO: 10.7 %
MUCOUS THREADS #/AREA URNS LPF: PRESENT /LPF
NEUTROPHILS # BLD AUTO: 12.7 10E9/L (ref 1.6–8.3)
NEUTROPHILS NFR BLD AUTO: 81.3 %
NITRATE UR QL: NEGATIVE
NRBC # BLD AUTO: 0 10*3/UL
NRBC BLD AUTO-RTO: 0 /100
OPIATES UR QL SCN: NEGATIVE
PCP UR QL SCN: NEGATIVE
PH UR STRIP: 5.5 PH (ref 5–7)
PLATELET # BLD AUTO: 278 10E9/L (ref 150–450)
POTASSIUM SERPL-SCNC: 3.2 MMOL/L (ref 3.4–5.3)
PROT SERPL-MCNC: 7.8 G/DL (ref 6.8–8.8)
RBC # BLD AUTO: 5.13 10E12/L (ref 4.4–5.9)
RBC #/AREA URNS AUTO: 2 /HPF (ref 0–2)
SARS-COV-2 RNA SPEC QL NAA+PROBE: NORMAL
SODIUM SERPL-SCNC: 143 MMOL/L (ref 133–144)
SOURCE: ABNORMAL
SP GR UR STRIP: 1.02 (ref 1–1.03)
SPECIMEN SOURCE: NORMAL
SQUAMOUS #/AREA URNS AUTO: <1 /HPF (ref 0–1)
TROPONIN I SERPL-MCNC: <0.015 UG/L (ref 0–0.04)
UROBILINOGEN UR STRIP-MCNC: NORMAL MG/DL (ref 0–2)
WBC # BLD AUTO: 15.6 10E9/L (ref 4–11)
WBC #/AREA URNS AUTO: 5 /HPF (ref 0–5)

## 2020-10-08 PROCEDURE — 96367 TX/PROPH/DG ADDL SEQ IV INF: CPT

## 2020-10-08 PROCEDURE — 250N000009 HC RX 250: Performed by: EMERGENCY MEDICINE

## 2020-10-08 PROCEDURE — 70450 CT HEAD/BRAIN W/O DYE: CPT

## 2020-10-08 PROCEDURE — 85025 COMPLETE CBC W/AUTO DIFF WBC: CPT | Performed by: EMERGENCY MEDICINE

## 2020-10-08 PROCEDURE — 83690 ASSAY OF LIPASE: CPT | Performed by: EMERGENCY MEDICINE

## 2020-10-08 PROCEDURE — 80320 DRUG SCREEN QUANTALCOHOLS: CPT | Performed by: EMERGENCY MEDICINE

## 2020-10-08 PROCEDURE — 83735 ASSAY OF MAGNESIUM: CPT | Performed by: EMERGENCY MEDICINE

## 2020-10-08 PROCEDURE — 250N000011 HC RX IP 250 OP 636: Performed by: EMERGENCY MEDICINE

## 2020-10-08 PROCEDURE — 72125 CT NECK SPINE W/O DYE: CPT

## 2020-10-08 PROCEDURE — 96375 TX/PRO/DX INJ NEW DRUG ADDON: CPT

## 2020-10-08 PROCEDURE — 80307 DRUG TEST PRSMV CHEM ANLYZR: CPT | Performed by: EMERGENCY MEDICINE

## 2020-10-08 PROCEDURE — 87086 URINE CULTURE/COLONY COUNT: CPT | Performed by: EMERGENCY MEDICINE

## 2020-10-08 PROCEDURE — 83605 ASSAY OF LACTIC ACID: CPT | Mod: 59 | Performed by: EMERGENCY MEDICINE

## 2020-10-08 PROCEDURE — 258N000003 HC RX IP 258 OP 636: Performed by: EMERGENCY MEDICINE

## 2020-10-08 PROCEDURE — 96365 THER/PROPH/DIAG IV INF INIT: CPT | Mod: 59

## 2020-10-08 PROCEDURE — 96366 THER/PROPH/DIAG IV INF ADDON: CPT

## 2020-10-08 PROCEDURE — 80053 COMPREHEN METABOLIC PANEL: CPT | Performed by: EMERGENCY MEDICINE

## 2020-10-08 PROCEDURE — 99285 EMERGENCY DEPT VISIT HI MDM: CPT | Mod: 25

## 2020-10-08 PROCEDURE — 71045 X-RAY EXAM CHEST 1 VIEW: CPT

## 2020-10-08 PROCEDURE — 87040 BLOOD CULTURE FOR BACTERIA: CPT | Performed by: EMERGENCY MEDICINE

## 2020-10-08 PROCEDURE — 93005 ELECTROCARDIOGRAM TRACING: CPT

## 2020-10-08 PROCEDURE — C9803 HOPD COVID-19 SPEC COLLECT: HCPCS

## 2020-10-08 PROCEDURE — 81001 URINALYSIS AUTO W/SCOPE: CPT | Performed by: EMERGENCY MEDICINE

## 2020-10-08 PROCEDURE — 83605 ASSAY OF LACTIC ACID: CPT | Performed by: EMERGENCY MEDICINE

## 2020-10-08 PROCEDURE — 84484 ASSAY OF TROPONIN QUANT: CPT | Performed by: EMERGENCY MEDICINE

## 2020-10-08 PROCEDURE — U0003 INFECTIOUS AGENT DETECTION BY NUCLEIC ACID (DNA OR RNA); SEVERE ACUTE RESPIRATORY SYNDROME CORONAVIRUS 2 (SARS-COV-2) (CORONAVIRUS DISEASE [COVID-19]), AMPLIFIED PROBE TECHNIQUE, MAKING USE OF HIGH THROUGHPUT TECHNOLOGIES AS DESCRIBED BY CMS-2020-01-R: HCPCS | Performed by: EMERGENCY MEDICINE

## 2020-10-08 PROCEDURE — 96361 HYDRATE IV INFUSION ADD-ON: CPT

## 2020-10-08 RX ORDER — AZITHROMYCIN 500 MG/1
500 INJECTION, POWDER, LYOPHILIZED, FOR SOLUTION INTRAVENOUS ONCE
Status: COMPLETED | OUTPATIENT
Start: 2020-10-08 | End: 2020-10-08

## 2020-10-08 RX ORDER — PIPERACILLIN SODIUM, TAZOBACTAM SODIUM 4; .5 G/20ML; G/20ML
4.5 INJECTION, POWDER, LYOPHILIZED, FOR SOLUTION INTRAVENOUS ONCE
Status: COMPLETED | OUTPATIENT
Start: 2020-10-08 | End: 2020-10-08

## 2020-10-08 RX ORDER — POTASSIUM CL/LIDO/0.9 % NACL 10MEQ/0.1L
10 INTRAVENOUS SOLUTION, PIGGYBACK (ML) INTRAVENOUS ONCE
Status: COMPLETED | OUTPATIENT
Start: 2020-10-08 | End: 2020-10-08

## 2020-10-08 RX ORDER — CEFTRIAXONE 1 G/1
1 INJECTION, POWDER, FOR SOLUTION INTRAMUSCULAR; INTRAVENOUS ONCE
Status: DISCONTINUED | OUTPATIENT
Start: 2020-10-08 | End: 2020-10-08

## 2020-10-08 RX ADMIN — SODIUM CHLORIDE 1000 ML: 9 INJECTION, SOLUTION INTRAVENOUS at 14:14

## 2020-10-08 RX ADMIN — PIPERACILLIN SODIUM AND TAZOBACTAM SODIUM 4.5 G: 4; .5 INJECTION, POWDER, LYOPHILIZED, FOR SOLUTION INTRAVENOUS at 18:42

## 2020-10-08 RX ADMIN — Medication 10 MEQ: at 19:41

## 2020-10-08 RX ADMIN — AZITHROMYCIN MONOHYDRATE 500 MG: 500 INJECTION, POWDER, LYOPHILIZED, FOR SOLUTION INTRAVENOUS at 19:37

## 2020-10-08 RX ADMIN — SODIUM CHLORIDE 1000 ML: 9 INJECTION, SOLUTION INTRAVENOUS at 18:44

## 2020-10-08 RX ADMIN — FOLIC ACID: 5 INJECTION, SOLUTION INTRAMUSCULAR; INTRAVENOUS; SUBCUTANEOUS at 16:43

## 2020-10-08 NOTE — ED NOTES
Bed: ED23  Expected date:   Expected time:   Means of arrival:   Comments:  [facility] - 424 - 30 M etoh eta 1317 no covid

## 2020-10-08 NOTE — ED TRIAGE NOTES
Pt presents by EMS after being found passed out in the [facility] in [place] by the housekeepers. Pt laying incontinent of urine in the hotel bed with bottles of vodka laying around. Pt was seen here over the weekend for similar concerns but was more unresponsive at that time per EMS staff. Here, pt able to open eyes but not talking and unable to follow commands. Blood sugar 87. Pt recently left drug/alchohol treatment

## 2020-10-08 NOTE — ED PROVIDER NOTES
History     Chief Complaint:    Alcohol Intoxication      HPI   Patient is a poor historian due to his acute intoxication and mental status.  Mohit Porter is a 30 year old male who frequents the emergency department for alcohol intoxication. He was last in this emergency department on the 10/5 for alcohol intoxication. His history includes alcohol abuse, alcohol withdrawal, and Seroquel abuse. He was found passed out in a hotel on the 5th. They found bottles of alcohol but no signs of pills.  His alcohol level at that time was 0.46. It looks like he was observed in the emergency department to sober up overnight. He did so the next morning and went back to this homeless hotel where he was found again intoxicated on the floor today.  Again there is noted to be no drug paraphernalia or pill bottles.  EMS brought him in.  His blood sugar was normal on the scene.  He is significantly altered..     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    Family History:    Father: Hypertension, hyperlipidemia    Social History:  The patient was accompanied to the ED via EMS.  Smoking Status: Never Smoker  Smokeless Tobacco: Current User   Type: Chew  Alcohol Use: Positive  Drug Use: Negative    Marital Status:  Single     Review of Systems   Reason unable to perform ROS: Alcohol intoxication.       Physical Exam     Patient Vitals for the past 24 hrs:   BP Temp Temp src Pulse Resp SpO2   10/08/20 1500 130/82 -- -- 105 14 95 %   10/08/20  1445 -- -- -- 109 13 94 %   10/08/20 1430 (!) 132/93 -- -- -- -- --   10/08/20 1415 -- -- -- 121 24 94 %   10/08/20 1400 125/82 -- -- 116 15 90 %   10/08/20 1332 (!) 138/91 98.6  F (37  C) Oral 114 20 91 %       Physical Exam    Physical Exam   Constitutional:  Altered mental status.  Does not look at me when I asked him questions but is nonverbal  HENT:      Mouth/Throat:   Patient will not open his mouth. No blood noted.  Eyes:    Conjunctivae normal and EOM are normal. Pupils are 3 mm sluggish  Neck:    Normal range of motion.   Cardiovascular: Mildly tachycardic, regular rhythm and normal heart sounds.  Exam reveals no gallop and no friction rub.  No murmur heard.  Pulmonary/Chest:  Slightly diminished breath sounds. Patient has no wheezes. Patient has no rales. Desats when he sleeping has normal sats when he is awake.  Abdominal:   Soft. Bowel sounds are normal. Patient exhibits no mass. There is no apparent tenderness to palpation. There is no rebound and no guarding.   Musculoskeletal:  Normal range of motion. Patient exhibits no edema.   Neurological:   Patient is significantly altered. Smells of alcohol. Withdrawals all extremities to pain. Equal  strength. GCS: 8  Skin:   Skin is warm and dry. No rash noted. No erythema.   Psychiatric:   Unable to assess due to acute alcohol intoxication.      Emergency Department Course     ECG:  Indication: Alcohol Intoxication  Time: 1417  Vent. Rate 111 bpm. NJ interval 152. QRS duration 78. QT/QTc 314/427. P-R-T axis 46 58 1. Sinus tachycardia. Septal infarct, age undetermined. Abnormal ECG. Read time: 1425     Imaging:  Patient is unable to understand the findings.  When asked if we can call his family he states no.    CT Head without contrast:   No acute intracranial abnormality. As per radiology.    CT Cervical Spine without contrast:   1. No evidence of acute fracture or post traumatic subluxation.  2. Multiple patchy nodules within the right lung apex,  presumably  infectious/inflammatory. As per radiology.    XR Chest, Portable, G/E 1 view:   Portable chest. Lungs are mildly hypoaerated but there are  increased interstitial changes at the lung bases with retrocardiac  opacity concerning for infiltrate/pneumonia. No pneumothorax or  pleural effusions. Heart is normal in size. As per radiology.    Laboratory:  Patient is unable to understand his lab results.  I did tell him that I was admitting him.    CMP: Glucose 87, Potassium: 3.2 (L), Calcium: 8.1 (L), Albumin: 3.3 (L), o/w WNL (Creatinine: 0.66)    CBC: WBC: 15.6 (H), HGB: 15.9, PLT: 278    Lipase: 674 (H)    Magnesium: 2.0    1408 Troponin: <0.015    1408 Lactic Acid: 3.5 (H)    Alcohol ethyl: 0.55 (H)    UA with Microscopic: Ketones: 10 (A), Blood: Moderate (A), Protein Albumin: 100 (A), Bacteria: Few (A), Mucous: Present (A) o/w Negative    Drug abuse screen: Benzodiazepines: Positive o/w Negative    Urine Culture Aerobic Bacterial: Pending    COVID-19 Virus (Coronavirus), PCR NP Swab: Pending      Blood Cultures x2 : Pending  Repeat Lactic acid: pending    Procedures  None  Interventions:  1414 NS 1L IV  1659    Banana Bag               Zosyn 4. Mg IV              Azithromycin 500 mg IV      Emergency Department Course:  Past medical records, nursing notes, and vitals reviewed.    1442 I performed an exam of the patient as documented above.     EKG obtained in the ED, see results above.     IV was inserted and blood was drawn for laboratory testing, results above.    The patient provided a urine sample here in the emergency department. This was sent for laboratory testing, findings above.    The patient was sent for a Chest X-ray and Head, Cervical Spine CT while in the emergency department, results above.     1600 I rechecked the patient patient was sleeping satting 88%.  He awoke with verbal stimulation.  His oxygen saturations went up to 95%.  I did place him on nasal cannula.  He still would not talk to  me but does answer one-word questions to the nurse.  The patient was signed out to Dr. Coker, [facility] ED physician, pending bed.  At this time the patient does require admission however due to the full capacity status at [facility] there has been a delay in obtaining a bed.        Impression & Plan     Covid-19  Mohit Porter was evaluated during a global COVID-19 pandemic, which necessitated consideration that the patient might be at risk for infection with the SARS-CoV-2 virus that causes COVID-19.   Applicable protocols for evaluation were followed during the patient's care.   COVID-19 was considered as part of the patient's evaluation. The plan for testing is:  a test was obtained during this visit.    Medical Decision Making:  Mohit Porter is a 30 year old male with a history of alcohol and Seroquel abuse presenting to the emergency department from his homeless hotel where he was found unresponsive on the floor. This is a similar presentation for this patient from previous. He is nonverbal. He looks at me with verbal and painful stimuli. He will obey commands in that he will move his extremities when pinched but with minimal effort. He has no signs of trauma but I did CT his head and neck due to history of alcohol abuse and being found down. Fortunately there is no evidence of intracranial bleed or cervical fracture. However, CT of the spine does show some patchy inflammatory nodules in the lung apex, infectious vs. Inflammatory. His white count is elevated as well and I did Covid swab him after seeing his CT of the cervical spine and obtained a chest x-ray being that he lives in a homeless hotel and Covid is in the differential. Being that he has an elevated white count I suspect more infectious vs inflammatory.  Chest x-ray shows a left lower lobe infiltrate.  He was covered with Zosyn and azithromycin cannot rule out aspiration especially due to his frequent alcohol intoxication.  His lipase is  chronically elevated likely secondary to alcohol abuse. His liver functions are chronically elevated likley secondary to alcohol abuse. His lactic is significantly elevated, again, secondary to alcohol intoxication. Cardiac enzyme is within normal limits. His ECG shows sinus tachycardia without injury pattern. His alcohol level is significantly elevated at 0.55. Over the past year his alcohol levels have been between 0.46 and 0.61. Magnesium is fortunately normal.     At this time the patient requires hospitalization for his acute alcohol intoxication and pneumonia. He received IV fluids and banana bag.  At this point I am awaiting bed assignment there is critical capacity at many hospitals and will sign out to Dr. Coker pending an [facility] bed.    Diagnosis:    ICD-10-CM    1. Alcoholic intoxication with complication (H)  F10.929 UA with Microscopic     Drug abuse screen urine     Symptomatic COVID-19 Virus (Coronavirus) by PCR     Urine Culture Aerobic Bacterial     Urine Culture Aerobic Bacterial   2. Pneumonia due to infectious organism, unspecified laterality, unspecified part of lung  J18.9    3. Person under investigation for COVID-19  Z20.828    4. Altered mental status, unspecified altered mental status type  R41.82    5. Hypoxia  R09.02        Disposition:  Signed out to Dr. Coker, pending Admission.    Discharge Medications:  New Prescriptions    No medications on file       Scribe Disclosure:  I, Zhou Vizcaino, am serving as a scribe at 1:50 PM on 10/8/2020 to document services personally performed by Cynthia Sanchez MD based on my observations and the provider's statements to me.        Cynthia Sanchez MD  10/08/20 9430

## 2020-10-08 NOTE — ED PROVIDER NOTES
Mohit Porter is a 30 year old male who presented to the ED for alcohol intoxication.  Patient was signed out to me by Cynthia Sanchez MD at shift change, pending an available bed.      Briefly, the patient was found to be intoxicated today at the [facility] facility where he is currently staying.  He has multiple other recent ED evaluations for intoxication.  Blood alcohol level was 0.55 on presentation. No drug paraphernalia or bill bottles were found at the scene.  He was found to have a left lower lobe infiltrate and started on Zosyn and Azithromycin.      (1831) I spoke with Dr. Momin with the hospitalist service at [facility] who accepted the patient for admission.  (1939) Lactic Acid: 2.2 (H)     Patient was transferred to [facility] via EMS.      I, Meenakshi Hanson, am serving as a scribe on 10/8/2020 at 5:10 PM to personally document services performed by Antonette Coker MD based on my observations and the provider's statements to me.

## 2020-10-09 LAB
BACTERIA SPEC CULT: NO GROWTH
LABORATORY COMMENT REPORT: NORMAL
Lab: NORMAL
SARS-COV-2 RNA SPEC QL NAA+PROBE: NEGATIVE
SPECIMEN SOURCE: NORMAL
SPECIMEN SOURCE: NORMAL

## 2020-10-09 NOTE — ED NOTES
Pt continually attempting to get out bed alone. Pt keeps requesting water. Discussed with pt that he had too much water and vomited earlier. Pt redirectable.

## 2020-10-09 NOTE — ED NOTES
EMS arrived, report given. Pt agrees to transport. Pt transported with IV's intact   24-Oct-2018 20:14

## 2020-10-13 ENCOUNTER — HOSPITAL ENCOUNTER (EMERGENCY)
Facility: CLINIC | Age: 30
Discharge: OTHER INSTITUTION | End: 2020-10-14
Attending: EMERGENCY MEDICINE | Admitting: EMERGENCY MEDICINE
Payer: COMMERCIAL

## 2020-10-13 DIAGNOSIS — F10.920 ALCOHOLIC INTOXICATION WITHOUT COMPLICATION (H): ICD-10-CM

## 2020-10-13 PROCEDURE — 85027 COMPLETE CBC AUTOMATED: CPT | Performed by: EMERGENCY MEDICINE

## 2020-10-13 PROCEDURE — 80320 DRUG SCREEN QUANTALCOHOLS: CPT | Performed by: EMERGENCY MEDICINE

## 2020-10-13 PROCEDURE — 99285 EMERGENCY DEPT VISIT HI MDM: CPT

## 2020-10-13 PROCEDURE — 80048 BASIC METABOLIC PNL TOTAL CA: CPT | Performed by: EMERGENCY MEDICINE

## 2020-10-13 NOTE — ED AVS SNAPSHOT
[facility] Emergency Dept  [address] [address]  Phone: [phone]  Fax: [phone]                                    Mohit Porter   MRN: [id number]    Department: [facility] Emergency Dept   Date of Visit: 10/13/2020           After Visit Summary Signature Page    I have received my discharge instructions, and my questions have been answered. I have discussed any challenges I see with this plan with the nurse or doctor.    ..........................................................................................................................................  Patient/Patient Representative Signature      ..........................................................................................................................................  Patient Representative Print Name and Relationship to Patient    ..................................................               ................................................  Date                                   Time    ..........................................................................................................................................  Reviewed by Signature/Title    ...................................................              ..............................................  Date                                               Time          22EPIC Rev 08/18

## 2020-10-13 NOTE — ED TRIAGE NOTES
Officers responded to [facility] for a male that was too intoxicated to stand but was drinking a bottle of vodka. Pt was found lying in the lobby, pt had urinated on self

## 2020-10-13 NOTE — ED PROVIDER NOTES
History     Chief Complaint:  Alcohol Intoxication    The history is provided by the EMS personnel. The history is limited by the condition of the patient.      Mohit Porter is a 30 year old male who presents via EMS for evaluation for alcohol intoxication. The patient was at the [facility] earlier today when staff called police as he was too intoxicated to stand up. On 's arrival, he was found to be lying down in lobby covered in his urine. Patient was heavily intoxicated and thus brought to the ED for evaluation.     Allergies:  No known allergies     Medications:    Seroquel  Hydroxyzine Pamoate    Past Medical History:    Alcohol Abuse  Anxiety  Alcoholic hepatitis   Suicidal ideation  Drug-induced mental disorder    Past Surgical History:    The patient does not have any pertinent past surgical history.     Family History:    Hypertension     Social History:  Marital Status:  Single  Tobacco Use: Chew, current user  Alcohol Use: Yes  Drug Use: Yes     Review of Systems   Unable to perform ROS: Mental status change       Physical Exam     Patient Vitals for the past 24 hrs:   BP Temp Temp src Pulse Resp SpO2   10/13/20 2230 130/87 -- -- 83 -- --   10/13/20 2200 (!) 149/107 -- -- 110 -- 93 %   10/13/20 2130 (!) 139/97 -- -- 115 -- 91 %   10/13/20 2110 (!) 140/95 -- -- -- -- 92 %   10/13/20 2050 -- -- -- -- -- 92 %   10/13/20 1900 -- -- -- -- -- 93 %   10/13/20 1630 130/89 -- -- 109 -- 98 %   10/13/20 1600 (!) 124/93 -- -- 129 -- 98 %   10/13/20 1553 -- -- -- -- -- 91 %   10/13/20 1552 (!) 139/93 98.4  F (36.9  C) Axillary 104 16 (!) 87 %       Physical Exam  Eyes:  The pupils are equal and round    Conjunctivae and sclerae are normal  ENT:    The nose is normal    Pinnae are normal    The oropharynx is dry  Neck:  Normal range of motion    There is no rigidity noted  CV:  Tachycardic and regular     No edema  Resp:  Lungs are clear    Non-labored  GI:  Abdomen is soft without  appreciable tenderness, there is no rigidity  MS:  Normal muscular tone    No asymmetric leg swelling  Skin:  No rash or acute skin lesions noted  Neuro:   Awake, alert.      Speech is normal and fluent.    Face is symmetric.     Moves all extremities    Emergency Department Course     Laboratory:  Laboratory findings were communicated with the patient who voiced understanding of the findings.    CBC:WBC 6.1, HGB 13.4,     BMP: Chloride 93 (L), Glucose 135 (H), Creat 0.53 (L), Calcium 8.1 (L), o/w WNL    Alcohol Blood Level: >0.30 (H)     Emergency Department Course:  Past medical records, nursing notes, and vitals reviewed.    1622 I performed an exam of the patient as documented above.      Blood was drawn for laboratory testing, results above.    1737 I rechecked the patient and discussed the results of his workup. Still asleep.     2143  I rechecked the patient as the RN reported he was awakening.     The patient was signed out to oncoming ED physician, pending re-evaluation once sober.    I personally reviewed the laboratory results with the Patient and answered all related questions prior to sign out.     Impression & Plan     Medical Decision Making:  Mohit Porter is a 30 year old male who presents the emergency department with alcohol intoxication.  He has a history of repeated alcohol intoxication.  He presents here with altered mental status.  This suspected be secondary to alcohol intoxication as he is found with significant amount of alcohol and was incontinent of urine.  He was observed here in the emergency department after laboratory work-up confirmed alcohol intoxication.  His mental status improved.  He is able answer questions and reported that he was amenable to transfer to detox.  Upon my last assessment he was unable to ambulate independently, so patient was signed out to my partner awaiting sobriety for hopeful eventual transfer to detox.    Diagnosis:    ICD-10-CM    1. Alcoholic  intoxication without complication (H)  F10.920        Disposition:  Signed out to Dr. Doll, pending improved sobriety    Discharge Medications:  New Prescriptions    No medications on file       Scribe Disclosure:  I, Mario Morales, am serving as a scribe at 4:22 PM on 10/13/2020 to document services personally performed by Mehul Robles MD based on my observations and the provider's statements to me.   I, Darling Bennett, am serving as a scribe on 10/13/2020 at 11:05 PM to personally document services performed by Mehul Robles MD based on my observations and the provider's statements to me.        10/13/2020   [facility] EMERGENCY DEPT       Mehul Robles MD  10/13/20 9448

## 2020-10-14 VITALS
RESPIRATION RATE: 16 BRPM | TEMPERATURE: 98.4 F | HEART RATE: 110 BPM | DIASTOLIC BLOOD PRESSURE: 106 MMHG | OXYGEN SATURATION: 95 % | SYSTOLIC BLOOD PRESSURE: 155 MMHG

## 2020-10-14 LAB
ANION GAP SERPL CALCULATED.3IONS-SCNC: 14 MMOL/L (ref 3–14)
BACTERIA SPEC CULT: NO GROWTH
BACTERIA SPEC CULT: NO GROWTH
BUN SERPL-MCNC: 10 MG/DL (ref 7–30)
CALCIUM SERPL-MCNC: 8.1 MG/DL (ref 8.5–10.1)
CHLORIDE SERPL-SCNC: 93 MMOL/L (ref 94–109)
CO2 SERPL-SCNC: 27 MMOL/L (ref 20–32)
CREAT SERPL-MCNC: 0.53 MG/DL (ref 0.66–1.25)
ERYTHROCYTE [DISTWIDTH] IN BLOOD BY AUTOMATED COUNT: 15 % (ref 10–15)
ETHANOL SERPL-MCNC: 0.59 G/DL
GFR SERPL CREATININE-BSD FRML MDRD: >90 ML/MIN/{1.73_M2}
GLUCOSE SERPL-MCNC: 135 MG/DL (ref 70–99)
HCT VFR BLD AUTO: 39.7 % (ref 40–53)
HGB BLD-MCNC: 13.4 G/DL (ref 13.3–17.7)
MCH RBC QN AUTO: 30 PG (ref 26.5–33)
MCHC RBC AUTO-ENTMCNC: 33.8 G/DL (ref 31.5–36.5)
MCV RBC AUTO: 89 FL (ref 78–100)
PLATELET # BLD AUTO: 314 10E9/L (ref 150–450)
POTASSIUM SERPL-SCNC: 3.6 MMOL/L (ref 3.4–5.3)
RBC # BLD AUTO: 4.46 10E12/L (ref 4.4–5.9)
SODIUM SERPL-SCNC: 134 MMOL/L (ref 133–144)
SPECIMEN SOURCE: NORMAL
SPECIMEN SOURCE: NORMAL
WBC # BLD AUTO: 6.1 10E9/L (ref 4–11)

## 2020-10-14 PROCEDURE — 250N000013 HC RX MED GY IP 250 OP 250 PS 637: Performed by: EMERGENCY MEDICINE

## 2020-10-14 RX ORDER — LORAZEPAM 1 MG/1
2 TABLET ORAL ONCE
Status: COMPLETED | OUTPATIENT
Start: 2020-10-14 | End: 2020-10-14

## 2020-10-14 RX ADMIN — LORAZEPAM 2 MG: 1 TABLET ORAL at 07:01

## 2020-10-14 NOTE — ED NOTES
30 year old male received in signout from Dr. Robles who presents to the ED w/ alcohol intoxication.  Please see primary note for full details.  At time of signout, patient is hemodynamically stable with a diagnosis of alcohol intoxication.  Plan for the patient to discharge to detox when able to ambulate.  Otherwise no acute events on my shift.  Patient subsequently signed out in stable condition awaiting discharge to detox versus clinical sobriety.  Upon recheck just prior to signout, patient noted to be minimally tremulous.  Oral lorazepam ordered.  I do not think admission is indicated given the patient does not have a history of complicated withdrawal such as DTs or withdrawal seizures.                 Daniel Doll MD  10/14/20 0624       Daniel Doll MD  10/14/20 0656

## 2020-10-14 NOTE — ED NOTES
"Lab called reporting that previously reported ETOH level \" > 0.30\" is not accurate due to dilution problem; lab will call back with ETOH results when completed.  "

## 2020-10-14 NOTE — ED NOTES
Called [facility] -no beds available, call after 8am.    Ford Cliff would like labs, MD/RN notes, H&P faxed to [phone] before they accept him.

## 2020-10-14 NOTE — ED NOTES
Report given to [facility]; pt aware of transfer and verbally excepts being admitted to [facility]. MD also updated the pt

## 2020-10-14 NOTE — ED NOTES
Patient ambulating with steady gait. Finished eating a sandwich, has been drinking water and walked to toilet. Agreed to go to Detox.

## 2020-10-14 NOTE — DISCHARGE INSTRUCTIONS

## 2020-10-14 NOTE — ED PROVIDER NOTES
[facility] ED Behavioral Health Handoff Note:     Brief HPI:  This is a 30 year old male signed out to me by Dr. Doll .  See initial ED Provider note for details of the presentation.     Patient is a JOANA 0.59.  Awaiting detox vs clinical sobriety.     The patient is on a hold.  The type of hold is NICOLASA.      The patient has not required medication for agitation.    ED Course:    There were significant events while under my care.      A bed was arranged for detox at [facility].  Sent by EMS.       Impression:    ICD-10-CM    1. Alcoholic intoxication without complication (H)  F10.920 CBC with platelets     Basic metabolic panel     Alcohol level blood       Plan:    1.Sent to Detox by EMS    MD Lyric Mirza Scott, MD  10/14/20 0952

## 2020-10-14 NOTE — ED NOTES
Attempted to ambulate pt. Per request by RN. Pt unable to to stand with assist due to intoxication.

## 2020-10-17 ENCOUNTER — HOSPITAL ENCOUNTER (EMERGENCY)
Facility: CLINIC | Age: 30
Discharge: HOME | End: 2020-10-18
Attending: EMERGENCY MEDICINE | Admitting: EMERGENCY MEDICINE
Payer: COMMERCIAL

## 2020-10-17 ENCOUNTER — APPOINTMENT (OUTPATIENT)
Dept: CT IMAGING | Facility: CLINIC | Age: 30
End: 2020-10-17
Attending: EMERGENCY MEDICINE
Payer: COMMERCIAL

## 2020-10-17 ENCOUNTER — APPOINTMENT (OUTPATIENT)
Dept: GENERAL RADIOLOGY | Facility: CLINIC | Age: 30
End: 2020-10-17
Attending: EMERGENCY MEDICINE
Payer: COMMERCIAL

## 2020-10-17 DIAGNOSIS — F10.920 ALCOHOLIC INTOXICATION WITHOUT COMPLICATION (H): ICD-10-CM

## 2020-10-17 LAB
ALBUMIN SERPL-MCNC: 3.8 G/DL (ref 3.4–5)
ALP SERPL-CCNC: 116 U/L (ref 40–150)
ALT SERPL W P-5'-P-CCNC: 317 U/L (ref 0–70)
AMPHETAMINES UR QL SCN: NEGATIVE
ANION GAP SERPL CALCULATED.3IONS-SCNC: 13 MMOL/L (ref 3–14)
APAP SERPL-MCNC: <2 MG/L (ref 10–20)
AST SERPL W P-5'-P-CCNC: 367 U/L (ref 0–45)
BARBITURATES UR QL: NEGATIVE
BASOPHILS # BLD AUTO: 0 10E9/L (ref 0–0.2)
BASOPHILS NFR BLD AUTO: 0.7 %
BENZODIAZ UR QL: POSITIVE
BILIRUB SERPL-MCNC: 0.4 MG/DL (ref 0.2–1.3)
BUN SERPL-MCNC: 6 MG/DL (ref 7–30)
CALCIUM SERPL-MCNC: 8.7 MG/DL (ref 8.5–10.1)
CANNABINOIDS UR QL SCN: NEGATIVE
CHLORIDE SERPL-SCNC: 100 MMOL/L (ref 94–109)
CO2 SERPL-SCNC: 27 MMOL/L (ref 20–32)
COCAINE UR QL: NEGATIVE
CREAT SERPL-MCNC: 0.68 MG/DL (ref 0.66–1.25)
DIFFERENTIAL METHOD BLD: ABNORMAL
EOSINOPHIL # BLD AUTO: 0 10E9/L (ref 0–0.7)
EOSINOPHIL NFR BLD AUTO: 1 %
ERYTHROCYTE [DISTWIDTH] IN BLOOD BY AUTOMATED COUNT: 15.8 % (ref 10–15)
ETHANOL SERPL-MCNC: 0.5 G/DL
GFR SERPL CREATININE-BSD FRML MDRD: >90 ML/MIN/{1.73_M2}
GLUCOSE SERPL-MCNC: 92 MG/DL (ref 70–99)
HCT VFR BLD AUTO: 46.3 % (ref 40–53)
HGB BLD-MCNC: 16.4 G/DL (ref 13.3–17.7)
IMM GRANULOCYTES # BLD: 0 10E9/L (ref 0–0.4)
IMM GRANULOCYTES NFR BLD: 0.7 %
INTERPRETATION ECG - MUSE: NORMAL
LACTATE BLD-SCNC: 2.1 MMOL/L (ref 0.7–2)
LIPASE SERPL-CCNC: 409 U/L (ref 73–393)
LYMPHOCYTES # BLD AUTO: 1.6 10E9/L (ref 0.8–5.3)
LYMPHOCYTES NFR BLD AUTO: 40.4 %
MCH RBC QN AUTO: 31.2 PG (ref 26.5–33)
MCHC RBC AUTO-ENTMCNC: 35.4 G/DL (ref 31.5–36.5)
MCV RBC AUTO: 88 FL (ref 78–100)
MONOCYTES # BLD AUTO: 0.6 10E9/L (ref 0–1.3)
MONOCYTES NFR BLD AUTO: 13.9 %
NEUTROPHILS # BLD AUTO: 1.7 10E9/L (ref 1.6–8.3)
NEUTROPHILS NFR BLD AUTO: 43.3 %
NRBC # BLD AUTO: 0 10*3/UL
NRBC BLD AUTO-RTO: 0 /100
OPIATES UR QL SCN: NEGATIVE
PCP UR QL SCN: NEGATIVE
PLATELET # BLD AUTO: 202 10E9/L (ref 150–450)
POTASSIUM SERPL-SCNC: 3.2 MMOL/L (ref 3.4–5.3)
PROT SERPL-MCNC: 8.1 G/DL (ref 6.8–8.8)
RBC # BLD AUTO: 5.26 10E12/L (ref 4.4–5.9)
SALICYLATES SERPL-MCNC: <2 MG/DL
SODIUM SERPL-SCNC: 140 MMOL/L (ref 133–144)
WBC # BLD AUTO: 4 10E9/L (ref 4–11)

## 2020-10-17 PROCEDURE — 96366 THER/PROPH/DIAG IV INF ADDON: CPT

## 2020-10-17 PROCEDURE — 80053 COMPREHEN METABOLIC PANEL: CPT | Performed by: EMERGENCY MEDICINE

## 2020-10-17 PROCEDURE — 250N000011 HC RX IP 250 OP 636: Performed by: EMERGENCY MEDICINE

## 2020-10-17 PROCEDURE — 80329 ANALGESICS NON-OPIOID 1 OR 2: CPT | Performed by: EMERGENCY MEDICINE

## 2020-10-17 PROCEDURE — 70450 CT HEAD/BRAIN W/O DYE: CPT

## 2020-10-17 PROCEDURE — 83605 ASSAY OF LACTIC ACID: CPT | Performed by: EMERGENCY MEDICINE

## 2020-10-17 PROCEDURE — 85025 COMPLETE CBC W/AUTO DIFF WBC: CPT | Performed by: EMERGENCY MEDICINE

## 2020-10-17 PROCEDURE — 99285 EMERGENCY DEPT VISIT HI MDM: CPT | Mod: 25

## 2020-10-17 PROCEDURE — 96365 THER/PROPH/DIAG IV INF INIT: CPT

## 2020-10-17 PROCEDURE — 83690 ASSAY OF LIPASE: CPT | Performed by: EMERGENCY MEDICINE

## 2020-10-17 PROCEDURE — 258N000003 HC RX IP 258 OP 636: Performed by: EMERGENCY MEDICINE

## 2020-10-17 PROCEDURE — 80307 DRUG TEST PRSMV CHEM ANLYZR: CPT | Performed by: EMERGENCY MEDICINE

## 2020-10-17 PROCEDURE — 93005 ELECTROCARDIOGRAM TRACING: CPT

## 2020-10-17 PROCEDURE — 71045 X-RAY EXAM CHEST 1 VIEW: CPT

## 2020-10-17 PROCEDURE — 80320 DRUG SCREEN QUANTALCOHOLS: CPT | Performed by: EMERGENCY MEDICINE

## 2020-10-17 PROCEDURE — 250N000009 HC RX 250: Performed by: EMERGENCY MEDICINE

## 2020-10-17 RX ORDER — QUETIAPINE FUMARATE 400 MG/1
400 TABLET, FILM COATED ORAL AT BEDTIME
Status: DISCONTINUED | OUTPATIENT
Start: 2020-10-17 | End: 2020-10-18 | Stop reason: HOSPADM

## 2020-10-17 RX ORDER — FOLIC ACID 1 MG/1
1 TABLET ORAL DAILY
Status: ON HOLD | COMMUNITY
End: 2020-10-24

## 2020-10-17 RX ORDER — QUETIAPINE FUMARATE 400 MG/1
400 TABLET, FILM COATED ORAL AT BEDTIME
Qty: 3 TABLET | Refills: 0 | Status: ON HOLD | OUTPATIENT
Start: 2020-10-17 | End: 2020-10-24

## 2020-10-17 RX ORDER — HYDROXYZINE HYDROCHLORIDE 25 MG/1
25 TABLET, FILM COATED ORAL 3 TIMES DAILY PRN
COMMUNITY
End: 2020-10-17

## 2020-10-17 RX ORDER — HYDROXYZINE HYDROCHLORIDE 25 MG/1
25 TABLET, FILM COATED ORAL DAILY
Status: DISCONTINUED | OUTPATIENT
Start: 2020-10-18 | End: 2020-10-18 | Stop reason: HOSPADM

## 2020-10-17 RX ORDER — HYDROXYZINE HYDROCHLORIDE 25 MG/1
25 TABLET, FILM COATED ORAL DAILY
Qty: 3 TABLET | Refills: 0 | Status: SHIPPED | OUTPATIENT
Start: 2020-10-17 | End: 2020-10-20

## 2020-10-17 RX ADMIN — THIAMINE HYDROCHLORIDE: 100 INJECTION, SOLUTION INTRAMUSCULAR; INTRAVENOUS at 15:15

## 2020-10-17 ASSESSMENT — MIFFLIN-ST. JEOR: SCORE: 1765.04

## 2020-10-17 NOTE — ED NOTES
Bed: ST01  Expected date:   Expected time:   Means of arrival:   Comments:  [place] [address] 30 M altered ETOH or Drug overdose

## 2020-10-17 NOTE — PHARMACY-ADMISSION MEDICATION HISTORY
Pharmacy Medication History  Admission medication history interview status for the 10/17/2020  admission is complete. See EPIC admission navigator for prior to admission medications     Location of Interview: Patient room  Medication history sources: Surescripts, Care Everywhere and medication vials brought in by EMS  Medication history source reliability: Moderate  Adherence assessment: Poor    Additional medication history information:     --Removed: naloxone    --Of note, Augmentin 875-125 mg BID x7 days prescribed on 10/9/20 for aspiration pneumonia. This vial brought in had many tablets remaining.    Medication reconciliation completed by provider prior to medication history? No    Time spent in this activity: 8 minutes      Prior to Admission medications    Medication Sig Last Dose Taking? Auth Provider   folic acid (FOLVITE) 1 MG tablet Take 1 mg by mouth daily  Yes Unknown, Entered By History   hydrOXYzine (ATARAX) 25 MG tablet Take 25 mg by mouth 3 times daily as needed for anxiety  Yes Unknown, Entered By History   multivitamin w/minerals (THERA-VIT-M) tablet Take 1 tablet by mouth daily   Nam Babin MD   QUEtiapine (SEROQUEL) 200 MG tablet Take 400 mg by mouth At Bedtime    Nam Babin MD   vitamin B1 (THIAMINE) 100 MG tablet Take 1 tablet (100 mg) by mouth daily   Nam Babin MD     Maira PharmD  EM Pharmacist

## 2020-10-17 NOTE — ED TRIAGE NOTES
Arrives via EMS from [facility]. Found by Housekeeping to be  unresponsive in bed and empty 1.75L of vodka next to him.

## 2020-10-17 NOTE — ED AVS SNAPSHOT
[facility] Emergency Dept  [address] [address]  Phone: [phone]  Fax: [phone]                                    Mohit Porter   MRN: [id number]    Department: [facility] Emergency Dept   Date of Visit: 10/17/2020           After Visit Summary Signature Page    I have received my discharge instructions, and my questions have been answered. I have discussed any challenges I see with this plan with the nurse or doctor.    ..........................................................................................................................................  Patient/Patient Representative Signature      ..........................................................................................................................................  Patient Representative Print Name and Relationship to Patient    ..................................................               ................................................  Date                                   Time    ..........................................................................................................................................  Reviewed by Signature/Title    ...................................................              ..............................................  Date                                               Time          22EPIC Rev 08/18

## 2020-10-17 NOTE — ED NOTES
DATE:  10/17/2020   TIME OF RECEIPT FROM LAB:  3:51 PM   LAB TEST:  ETOH  LAB VALUE:  0.50  RESULTS GIVEN WITH READ-BACK TO (PROVIDER):  Alfredo Domínguez MD  TIME LAB VALUE REPORTED TO PROVIDER:   3:51 PM      END OF SHIFT NOTE:    INTAKE/OUTPUT  10/06 0701 - 10/07 0700  In: 1300 [I.V.:1300]  Out: 2400 [Urine:2400]  Voiding: YES  Catheter: NO  Drain:              Flatus: Patient does have flatus present. Stool:  1 occurrences. Characteristics:  Stool Assessment  Stool Color: Brown  Stool Appearance: Soft  Stool Amount: Medium  Stool Source/Status: Rectum    Emesis: 0 occurrences. Characteristics:        VITAL SIGNS  Patient Vitals for the past 12 hrs:   Temp Pulse Resp BP SpO2   10/07/20 1545 97.8 °F (36.6 °C) 78 20 113/65 96 %   10/07/20 1110 97.8 °F (36.6 °C) 80 19 108/62 95 %   10/07/20 0933 -- 77 18 (!) 110/59 93 %   10/07/20 0916 98 °F (36.7 °C) 65 16 (!) 106/59 97 %   10/07/20 0915 -- 82 14 (!) 106/59 97 %   10/07/20 0859 -- 78 16 115/65 94 %   10/07/20 0820 97.6 °F (36.4 °C) 68 16 111/64 97 %   10/07/20 0750 97.5 °F (36.4 °C) 75 17 (!) 97/42 95 %       Pain Assessment  Pain Intensity 1: 0 (10/07/20 0933)  Pain Location 1: Flank, Abdomen  Pain Intervention(s) 1: Medication (see MAR)  Patient Stated Pain Goal: 0    Ambulating  Yes    Shift report given to oncoming nurse at the bedside.     Palmer Rosas RN

## 2020-10-17 NOTE — ED PROVIDER NOTES
"  History     Chief Complaint:  Altered Mental Status     HPI The history is obtained through EMS and is limited due to the patient's altered mental status.     Mohit Porter is a 30 year old male with a history of alcohol abuse and hepatitis C who presents via EMS for evaluation of an altered mental status. Today the patient was found unresponsive on a bed by housekeeping at [facility] and EMS was called to evaluate him. An empty 1.75 liter bottle of vodka was found in the room as well as a bottle of hydroxyzine and some iron supplements. Per EMS the patient has some bruising to his bilateral arms as well. He was able to swallow at EMS' request and has been intermittently coughing and gagging. EMS attempted to place an intranasal airway but the patient pulled this out. He was given 0.8 mg of Narcan prior to arrival without change in status. Here in the ED the patient reports drinking alcohol today but denies drug use. He denies any pain.      Allergies:  NKDA      Medications:    Lexapro  Multivitamin  Naltrexone  Protonix  Seroquel  Thiamine      Past Medical History:    Alcohol abuse  Anxiety  Hepatitis C     Past Surgical History:    History reviewed. No pertinent past surgical history.      Family History:    History reviewed. No pertinent family history.      Social History:  Tobacco use:    Never smoker, current chewing tobacco user   Alcohol use:    Positive   Drug use:    Negative   Marital status:    Single   Accompanied to ED by:  EMS       Review of Systems   Unable to perform ROS: Mental status change       Physical Exam   First Vitals:  BP: 121/80  Pulse: 115  Temp: 96.7  F (35.9  C)  Resp: 16  Height: 177.8 cm (5' 10\")  Weight: 79.4 kg (175 lb)  SpO2: 99 %     Physical Exam  SKIN:  Warm, dry.  No jaundice.  Faint ecchymoses to the left medial upper extremity just above the elbow.  No other traumatic findings.  No rash.  HEMATOLOGIC/IMMUNOLOGIC/LYMPHATIC:  No pallor.  No petechia or " purpura.  HENT:  No oral mucosal lingual or dental trauma.  EYES:  Conjunctivae normal.  Pupils equal round and reactive to light.  Anicteric.  CARDIOVASCULAR: Tachycardic rate with regular rhythm.  No murmur.  No cardiac rub.  RESPIRATORY:  No respiratory distress, breath sounds equal and normal.  GASTROINTESTINAL:  Soft, nontender abdomen with active bowel sounds.  No distention.  No palpable abdominal mass.  MUSCULOSKELETAL: Normal body habitus.  Passive range of motion of the head neck and extremities does not elicit a painful response from the patient.  NEUROLOGIC: Somnolent, rouses to verbal and tactile stimuli, extremities are not hypertonic/no seizure activity.  PSYCHIATRIC: Unable to assess in current condition.    Emergency Department Course   ECG (14:12:20):  Indication: Screening for cardiovascular disease.   Rate 131 bpm. NJ interval 136 ms. QRS duration 96 ms. QT/QTc 322/475 ms. P-R-T axes 37 45 45.   Interpretation: Sinus tachycardia, Otherwise normal ECG   Agree with computer interpretation. Yes   No significant change compared to EKG dated 10/8/2020.   Interpreted at 1420 by Dr. Domínguez.      Imaging:  XR Chest Port:  IMPRESSION: Previously seen and retrocardiac bibasilar atelectasis/consolidation has resolved. No focal infiltrate, pleural   effusion or pneumothorax. The cardiac and mediastinal silhouettes are normal.   Per radiology.     CT Head w/o Contrast:  IMPRESSION:   No evidence of acute intracranial hemorrhage, mass, or herniation.   Per radiology.      Laboratory:  CBC: WNL (WBC 4.0, HGB 16.4, )   CMP: Potassium 3.2 low, BUN 6 low,  high,  high, o/w WNL (Creatinine 0.68)   Lipase: 409 high    Lactic acid whole blood: 2.1 high   Alcohol ethyl: 0.50 high   Salicylate level: <2   Acetaminophen level: <2   Drug abuse screen 77 urine: Pending      Interventions:  1515 NS 1,000 mL with infuvite adult 10 mL, thiamine 100 mg, folic acid 1 mg IV      Emergency Department  Course:  Patient was brought to the ED via EMS.     Nursing notes and vitals reviewed.  1411: I performed an exam of the patient as documented above.     The patient was signed out to my oncoming partner, Dr. Sanchez, pending ultimate disposition.     Impression & Plan    The Lactic acid level is elevated due to alcohol intoxication, at this time there is no sign of severe sepsis or septic shock.     Medical Decision Making:  Mohit Porter is a 30 year old male who presented with altered mentation and concern was heavy drinking, which certainly is the case given his alcohol level. He underwent other testing as above given his presentation, which is reassuring. Lactic acid is slightly elevated, which I think is a function of his alcohol intoxication. He was hydrated with multivitamin solution. At shift change the patient was still quite intoxicated and a disposition had not been arranged yet. I signed the patient out to my colleague, Dr. Sanchez, to manage the patient and facilitate disposition during her shift.     Diagnosis:    ICD-10-CM    1. Alcoholic intoxication without complication (H)  F10.920        Disposition:   Sign out.         I, Richy Rincon, am serving as a scribe at 2:11 PM on 10/17/2020 to document services personally performed by Dr. Domínguez, based on my observations and the provider's statements to me.     [facility] EMERGENCY DEPT       Sang, Alfredo Baker MD  10/18/20 0985

## 2020-10-18 VITALS
WEIGHT: 175 LBS | SYSTOLIC BLOOD PRESSURE: 108 MMHG | HEIGHT: 70 IN | RESPIRATION RATE: 10 BRPM | OXYGEN SATURATION: 94 % | TEMPERATURE: 96.7 F | DIASTOLIC BLOOD PRESSURE: 75 MMHG | HEART RATE: 112 BPM | BODY MASS INDEX: 25.05 KG/M2

## 2020-10-18 PROCEDURE — 250N000013 HC RX MED GY IP 250 OP 250 PS 637: Performed by: EMERGENCY MEDICINE

## 2020-10-18 RX ADMIN — QUETIAPINE 400 MG: 400 TABLET ORAL at 01:16

## 2020-10-18 NOTE — ED NOTES
Attempted to introduce self to pt, pt sleeping at this time. Tachycardic otherwise vitally stable.

## 2020-10-18 NOTE — ED PROVIDER NOTES
Patient is sign out, please she Dr. Domínguez's HPI for full details.  Patient unfortunately is a chronic inebriated with a long history of alcohol abuse.  We attempted to get him into detox this evening however since he left AMA recently he has [place] from returning to detox for the next 14 days.  Patient will be observed here in the emergency department until he reaches clinical sobriety at which point he will be discharged.  No concern for suicidal or homicidal ideations at this time.  Initial blood work does not show any evidence of significant overdose pathology or significant electrolyte abnormality.  Patient does have elevated LFTs in a pattern consistent with alcoholic hepatitis.  Patient ultimately reached a point of clinical sobriety where he was able to ambulate to the bathroom and tolerate p.o.  He will be discharged with a bus token where he can return to his hotel.     Daniel Ulrich MD  10/18/20 0816

## 2020-10-18 NOTE — ED PROVIDER NOTES
Patient awake, urinated, yelling in bed.  Patient did eat food at this time he does have a bed at [address].  I feel with his acute intoxication although this is his normal alcohol intake intoxication baseline that he is unable to care for himself at the hotel.  I did write him for 3 days of his Seroquel and hydroxyzine to send with him.  I will sign out to my partner awaiting transfer to [address] [place] [address].    Addendum: Unfortunately [address] [place] [address] had this patient within the last week.  They now have a new rule that if people leave AMA they are not able to come back for 15 days.  The patient unfortunately did sign himself out AMA from that facility and at this point there are no other detox beds available.  I will therefore keep him overnight to ensure that he is safe while becoming clinically sober.     Cynthia Sanchez MD  10/17/20 2325       Cynthia Sanchez MD  10/18/20 0005

## 2020-10-18 NOTE — ED NOTES
Patient is resting in bed with eyes closed on his left side. Patient completed most of his meal and is aware of the plan to sober and likely discharge in the AM.

## 2020-10-18 NOTE — ED NOTES
Report received. ED staff was informed that due to patient leaving AMA from [address] [place] [address] within the last few days he is now not able to return to them for 15 days after leaving. MD was notified and plan is now for patient to sober up and likely discharge. Patient visualized and is resting in bed on his back. Patients vitals remain stable. Will continue to monitor.

## 2020-10-23 ENCOUNTER — HOSPITAL ENCOUNTER (EMERGENCY)
Facility: CLINIC | Age: 30
Discharge: HOME | End: 2020-10-23
Attending: EMERGENCY MEDICINE | Admitting: EMERGENCY MEDICINE
Payer: COMMERCIAL

## 2020-10-23 ENCOUNTER — HOSPITAL ENCOUNTER (INPATIENT)
Facility: CLINIC | Age: 30
LOS: 4 days | Discharge: PSYCHIATRIC OR SUBSTANCE USE FACILITY | End: 2020-10-28
Attending: EMERGENCY MEDICINE | Admitting: HOSPITALIST
Payer: COMMERCIAL

## 2020-10-23 VITALS
BODY MASS INDEX: 26.52 KG/M2 | TEMPERATURE: 98.1 F | HEART RATE: 103 BPM | RESPIRATION RATE: 20 BRPM | DIASTOLIC BLOOD PRESSURE: 98 MMHG | HEIGHT: 68 IN | WEIGHT: 175 LBS | SYSTOLIC BLOOD PRESSURE: 138 MMHG | OXYGEN SATURATION: 98 %

## 2020-10-23 DIAGNOSIS — F10.920 ALCOHOLIC INTOXICATION WITHOUT COMPLICATION (H): ICD-10-CM

## 2020-10-23 DIAGNOSIS — R41.82 ALTERED MENTAL STATUS, UNSPECIFIED ALTERED MENTAL STATUS TYPE: ICD-10-CM

## 2020-10-23 DIAGNOSIS — R79.89 ELEVATED LIVER FUNCTION TESTS: ICD-10-CM

## 2020-10-23 DIAGNOSIS — E87.6 HYPOKALEMIA: ICD-10-CM

## 2020-10-23 PROCEDURE — 99285 EMERGENCY DEPT VISIT HI MDM: CPT

## 2020-10-23 PROCEDURE — C9803 HOPD COVID-19 SPEC COLLECT: HCPCS

## 2020-10-23 PROCEDURE — HZ2ZZZZ DETOXIFICATION SERVICES FOR SUBSTANCE ABUSE TREATMENT: ICD-10-PCS | Performed by: PSYCHIATRY & NEUROLOGY

## 2020-10-23 RX ORDER — POTASSIUM CHLORIDE 1500 MG/1
20 TABLET, EXTENDED RELEASE ORAL 2 TIMES DAILY
Qty: 14 TABLET | Refills: 0 | Status: ON HOLD | OUTPATIENT
Start: 2020-10-23 | End: 2020-10-24

## 2020-10-23 ASSESSMENT — MIFFLIN-ST. JEOR
SCORE: 1733.29
SCORE: 1733.29

## 2020-10-23 NOTE — ED PROVIDER NOTES
"  History     Chief Complaint:    Alcohol Intoxication      History limited by: Alcohol Intoxication.      Mohit Porter is a 30 year old male who presents with alcohol intoxication. Per nursing staff, the patient was found naked in his car by PD. The patient states that he lives alone and does not have any family in town. He also reports that he is not taking his medications.     Allergies:  No Known Drug Allergies       Medications:    Lexapro  Multivitamin  Naltrexone  Protonix  Seroquel  Thiamine       Past Medical History:    Alcohol abuse  Anxiety  Hepatitis C      Past Surgical History:    History reviewed. No pertinent past surgical history.      Family History:    History reviewed. No pertinent family history.      Social History:  Tobacco use:                          Never smoker, current chewing tobacco user   Alcohol use:                            Positive   Drug use:                                Negative   Marital status:                          Single   Accompanied to ED by:          EMS        Review of Systems   Reason unable to perform ROS: Alcohol Intoxication.       Physical Exam     Patient Vitals for the past 24 hrs:   BP Temp Temp src Pulse Resp SpO2 Height Weight   10/23/20 0514 (!) 145/112 96.1  F (35.6  C) Temporal 107 18 98 % 1.727 m (5' 8\") 79.4 kg (175 lb)       Physical Exam   General: Appears significantly intoxicated.  Head: No signs of trauma.   CV: Normal rate and regular rhythm.    Resp: Effort normal and breath sounds normal. No respiratory distress.   GI: Soft. There is no tenderness.  No rebound or guarding.  Normal bowel sounds.   MSK: Normal range of motion.   Neuro: The patient is alert but not able to participate.  Skin: Skin is warm and dry. No rash noted.       Emergency Department Course     Emergency Department Course:  Past medical records, nursing notes, and vitals reviewed.    0526 I performed an exam of the patient as documented above.     0700 I rechecked " the patient and discussed the results of his workup thus far.     The patient was signed out to Dr. Doll, oncoming ED physician, pending sobriety.    I personally answered all related questions prior to sign out.     Impression & Plan     Medical Decision Making:  Mohit Porter is a 30-year-old gentleman presents due to alcohol intoxication.  He was apparently been found naked in a car by police and was noted to be intoxicated and brought to the hospital.  Patient has been seen many times for alcohol intoxication.  On my evaluation he opens his eyes but unable provide any significant history secondary to his intoxicated state.  There is no clear signs or history of trauma.  Patient was signed out to Dr. Doll with the plan for repeat assessment once clinically appropriate    Diagnosis:    ICD-10-CM    1. Alcoholic intoxication without complication (H)  F10.920        Disposition:  Signed out to Dr. Doll, pending sobriety.    Scribe Disclosure:  I, Zhou Vizcaino, am serving as a scribe at 5:16 AM on 10/23/2020 to document services personally performed by Miko Rangel MD based on my observations and the provider's statements to me.        Mkio Rangel MD  10/23/20 2737

## 2020-10-23 NOTE — ED NOTES
Writer walked into pt room and PD at bedside inquiring about the legal blood draw.  In reviewing pt VS from arrival in ED writer noted Temp was 96.1 oral. Rectal temp was taken and found to be 95.6.  Pt had peed himself, all linen removed, dry scrubs placed, and Eitan Warmer blanket placed with temp dialed in at 38 degrees C.  Pt extremities cold to touch with lower extremities cold to touch from the feet to the knees. Slipper sox placed.  Pt asked for water.  Denies any further needs at this time.  Remains on an [facility] with staff and security watch

## 2020-10-23 NOTE — ED AVS SNAPSHOT
[facility] Emergency Dept  [address] [address]  Phone: [phone]  Fax: [phone]                                    Mohit Porter   MRN: [id number]    Department: [facility] Emergency Dept   Date of Visit: 10/23/2020           After Visit Summary Signature Page    I have received my discharge instructions, and my questions have been answered. I have discussed any challenges I see with this plan with the nurse or doctor.    ..........................................................................................................................................  Patient/Patient Representative Signature      ..........................................................................................................................................  Patient Representative Print Name and Relationship to Patient    ..................................................               ................................................  Date                                   Time    ..........................................................................................................................................  Reviewed by Signature/Title    ...................................................              ..............................................  Date                                               Time          22EPIC Rev 08/18

## 2020-10-23 NOTE — ED PROVIDER NOTES
30 year old male received in signout from Dr. Rangel who presents to the ED w/ alcohol intoxication.  At time of signout, patient awaiting reevaluation for clinical sobriety.  Please see primary note for full details.  Patient reevaluated by myself later in ED stay.  He is noted to be clinically sober.  At this time I feel the patient is safe for discharge.  He is provided with resources for homeless shelters and substance abuse.  Recommendations given regarding return to the emergency department as needed for new or worsening symptoms.  Counseled on all results, diagnosis and disposition.  Pt understanding and agreeable to plan. Patient discharged in stable condition.                     Daniel Doll MD  10/24/20 7721

## 2020-10-23 NOTE — ED TRIAGE NOTES
Patient arrived via PD intoxicated, wearing only shorts. PD informed staff that the patient was found in a car, naked, and intoxicated. PD placed him on a hold and will return with a warrant for legal blood draw.

## 2020-10-23 NOTE — ED NOTES
PD officer reports pt has many outstanding warrants and would like to be called when pt is discharged - Officer Madie Zacarias #[id number] filled out Law Enforcement Registration form.

## 2020-10-24 PROBLEM — R79.89 ELEVATED LIVER FUNCTION TESTS: Status: ACTIVE | Noted: 2020-10-24

## 2020-10-24 PROBLEM — R41.82 ALTERED MENTAL STATUS, UNSPECIFIED ALTERED MENTAL STATUS TYPE: Status: ACTIVE | Noted: 2020-10-24

## 2020-10-24 LAB
ALBUMIN SERPL-MCNC: 3.2 G/DL (ref 3.4–5)
ALBUMIN SERPL-MCNC: 3.3 G/DL (ref 3.4–5)
ALP SERPL-CCNC: 139 U/L (ref 40–150)
ALP SERPL-CCNC: 161 U/L (ref 40–150)
ALT SERPL W P-5'-P-CCNC: 944 U/L (ref 0–70)
ALT SERPL W P-5'-P-CCNC: 951 U/L (ref 0–70)
AMPHETAMINES UR QL SCN: NEGATIVE
ANION GAP SERPL CALCULATED.3IONS-SCNC: 7 MMOL/L (ref 3–14)
ANION GAP SERPL CALCULATED.3IONS-SCNC: 8 MMOL/L (ref 3–14)
APAP SERPL-MCNC: <2 MG/L (ref 10–20)
AST SERPL W P-5'-P-CCNC: 1379 U/L (ref 0–45)
AST SERPL W P-5'-P-CCNC: 1557 U/L (ref 0–45)
BARBITURATES UR QL: NEGATIVE
BASOPHILS # BLD AUTO: 0 10E9/L (ref 0–0.2)
BASOPHILS # BLD AUTO: 0 10E9/L (ref 0–0.2)
BASOPHILS NFR BLD AUTO: 0.4 %
BASOPHILS NFR BLD AUTO: 0.6 %
BENZODIAZ UR QL: POSITIVE
BILIRUB SERPL-MCNC: 0.4 MG/DL (ref 0.2–1.3)
BILIRUB SERPL-MCNC: 1.2 MG/DL (ref 0.2–1.3)
BUN SERPL-MCNC: 5 MG/DL (ref 7–30)
BUN SERPL-MCNC: 7 MG/DL (ref 7–30)
CALCIUM SERPL-MCNC: 7.7 MG/DL (ref 8.5–10.1)
CALCIUM SERPL-MCNC: 8.1 MG/DL (ref 8.5–10.1)
CANNABINOIDS UR QL SCN: NEGATIVE
CHLORIDE SERPL-SCNC: 101 MMOL/L (ref 94–109)
CHLORIDE SERPL-SCNC: 98 MMOL/L (ref 94–109)
CO2 SERPL-SCNC: 28 MMOL/L (ref 20–32)
CO2 SERPL-SCNC: 31 MMOL/L (ref 20–32)
COCAINE UR QL: NEGATIVE
CREAT SERPL-MCNC: 0.54 MG/DL (ref 0.66–1.25)
CREAT SERPL-MCNC: 0.64 MG/DL (ref 0.66–1.25)
DIFFERENTIAL METHOD BLD: ABNORMAL
DIFFERENTIAL METHOD BLD: ABNORMAL
EOSINOPHIL # BLD AUTO: 0 10E9/L (ref 0–0.7)
EOSINOPHIL # BLD AUTO: 0 10E9/L (ref 0–0.7)
EOSINOPHIL NFR BLD AUTO: 0.4 %
EOSINOPHIL NFR BLD AUTO: 0.4 %
ERYTHROCYTE [DISTWIDTH] IN BLOOD BY AUTOMATED COUNT: 16 % (ref 10–15)
ERYTHROCYTE [DISTWIDTH] IN BLOOD BY AUTOMATED COUNT: 16.3 % (ref 10–15)
ETHANOL SERPL-MCNC: 0.28 G/DL
GFR SERPL CREATININE-BSD FRML MDRD: >90 ML/MIN/{1.73_M2}
GFR SERPL CREATININE-BSD FRML MDRD: >90 ML/MIN/{1.73_M2}
GLUCOSE SERPL-MCNC: 87 MG/DL (ref 70–99)
GLUCOSE SERPL-MCNC: 94 MG/DL (ref 70–99)
HCT VFR BLD AUTO: 38.1 % (ref 40–53)
HCT VFR BLD AUTO: 39.1 % (ref 40–53)
HGB BLD-MCNC: 13.2 G/DL (ref 13.3–17.7)
HGB BLD-MCNC: 13.6 G/DL (ref 13.3–17.7)
IMM GRANULOCYTES # BLD: 0 10E9/L (ref 0–0.4)
IMM GRANULOCYTES # BLD: 0 10E9/L (ref 0–0.4)
IMM GRANULOCYTES NFR BLD: 0.1 %
IMM GRANULOCYTES NFR BLD: 0.2 %
INR PPP: 0.93 (ref 0.86–1.14)
LABORATORY COMMENT REPORT: NORMAL
LYMPHOCYTES # BLD AUTO: 0.9 10E9/L (ref 0.8–5.3)
LYMPHOCYTES # BLD AUTO: 1.8 10E9/L (ref 0.8–5.3)
LYMPHOCYTES NFR BLD AUTO: 13.1 %
LYMPHOCYTES NFR BLD AUTO: 34.6 %
MAGNESIUM SERPL-MCNC: 1.4 MG/DL (ref 1.6–2.3)
MAGNESIUM SERPL-MCNC: 1.6 MG/DL (ref 1.6–2.3)
MAGNESIUM SERPL-MCNC: 2.7 MG/DL (ref 1.6–2.3)
MCH RBC QN AUTO: 30.9 PG (ref 26.5–33)
MCH RBC QN AUTO: 30.9 PG (ref 26.5–33)
MCHC RBC AUTO-ENTMCNC: 34.6 G/DL (ref 31.5–36.5)
MCHC RBC AUTO-ENTMCNC: 34.8 G/DL (ref 31.5–36.5)
MCV RBC AUTO: 89 FL (ref 78–100)
MCV RBC AUTO: 89 FL (ref 78–100)
MONOCYTES # BLD AUTO: 0.6 10E9/L (ref 0–1.3)
MONOCYTES # BLD AUTO: 0.7 10E9/L (ref 0–1.3)
MONOCYTES NFR BLD AUTO: 10.7 %
MONOCYTES NFR BLD AUTO: 9.7 %
NEUTROPHILS # BLD AUTO: 2.7 10E9/L (ref 1.6–8.3)
NEUTROPHILS # BLD AUTO: 5.2 10E9/L (ref 1.6–8.3)
NEUTROPHILS NFR BLD AUTO: 53.5 %
NEUTROPHILS NFR BLD AUTO: 76.3 %
NRBC # BLD AUTO: 0 10*3/UL
NRBC # BLD AUTO: 0 10*3/UL
NRBC BLD AUTO-RTO: 0 /100
NRBC BLD AUTO-RTO: 0 /100
OPIATES UR QL SCN: NEGATIVE
PCP UR QL SCN: NEGATIVE
PLATELET # BLD AUTO: 220 10E9/L (ref 150–450)
PLATELET # BLD AUTO: 225 10E9/L (ref 150–450)
POTASSIUM SERPL-SCNC: 3.1 MMOL/L (ref 3.4–5.3)
POTASSIUM SERPL-SCNC: 3.3 MMOL/L (ref 3.4–5.3)
POTASSIUM SERPL-SCNC: 3.7 MMOL/L (ref 3.4–5.3)
PROT SERPL-MCNC: 6.5 G/DL (ref 6.8–8.8)
PROT SERPL-MCNC: 6.8 G/DL (ref 6.8–8.8)
RBC # BLD AUTO: 4.27 10E12/L (ref 4.4–5.9)
RBC # BLD AUTO: 4.4 10E12/L (ref 4.4–5.9)
SARS-COV-2 RNA SPEC QL NAA+PROBE: NEGATIVE
SARS-COV-2 RNA SPEC QL NAA+PROBE: NORMAL
SODIUM SERPL-SCNC: 134 MMOL/L (ref 133–144)
SODIUM SERPL-SCNC: 139 MMOL/L (ref 133–144)
SPECIMEN SOURCE: NORMAL
SPECIMEN SOURCE: NORMAL
WBC # BLD AUTO: 5.1 10E9/L (ref 4–11)
WBC # BLD AUTO: 6.8 10E9/L (ref 4–11)

## 2020-10-24 PROCEDURE — 36415 COLL VENOUS BLD VENIPUNCTURE: CPT | Performed by: HOSPITALIST

## 2020-10-24 PROCEDURE — 87340 HEPATITIS B SURFACE AG IA: CPT | Performed by: STUDENT IN AN ORGANIZED HEALTH CARE EDUCATION/TRAINING PROGRAM

## 2020-10-24 PROCEDURE — U0003 INFECTIOUS AGENT DETECTION BY NUCLEIC ACID (DNA OR RNA); SEVERE ACUTE RESPIRATORY SYNDROME CORONAVIRUS 2 (SARS-COV-2) (CORONAVIRUS DISEASE [COVID-19]), AMPLIFIED PROBE TECHNIQUE, MAKING USE OF HIGH THROUGHPUT TECHNOLOGIES AS DESCRIBED BY CMS-2020-01-R: HCPCS | Performed by: EMERGENCY MEDICINE

## 2020-10-24 PROCEDURE — 80307 DRUG TEST PRSMV CHEM ANLYZR: CPT | Performed by: EMERGENCY MEDICINE

## 2020-10-24 PROCEDURE — 85025 COMPLETE CBC W/AUTO DIFF WBC: CPT | Performed by: EMERGENCY MEDICINE

## 2020-10-24 PROCEDURE — 250N000013 HC RX MED GY IP 250 OP 250 PS 637: Performed by: HOSPITALIST

## 2020-10-24 PROCEDURE — 84132 ASSAY OF SERUM POTASSIUM: CPT | Performed by: STUDENT IN AN ORGANIZED HEALTH CARE EDUCATION/TRAINING PROGRAM

## 2020-10-24 PROCEDURE — 258N000003 HC RX IP 258 OP 636: Performed by: INTERNAL MEDICINE

## 2020-10-24 PROCEDURE — 250N000011 HC RX IP 250 OP 636: Performed by: HOSPITALIST

## 2020-10-24 PROCEDURE — 83735 ASSAY OF MAGNESIUM: CPT | Performed by: EMERGENCY MEDICINE

## 2020-10-24 PROCEDURE — 85610 PROTHROMBIN TIME: CPT | Performed by: HOSPITALIST

## 2020-10-24 PROCEDURE — 86706 HEP B SURFACE ANTIBODY: CPT | Performed by: STUDENT IN AN ORGANIZED HEALTH CARE EDUCATION/TRAINING PROGRAM

## 2020-10-24 PROCEDURE — 250N000013 HC RX MED GY IP 250 OP 250 PS 637: Performed by: STUDENT IN AN ORGANIZED HEALTH CARE EDUCATION/TRAINING PROGRAM

## 2020-10-24 PROCEDURE — 86038 ANTINUCLEAR ANTIBODIES: CPT | Performed by: HOSPITALIST

## 2020-10-24 PROCEDURE — 86803 HEPATITIS C AB TEST: CPT | Performed by: STUDENT IN AN ORGANIZED HEALTH CARE EDUCATION/TRAINING PROGRAM

## 2020-10-24 PROCEDURE — 83735 ASSAY OF MAGNESIUM: CPT | Performed by: STUDENT IN AN ORGANIZED HEALTH CARE EDUCATION/TRAINING PROGRAM

## 2020-10-24 PROCEDURE — 86705 HEP B CORE ANTIBODY IGM: CPT | Performed by: HOSPITALIST

## 2020-10-24 PROCEDURE — 85025 COMPLETE CBC W/AUTO DIFF WBC: CPT | Performed by: STUDENT IN AN ORGANIZED HEALTH CARE EDUCATION/TRAINING PROGRAM

## 2020-10-24 PROCEDURE — 86704 HEP B CORE ANTIBODY TOTAL: CPT | Performed by: STUDENT IN AN ORGANIZED HEALTH CARE EDUCATION/TRAINING PROGRAM

## 2020-10-24 PROCEDURE — 80320 DRUG SCREEN QUANTALCOHOLS: CPT | Performed by: EMERGENCY MEDICINE

## 2020-10-24 PROCEDURE — 80053 COMPREHEN METABOLIC PANEL: CPT | Performed by: EMERGENCY MEDICINE

## 2020-10-24 PROCEDURE — 99207 PR CDG-CODE CATEGORY CHANGED: CPT | Performed by: HOSPITALIST

## 2020-10-24 PROCEDURE — 86708 HEPATITIS A ANTIBODY: CPT | Performed by: STUDENT IN AN ORGANIZED HEALTH CARE EDUCATION/TRAINING PROGRAM

## 2020-10-24 PROCEDURE — 86709 HEPATITIS A IGM ANTIBODY: CPT | Performed by: STUDENT IN AN ORGANIZED HEALTH CARE EDUCATION/TRAINING PROGRAM

## 2020-10-24 PROCEDURE — 258N000003 HC RX IP 258 OP 636: Performed by: HOSPITALIST

## 2020-10-24 PROCEDURE — 83735 ASSAY OF MAGNESIUM: CPT | Performed by: HOSPITALIST

## 2020-10-24 PROCEDURE — 36415 COLL VENOUS BLD VENIPUNCTURE: CPT | Performed by: STUDENT IN AN ORGANIZED HEALTH CARE EDUCATION/TRAINING PROGRAM

## 2020-10-24 PROCEDURE — 120N000001 HC R&B MED SURG/OB

## 2020-10-24 PROCEDURE — 80053 COMPREHEN METABOLIC PANEL: CPT | Performed by: STUDENT IN AN ORGANIZED HEALTH CARE EDUCATION/TRAINING PROGRAM

## 2020-10-24 PROCEDURE — 80329 ANALGESICS NON-OPIOID 1 OR 2: CPT | Performed by: STUDENT IN AN ORGANIZED HEALTH CARE EDUCATION/TRAINING PROGRAM

## 2020-10-24 PROCEDURE — 99223 1ST HOSP IP/OBS HIGH 75: CPT | Mod: 95 | Performed by: PSYCHIATRY & NEUROLOGY

## 2020-10-24 PROCEDURE — 99223 1ST HOSP IP/OBS HIGH 75: CPT | Mod: AI | Performed by: HOSPITALIST

## 2020-10-24 PROCEDURE — 250N000011 HC RX IP 250 OP 636: Performed by: INTERNAL MEDICINE

## 2020-10-24 RX ORDER — LORAZEPAM 2 MG/ML
1-2 INJECTION INTRAMUSCULAR EVERY 30 MIN PRN
Status: DISCONTINUED | OUTPATIENT
Start: 2020-10-24 | End: 2020-10-28 | Stop reason: HOSPADM

## 2020-10-24 RX ORDER — POLYETHYLENE GLYCOL 3350 17 G/17G
17 POWDER, FOR SOLUTION ORAL DAILY PRN
Status: DISCONTINUED | OUTPATIENT
Start: 2020-10-24 | End: 2020-10-28 | Stop reason: HOSPADM

## 2020-10-24 RX ORDER — QUETIAPINE FUMARATE 400 MG/1
400 TABLET, FILM COATED ORAL AT BEDTIME
Status: ON HOLD | COMMUNITY
End: 2021-01-19

## 2020-10-24 RX ORDER — QUETIAPINE FUMARATE 100 MG/1
200 TABLET, FILM COATED ORAL AT BEDTIME
Status: DISCONTINUED | OUTPATIENT
Start: 2020-10-24 | End: 2020-10-25

## 2020-10-24 RX ORDER — LANOLIN ALCOHOL/MO/W.PET/CERES
3 CREAM (GRAM) TOPICAL
Status: DISCONTINUED | OUTPATIENT
Start: 2020-10-24 | End: 2020-10-28 | Stop reason: HOSPADM

## 2020-10-24 RX ORDER — LORAZEPAM 1 MG/1
1-2 TABLET ORAL EVERY 30 MIN PRN
Status: DISCONTINUED | OUTPATIENT
Start: 2020-10-24 | End: 2020-10-28 | Stop reason: HOSPADM

## 2020-10-24 RX ORDER — ONDANSETRON 4 MG/1
4 TABLET, ORALLY DISINTEGRATING ORAL EVERY 6 HOURS PRN
Status: DISCONTINUED | OUTPATIENT
Start: 2020-10-24 | End: 2020-10-28 | Stop reason: HOSPADM

## 2020-10-24 RX ORDER — HYDROXYZINE PAMOATE 25 MG/1
25 CAPSULE ORAL 3 TIMES DAILY PRN
Status: ON HOLD | COMMUNITY
End: 2020-10-28

## 2020-10-24 RX ORDER — LANOLIN ALCOHOL/MO/W.PET/CERES
100 CREAM (GRAM) TOPICAL DAILY
Status: COMPLETED | OUTPATIENT
Start: 2020-10-24 | End: 2020-10-28

## 2020-10-24 RX ORDER — FOLIC ACID 1 MG/1
1 TABLET ORAL DAILY
Status: DISCONTINUED | OUTPATIENT
Start: 2020-10-24 | End: 2020-10-28 | Stop reason: HOSPADM

## 2020-10-24 RX ORDER — POTASSIUM CHLORIDE 7.45 MG/ML
10 INJECTION INTRAVENOUS
Status: DISCONTINUED | OUTPATIENT
Start: 2020-10-24 | End: 2020-10-28 | Stop reason: HOSPADM

## 2020-10-24 RX ORDER — POTASSIUM CHLORIDE 1500 MG/1
20-40 TABLET, EXTENDED RELEASE ORAL
Status: DISCONTINUED | OUTPATIENT
Start: 2020-10-24 | End: 2020-10-28 | Stop reason: HOSPADM

## 2020-10-24 RX ORDER — NALOXONE HYDROCHLORIDE 0.4 MG/ML
.1-.4 INJECTION, SOLUTION INTRAMUSCULAR; INTRAVENOUS; SUBCUTANEOUS
Status: DISCONTINUED | OUTPATIENT
Start: 2020-10-24 | End: 2020-10-28 | Stop reason: HOSPADM

## 2020-10-24 RX ORDER — MAGNESIUM SULFATE HEPTAHYDRATE 40 MG/ML
2 INJECTION, SOLUTION INTRAVENOUS DAILY PRN
Status: DISCONTINUED | OUTPATIENT
Start: 2020-10-24 | End: 2020-10-28 | Stop reason: HOSPADM

## 2020-10-24 RX ORDER — POTASSIUM CHLORIDE 29.8 MG/ML
20 INJECTION INTRAVENOUS
Status: DISCONTINUED | OUTPATIENT
Start: 2020-10-24 | End: 2020-10-28 | Stop reason: HOSPADM

## 2020-10-24 RX ORDER — AMOXICILLIN 250 MG
2 CAPSULE ORAL 2 TIMES DAILY PRN
Status: DISCONTINUED | OUTPATIENT
Start: 2020-10-24 | End: 2020-10-28 | Stop reason: HOSPADM

## 2020-10-24 RX ORDER — HYDROXYZINE HYDROCHLORIDE 10 MG/1
10 TABLET, FILM COATED ORAL 3 TIMES DAILY PRN
Status: DISCONTINUED | OUTPATIENT
Start: 2020-10-24 | End: 2020-10-27

## 2020-10-24 RX ORDER — MAGNESIUM SULFATE HEPTAHYDRATE 40 MG/ML
4 INJECTION, SOLUTION INTRAVENOUS EVERY 4 HOURS PRN
Status: DISCONTINUED | OUTPATIENT
Start: 2020-10-24 | End: 2020-10-28 | Stop reason: HOSPADM

## 2020-10-24 RX ORDER — POTASSIUM CL/LIDO/0.9 % NACL 10MEQ/0.1L
10 INTRAVENOUS SOLUTION, PIGGYBACK (ML) INTRAVENOUS
Status: DISCONTINUED | OUTPATIENT
Start: 2020-10-24 | End: 2020-10-28 | Stop reason: HOSPADM

## 2020-10-24 RX ORDER — ONDANSETRON 2 MG/ML
4 INJECTION INTRAMUSCULAR; INTRAVENOUS EVERY 6 HOURS PRN
Status: DISCONTINUED | OUTPATIENT
Start: 2020-10-24 | End: 2020-10-28 | Stop reason: HOSPADM

## 2020-10-24 RX ORDER — POTASSIUM CHLORIDE 1.5 G/1.58G
20-40 POWDER, FOR SOLUTION ORAL
Status: DISCONTINUED | OUTPATIENT
Start: 2020-10-24 | End: 2020-10-28 | Stop reason: HOSPADM

## 2020-10-24 RX ORDER — AMOXICILLIN 250 MG
1 CAPSULE ORAL 2 TIMES DAILY PRN
Status: DISCONTINUED | OUTPATIENT
Start: 2020-10-24 | End: 2020-10-28 | Stop reason: HOSPADM

## 2020-10-24 RX ORDER — MULTIPLE VITAMINS W/ MINERALS TAB 9MG-400MCG
1 TAB ORAL DAILY
Status: DISCONTINUED | OUTPATIENT
Start: 2020-10-24 | End: 2020-10-28 | Stop reason: HOSPADM

## 2020-10-24 RX ORDER — PROCHLORPERAZINE 25 MG
25 SUPPOSITORY, RECTAL RECTAL EVERY 12 HOURS PRN
Status: DISCONTINUED | OUTPATIENT
Start: 2020-10-24 | End: 2020-10-28 | Stop reason: HOSPADM

## 2020-10-24 RX ORDER — PROCHLORPERAZINE MALEATE 5 MG
10 TABLET ORAL EVERY 6 HOURS PRN
Status: DISCONTINUED | OUTPATIENT
Start: 2020-10-24 | End: 2020-10-28 | Stop reason: HOSPADM

## 2020-10-24 RX ORDER — LIDOCAINE 40 MG/G
CREAM TOPICAL
Status: DISCONTINUED | OUTPATIENT
Start: 2020-10-24 | End: 2020-10-28 | Stop reason: HOSPADM

## 2020-10-24 RX ORDER — SERTRALINE HYDROCHLORIDE 25 MG/1
25 TABLET, FILM COATED ORAL DAILY
Status: DISCONTINUED | OUTPATIENT
Start: 2020-10-24 | End: 2020-10-27

## 2020-10-24 RX ORDER — BISACODYL 10 MG
10 SUPPOSITORY, RECTAL RECTAL DAILY PRN
Status: DISCONTINUED | OUTPATIENT
Start: 2020-10-24 | End: 2020-10-28 | Stop reason: HOSPADM

## 2020-10-24 RX ORDER — SODIUM CHLORIDE, SODIUM LACTATE, POTASSIUM CHLORIDE, CALCIUM CHLORIDE 600; 310; 30; 20 MG/100ML; MG/100ML; MG/100ML; MG/100ML
INJECTION, SOLUTION INTRAVENOUS CONTINUOUS
Status: ACTIVE | OUTPATIENT
Start: 2020-10-24 | End: 2020-10-24

## 2020-10-24 RX ADMIN — POTASSIUM CHLORIDE 40 MEQ: 1500 TABLET, EXTENDED RELEASE ORAL at 06:03

## 2020-10-24 RX ADMIN — QUETIAPINE FUMARATE 200 MG: 100 TABLET ORAL at 21:08

## 2020-10-24 RX ADMIN — SODIUM CHLORIDE, POTASSIUM CHLORIDE, SODIUM LACTATE AND CALCIUM CHLORIDE: 600; 310; 30; 20 INJECTION, SOLUTION INTRAVENOUS at 05:13

## 2020-10-24 RX ADMIN — LORAZEPAM 1 MG: 2 INJECTION INTRAMUSCULAR; INTRAVENOUS at 21:08

## 2020-10-24 RX ADMIN — LORAZEPAM 1 MG: 1 TABLET ORAL at 09:06

## 2020-10-24 RX ADMIN — ONDANSETRON 4 MG: 4 TABLET, ORALLY DISINTEGRATING ORAL at 20:26

## 2020-10-24 RX ADMIN — THIAMINE HCL TAB 100 MG 100 MG: 100 TAB at 09:03

## 2020-10-24 RX ADMIN — ACETYLCYSTEINE 4 G: 200 INJECTION, SOLUTION INTRAVENOUS at 20:13

## 2020-10-24 RX ADMIN — ACETYLCYSTEINE 12 G: 200 INJECTION, SOLUTION INTRAVENOUS at 19:09

## 2020-10-24 RX ADMIN — LORAZEPAM 2 MG: 1 TABLET ORAL at 10:19

## 2020-10-24 RX ADMIN — LORAZEPAM 1 MG: 1 TABLET ORAL at 19:11

## 2020-10-24 RX ADMIN — LORAZEPAM 2 MG: 1 TABLET ORAL at 20:29

## 2020-10-24 RX ADMIN — PROCHLORPERAZINE EDISYLATE 10 MG: 5 INJECTION INTRAMUSCULAR; INTRAVENOUS at 21:08

## 2020-10-24 RX ADMIN — MULTIPLE VITAMINS W/ MINERALS TAB 1 TABLET: TAB at 09:03

## 2020-10-24 RX ADMIN — LORAZEPAM 2 MG: 1 TABLET ORAL at 15:48

## 2020-10-24 RX ADMIN — LORAZEPAM 2 MG: 1 TABLET ORAL at 13:26

## 2020-10-24 RX ADMIN — SERTRALINE HYDROCHLORIDE 25 MG: 25 TABLET ORAL at 14:03

## 2020-10-24 RX ADMIN — GABAPENTIN 400 MG: 100 CAPSULE ORAL at 13:59

## 2020-10-24 RX ADMIN — FOLIC ACID 1 MG: 1 TABLET ORAL at 09:03

## 2020-10-24 RX ADMIN — POTASSIUM CHLORIDE 20 MEQ: 1500 TABLET, EXTENDED RELEASE ORAL at 09:06

## 2020-10-24 RX ADMIN — MAGNESIUM SULFATE HEPTAHYDRATE 4 G: 40 INJECTION, SOLUTION INTRAVENOUS at 13:57

## 2020-10-24 ASSESSMENT — ACTIVITIES OF DAILY LIVING (ADL)
ADLS_ACUITY_SCORE: 15

## 2020-10-24 NOTE — ED PROVIDER NOTES
"  History     Chief Complaint:  Alcohol Intoxication      HPI   History and ROS limited by intoxication.  Mohit Porter is a 30 year old male with a history of alcohol dependence who presents via EMS with alcohol intoxication.  The patient was found intoxicated with breathalyzer alcohol level of 0.38.  He has frequent presentations to the ED for intoxication and was just discharged approximately 6 hours prior to his current presentation.  No reported trauma on scene.  He does not answer many questions and is drowsy.  However he denies being in any pain and denied vomiting.      Allergies:  No known drug allergies.     Medications:    Seroquel  Vitamin B1  Folic acid  Potassium   Multivitamin     Past Medical History:    Alcohol dependence  Anxiety  Hepatitis C     Past Surgical History:    History reviewed. No pertinent past surgical history.     Family History:    History reviewed. No pertinent family history.     Social History:  Presents to the ED alone.  Tobacco Use: No previous or current tobacco use.   Alcohol Use: 1.75 L vodka/day     Review of Systems   Unable to perform ROS: Mental status change     Physical Exam   First Vitals:  BP: (!) 130/93  Pulse: 122  Temp: 99.2  F (37.3  C)  Resp: 18  Height: 172.7 cm (5' 8\")  Weight: 79.4 kg (175 lb)  SpO2: 94 %      Physical Exam  Eyes:               Sclera white; Pupils are equal and round  ENT:                External ears and nares normal  CV:                  Rate as above with regular rhythm   Resp:               Breath sounds clear and equal bilaterally                          Non-labored, no retractions or accessory muscle use  GI:                   Abdomen is soft, non-tender, non-distended                          No rebound tenderness or peritoneal features  MS:                  Moves all extremities  Skin:                Warm and dry  Neuro:             Speech is rare and brief, eyes open    Emergency Department Course     Laboratory:  CBC: " pending  CMP: pending  Ethyl alcohol: pending  Magnesium: pending  UDS: pending  Asymptomatic COVID19 Virus PCR, nasopharyngeal: pending      Emergency Department Course:  The patient arrived in the emergency department via [facility].   Nursing notes and vitals reviewed.  I performed an exam of the patient as documented above.     Patient was monitored and boarded in the ED pending clearer mental status.    RN contacted [facility] and there is a bed available for the patient.    Orders for blood work, UDS, and COVID placed.    Patient was signed out to my colleague, Dr. Ginna Cote, pending labs and transfer to detox.    Impression & Plan      Medical Decision Making:  Mohit Porter is a 30 year old male is here for evaluation of alcohol intoxication.  There is no reported history of trauma and vital signs do not suggest infection.  He has no tenderness on exam.  He will be monitored in the department for appropriate clearing and when he is able to have a more detailed conversation he will be offered detox.  He was interested in this possibility.  There is is a bed available at [facility] however blood work and urine was requested prior to accepting him and this is pending at this time.    Diagnosis:    ICD-10-CM    1. Altered mental status, unspecified altered mental status type  R41.82    2. Alcoholic intoxication without complication (H)  F10.920        Disposition:  Signed out      I, Meenakshi Hanson, am serving as a scribe on 10/23/2020 at 7:51 PM to personally document services performed by Ann-Marie Roberson Md based on my observations and the provider's statements to me.   Meenakshi Hanson  10/23/2020   [facility] EMERGENCY DEPT       Ann-Marie Roberson MD  10/24/20 0149

## 2020-10-24 NOTE — CONSULTS
Telemedicine Visit: The patient's condition can be safely assessed and treated via synchronous audio and visual telemedicine encounter.   Start Time: 10.52  Stop Time: 11.11  Reason for Telemedicine Visit: Covid-19   Originating Site (Patient Location): [facility]  Distant Site (Provider Location): Provider Remote Setting   Consent: The patient/guardian has verbally consented to: the potential risks and benefits of telemedicine (video visit) versus in person care; bill my insurance or make self-payment for services provided; and responsibility for payment of non-covered services.   Mode of Communication: Video Conference via polycom  As the provider I attest to compliance with applicable laws and regulations related to telemedicine.       The patient/guardian has been notified of the following:   This telemedicine visit is conducted live between you and your clinician. We have found that certain health care needs can be provided without the need for a physical exam. This service lets us provide the care you need with a telemedicine conversation.      Diagnosis generalized anxiety disorder  Alcohol use disorder severe  Alcohol withdrawal severe    Recommendations  Medical stabilization and detox as per primary treatment team because of his elevated transaminases patient was being detoxed using CIWA protocol on Ativan    For his anxiety patient is willing to take Zoloft started 25 mg and increase to 50 mg    For his anxiety patient is willing to take gabapentin will go with 300 mg 3 times a day prn  Patient can continue his Seroquel once the dose is confirmed  patient will benefit from CD consult and CD treatment    Please note I have not ordered medications because I am not sure if patient is medically stable for me to order these medications.    [id number]

## 2020-10-24 NOTE — PLAN OF CARE
DATE & TIME: 10/24/2020 Day shift  Cognitive Concerns/ Orientation : A&Ox4  BEHAVIOR & AGGRESSION TOOL COLOR: Green   CIWA SCORE: 11, 13, 13,- Ativan PO given  ABNL VS/O2: VSS on RA  MOBILITY: SBA/FR  PAIN MANAGMENT: Denies pain   DIET: Advanced to regular diet, tolerated     BOWEL/BLADDER: Continent  ABNL LAB/BG: Ethanol 0.28, , AST 1379, K 3.3- replaced- recheck at 1400; Mag 1.4, being replaced  DRAIN/DEVICES: PIV infusing LR at 100 ml/hr  TELEMETRY RHYTHM: N/A  SKIN: Bruising  TESTS/PROCEDURES: US abdomen with doppler to be completed tomorrow AM, must be NPO at midnight  D/C DAY/GOALS/PLACE: Pending ETOH withdrawal. Patient is withdrawing with severe tremors and anxiety- ativan given with relief. Psych consulted and added more medications for anxiety. Patient wants to go to CD treatment from here.   OTHER IMPORTANT INFO:

## 2020-10-24 NOTE — CONSULTS
"  GASTROENTEROLOGY CONSULTATION     Mohit Porter  [address]   [facility] [address]  30 year old male    Admission Date/Time: 10/23/2020  Primary Care Provider: No Ref-Primary, Physician    We were asked to see the patient in consultation by Dr. Edmondson for evaluation of abnormal LFTs.        HPI:  Mohit Porter is a 30 year old male with significant alcohol abuse.  Multiple visits over the last several months to the ER for alcohol intoxication.  Now admitted with the same issue.  Currently active alcohol withdrawal.      ROS: A comprehensive ten point review of systems was negative aside from those in mentioned in the HPI.      MEDICATIONS: No current facility-administered medications on file prior to encounter.        hydrOXYzine (VISTARIL) 25 MG capsule, Take 25 mg by mouth 3 times daily as needed for itching       QUEtiapine (SEROQUEL) 400 MG tablet, Take 400 mg by mouth At Bedtime        ALLERGIES: No Known Allergies    Past Medical History:   Diagnosis Date     Alcohol abuse      Anxiety      Hepatitis C        No past surgical history on file.      SOCIAL HISTORY:  Social History     Tobacco Use     Smoking status: Never Smoker     Smokeless tobacco: Current User     Types: Chew   Substance Use Topics     Alcohol use: Yes     Comment: 1.75L vodka/day     Drug use: No       FAMILY HISTORY:  Reviewed    PHYSICAL EXAM:   /78 (BP Location: Right arm)   Pulse 95   Temp 98.5  F (36.9  C) (Oral)   Resp 18   Ht 1.727 m (5' 8\")   Wt 79.4 kg (175 lb)   SpO2 97%   BMI 26.61 kg/m      Constitutional: tremors  Respiratory: non labored breathing  Psychiatric: active alcohol withdrawal  Head: Normocephalic. Atraumatic.    Neck: Neck supple.   Eyes:  PERRL, no icterus  ENT:  hearing adequate  Abdomen:   Thin, nondistended  NEURO: grossly negative  SKIN: no suspicious lesions or rashes            ADDITIONAL COMMENTS:   I reviewed the patient's new clinical lab test results.   Recent " Labs   Lab Test 10/24/20  1037 10/24/20  0101 10/17/20  1423 06/01/20  1224 06/01/20  1224   WBC 6.8 5.1 4.0   < >  --    HGB 13.2* 13.6 16.4   < >  --    MCV 89 89 88   < >  --     225 202   < >  --    INR  --   --   --   --  0.94    < > = values in this interval not displayed.     Recent Labs   Lab Test 10/24/20  1442 10/24/20  1037 10/24/20  0101 10/17/20  1423   NA  --  134 139 140   POTASSIUM 3.7 3.3* 3.1* 3.2*   CHLORIDE  --  98 101 100   CO2  --  28 31 27   BUN  --  5* 7 6*   CR  --  0.54* 0.64* 0.68   ANIONGAP  --  8 7 13   ARYA  --  8.1* 7.7* 8.7   GLC  --  94 87 92     Recent Labs   Lab Test 10/24/20  1037 10/24/20  0101 10/17/20  1423 10/08/20  1512 10/08/20  1408 12/07/19  1747 12/07/19  1747 02/16/19  0300 02/16/19  0300   ALBUMIN 3.3* 3.2* 3.8  --  3.3*   < > 4.4   < >  --    BILITOTAL 1.2 0.4 0.4  --  0.3   < > 0.5   < >  --    * 951* 317*  --  329*   < > 422*   < >  --    AST 1,379* 1,557* 367*  --  223*   < > 221*   < >  --    ALKPHOS 161* 139 116  --  118   < > 110   < >  --    PROTEIN  --   --   --  100*  --   --   --   --  100*   LIPASE  --   --  409*  --  674*  --  234   < >  --     < > = values in this interval not displayed.             .    CONSULTATION ASSESSMENT AND PLAN:    Alcohol abuse  Alcohol intoxication   Active alcohol withdrawal  Abnormal AST/ALT, normal bili.  Multiple ER visits with alcohol intoxication and leaving AMA  Tylenol levels undetected    The AST/ALT are quite elevated.  Differential diagnosis for this includes alcohol hepatitis, however bili is normal and also typically the AST/ALT numbers tend to stay below 300.  Tylenol toxicity is still in the differential despite tylenol levels not detected.  Acute hepatitis A/B/C, autoimmune hepatitis, ischemic/shock liver although no low blood pressures recorded here.  Also bud chiari or other blood flow compromise through liver.    -- agree with US with dopplers  -- check INR  -- Would give n-acetylcysteine  --  check viral and autoimmune hepatitis labs.    Will continue to follow.    Jhony Del Cid MD  [facility]

## 2020-10-24 NOTE — PHARMACY-ADMISSION MEDICATION HISTORY
Pharmacy Medication History  Admission medication history interview status for the 10/23/2020  admission is complete. See EPIC admission navigator for prior to admission medications     Location of Interview: Phone  Medication history sources: Patient, Surescripts and Care Everywhere  Medication history source reliability: Good  Adherence assessment: Moderate    Significant changes made to the medication list:  Added: quetiapine, hydroxyzine.   Changed: none  Removed: folic acid, multivitamin, potassium, thiamine        Additional medication history information:   - Patient states he uses vistaril regularly a few times per day.     Medication reconciliation completed by provider prior to medication history? No    Time spent in this activity: 15 minutes    Prior to Admission medications    Medication Sig Last Dose Taking? Auth Provider   hydrOXYzine (VISTARIL) 25 MG capsule Take 25 mg by mouth 3 times daily as needed for itching Past Week at Unknown time Yes Unknown, Entered By History   QUEtiapine (SEROQUEL) 400 MG tablet Take 400 mg by mouth At Bedtime Past Week at Unknown time Yes Unknown, Entered By History

## 2020-10-24 NOTE — ED NOTES
DATE:  10/24/2020   TIME OF RECEIPT FROM LAB: 0140  LAB TEST:  AST; ALT  LAB VALUE:  AST = 1557   ALT = 951  RESULTS GIVEN WITH READ-BACK TO (PROVIDER):  Ginna Cote MD  TIME LAB VALUE REPORTED TO PROVIDER:   0141

## 2020-10-24 NOTE — ED NOTES
Bed: [facility]  Expected date: 10/23/20  Expected time: 7:09 PM  Means of arrival: Ambulance  Comments:  Francisco 30m etoh

## 2020-10-24 NOTE — PROGRESS NOTES
Clear liquid diet (ADAT) on admission. Pt tolerated clears, then was very adamant about eating a sandwich, states was very hungry. Pt denied nausea. Education given but pt refused and still determined to eat a sandwich. Diet advanced to regular, pt tolerated and denied nausea. Ultrasound called after the fact- since pt had food, okay to make NPO at midnight and do abdominal ultrasound Christophe 10/25 AM.

## 2020-10-24 NOTE — H&P
[facility]    History and Physical  Hospitalist       Date of Admission:  10/23/2020  Date of Service (when I saw the patient): 10/24/20    ASSESSMENT  Mohit Porter is a 30 year old gentleman with chronic active alcohol use disorder as well possibly as chronic Hepatitis C (by history; Hep C antibody negative 2/2019), who presents with acute toxic encephalopathy due to alcohol intoxication and is found to have severe trans-aminitis.    PLAN    1) Acute toxic encephalopathy due to alcohol intoxication: It seems he has had numerous recent ED visits and prior treatments for alcohol use disorder. Prior withdrawal documented. Unfortunately it seems likely he may start to withdraw from alcohol here.    -- Inpatient. Advance diet as tolerated. CIWA with Ativan ordered. IV fluids  ml/hourordered. Thiamine, Folate, MVI ordered. Psychiatry consulted.    2) Severe trans-aminitis: Alcoholic hepatitis suspected. A US from 2019 showed diffuse fatty liver infiltration. The degree of trans-aminitis could be concerning for concomitant causes of inflammation.    -- Repeat labs in AM. Check US with dopplers. Consider GI consult if unchanged or worsening.    3) Hypokalemia: Replacing. Check Magnesium and replace as well.    Rule Out COVID-19 infection  This patient was evaluated during a global COVID-19 pandemic, which necessitated consideration that the patient might be at risk for infection with the SARS-CoV-2 virus that causes COVID-19. Applicable protocols for evaluation were followed during the patient's care. LOW suspicion for infection.   -follow-up COVID-19 PCR test result; no current indication for precautions    Chief Complaint   Confusion    History is obtained from the patient and the ED physician whom I have spoken with    History of Present Illness   Mohit Porter is a 30 year old gentleman who presents with confusion, characterized as somnolence, having been found intoxicated  "with breathalyzer 0.38 about 6 hours after he had been discharged from a prolonged ED stay here, also for intoxication. On my interview, he is sleepy but arousable, and complains of associated shakiness and nausea, without vomiting. He denies abdominal pain, or cough,m dyspnea,, sore throat, chest pain, melena, or hematochezia, or any other acute complaints.    In the ED, Blood pressure (!) 130/93, pulse 122, temperature 99.2  F (37.3  C), temperature source Oral, resp. rate 18, height 1.727 m (5' 8\"), weight 79.4 kg (175 lb), SpO2 94 %.    CBC and CMP were notable for K 3.1, Ca 7.7, alb 3.2, Tprot 6.5, ALt 951, AST 1557, otherwise were within the normal reference range with Tbili 0.4 and aphos 139. Ethanol level 0.28. UDS positive for benzodiazepines.     PHYSICAL EXAM  Blood pressure (!) 130/93, pulse 122, temperature 99.2  F (37.3  C), temperature source Oral, resp. rate 18, height 1.727 m (5' 8\"), weight 79.4 kg (175 lb), SpO2 94 %.  Constitutional: Somnolent but arousable; no apparent distress  HEENT: normocephalic moist mucus membranes  Respiratory: lungs clear to auscultation bilaterally  Cardiovascular: regular S1 S2   GI: abdomen soft non tender non distended bowel sounds positive  Lymph/Hematologic: pale, no cervical lymphadenopathy  Skin: no rash, good turgor  Musculoskeletal: no clubbing, cyanosis or edema  Neurologic: extra-ocular muscles intact; moves all four extremities  Psychiatric: flattened affect, speech non-tangential     DVT Prophylaxis: Pneumatic Compression Devices  Code Status: Full Code presumed    Disposition: Expected discharge in 2-3 days    Sidney Enriquez MD    Past Medical History    I have reviewed this patient's medical history and updated it with pertinent information if needed.   Past Medical History:   Diagnosis Date     Alcohol abuse      Anxiety      Hepatitis C        Past Surgical History   I have reviewed this patient's surgical history and updated it with pertinent " information if needed.  No past surgical history on file.    Prior to Admission Medications   Prior to Admission Medications   Prescriptions Last Dose Informant Patient Reported? Taking?   QUEtiapine (SEROQUEL) 400 MG tablet   No No   Sig: Take 1 tablet (400 mg) by mouth At Bedtime for 3 days   folic acid (FOLVITE) 1 MG tablet   Yes No   Sig: Take 1 mg by mouth daily   multivitamin w/minerals (THERA-VIT-M) tablet   No No   Sig: Take 1 tablet by mouth daily   potassium chloride ER (K-TAB) 20 MEQ CR tablet   No No   Sig: Take 1 tablet (20 mEq) by mouth 2 times daily for 7 days   vitamin B1 (THIAMINE) 100 MG tablet   No No   Sig: Take 1 tablet (100 mg) by mouth daily      Facility-Administered Medications: None     Allergies   No Known Allergies    Social History   I have reviewed this patient's social history and updated it with pertinent information if needed. Mohit Porter  reports that he has never smoked. His smokeless tobacco use includes chew. He reports current alcohol use. He reports that he does not use drugs.    Family History   Family history assessed and, except as above, is non-contributory.    Review of Systems   The 10 point Review of Systems is negative other than noted in the HPI or here.     Primary Care Physician   Physician No Ref-Primary    Data   Labs Ordered and Resulted from Time of ED Arrival Up to the Time of Departure from the ED   CBC WITH PLATELETS DIFFERENTIAL - Abnormal; Notable for the following components:       Result Value    Hematocrit 39.1 (*)     RDW 16.3 (*)     All other components within normal limits   COMPREHENSIVE METABOLIC PANEL - Abnormal; Notable for the following components:    Potassium 3.1 (*)     Creatinine 0.64 (*)     Calcium 7.7 (*)     Albumin 3.2 (*)     Protein Total 6.5 (*)      (*)     AST 1,557 (*)     All other components within normal limits   ALCOHOL ETHYL - Abnormal; Notable for the following components:    Ethanol g/dL 0.28 (*)     All other  components within normal limits   DRUG ABUSE SCREEN 77 URINE (FL, RH, SH) - Abnormal; Notable for the following components:    Benzodiazepine Qual Urine Positive (*)     All other components within normal limits   COVID-19 VIRUS (CORONAVIRUS) BY PCR   MAGNESIUM       Data reviewed today:  I personally reviewed no images or EKG's today.    No results found for this or any previous visit (from the past 24 hour(s)).

## 2020-10-24 NOTE — PROGRESS NOTES
RECEIVING UNIT ED HANDOFF REVIEW    ED Nurse Handoff Report was reviewed by: Hazel Granados RN on October 24, 2020 at 4:01 AM

## 2020-10-24 NOTE — PROGRESS NOTES
RECEIVING UNIT ED HANDOFF REVIEW    ED Nurse Handoff Report was reviewed by: Hazel Granados RN on October 24, 2020 at 4:17 AM

## 2020-10-24 NOTE — PROGRESS NOTES
DATE & TIME: 10/24/2020 [id number]  Cognitive Concerns/ Orientation : A&Ox4  BEHAVIOR & AGGRESSION TOOL COLOR: Green   CIWA SCORE: 4  ABNL VS/O2: VSS on RA  MOBILITY: SBA  PAIN MANAGMENT: Denies pain   DIET: Advanced to regular diet, tolerated (see separate note).    BOWEL/BLADDER: Continent  ABNL LAB/BG: Ethanol 0.28, , AST 1557, K 3.1- replacement protocol initiated, 40 meq given 0603, 20 meq to be given at 0803- will pass on to day shift RN.   DRAIN/DEVICES: PIV infusing LR at 100 ml/hr  TELEMETRY RHYTHM: N/A  SKIN: Bruising, intact.   TESTS/PROCEDURES: US abdomen with doppler to be completed.  D/C DAY/GOALS/PLACE: Pending ETOH withdrawal   OTHER IMPORTANT INFO: Psych to consult.       Admission    Patient arrives to room 635-2 via wheelchair from ED.    Inpatient nursing criteria listed below were met:    PCD's Documented: Yes  Skin issues/needs documented :Yes  Isolation education started/completed NA  Patient allergies verified with patient: Yes  Verified completion of [place] Risk Assessment Tool:  Yes  Verified completion of Guardianship screening tool: Yes  Fall Prevention: Care plan updated, Education given and documented Yes  Care Plan initiated: Yes  Home medications documented in belongings flowsheet: Yes  Patient belongings documented in belongings flowsheet: Yes  Reminder note (belongings/ medications) placed in discharge instructions:Yes  Admission profile/ required documentation complete: Yes  Bedside Report Letter given and explained to patient Yes  Visitor Designated? Yes  If patient is a 72 hour hold/Commitment are belongings removed from room and locked up? NA

## 2020-10-24 NOTE — ED NOTES
"Patient up walking around room.   Up to nurse station asking \"can I have something to eat\".  ED out of sandwich trays at this time.  Awaiting restock.  Patient updated.   "

## 2020-10-24 NOTE — CONSULTS
Consult Date:  10/24/2020      REASON FOR CONSULTATION:  Alcohol withdrawal.      REQUESTING PHYSICIAN:  Dr. Enriquez      IDENTIFYING INFORMATION:  The patient is a 30-year-old  male.  He is a , not employed.  He is between houses.  He is living in a sober house.  He has 1 child.      CHIEF COMPLAINT:  Alcohol.      HISTORY OF PRESENT ILLNESS:  The patient came to the emergency room wanting help.  He has chronic alcoholism and generalized anxiety disorder.  He was found naked in his car by police department.      The patient began to drink alcohol at the age of 17 or 18 by 23, it was a problem.  He has tolerance, blackouts, withdrawal, progressive use, loss of control, use despite having problems, job, health, house.  He has no history of seizures, but he has a history of DTs as per patient.  He does not use any drugs.  He smokes one tin a day.  He does not butts.  He denies any depression other than he has numerous stressors.  He has legal stressors.  He has 1 daughter whom he cannot see.  He is worried about his career he was a .  He apparently has legal trouble .  He also has no place to live.      He is denying any neurovegetative symptoms of depression.  He denies any suicidal ideation, plan or intent.  He denies any lauren, denies any PTSD.      He describes he is a chronic worrier, worries about everything.  It impacts his sleep, impacts his concentration.  He becomes irritable and tense.  He was previously on Remeron and Seroquel, but at this time, the patient is not compliant with them.      PAST PSYCHIATRIC HISTORY:  He was never psychiatrically hospitalized.  He was in 5 or 6 chemical dependency treatments, including [facility], [facility].  He has never had civil commitments.      PAST MEDICAL HISTORY:  Please see detailed physical examination.      REVIEW OF SYSTEMS: I have reviewed A 10-point review of systems done on this patient by   Mina on 10/24/2020.  The patient's vitals are as below.      VITAL SIGNS:  Blood pressure of 130/85, temperature is 98.7, pulse of 79, respiratory rate of 18.      FAMILY HISTORY:  Anxiety in brother and dad.  No mental health issues.      Current Facility-Administered Medications   Medication     acetylcysteine (ACETADOTE) 4 g in sodium chloride 0.9 % 500 mL STEP TWO infusion     acetylcysteine (ACETADOTE) 8 g in sodium chloride 0.9 % 1,000 mL STEP THREE infusion     bisacodyl (DULCOLAX) Suppository 10 mg     folic acid (FOLVITE) tablet 1 mg     gabapentin (NEURONTIN) capsule 400 mg     hydrOXYzine (ATARAX) tablet 10 mg     lidocaine (LMX4) cream     lidocaine 1 % 0.1-1 mL     LORazepam (ATIVAN) tablet 1-2 mg    Or     LORazepam (ATIVAN) injection 1-2 mg     magnesium sulfate 2 g in water intermittent infusion     magnesium sulfate 4 g in 100 mL sterile water (premade)     melatonin tablet 3 mg     multivitamin w/minerals (THERA-VIT-M) tablet 1 tablet     naloxone (NARCAN) injection 0.1-0.4 mg     ondansetron (ZOFRAN-ODT) ODT tab 4 mg    Or     ondansetron (ZOFRAN) injection 4 mg     polyethylene glycol (MIRALAX) Packet 17 g     potassium chloride (KLOR-CON) Packet 20-40 mEq     potassium chloride 10 mEq in 100 mL intermittent infusion with 10 mg lidocaine     potassium chloride 10 mEq in 100 mL sterile water intermittent infusion (premix)     potassium chloride 20 mEq in 50 mL intermittent infusion     potassium chloride ER (KLOR-CON M) CR tablet 20-40 mEq     prochlorperazine (COMPAZINE) injection 10 mg    Or     prochlorperazine (COMPAZINE) tablet 10 mg    Or     prochlorperazine (COMPAZINE) suppository 25 mg     QUEtiapine (SEROquel) tablet 200 mg     senna-docusate (SENOKOT-S/PERICOLACE) 8.6-50 MG per tablet 1 tablet    Or     senna-docusate (SENOKOT-S/PERICOLACE) 8.6-50 MG per tablet 2 tablet     sertraline (ZOLOFT) tablet 25 mg     sodium chloride (PF) 0.9% PF flush 3 mL     sodium chloride (PF) 0.9% PF  flush 3 mL     thiamine (B-1) tablet 100 mg     SOCIAL HISTORY:  He was born in [place].  He describes he is a .  He has 1 child.  He is presently homeless and unemployed.      MENTAL STATUS EXAMINATION:  The patient is a 30-year-old  male who is lying in bed.  He has adequate grooming, adequate hygiene, maintains good eye contact, cooperative.  Mood is anxious.  Affect is congruent.  Speech is spontaneous, normal in rate, rhythm, volume.  Linear thought process, no loosening of association.  Does not have any active suicidal ideation, plan or intent.  Insight and judgment are fair.  Alert, oriented x 3.  Attention, concentration is intact.  Recent and remote memory intact.  Language and fund of knowledge intact.  Normal muscle strength, normal gait.      DIAGNOSES:   AXIS I:     1.  Generalized anxiety disorder.   2.  Alcohol use disorder, severe.   3.  Alcohol withdrawal, severe.   4.  Elevated transaminases.      PLAN:  The patient will be detoxed off alcohol MSSA protocol on Ativan.  This is because he has elevated liver enzymes.  For his anxiety patient is willing to take Zoloft.  It can be started at 25 mg, increase to 50 mg.  For his anxiety patient is willing to take gabapentin it can be started at 300 mg 3 times a day as needed.  He can continue Seroquel.  Apparently, the patient states he takes 400 mg.  Dose must be confirmed before it.  The patient will benefit from CD consult and CD treatment.  I have given recommendation to the nurse.  I have not put in the orders because I am not sure of patient's medical stability i.e. his liver can clear to Zoloft.  Please consult Psychiatry as needed.         ILANA HO MD             D: 10/24/2020   T: 10/24/2020   MT: NTS      Name:     AMAYA GUSTAFSON   MRN:      [id number]        Account:       [id number]   :      1990           Consult Date:  10/24/2020      Document: [id number]       cc: Sidney Enriquez MD

## 2020-10-25 ENCOUNTER — APPOINTMENT (OUTPATIENT)
Dept: ULTRASOUND IMAGING | Facility: CLINIC | Age: 30
End: 2020-10-25
Attending: STUDENT IN AN ORGANIZED HEALTH CARE EDUCATION/TRAINING PROGRAM
Payer: COMMERCIAL

## 2020-10-25 LAB
ERYTHROCYTE [DISTWIDTH] IN BLOOD BY AUTOMATED COUNT: 15.6 % (ref 10–15)
HCT VFR BLD AUTO: 42.7 % (ref 40–53)
HGB BLD-MCNC: 14.5 G/DL (ref 13.3–17.7)
MAGNESIUM SERPL-MCNC: 2.4 MG/DL (ref 1.6–2.3)
MCH RBC QN AUTO: 30.7 PG (ref 26.5–33)
MCHC RBC AUTO-ENTMCNC: 34 G/DL (ref 31.5–36.5)
MCV RBC AUTO: 91 FL (ref 78–100)
PLATELET # BLD AUTO: 203 10E9/L (ref 150–450)
RBC # BLD AUTO: 4.72 10E12/L (ref 4.4–5.9)
WBC # BLD AUTO: 5.7 10E9/L (ref 4–11)

## 2020-10-25 PROCEDURE — 250N000013 HC RX MED GY IP 250 OP 250 PS 637: Performed by: STUDENT IN AN ORGANIZED HEALTH CARE EDUCATION/TRAINING PROGRAM

## 2020-10-25 PROCEDURE — 36415 COLL VENOUS BLD VENIPUNCTURE: CPT | Performed by: STUDENT IN AN ORGANIZED HEALTH CARE EDUCATION/TRAINING PROGRAM

## 2020-10-25 PROCEDURE — 93975 VASCULAR STUDY: CPT

## 2020-10-25 PROCEDURE — 85027 COMPLETE CBC AUTOMATED: CPT | Performed by: STUDENT IN AN ORGANIZED HEALTH CARE EDUCATION/TRAINING PROGRAM

## 2020-10-25 PROCEDURE — 80053 COMPREHEN METABOLIC PANEL: CPT | Performed by: STUDENT IN AN ORGANIZED HEALTH CARE EDUCATION/TRAINING PROGRAM

## 2020-10-25 PROCEDURE — 250N000013 HC RX MED GY IP 250 OP 250 PS 637: Performed by: HOSPITALIST

## 2020-10-25 PROCEDURE — 83735 ASSAY OF MAGNESIUM: CPT | Performed by: STUDENT IN AN ORGANIZED HEALTH CARE EDUCATION/TRAINING PROGRAM

## 2020-10-25 PROCEDURE — 99207 PR CDG-MDM COMPONENT: MEETS HIGH - UP CODED: CPT | Performed by: HOSPITALIST

## 2020-10-25 PROCEDURE — 258N000003 HC RX IP 258 OP 636: Performed by: INTERNAL MEDICINE

## 2020-10-25 PROCEDURE — 250N000011 HC RX IP 250 OP 636: Performed by: INTERNAL MEDICINE

## 2020-10-25 PROCEDURE — 120N000001 HC R&B MED SURG/OB

## 2020-10-25 PROCEDURE — 99233 SBSQ HOSP IP/OBS HIGH 50: CPT | Performed by: HOSPITALIST

## 2020-10-25 RX ORDER — QUETIAPINE FUMARATE 100 MG/1
400 TABLET, FILM COATED ORAL AT BEDTIME
Status: DISCONTINUED | OUTPATIENT
Start: 2020-10-25 | End: 2020-10-28 | Stop reason: HOSPADM

## 2020-10-25 RX ORDER — QUETIAPINE FUMARATE 100 MG/1
400 TABLET, FILM COATED ORAL AT BEDTIME
Status: DISCONTINUED | OUTPATIENT
Start: 2020-10-25 | End: 2020-10-25

## 2020-10-25 RX ADMIN — QUETIAPINE FUMARATE 400 MG: 100 TABLET ORAL at 22:43

## 2020-10-25 RX ADMIN — ACETYLCYSTEINE 8 G: 200 INJECTION, SOLUTION INTRAVENOUS at 00:41

## 2020-10-25 RX ADMIN — SERTRALINE HYDROCHLORIDE 25 MG: 25 TABLET ORAL at 08:26

## 2020-10-25 RX ADMIN — NICOTINE POLACRILEX 2 MG: 2 GUM, CHEWING ORAL at 15:33

## 2020-10-25 RX ADMIN — GABAPENTIN 400 MG: 100 CAPSULE ORAL at 13:59

## 2020-10-25 RX ADMIN — THIAMINE HCL TAB 100 MG 100 MG: 100 TAB at 08:26

## 2020-10-25 RX ADMIN — FOLIC ACID 1 MG: 1 TABLET ORAL at 08:26

## 2020-10-25 RX ADMIN — MULTIPLE VITAMINS W/ MINERALS TAB 1 TABLET: TAB at 08:26

## 2020-10-25 RX ADMIN — LORAZEPAM 1 MG: 1 TABLET ORAL at 15:36

## 2020-10-25 RX ADMIN — LORAZEPAM 1 MG: 1 TABLET ORAL at 13:59

## 2020-10-25 RX ADMIN — LORAZEPAM 2 MG: 1 TABLET ORAL at 08:26

## 2020-10-25 ASSESSMENT — ACTIVITIES OF DAILY LIVING (ADL)
ADLS_ACUITY_SCORE: 15

## 2020-10-25 ASSESSMENT — MIFFLIN-ST. JEOR: SCORE: 1706.5

## 2020-10-25 NOTE — PLAN OF CARE
DATE & TIME: 10/25/20 Day shift  Cognitive Concerns/ Orientation : A&Ox4  BEHAVIOR & AGGRESSION TOOL COLOR: Green. Calm   CIWA SCORE:13, 0, 7, 9,- 3mg ativan given with relief  ABNL VS/O2: VSS on RA  MOBILITY: Ind- tremors per withdrawal.   PAIN MANAGMENT: Denies pain   DIET: Regular diet- fair appetite  BOWEL/BLADDER: Continent  ABNL LAB/BG: Ethanol 0.28, elevated liver enzymes- trending down  DRAIN/DEVICES: New PIV SL on L arm  TELEMETRY RHYTHM: N/A  SKIN: Bruising  TESTS/PROCEDURES: U/S abdomen with doppler to be completed this AM- negative  D/C DAY/GOALS/PLACE: Pending ETOH withdrawal. Patient seeking CD treatment. SW consulted,  OTHER IMPORTANT INFO: Psych and GI following.

## 2020-10-25 NOTE — PLAN OF CARE
DATE & TIME: 10/24/2020 3601-0708  Cognitive Concerns/ Orientation : A&Ox4  BEHAVIOR & AGGRESSION TOOL COLOR: Green   CIWA SCORE: 13, 6, 8, 14, 10 PRN ativan given.   ABNL VS/O2: VSS on RA  MOBILITY: SBA- tremors per withdrawal.   PAIN MANAGMENT: Denies pain   DIET: Advanced to regular diet- didn't tolerate. Had nausea/vomiting after meal. NPO at midnight.    BOWEL/BLADDER: Continent  ABNL LAB/BG: Ethanol 0.28, , AST 1379, K+ 3.7/Mg 2.7 (both replaced today)  DRAIN/DEVICES: PIV infusing LR at 100 ml/hr; acetadote dose infusing.   TELEMETRY RHYTHM: N/A  SKIN: Bruising  TESTS/PROCEDURES: U/S abdomen with doppler to be completed tomorrow AM, must be NPO at midnight  D/C DAY/GOALS/PLACE: Pending ETOH withdrawal. Patient is withdrawing with severe tremors and anxiety- ativan given with relief. Psych saw patient today and made medication adjustments. Patient seeking CD treatment. [facility] also following.   OTHER IMPORTANT INFO: Nursing continues to monitor patients withdrawal symptoms closely.

## 2020-10-25 NOTE — PROGRESS NOTES
[facility]  Hospitalist Progress Note   10/25/2020          Assessment and Plan:       Mohit Porter is a 30 year old gentleman with chronic active alcohol use disorder as well possibly as chronic Hepatitis C (by history; Hep C antibody negative 2/2019) entered on 10/23/2020 with alcohol intoxication.    Acute toxic encephalopathy due to alcohol intoxication:   Acute alcohol withdrawal.  It seems he has had numerous recent ED visits and prior treatments for alcohol use disorder. Prior withdrawal documented.   This morning CIWA around 9, receiving Ativan.  Continue CIWA with Ativan.  We will discontinue IV fluids.  Continue thiamine, multivitamin, folic acid supplements.  Monitor electrolytes, replace accordingly.  Chemical dependency consult requested.  Psychiatry following, medical management.  Appreciate recommendation.  Social work assistance with transition requested, patient reports outpatient rehab schedule in place.  Did discuss the need to consider alcohol cessation, patient appears somewhat motivated.    Severe trans-aminitis:  Alcoholic hepatitis suspected. A US from 2019 showed diffuse fatty liver infiltration. The degree of trans-aminitis could be concerning for concomitant causes of inflammation.  US with dopplers pending.  Liver enzymes trending down.  [place] [facility] following, plan for N-acetylcysteine.  Appreciate recommendation.  Check GGT, CK levels.     Hypokalemia, hypomagnesemia from alcohol use.  Replacement protocol in place.    Generalized anxiety disorder.  Followed by psychiatry, recommend to start on Zoloft 25 mg oral daily, can increase to 50 mg.  Started on gabapentin 300 mg 3 times a day as needed.  Recommended to continue PTA Seroquel.  Follow-up with psychiatry as needed.  Appreciate recommendation.     Tobacco use: Reports chewing tobacco.   PRN nicotine gum available   Discussed need for tobacco cessation.     Hx of hepatitis C: See this documented  in notes, but no confirmatory labs appreciated. No indication of prior treatment. Antibody obtained 2/17/2019 nonreactive.     Orders Placed This Encounter      Regular Diet Adult      DVT Prophylaxis:  SCD, ambulate.  Code Status: Full Code  Disposition: Expected discharge in 1 to 2 days pending clinical improvement.    Discussed with patient, bedside RN    Nilam Palmer MD        Interval History:      Patient lying in bed.  Complaining of generalized weakness.  Denies any abdominal pain, tolerating oral diet.  Denies any headache or dizziness.  No bleeding noted.  No nausea or vomiting.  Continues to be tremulous, denies hearing any voices.         Physical Exam:        Physical Exam   Temp:  [98.5  F (36.9  C)-98.7  F (37.1  C)] 98.5  F (36.9  C)  Pulse:  [79-95] 81  Resp:  [18] 18  BP: (120-130)/(78-85) 120/78  SpO2:  [96 %-97 %] 96 %    Intake/Output Summary (Last 24 hours) at 10/25/2020 0818  Last data filed at 10/25/2020 0500  Gross per 24 hour   Intake --   Output 2075 ml   Net -2075 ml       Admission Weight: 79.4 kg (175 lb)  Current Weight: 76.7 kg (169 lb 1.5 oz)    PHYSICAL EXAM  GENERAL: Patient is in no distress. Alert and oriented.  HEART: Regular rate and rhythm. S1S2. No murmurs  LUNGS: Respirations unlabored  ABDOMEN: Soft, no abdominal tenderness, bowel sounds heard   NEURO: Moving all extremities.  EXTREMITIES: No pedal edema  SKIN: Warm, dry.   PSYCHIATRY Cooperative       Medications:          folic acid  1 mg Oral Daily     multivitamin w/minerals  1 tablet Oral Daily     QUEtiapine  200 mg Oral At Bedtime     sertraline  25 mg Oral Daily     sodium chloride (PF)  3 mL Intracatheter Q8H     thiamine  100 mg Oral Daily     bisacodyl, gabapentin, hydrOXYzine, lidocaine 4%, lidocaine (buffered or not buffered), LORazepam **OR** LORazepam, magnesium sulfate, magnesium sulfate, melatonin, naloxone, ondansetron **OR** ondansetron, polyethylene glycol, potassium chloride, potassium chloride  with lidocaine, potassium chloride, potassium chloride, potassium chloride, prochlorperazine **OR** prochlorperazine **OR** prochlorperazine, senna-docusate **OR** senna-docusate, sodium chloride (PF)         Data:      All new lab and imaging data was reviewed.

## 2020-10-25 NOTE — ED PROVIDER NOTES
Pt signed out by Dr. Roberson.  Pt with markedly elevated LFTs, new from prior.  Will admit medically.  No abdominal pain.  No jaundice.  Discussed with Dr. Enriquez who accepted.    MD Rocael Vidal Karah M, MD  10/25/20 0042

## 2020-10-25 NOTE — PROGRESS NOTES
"GASTROENTEROLOGY PROGRESS NOTE    SUBJECTIVE: He is more alert today and is able to give me a better history.  He does report that he chronically takes Tylenol at least twice a day every day.  This is in addition to heavy alcohol drinking every day.  He reports that he does feel shaky from his alcohol withdrawal.    OBJECTIVE:    BP (!) 143/105 (BP Location: Right arm)   Pulse 82   Temp 98.3  F (36.8  C) (Oral)   Resp 16   Ht 1.727 m (5' 8\")   Wt 76.7 kg (169 lb 1.5 oz)   SpO2 98%   BMI 25.71 kg/m    Temp (24hrs), Av.3  F (36.8  C), Min:98  F (36.7  C), Max:98.5  F (36.9  C)    Patient Vitals for the past 72 hrs:   Weight   10/25/20 0620 76.7 kg (169 lb 1.5 oz)   10/23/20 1926 79.4 kg (175 lb)       Intake/Output Summary (Last 24 hours) at 10/25/2020 1817  Last data filed at 10/25/2020 0500  Gross per 24 hour   Intake --   Output 1300 ml   Net -1300 ml         PHYSICAL EXAM    Constitutional: NAD, comfortable.  visibily with minor tremors  Respiratory: nonlabored breathing  Abdomen: soft, non-tender, nondistended        Additional Comments:  ROS, FH, SH: See initial GI consult for details.    I have reviewed the patient's new clinical lab results:    Recent Labs   Lab Test 10/25/20  0813 10/24/20  1856 10/24/20  1037 10/24/20  0101 20  1224 20  1224   WBC 5.7  --  6.8 5.1   < >  --    HGB 14.5  --  13.2* 13.6   < >  --    MCV 91  --  89 89   < >  --      --  220 225   < >  --    INR  --  0.93  --   --   --  0.94    < > = values in this interval not displayed.     Recent Labs   Lab Test 10/25/20  0813 10/24/20  1442 10/24/20  1037 10/24/20  0101     --  134 139   POTASSIUM 3.8 3.7 3.3* 3.1*   CHLORIDE 106  --  98 101   CO2 28  --  28 31   BUN 6*  --  5* 7   CR 0.55*  --  0.54* 0.64*   ANIONGAP 5  --  8 7   ARYA 8.5  --  8.1* 7.7*     Recent Labs   Lab Test 10/25/20  0813 10/24/20  1037 10/24/20  0101 10/17/20  1423 10/08/20  1512 10/08/20  1408 19  1747 19  1747 " 02/16/19  0300 02/16/19  0300   ALBUMIN 3.2* 3.3* 3.2* 3.8  --  3.3*   < > 4.4   < >  --    BILITOTAL 1.4* 1.2 0.4 0.4  --  0.3   < > 0.5   < >  --    * 944* 951* 317*  --  329*   < > 422*   < >  --    * 1,379* 1,557* 367*  --  223*   < > 221*   < >  --    ALKPHOS 169* 161* 139 116  --  118   < > 110   < >  --    PROTEIN  --   --   --   --  100*  --   --   --   --  100*   LIPASE  --   --   --  409*  --  674*  --  234   < >  --     < > = values in this interval not displayed.         Assessment/plan  - Alcohol abuse  - Alcohol intoxication   - Active alcohol withdrawal  - Abnormal AST/ALT, bili slightly increased today  - Multiple ER visits with alcohol intoxication and leaving AMA  - Tylenol levels undetected, however he reports today that he does take tylenol everyday at least twice per day.     His AST/ALT are improving today.  I did start him on n-acetylcysteine yesterday given concern for tylenol toxicity despite tylenol levels not detected.  He did tell me today that he does take tylenol everyday which I was unable to get this history from him yesterday as he was agitated from his alcohol withdrawal.  I also ordered viral serologies and PREET which are pending.  US with dopplers showed fatty liver but no blood clots.    We will continue to follow.      Nice Tere Del Cid MD  [facility]

## 2020-10-25 NOTE — PLAN OF CARE
DATE & TIME: 10/24/2020 9130-8865  Cognitive Concerns/ Orientation : A&Ox4  BEHAVIOR & AGGRESSION TOOL COLOR: Green. Calm, slept majority of shift.   CIWA SCORE: 0 (sleeping), 2 (tremor), 0 (sleeping)  ABNL VS/O2: VSS on RA  MOBILITY: SBA- tremors per withdrawal.   PAIN MANAGMENT: Denies pain   DIET: NPO since midnight for abdom US  BOWEL/BLADDER: Continent  ABNL LAB/BG: Ethanol 0.28, , AST 1379, K+ 3.7/Mg 2.7 (both replaced yesterday)  DRAIN/DEVICES: PIV infusing acetadote step 3.   TELEMETRY RHYTHM: N/A  SKIN: Bruising  TESTS/PROCEDURES: U/S abdomen with doppler to be completed this AM.  D/C DAY/GOALS/PLACE: Pending ETOH withdrawal. Patient seeking CD treatment.   OTHER IMPORTANT INFO: Psych and GI following.

## 2020-10-26 LAB
ALBUMIN SERPL-MCNC: 3.1 G/DL (ref 3.4–5)
ALBUMIN SERPL-MCNC: 3.2 G/DL (ref 3.4–5)
ALP SERPL-CCNC: 156 U/L (ref 40–150)
ALP SERPL-CCNC: 169 U/L (ref 40–150)
ALT SERPL W P-5'-P-CCNC: 597 U/L (ref 0–70)
ALT SERPL W P-5'-P-CCNC: 756 U/L (ref 0–70)
ANA SER QL IF: NEGATIVE
ANION GAP SERPL CALCULATED.3IONS-SCNC: 5 MMOL/L (ref 3–14)
ANION GAP SERPL CALCULATED.3IONS-SCNC: 7 MMOL/L (ref 3–14)
AST SERPL W P-5'-P-CCNC: 423 U/L (ref 0–45)
AST SERPL W P-5'-P-CCNC: 683 U/L (ref 0–45)
BILIRUB SERPL-MCNC: 0.9 MG/DL (ref 0.2–1.3)
BILIRUB SERPL-MCNC: 1.4 MG/DL (ref 0.2–1.3)
BUN SERPL-MCNC: 6 MG/DL (ref 7–30)
BUN SERPL-MCNC: 8 MG/DL (ref 7–30)
CALCIUM SERPL-MCNC: 8.5 MG/DL (ref 8.5–10.1)
CALCIUM SERPL-MCNC: 8.7 MG/DL (ref 8.5–10.1)
CHLORIDE SERPL-SCNC: 106 MMOL/L (ref 94–109)
CHLORIDE SERPL-SCNC: 107 MMOL/L (ref 94–109)
CK SERPL-CCNC: 61 U/L (ref 30–300)
CO2 SERPL-SCNC: 26 MMOL/L (ref 20–32)
CO2 SERPL-SCNC: 28 MMOL/L (ref 20–32)
CREAT SERPL-MCNC: 0.51 MG/DL (ref 0.66–1.25)
CREAT SERPL-MCNC: 0.55 MG/DL (ref 0.66–1.25)
ERYTHROCYTE [DISTWIDTH] IN BLOOD BY AUTOMATED COUNT: 15.9 % (ref 10–15)
GFR SERPL CREATININE-BSD FRML MDRD: >90 ML/MIN/{1.73_M2}
GFR SERPL CREATININE-BSD FRML MDRD: >90 ML/MIN/{1.73_M2}
GGT SERPL-CCNC: 626 U/L (ref 0–75)
GLUCOSE SERPL-MCNC: 81 MG/DL (ref 70–99)
GLUCOSE SERPL-MCNC: 82 MG/DL (ref 70–99)
HAV IGG SER QL IA: NONREACTIVE
HAV IGM SERPL QL IA: NONREACTIVE
HBV CORE AB SERPL QL IA: NONREACTIVE
HBV CORE IGM SERPL QL IA: NONREACTIVE
HBV SURFACE AB SERPL IA-ACNC: >1000 M[IU]/ML
HBV SURFACE AG SERPL QL IA: NONREACTIVE
HCT VFR BLD AUTO: 38.7 % (ref 40–53)
HCV AB SERPL QL IA: NONREACTIVE
HGB BLD-MCNC: 13.2 G/DL (ref 13.3–17.7)
MAGNESIUM SERPL-MCNC: 2.2 MG/DL (ref 1.6–2.3)
MCH RBC QN AUTO: 31 PG (ref 26.5–33)
MCHC RBC AUTO-ENTMCNC: 34.1 G/DL (ref 31.5–36.5)
MCV RBC AUTO: 91 FL (ref 78–100)
PHOSPHATE SERPL-MCNC: 4.1 MG/DL (ref 2.5–4.5)
PLATELET # BLD AUTO: 210 10E9/L (ref 150–450)
POTASSIUM SERPL-SCNC: 3.8 MMOL/L (ref 3.4–5.3)
POTASSIUM SERPL-SCNC: 3.8 MMOL/L (ref 3.4–5.3)
PROT SERPL-MCNC: 6.5 G/DL (ref 6.8–8.8)
PROT SERPL-MCNC: 7 G/DL (ref 6.8–8.8)
RBC # BLD AUTO: 4.26 10E12/L (ref 4.4–5.9)
SODIUM SERPL-SCNC: 139 MMOL/L (ref 133–144)
SODIUM SERPL-SCNC: 140 MMOL/L (ref 133–144)
WBC # BLD AUTO: 4.8 10E9/L (ref 4–11)

## 2020-10-26 PROCEDURE — 120N000001 HC R&B MED SURG/OB

## 2020-10-26 PROCEDURE — 84100 ASSAY OF PHOSPHORUS: CPT | Performed by: HOSPITALIST

## 2020-10-26 PROCEDURE — 999N000216 HC STATISTIC ADULT CD FACE TO FACE-NO CHRG

## 2020-10-26 PROCEDURE — 36415 COLL VENOUS BLD VENIPUNCTURE: CPT | Performed by: HOSPITALIST

## 2020-10-26 PROCEDURE — 250N000013 HC RX MED GY IP 250 OP 250 PS 637: Performed by: STUDENT IN AN ORGANIZED HEALTH CARE EDUCATION/TRAINING PROGRAM

## 2020-10-26 PROCEDURE — 82977 ASSAY OF GGT: CPT | Performed by: HOSPITALIST

## 2020-10-26 PROCEDURE — 99232 SBSQ HOSP IP/OBS MODERATE 35: CPT | Performed by: HOSPITALIST

## 2020-10-26 PROCEDURE — 83735 ASSAY OF MAGNESIUM: CPT | Performed by: HOSPITALIST

## 2020-10-26 PROCEDURE — 80053 COMPREHEN METABOLIC PANEL: CPT | Performed by: HOSPITALIST

## 2020-10-26 PROCEDURE — 85027 COMPLETE CBC AUTOMATED: CPT | Performed by: HOSPITALIST

## 2020-10-26 PROCEDURE — 82550 ASSAY OF CK (CPK): CPT | Performed by: HOSPITALIST

## 2020-10-26 PROCEDURE — 250N000013 HC RX MED GY IP 250 OP 250 PS 637: Performed by: HOSPITALIST

## 2020-10-26 RX ADMIN — NICOTINE POLACRILEX 2 MG: 2 GUM, CHEWING ORAL at 19:37

## 2020-10-26 RX ADMIN — THIAMINE HCL TAB 100 MG 100 MG: 100 TAB at 08:50

## 2020-10-26 RX ADMIN — HYDROXYZINE HYDROCHLORIDE 10 MG: 10 TABLET, FILM COATED ORAL at 13:43

## 2020-10-26 RX ADMIN — MULTIPLE VITAMINS W/ MINERALS TAB 1 TABLET: TAB at 08:50

## 2020-10-26 RX ADMIN — LORAZEPAM 1 MG: 1 TABLET ORAL at 07:38

## 2020-10-26 RX ADMIN — QUETIAPINE FUMARATE 400 MG: 100 TABLET ORAL at 22:30

## 2020-10-26 RX ADMIN — NICOTINE POLACRILEX 2 MG: 2 GUM, CHEWING ORAL at 22:30

## 2020-10-26 RX ADMIN — LORAZEPAM 1 MG: 1 TABLET ORAL at 10:43

## 2020-10-26 RX ADMIN — SERTRALINE HYDROCHLORIDE 25 MG: 25 TABLET ORAL at 08:50

## 2020-10-26 RX ADMIN — FOLIC ACID 1 MG: 1 TABLET ORAL at 08:50

## 2020-10-26 RX ADMIN — NICOTINE POLACRILEX 2 MG: 2 GUM, CHEWING ORAL at 16:11

## 2020-10-26 RX ADMIN — GABAPENTIN 400 MG: 100 CAPSULE ORAL at 08:51

## 2020-10-26 ASSESSMENT — ACTIVITIES OF DAILY LIVING (ADL)
ADLS_ACUITY_SCORE: 15

## 2020-10-26 ASSESSMENT — MIFFLIN-ST. JEOR: SCORE: 1714.5

## 2020-10-26 NOTE — PLAN OF CARE
DATE & TIME: 10/25/2020 4123-3946  Cognitive Concerns/ Orientation : A&Ox4  BEHAVIOR & AGGRESSION TOOL COLOR: Green. Calm   CIWA SCORE: 4, 2  ABNL VS/O2: VSS on RA  MOBILITY: Ind- mild tremors per withdrawal.   PAIN MANAGMENT: Denies pain   DIET: Regular diet- fair appetite  BOWEL/BLADDER: Continent  ABNL LAB/BG: Ethanol 0.28, elevated liver enzymes- trending down  DRAIN/DEVICES: PIV on L arm SL. Completed acetadote step 3 infusion.   TELEMETRY RHYTHM: N/A  SKIN: Bruising  TESTS/PROCEDURES: U/S abdomen with doppler completed yesterday- negative  D/C DAY/GOALS/PLACE: Pending ETOH withdrawal. Patient seeking CD treatment. SW consulted,  OTHER IMPORTANT INFO: Psych and GI following.

## 2020-10-26 NOTE — PROGRESS NOTES
[facility]  Hospitalist Progress Note   10/26/2020          Assessment and Plan:       Mohit Porter is a 30 year old gentleman with chronic active alcohol use disorder as well possibly as chronic Hepatitis C (by history; Hep C antibody negative 2/2019) entered on 10/23/2020 with alcohol intoxication.    Acute toxic encephalopathy due to alcohol intoxication:   Acute alcohol withdrawal.  It seems he has had numerous recent ED visits and prior treatments for alcohol use disorder. Prior withdrawal documented.   Still scoring on CIWA, receiving Ativan.  Continue CIWA with Ativan.  Continue thiamine, multivitamin, folic acid supplements.  Monitor electrolytes, replace accordingly.  Chemical dependency consult requested.  Psychiatry following, medical management.  Appreciate recommendation.  Social work assistance with transition requested, patient reports outpatient rehab schedule in place.  Did discuss the need to consider alcohol cessation, patient appears somewhat motivated.    Severe trans-aminitis:  Alcoholic hepatitis suspected.  Superimposed Tylenol use.  Admits to Tylenol use almost on daily basis for pain.  , AST 1557, , CK 61.  US with dopplers -Negative abdominal ultrasound. No venous thrombosis demonstrated.  Liver enzymes trending down.  [place] [facility] following, completed N-acetylcysteine protocol last night.  Appreciate recommendation.     Hypokalemia, hypomagnesemia from alcohol use.  Replacement protocol in place.    Generalized anxiety disorder.  Followed by psychiatry, recommend to start on Zoloft 25 mg oral daily, started on gabapentin 300 mg 3 times a day as needed.  Recommended to continue PTA Seroquel.  Follow-up with psychiatry as needed.  Appreciate recommendation.     Tobacco use: Reports chewing tobacco.   PRN nicotine gum available   Discussed need for tobacco cessation.     Hx of hepatitis C: See this documented in notes, but no confirmatory  labs appreciated. No indication of prior treatment. Antibody obtained 2/17/2019 nonreactive.     Orders Placed This Encounter      Regular Diet Adult      DVT Prophylaxis:  SCD, ambulate.  Code Status: Full Code  Disposition: Expected discharge in 1 to 2 days pending clinical improvement.    Discussed with patient, bedside RN    Nilam Palmer MD        Interval History:      Patient lying in bed.  Complaining of generalized weakness.Continues to be tremulous, denies hearing any voices.  Reports no relief of anxiety with Zoloft or gabapentin.  Denies any abdominal pain, tolerating oral diet.  Denies any headache or dizziness.  No bleeding noted.  No nausea or vomiting.           Physical Exam:        Physical Exam   Temp:  [98.1  F (36.7  C)-98.4  F (36.9  C)] 98.1  F (36.7  C)  Pulse:  [73-84] 73  Resp:  [16] 16  BP: (115-143)/() 115/77  SpO2:  [95 %-98 %] 95 %    Intake/Output Summary (Last 24 hours) at 10/25/2020 0818  Last data filed at 10/25/2020 0500  Gross per 24 hour   Intake --   Output 2075 ml   Net -2075 ml     Admission Weight: 79.4 kg (175 lb)  Current Weight: 76.7 kg (169 lb 1.5 oz)    PHYSICAL EXAM  GENERAL: Patient is in no distress. Alert and oriented.  LUNGS: Respirations unlabored  ABDOMEN: Soft, no abdominal tenderness, bowel sounds heard   NEURO: Moving all extremities.  EXTREMITIES: No pedal edema  SKIN: Warm, dry.   PSYCHIATRY Cooperative       Medications:          folic acid  1 mg Oral Daily     multivitamin w/minerals  1 tablet Oral Daily     QUEtiapine  400 mg Oral At Bedtime     sertraline  25 mg Oral Daily     sodium chloride (PF)  3 mL Intracatheter Q8H     thiamine  100 mg Oral Daily     bisacodyl, gabapentin, hydrOXYzine, lidocaine 4%, lidocaine (buffered or not buffered), LORazepam **OR** LORazepam, magnesium sulfate, magnesium sulfate, melatonin, naloxone, nicotine, ondansetron **OR** ondansetron, polyethylene glycol, potassium chloride, potassium chloride with lidocaine,  potassium chloride, potassium chloride, potassium chloride, prochlorperazine **OR** prochlorperazine **OR** prochlorperazine, senna-docusate **OR** senna-docusate, sodium chloride (PF)         Data:      All new lab and imaging data was reviewed.

## 2020-10-26 NOTE — PROVIDER NOTIFICATION
Prescriber Notification Note    The pharmacist has communicated with this patient's provider regarding a concern or therapy recommendation.    Notified Person: LOIDA Amezquita  Date/Time of Notification: 10/26/2020/1235  Interaction: phone  Concern/Recommendation:confirmed that further acetylcysteine is not necessary.

## 2020-10-26 NOTE — PLAN OF CARE
DATE & TIME: 10/26/20 4021-2099  Cognitive Concerns/ Orientation : A&Ox4  BEHAVIOR & AGGRESSION TOOL COLOR: Green. Calm   CIWA SCORE: 9,2,10,6; Ativan 1 mg PO given x 2; Gabapentin given this morning and Atarax this afternoon for anxiety.    ABNL VS/O2: VSS on RA  MOBILITY: Ind- mild tremors  PAIN MANAGMENT: Denies pain   DIET: Regular diet- good appetite  BOWEL/BLADDER: Continent  ABNL LAB/BG: elevated liver enzymes- trending down  DRAIN/DEVICES: PIV on L arm SL. Completed acetadote step 3 infusion during the night  TELEMETRY RHYTHM: N/A  SKIN: Bruising  TESTS/PROCEDURES:   D/C DAY/GOALS/PLACE: pending tomorrow's labs, status of withdrawal.  Patient does have a inpatient treatment program he has been in touch with from a previous CD evaluation, SW and CC following.  OTHER IMPORTANT INFO: GI following.

## 2020-10-26 NOTE — PLAN OF CARE
DATE & TIME: 10/25/20 Day shift  Cognitive Concerns/ Orientation : A&Ox4  BEHAVIOR & AGGRESSION TOOL COLOR: Green. Calm   CIWA SCORE: 8, 7- 1mg ativan given with relief  ABNL VS/O2: VSS on RA  MOBILITY: Ind- mild tremors per withdrawal.   PAIN MANAGMENT: Denies pain   DIET: Regular diet- fair appetite  BOWEL/BLADDER: Continent  ABNL LAB/BG: Ethanol 0.28, elevated liver enzymes- trending down  DRAIN/DEVICES: New PIV on L arm- infusing step 3 of acedotone  TELEMETRY RHYTHM: N/A  SKIN: Bruising  TESTS/PROCEDURES: U/S abdomen with doppler to be completed this AM- negative  D/C DAY/GOALS/PLACE: Pending ETOH withdrawal. Patient seeking CD treatment. SW consulted,  OTHER IMPORTANT INFO: Psych and GI following.

## 2020-10-26 NOTE — CONSULTS
"The patient has been notified of the following:     \"We have found that certain health care needs can be provided without the need for a face to face visit.  This service lets us provide the care you need with a phone conversation.      I will have full access to your [facility] medical record during this entire phone call.   I will be taking notes for your medical record.     Since this is like an office visit, we will bill your insurance company for this service.  If your insurance denies the charge we will appeal and/or write off the cost of the service.  The Governor's executive order may result in expanded health insurance coverage for this service, which would be paid by your insurance.         There are potential benefits and risks of telephone visits (e.g. limits to patient confidentiality) that differ from in-person visits.?  Confidentiality still applies for telephone services, and nobody will record the visit.  It is important to be in a quiet, private space that is free of distractions (including cell phone or other devices) during the visit.??     If during the course of the call I believe a telephone visit is not appropriate, you will not be charged for this service\"    Consent has been obtained for this service by care team member: Yes    Phone visit start time:  1:26pm  Phone visit end time:  1:31pm    Type of service:  Chemical Dependency Consult  Originating Location (pt. Location):  [facility] [address] [place] [address]  66  Distant Location (provider location):  Provider remote workspace  Reason for Televisit:  Patient inpatient and appropriate for telemedicine video visit.  Mode of Communication:  Video Conference via [employer]  As the provider I attest to compliance with applicable laws and regulations related to telemedicine.  VINAYAK Prado    10/26/20 chem dep consult completed.      Met with patient via telemedicine video visit, and he was " agreeable to evaluation.  Patient reports he was in detox about 1 week ago and had a Rule 25 assessment with referral to [facility].  He admits he has missed about 2-3 intakes with program, but he reports he spoke with them and they have an admission spot available for him either tomorrow, Tuesday, or Wednesday.  He reports he just needs assistance with coordinating this admission and transportation from hospital.  He is hoping to directly go to treatment.    I emailed unit , Jessica MCKINNEY, and care coordinator, Brenna CARROLL, and updated them.  At this time chem dep will sign off as patient has no needs form chem dep in assisting with referrals.  Unit care team can assistance with coordination of discharge to [facility].    Billie Hairston, [facility]  [phone]

## 2020-10-26 NOTE — PROGRESS NOTES
"GASTROENTEROLOGY PROGRESS NOTE     SUBJECTIVE: Doing ok. Still with some withdrawal symptoms. Denies abdominal pain, nausea or vomiting. Tolerating diet.      OBJECTIVE:   /77 (BP Location: Right arm)   Pulse 73   Temp 98.1  F (36.7  C) (Oral)   Resp 16   Ht 1.727 m (5' 8\")   Wt 77.5 kg (170 lb 13.7 oz)   SpO2 95%   BMI 25.98 kg/m     Temp (24hrs), Av.3  F (36.8  C), Min:98.1  F (36.7  C), Max:98.4  F (36.9  C)     Patient Vitals for the past 72 hrs:   Weight   10/26/20 0626 77.5 kg (170 lb 13.7 oz)   10/25/20 06 76.7 kg (169 lb 1.5 oz)   10/23/20 1926 79.4 kg (175 lb)        Intake/Output Summary (Last 24 hours) at 10/26/2020 1013  Last data filed at 10/26/2020 0900  Gross per 24 hour   Intake 480 ml   Output 500 ml   Net -20 ml      PHYSICAL EXAM   Constitutional: Age appropriate, in bed, no acute distress  Cardiovascular: Regular rate and rhythm. No murmurs rubs or gallops  Respiratory: Clear to auscultation bilaterally  Abdomen: Soft, non-tender, non-distended, normally active bowel sounds. No masses or hepatosplenomegaly appreciated. No guarding or rebound tenderness.    Additional Comments:   ROS, FH, SH: See initial GI consult for details.     I have reviewed the patient's new clinical lab results:   Recent Labs   Lab Test 10/26/20  0806 10/25/20  0813 10/24/20  1856 10/24/20  1037 20  1224 20  1224   WBC 4.8 5.7  --  6.8   < >  --    HGB 13.2* 14.5  --  13.2*   < >  --    MCV 91 91  --  89   < >  --     203  --  220   < >  --    INR  --   --  0.93  --   --  0.94    < > = values in this interval not displayed.      Recent Labs   Lab Test 10/26/20  0806 10/25/20  0813 10/24/20  1442 10/24/20  1037    139  --  134   POTASSIUM 3.8 3.8 3.7 3.3*   CHLORIDE 107 106  --  98   CO2 26 28  --  28   BUN 8 6*  --  5*   CR 0.51* 0.55*  --  0.54*   ANIONGAP 7 5  --  8   ARYA 8.7 8.5  --  8.1*      Recent Labs   Lab Test 10/26/20  0806 10/25/20  0813 10/24/20  1037 10/17/20  1423 " 10/17/20  1423 10/08/20  1512 10/08/20  1408 12/07/19  1747 12/07/19  1747 02/16/19  0300 02/16/19  0300   ALBUMIN 3.1* 3.2* 3.3*   < > 3.8  --  3.3*   < > 4.4   < >  --    BILITOTAL 0.9 1.4* 1.2   < > 0.4  --  0.3   < > 0.5   < >  --    * 756* 944*   < > 317*  --  329*   < > 422*   < >  --    * 683* 1,379*   < > 367*  --  223*   < > 221*   < >  --    ALKPHOS 156* 169* 161*   < > 116  --  118   < > 110   < >  --    PROTEIN  --   --   --   --   --  100*  --   --   --   --  100*   LIPASE  --   --   --   --  409*  --  674*  --  234   < >  --     < > = values in this interval not displayed.     10/25/20 Ultrasound abdomen doppler  FINDINGS:  Liver is moderately fatty infiltrated as evidenced by a  diffuse increase in echogenicity without worrisome focal solid  lesions. Gallbladder is unremarkable without stones or sludge.  Extrahepatic bile duct is normal in diameter. Pancreas is normal where  visualized.  Spleen is normal in appearance. Kidneys are normal in  size. There is no hydronephrosis. Proximal aorta and IVC are  nonaneurysmal.  The portal vein and splenic vein are patent with appropriate direction  of flow. The intrahepatic portal vessels are patent with appropriate  directional flow. The hepatic venous system is patent with appropriate  direction of flow. The hepatic artery is patent with a normal  resistive index.     Assessment: 30-year-old male with a history of EtOH abuse presenting with intoxication and significantly elevated transaminases.  His acetaminophen level was undetectable on admission however N-acetylcysteine was started empirically.  The patient had been taking acetaminophen on a regular basis so potentially this could be acetaminophen toxicity on top of EtOH hepatitis.  His LFTs have been trending down which is reassuring.  His EtOH withdrawals have been improving as well.  Given his frequent hospitalizations for EtOH intoxication and multiple attempts at rehab a commitment may  need to be considered.    Plan:    -Diet as tolerated  -Complete acetylcysteine protocol  -Continue to follow LFTs  -Social work to help with discharge plans to a treatment facility   -If he is unsuccessful at treatment would consider psychiatry consultation regarding commitment   -[facility] following    [facility]  Cell:  [phone] Monday through Friday 2234-0964  Office: [phone]

## 2020-10-27 LAB
ALBUMIN SERPL-MCNC: 3.2 G/DL (ref 3.4–5)
ALP SERPL-CCNC: 147 U/L (ref 40–150)
ALT SERPL W P-5'-P-CCNC: 534 U/L (ref 0–70)
AST SERPL W P-5'-P-CCNC: 317 U/L (ref 0–45)
BILIRUB DIRECT SERPL-MCNC: 0.2 MG/DL (ref 0–0.2)
BILIRUB SERPL-MCNC: 0.7 MG/DL (ref 0.2–1.3)
PROT SERPL-MCNC: 6.6 G/DL (ref 6.8–8.8)

## 2020-10-27 PROCEDURE — 36415 COLL VENOUS BLD VENIPUNCTURE: CPT | Performed by: HOSPITALIST

## 2020-10-27 PROCEDURE — 250N000013 HC RX MED GY IP 250 OP 250 PS 637: Performed by: PSYCHIATRY & NEUROLOGY

## 2020-10-27 PROCEDURE — 250N000013 HC RX MED GY IP 250 OP 250 PS 637: Performed by: HOSPITALIST

## 2020-10-27 PROCEDURE — 120N000001 HC R&B MED SURG/OB

## 2020-10-27 PROCEDURE — 250N000013 HC RX MED GY IP 250 OP 250 PS 637: Performed by: STUDENT IN AN ORGANIZED HEALTH CARE EDUCATION/TRAINING PROGRAM

## 2020-10-27 PROCEDURE — 99207 PR CDG-CODE CATEGORY CHANGED: CPT | Performed by: PSYCHIATRY & NEUROLOGY

## 2020-10-27 PROCEDURE — 99232 SBSQ HOSP IP/OBS MODERATE 35: CPT | Performed by: PSYCHIATRY & NEUROLOGY

## 2020-10-27 PROCEDURE — 80076 HEPATIC FUNCTION PANEL: CPT | Performed by: HOSPITALIST

## 2020-10-27 PROCEDURE — 99232 SBSQ HOSP IP/OBS MODERATE 35: CPT | Performed by: HOSPITALIST

## 2020-10-27 RX ORDER — GABAPENTIN 300 MG/1
600 CAPSULE ORAL 3 TIMES DAILY
Status: DISCONTINUED | OUTPATIENT
Start: 2020-10-27 | End: 2020-10-28 | Stop reason: HOSPADM

## 2020-10-27 RX ORDER — HYDROXYZINE HYDROCHLORIDE 25 MG/1
25-50 TABLET, FILM COATED ORAL 3 TIMES DAILY PRN
Status: DISCONTINUED | OUTPATIENT
Start: 2020-10-27 | End: 2020-10-28 | Stop reason: HOSPADM

## 2020-10-27 RX ORDER — CARBOXYMETHYLCELLULOSE SODIUM 5 MG/ML
2 SOLUTION/ DROPS OPHTHALMIC
Status: DISCONTINUED | OUTPATIENT
Start: 2020-10-27 | End: 2020-10-28 | Stop reason: HOSPADM

## 2020-10-27 RX ADMIN — HYDROXYZINE HYDROCHLORIDE 50 MG: 25 TABLET, FILM COATED ORAL at 14:06

## 2020-10-27 RX ADMIN — GABAPENTIN 400 MG: 100 CAPSULE ORAL at 09:03

## 2020-10-27 RX ADMIN — MULTIPLE VITAMINS W/ MINERALS TAB 1 TABLET: TAB at 09:04

## 2020-10-27 RX ADMIN — LORAZEPAM 1 MG: 1 TABLET ORAL at 09:07

## 2020-10-27 RX ADMIN — CARBOXYMETHYLCELLULOSE SODIUM 2 DROP: 5 SOLUTION/ DROPS OPHTHALMIC at 21:54

## 2020-10-27 RX ADMIN — NICOTINE POLACRILEX 2 MG: 2 GUM, CHEWING ORAL at 15:57

## 2020-10-27 RX ADMIN — THIAMINE HCL TAB 100 MG 100 MG: 100 TAB at 09:04

## 2020-10-27 RX ADMIN — FOLIC ACID 1 MG: 1 TABLET ORAL at 09:04

## 2020-10-27 RX ADMIN — NICOTINE POLACRILEX 2 MG: 2 GUM, CHEWING ORAL at 12:05

## 2020-10-27 RX ADMIN — GABAPENTIN 400 MG: 100 CAPSULE ORAL at 00:15

## 2020-10-27 RX ADMIN — GABAPENTIN 600 MG: 300 CAPSULE ORAL at 15:57

## 2020-10-27 RX ADMIN — NICOTINE POLACRILEX 2 MG: 2 GUM, CHEWING ORAL at 19:17

## 2020-10-27 RX ADMIN — SERTRALINE HYDROCHLORIDE 25 MG: 25 TABLET ORAL at 09:04

## 2020-10-27 RX ADMIN — QUETIAPINE FUMARATE 400 MG: 100 TABLET ORAL at 21:50

## 2020-10-27 RX ADMIN — GABAPENTIN 600 MG: 300 CAPSULE ORAL at 21:50

## 2020-10-27 ASSESSMENT — ACTIVITIES OF DAILY LIVING (ADL)
ADLS_ACUITY_SCORE: 15

## 2020-10-27 NOTE — PROGRESS NOTES
Care Management Assessment and Discharge Consult    General Information  Patient is homeless and seeking direct transfer from the hospital to [facility]. Patient has been assessed and accepted at [facility].   Patient has spoken with admission staff at [facility] and   writer also has spoken with [facility] admissions at [phone].  They have two admit appointments available.  One on Wednesday at 9: 30 AM and on Thursday at 10:30 AM.  Patient feels good and hopes to discharge tomorrow morning. He reports he feels any observed anxiousness is related to making his discharge plan and not related to DT's. He is very engaged in starting the treatment program.  If Dr Palmer does discharge patient, writer will arrange a taxi ride for 0900.  Any discharge medications will not be filled by that time, but writer will arrange to have the medications delivered to [facility] later in the morning.  The [facility] patient is admitting to is [address] in [facility].   Writer has a page out to Dr Palmer to update her that patient hopes to leave tomorrow.   The Care Transitions RN arrives at 7:30 AM and will also page out to Dr Palmer.   The evening bedside nurse is aware of the possible discharge on Wednesday at 0900.              Advance Care Planning:            Communication Assessment  Patient's communication style: spoken language (English or Bilingual)    Hearing Difficulty or Deaf: no   Wear Glasses or Blind: no    Cognitive  Cognitive/Neuro/Behavioral: WDL                      Living Environment:   People in home: alone     Current living Arrangements: homeless, hotel/motel          Family/Social Support:  Care provided by: self  Provides care for:             Description of Support System:                        Current Resources:   Skilled Home Care Services:    Community Resources:    Equipment currently used at home: none  Supplies currently used at home:      Employment:  Employment Status:           Financial/Environmental Concerns:            Lifestyle & Psychosocial Needs:        Socioeconomic History     Marital status: Single     Spouse name: Not on file     Number of children: Not on file     Years of education: Not on file     Highest education level: Not on file   Occupational History     Employer: OTHER     Comment:      Tobacco Use     Smoking status: Never Smoker     Smokeless tobacco: Current User     Types: Chew   Substance and Sexual Activity     Alcohol use: Yes     Comment: 1.75L vodka/day     Drug use: No     Sexual activity: Yes     Partners: Female       Functional Status:  Prior to admission patient needed assistance:          Mental Health Status:  WDL                            Values/Beliefs:  Spiritual, Cultural Beliefs, Confucianism Practices, Values that affect care:                   Discharge Planning:  Expected Discharge Date: 10/28/20(RX program)     Concerns to be Addressed:         Anticipated Discharge Disposition: Inpatient Chemical Dependency  Anticipated Discharge Services: Transportation Services  Anticipated Discharge DME:      Patient/family educated on Medicare website which has current facility and service quality ratings:    Referrals Placed by CM/SW:    Education Provided on the Discharge Plan:    Patient/Family in Agreement with the Plan: yes     Disposition Comments:      Selected Continued Care - Admitted Since 10/23/2020    No services have been selected for the patient.         Additional Information:        GENESIS CarnesSW

## 2020-10-27 NOTE — PLAN OF CARE
DATE & TIME: 10/26/2020; 9252-1834   Cognitive Concerns/ Orientation : A & O x 4; calm and cooperative    BEHAVIOR & AGGRESSION TOOL COLOR: Green   CIWA SCORE: 3, 2 (slight tremor, anxiety)    ABNL VS/O2: VSS on RA except BP elevated at times   MOBILITY: independent in the room; ambulated in the halls and took a shower this evening   PAIN MANAGMENT: denies   DIET: Regular; tolerating well   BOWEL/BLADDER: BS active x 4; continent   ABNL LAB/BG: Creatinine= 0.51; Albumin= 3.1; Alkal phos= 156; ALT=597; SDB=977; hgb= 13.2   DRAIN/DEVICES: PIV/SL   TELEMETRY RHYTHM: N/A  SKIN: dry, intact  TESTS/PROCEDURES: N/A  D/C DAY/GOALS/PLACE: pending discharge 1-2 days pending clinical improvement  OTHER IMPORTANT INFO: GI following; nicotine gum given x 3; Nursing will continue to monitor and assess.     MD/RN ROUNDING SIGNED OFF D/E SHIFT: N/A  COMMIT TO SIT DONE AND SIGNED OFF: COMPLETE

## 2020-10-27 NOTE — PLAN OF CARE
DATE & TIME: 10/28/20 7274-4787  Cognitive Concerns/ Orientation : A&O x4, anxious at times.     BEHAVIOR & AGGRESSION TOOL COLOR: Green   CIWA SCORE: 8, 5, 5 (tremor, anxiety); last Ativan given at 0900   ABNL VS/O2: VSS on RA   MOBILITY: independent in room; up in cortes independently  PAIN MANAGMENT: denies pain  DIET: Regular; excellent intake  BOWEL/BLADDER: Continent  ABNL LAB/BG: LFT elevated, trending down, recheck in am  DRAIN/DEVICES: no PIV  TELEMETRY RHYTHM: n/a  SKIN: WDL  TESTS/PROCEDURES: n/a  D/C DAY/GOALS/PLACE: pending withdrawal, labs; possibly tomorrow. Pt to transition to inpatient treatment program he has been in contact with previously; SW/CC consulted for discharge.   OTHER IMPORTANT INFO: GI following. Gabapentin x1 for anxiety, Psyche following and increased Gabapentin dose and scheduled it.  Also Atarax given x 1 for anxiety at 1400.

## 2020-10-27 NOTE — PLAN OF CARE
Cognitive Concerns/ Orientation : A&O x4, anxious at times.     BEHAVIOR & AGGRESSION TOOL COLOR: Green   CIWA SCORE: 5, 2 (tremor, anxiety)    ABNL VS/O2: VSS on RA   MOBILITY: independent in room  PAIN MANAGMENT: denies pain  DIET: Regular; tolerating well   BOWEL/BLADDER: Continent  ABNL LAB/BG: LFT elevated, trending down, recheck in am  DRAIN/DEVICES: no PIV  TELEMETRY RHYTHM: n/a  SKIN: WDL  TESTS/PROCEDURES: n/a  D/C DAY/GOALS/PLACE: pending withdrawal, labs; possibly today/tomorrow. Pt hoping to transition to inpatient treatment program he has been in contact with previously; SW/CC consulted for discharge.   OTHER IMPORTANT INFO: GI following. Gabapentin x1 for anxiety.

## 2020-10-27 NOTE — PROGRESS NOTES
[facility]  Hospitalist Progress Note   10/27/2020          Assessment and Plan:       Mohit Porter is a 30 year old gentleman with chronic active alcohol use disorder as well possibly as chronic Hepatitis C (by history; Hep C antibody negative 2/2019) entered on 10/23/2020 with alcohol intoxication.    Acute toxic encephalopathy due to alcohol intoxication:   Acute alcohol withdrawal.  It seems he has had numerous recent ED visits and prior treatments for alcohol use disorder. Prior withdrawal documented.   Still scoring on CIWA, receiving Ativan.  Continue CIWA with Ativan.  Continue thiamine, multivitamin, folic acid supplements.  Monitor electrolytes, replace accordingly.  Chemical dependency consult requested.  Psychiatry reconsulted, will need to be discharged directly to treatment facility.     Appreciate recommendation.  Social work assistance with transition requested, patient reports outpatient rehab schedule in place.  Did discuss the need to consider alcohol cessation, patient appears somewhat motivated.    Severe trans-aminitis: Component of alcohol hepatitis, superimposed Tylenol use.  Admits to Tylenol use almost on daily basis for pain.  , AST 1557, , CK 61.  US with dopplers -Negative abdominal ultrasound. No venous thrombosis demonstrated.  Liver enzymes trending down.  [place] [facility] following, completed N-acetylcysteine protocol. Per GI if unsuccessful at treatment will need to consider psychiatry consultation regarding commitment. Follow up visit with our hepatology clinic will be arranged for about 2-3 weeks to reassess his symptoms. Appreciate recommendation.     Hypokalemia, hypomagnesemia from alcohol use.  Replacement protocol in place.    Generalized anxiety disorder.  Followed by psychiatry, recommend to start on Zoloft 25 mg oral daily, started on gabapentin 300 mg 3 times a day as needed.  Recommended to continue PTA Seroquel.  Reconsult  psychiatry today.  Appreciate recommendation.     Tobacco use: Reports chewing tobacco.   PRN nicotine gum available   Discussed need for tobacco cessation.     Hx of hepatitis C: See this documented in notes, but no confirmatory labs appreciated. No indication of prior treatment. Antibody obtained 2/17/2019 nonreactive.     Orders Placed This Encounter      Regular Diet Adult      DVT Prophylaxis:  SCD, ambulate.  Code Status: Full Code  Disposition: Expected discharge in 1 to 2 days pending clinical improvement.    Discussed with patient, bedside RN, care coordinator    Nilam Palmer MD        Interval History:      Patient lying in bed.  Continues to be tremulous, denies hearing any voices.  Reports no relief of anxiety with Zoloft or gabapentin little relief of symptoms with Ativan.  This morning scored 7 on CIWA.. .  Denies any abdominal pain, tolerating oral diet.  Denies any headache or dizziness.  No bleeding noted.  No nausea or vomiting.           Physical Exam:        Physical Exam   Temp:  [97.5  F (36.4  C)-98.7  F (37.1  C)] 97.5  F (36.4  C)  Pulse:  [70-87] 82  Resp:  [16-18] 18  BP: ()/() 96/55  SpO2:  [97 %] 97 %    Intake/Output Summary (Last 24 hours) at 10/25/2020 0818  Last data filed at 10/25/2020 0500  Gross per 24 hour   Intake --   Output 2075 ml   Net -2075 ml     Admission Weight: 79.4 kg (175 lb)  Current Weight: 76.7 kg (169 lb 1.5 oz)    PHYSICAL EXAM  GENERAL: Patient is in no distress. Alert and oriented.  LUNGS: Respirations unlabored  ABDOMEN: Soft, no abdominal tenderness, bowel sounds heard   NEURO: Moving all extremities.  EXTREMITIES: No pedal edema, tremulous  SKIN: Warm, dry.   PSYCHIATRY Cooperative       Medications:          folic acid  1 mg Oral Daily     gabapentin  600 mg Oral TID     multivitamin w/minerals  1 tablet Oral Daily     QUEtiapine  400 mg Oral At Bedtime     [START ON 10/28/2020] sertraline  50 mg Oral Daily     sodium chloride (PF)  3 mL  Intracatheter Q8H     thiamine  100 mg Oral Daily     bisacodyl, hydrOXYzine, lidocaine 4%, lidocaine (buffered or not buffered), LORazepam **OR** LORazepam, magnesium sulfate, magnesium sulfate, melatonin, naloxone, nicotine, ondansetron **OR** ondansetron, polyethylene glycol, potassium chloride, potassium chloride with lidocaine, potassium chloride, potassium chloride, potassium chloride, prochlorperazine **OR** prochlorperazine **OR** prochlorperazine, senna-docusate **OR** senna-docusate, sodium chloride (PF)         Data:      All new lab and imaging data was reviewed.

## 2020-10-27 NOTE — PROGRESS NOTES
"GASTROENTEROLOGY PROGRESS NOTE     SUBJECTIVE: Feeling fine. Denies pain, nausea or vomiting. Tolerating diet.      OBJECTIVE:   BP 96/55 (BP Location: Right arm)   Pulse 82   Temp 97.5  F (36.4  C) (Oral)   Resp 18   Ht 1.727 m (5' 8\")   Wt 77.5 kg (170 lb 13.7 oz)   SpO2 97%   BMI 25.98 kg/m     Temp (24hrs), Av.1  F (36.7  C), Min:97.5  F (36.4  C), Max:98.7  F (37.1  C)     Patient Vitals for the past 72 hrs:   Weight   10/26/20 0626 77.5 kg (170 lb 13.7 oz)   10/25/20 0620 76.7 kg (169 lb 1.5 oz)        Intake/Output Summary (Last 24 hours) at 10/27/2020 1101  Last data filed at 10/27/2020 1021  Gross per 24 hour   Intake 2280 ml   Output 575 ml   Net 1705 ml      PHYSICAL EXAM   Constitutional: Age appropriate, in bed, no acute distress    Additional Comments:   ROS, FH, SH: See initial GI consult for details.     I have reviewed the patient's new clinical lab results:   Recent Labs   Lab Test 10/26/20  0806 10/25/20  0813 10/24/20  1856 10/24/20  1037 20  1224 20  1224   WBC 4.8 5.7  --  6.8   < >  --    HGB 13.2* 14.5  --  13.2*   < >  --    MCV 91 91  --  89   < >  --     203  --  220   < >  --    INR  --   --  0.93  --   --  0.94    < > = values in this interval not displayed.      Recent Labs   Lab Test 10/26/20  0806 10/25/20  0813 10/24/20  1442 10/24/20  1037    139  --  134   POTASSIUM 3.8 3.8 3.7 3.3*   CHLORIDE 107 106  --  98   CO2 26 28  --  28   BUN 8 6*  --  5*   CR 0.51* 0.55*  --  0.54*   ANIONGAP 7 5  --  8   ARYA 8.7 8.5  --  8.1*      Recent Labs   Lab Test 10/26/20  0806 10/25/20  0813 10/24/20  1037 10/17/20  1423 10/17/20  1423 10/08/20  1512 10/08/20  1408 19  1747 19  1747 19  0300 19  0300   ALBUMIN 3.1* 3.2* 3.3*   < > 3.8  --  3.3*   < > 4.4   < >  --    BILITOTAL 0.9 1.4* 1.2   < > 0.4  --  0.3   < > 0.5   < >  --    * 756* 944*   < > 317*  --  329*   < > 422*   < >  --    * 683* 1,379*   < > 367*  --  223*   " < > 221*   < >  --    ALKPHOS 156* 169* 161*   < > 116  --  118   < > 110   < >  --    PROTEIN  --   --   --   --   --  100*  --   --   --   --  100*   LIPASE  --   --   --   --  409*  --  674*  --  234   < >  --     < > = values in this interval not displayed.      10/25/20 Ultrasound abdomen doppler  FINDINGS:  Liver is moderately fatty infiltrated as evidenced by a  diffuse increase in echogenicity without worrisome focal solid  lesions. Gallbladder is unremarkable without stones or sludge.  Extrahepatic bile duct is normal in diameter. Pancreas is normal where  visualized.  Spleen is normal in appearance. Kidneys are normal in  size. There is no hydronephrosis. Proximal aorta and IVC are  nonaneurysmal.  The portal vein and splenic vein are patent with appropriate direction  of flow. The intrahepatic portal vessels are patent with appropriate  directional flow. The hepatic venous system is patent with appropriate  direction of flow. The hepatic artery is patent with a normal  resistive index.      Assessment: 30-year-old male with a history of EtOH abuse presenting with intoxication and significantly elevated transaminases.  His acetaminophen level was undetectable on admission however N-acetylcysteine was started empirically.  The patient had been taking acetaminophen on a regular basis so potentially this could be acetaminophen toxicity on top of EtOH hepatitis.  His LFTs have been trending down which is reassuring.  His EtOH withdrawals have been improving as well.  Given his frequent hospitalizations for EtOH intoxication and multiple attempts at rehab a commitment may need to be considered.     Plan:    -Diet as tolerated  -Acetylcysteine protocol completed  -Continue to follow LFTs daily.   -Discharge planning directly to treatment facility  -If he is unsuccessful at treatment would consider psychiatry consultation regarding commitment   -Follow up visit with our hepatology clinic will be arranged for  about 2-3 weeks to reassess his symptoms.   -No further GI recommendations at this time. Will sign off. Please call with any questions.    Vinod Amezquita PA-C  [facility]  Cell:  [phone] Monday through Friday 7050-1108  Office: [phone]

## 2020-10-27 NOTE — CONSULTS
"[facility]  Psychiatry Consultation - Follow-up note      Interim History:   Follow-up requested to assist in managing anxiety.  No acute issues overnight.  The patient was last seen by psychiatry consultation service on October 24 with Dr. Lombardi.  She initiated Zoloft and recommended increasing the dose after a few days.  Gabapentin was also started on an as-needed basis while continuing Seroquel.    On examination today, the patient was laying in his bed, focused on his phone, possibly watching a movie or videos.  He tells me that he has been feeling anxious however finds gabapentin to be slightly helpful.  He is sleeping well at night.  Appetite is improving.  Energy is improving.  Concentration is better.  He is looking forward to exploring treatment options as he desires maintaining sobriety.    He denied suicidal and homicidal thoughts.  He denied psychotic symptoms.  Tolerating his medications without side effects.           Medications:       folic acid  1 mg Oral Daily     gabapentin  600 mg Oral TID     multivitamin w/minerals  1 tablet Oral Daily     QUEtiapine  400 mg Oral At Bedtime     sertraline  25 mg Oral Daily     sodium chloride (PF)  3 mL Intracatheter Q8H     thiamine  100 mg Oral Daily          Allergies:   No Known Allergies       Labs:     Recent Results (from the past 24 hour(s))   Hepatic panel    Collection Time: 10/27/20 10:03 AM   Result Value Ref Range    Bilirubin Direct 0.2 0.0 - 0.2 mg/dL    Bilirubin Total 0.7 0.2 - 1.3 mg/dL    Albumin 3.2 (L) 3.4 - 5.0 g/dL    Protein Total 6.6 (L) 6.8 - 8.8 g/dL    Alkaline Phosphatase 147 40 - 150 U/L     (HH) 0 - 70 U/L     (H) 0 - 45 U/L          Psychiatric Examination:     BP 96/55 (BP Location: Right arm)   Pulse 82   Temp 97.5  F (36.4  C) (Oral)   Resp 18   Ht 1.727 m (5' 8\")   Wt 77.5 kg (170 lb 13.7 oz)   SpO2 97%   BMI 25.98 kg/m    Weight is 170 lbs 13.7 oz  Body mass index is 25.98 " kg/m .  Orthostatic Vitals     None            Appearance: awake, alert  Attitude:  cooperative  Eye Contact:  fair  Mood:  anxious  Affect:  appropriate and in normal range  Speech:  clear, coherent  Psychomotor Behavior:  tremor observed   Throught Process:  linear  Associations:  no loose associations  Thought Content:  no evidence of suicidal ideation or homicidal ideation and no evidence of psychotic thought  Insight:  fair  Judgement:  intact  Oriented to:  time, person, and place  Attention Span and Concentration:  intact  Recent and Remote Memory:  intact           DIagnoses:     Generalized anxiety disorder  Alcohol use disorder, severe      Recommendations:     To better address symptoms of anxiety, gabapentin may be scheduled and the dose may be increased to 600 mg.  The patient was agreeable to this plan.    Increase Zoloft to 50 mg daily to optimize its effectiveness for longer-term management of anxiety.    He remains agreeable to pursue residential chemical dependency treatment.  Documentation indicates that the chemical dependency consultant is involved and assisting in coordinating treatment options.    Please reconsult with Psychiatry as needed.   Osvaldo Freitas MD   Text Page

## 2020-10-28 VITALS
HEART RATE: 83 BPM | WEIGHT: 170.86 LBS | OXYGEN SATURATION: 100 % | SYSTOLIC BLOOD PRESSURE: 130 MMHG | DIASTOLIC BLOOD PRESSURE: 91 MMHG | HEIGHT: 68 IN | TEMPERATURE: 97.7 F | BODY MASS INDEX: 25.89 KG/M2 | RESPIRATION RATE: 18 BRPM

## 2020-10-28 LAB
ALBUMIN SERPL-MCNC: 3.3 G/DL (ref 3.4–5)
ALP SERPL-CCNC: 159 U/L (ref 40–150)
ALT SERPL W P-5'-P-CCNC: 476 U/L (ref 0–70)
AST SERPL W P-5'-P-CCNC: 209 U/L (ref 0–45)
BILIRUB DIRECT SERPL-MCNC: 0.2 MG/DL (ref 0–0.2)
BILIRUB SERPL-MCNC: 0.4 MG/DL (ref 0.2–1.3)
PROT SERPL-MCNC: 6.6 G/DL (ref 6.8–8.8)

## 2020-10-28 PROCEDURE — 250N000013 HC RX MED GY IP 250 OP 250 PS 637: Performed by: PSYCHIATRY & NEUROLOGY

## 2020-10-28 PROCEDURE — 80076 HEPATIC FUNCTION PANEL: CPT | Performed by: HOSPITALIST

## 2020-10-28 PROCEDURE — 36415 COLL VENOUS BLD VENIPUNCTURE: CPT | Performed by: HOSPITALIST

## 2020-10-28 PROCEDURE — 250N000013 HC RX MED GY IP 250 OP 250 PS 637: Performed by: HOSPITALIST

## 2020-10-28 PROCEDURE — 99239 HOSP IP/OBS DSCHRG MGMT >30: CPT | Performed by: HOSPITALIST

## 2020-10-28 RX ORDER — FOLIC ACID 1 MG/1
1 TABLET ORAL DAILY
Qty: 30 TABLET | Refills: 0 | COMMUNITY
Start: 2020-10-29 | End: 2020-10-28

## 2020-10-28 RX ORDER — MULTIPLE VITAMINS W/ MINERALS TAB 9MG-400MCG
1 TAB ORAL DAILY
Qty: 30 TABLET | Refills: 0 | Status: SHIPPED | OUTPATIENT
Start: 2020-10-29 | End: 2020-11-28

## 2020-10-28 RX ORDER — FOLIC ACID 1 MG/1
1 TABLET ORAL DAILY
Qty: 30 TABLET | Refills: 0 | Status: ON HOLD | OUTPATIENT
Start: 2020-10-29 | End: 2021-01-17

## 2020-10-28 RX ORDER — LANOLIN ALCOHOL/MO/W.PET/CERES
100 CREAM (GRAM) TOPICAL DAILY
Qty: 30 TABLET | Refills: 0 | Status: ON HOLD | OUTPATIENT
Start: 2020-10-28 | End: 2021-01-17

## 2020-10-28 RX ORDER — GABAPENTIN 300 MG/1
600 CAPSULE ORAL 3 TIMES DAILY
Qty: 30 CAPSULE | Refills: 0 | Status: SHIPPED | OUTPATIENT
Start: 2020-10-28 | End: 2020-11-10

## 2020-10-28 RX ORDER — LANOLIN ALCOHOL/MO/W.PET/CERES
100 CREAM (GRAM) TOPICAL DAILY
COMMUNITY
Start: 2020-10-28 | End: 2020-10-28

## 2020-10-28 RX ORDER — MULTIPLE VITAMINS W/ MINERALS TAB 9MG-400MCG
1 TAB ORAL DAILY
Refills: 0 | COMMUNITY
Start: 2020-10-29 | End: 2020-10-28

## 2020-10-28 RX ADMIN — MULTIPLE VITAMINS W/ MINERALS TAB 1 TABLET: TAB at 08:15

## 2020-10-28 RX ADMIN — SERTRALINE HYDROCHLORIDE 50 MG: 50 TABLET ORAL at 08:16

## 2020-10-28 RX ADMIN — THIAMINE HCL TAB 100 MG 100 MG: 100 TAB at 08:16

## 2020-10-28 RX ADMIN — FOLIC ACID 1 MG: 1 TABLET ORAL at 08:15

## 2020-10-28 RX ADMIN — GABAPENTIN 600 MG: 300 CAPSULE ORAL at 08:16

## 2020-10-28 ASSESSMENT — ACTIVITIES OF DAILY LIVING (ADL)
ADLS_ACUITY_SCORE: 15

## 2020-10-28 NOTE — PLAN OF CARE
"Discharge    Patient discharged to inpatient treatment facility via taxi.  Care plan note:  patient continues to have some anxiety but he states he is \"fine\".  LFT's improving, denies pain, good intake.  Up ad lashon.  Anxious to get to treatment.  Calm and cooperative.    Listed belongings gathered and returned to patient. Yes  Care Plan and Patient education resolved: Yes  Prescriptions if needed, hard copies sent with patient  filled, will be sent over to treatment facility when filled  Home and hospital acquired medications returned to patient: Yes  Medication Bin checked and emptied on discharge Yes  Follow up appointment made for patient: Yes, care coordinator making and will inform patient via cell phone    " placed on observation for safety, social work eval in AM

## 2020-10-28 NOTE — DISCHARGE SUMMARY
Discharge Summary  Hospitalist    Date of Admission:  10/23/2020  Date of Discharge:  10/28/2020  9:59 AM  Discharging Provider: Nilam Palmer MD    Primary Care Physician   Physician No Ref-Primary  Primary Care Provider Phone Number: None  Primary Care Provider Fax Number: [phone]    PRINCIPAL DIAGNOSIS  Acute toxic encephalopathy due to alcohol intoxication:   Acute alcohol withdrawal.  Severe trans-aminitis: Component of alcohol hepatitis, superimposed Tylenol use.  Hypokalemia, hypomagnesemia from alcohol use.  Generalized anxiety disorder.  Tobacco use: Reports chewing tobacco.     Past Medical History:   Diagnosis Date     Alcohol abuse      Anxiety      Hepatitis C        History of Present Illness   Mohit Porter is an 30 year old male who presented with alcohol intoxication    Hospital Course   Mohit Porter is a 30 year old gentleman with chronic active alcohol use admitted 10/23/2020 with alcohol intoxication.     Acute toxic encephalopathy due to alcohol intoxication:   Acute alcohol withdrawal.  It seems he has had numerous recent ED visits and prior treatments for alcohol use disorder. Prior withdrawal documented.   Managed with supportive care, CIWA protocol with Ativan.  Continue thiamine, multivitamin, folic acid supplements.  Followed by psychiatry, chemical dependency team,  and opted to go in for voluntary rehab.  Dismissed,  assisting with transition.  Did discuss the need to consider alcohol cessation, patient appears motivated.     Severe trans-aminitis: Component of alcohol hepatitis, superimposed Tylenol use.  Admits to Tylenol use almost on daily basis for pain.  , AST 1557, , CK 61.  US with dopplers negative abdominal ultrasound. No venous thrombosis demonstrated.  Liver enzymes trending down.  [facility] following, completed N-acetylcysteine protocol.  Follow up visit [facility] hepatology clinic in 2-3 weeks to  reassess his symptoms  Emphasized alcohol cessation.  Needs to check BMP in 1 week.  Did discuss need to avoid hepatotoxic drugs     Hypokalemia, hypomagnesemia from alcohol use.  Was on replacement, corrected     Generalized anxiety disorder.  Followed by psychiatry, started on Zoloft, gabapentin.  Recommended to continue PTA Seroquel.  Follow-up with psychiatry as outpatient.      Tobacco use: Reports chewing tobacco.   PRN nicotine gum prescribed.  Discussed need for tobacco cessation.     Hx of hepatitis C: See this documented in notes, but no confirmatory labs appreciated. No indication of prior treatment. Antibody obtained 2/17/2019 nonreactive.     Nilam Palmer MD, MD    Pending Results   Unresulted Labs Ordered in the Past 30 Days of this Admission     No orders found from 9/23/2020 to 10/24/2020.             Physical Exam   Vitals:    10/23/20 1926 10/25/20 0620 10/26/20 0626   Weight: 79.4 kg (175 lb) 76.7 kg (169 lb 1.5 oz) 77.5 kg (170 lb 13.7 oz)     Vital Signs with Ranges  Temp:  [97  F (36.1  C)-98.4  F (36.9  C)] 97.7  F (36.5  C)  Pulse:  [67-83] 83  Resp:  [16-18] 18  BP: (121-130)/(76-91) 130/91  SpO2:  [96 %-100 %] 100 %  I/O last 3 completed shifts:  In: 960 [P.O.:960]  Out: -   PHYSICAL EXAM  GENERAL: Patient is in no distress. Alert and oriented.  LUNGS: Respirations unlabored  ABDOMEN: Soft, no abdominal tenderness, bowel sounds heard   NEURO: Moving all extremities.  EXTREMITIES: No pedal edema, tremulous  SKIN: Warm, dry.   PSYCHIATRY Cooperative    )Consultations This Hospital Stay   PSYCHIATRY IP CONSULT  GASTROENTEROLOGY IP CONSULT  CHEMICAL DEPENDENCY IP CONSULT  CARE MANAGEMENT / SOCIAL WORK IP CONSULT  PSYCHIATRY IP CONSULT    Time Spent on this Encounter   INilam MD, personally saw the patient today and spent greater than 30 minutes discharging this patient.  Discussed with patient, bedside RN, care team.    Discharge Orders      Reason for your hospital stay     Were admitted to the hospital with alcohol intoxication and elevated liver enzymes.  Was followed by psychiatry, chemical dependency team, social work and opted for rehabilitation.  Was also followed by gastroenterology for elevated liver enzymes.     Follow-up and recommended labs and tests     Follow up with primary care provider within 5 days, must establish primary care.  Must check CMP complete metabolic panel in 5 days or earlier if symptomatic.  Follow-up with psychiatry as outpatient.  Pursue rehab treatment.  Follow-up in hepatology clinic [[facility]] in 2 to 3 weeks     Activity    Your activity upon discharge: activity as tolerated and no driving for today     Monitor and record    Monitor daily blood pressure readings, review on provider visit.     Discharge Instructions    Strongly consider abstinence from alcohol use.  Consider abstinence from nicotine use.     Diet    Follow this diet upon discharge: Orders Placed This Encounter      Regular Diet Adult       Discharge Medications   Current Discharge Medication List      START taking these medications    Details   folic acid (FOLVITE) 1 MG tablet Take 1 tablet (1 mg) by mouth daily  Qty: 30 tablet, Refills: 0    Associated Diagnoses: Alcoholic intoxication without complication (H)      gabapentin (NEURONTIN) 300 MG capsule Take 2 capsules (600 mg) by mouth 3 times daily  Qty: 30 capsule, Refills: 0    Associated Diagnoses: Alcoholic intoxication without complication (H)      multivitamin w/minerals (THERA-VIT-M) tablet Take 1 tablet by mouth daily  Qty:  , Refills: 0    Associated Diagnoses: Alcoholic intoxication without complication (H)      nicotine (NICORETTE) 2 MG gum Place 1 each (2 mg) inside cheek every 2 hours as needed for smoking cessation  Qty:      Associated Diagnoses: Alcoholic intoxication without complication (H)      sertraline (ZOLOFT) 50 MG tablet Take 1 tablet (50 mg) by mouth daily  Qty: 10 tablet, Refills: 0     Associated Diagnoses: Alcoholic intoxication without complication (H)      thiamine (B-1) 100 MG tablet Take 1 tablet (100 mg) by mouth daily  Qty:      Associated Diagnoses: Alcoholic intoxication without complication (H)         CONTINUE these medications which have NOT CHANGED    Details   QUEtiapine (SEROQUEL) 400 MG tablet Take 400 mg by mouth At Bedtime         STOP taking these medications       hydrOXYzine (VISTARIL) 25 MG capsule Comments:   Reason for Stopping:             Allergies   No Known Allergies    Discharge Disposition   Discharged -voluntarily will go to inpatient rehab.  Condition at discharge: Good    DATA  Most Recent 3 CBC's:  Recent Labs   Lab Test 10/26/20  0806 10/25/20  0813 10/24/20  1037   WBC 4.8 5.7 6.8   HGB 13.2* 14.5 13.2*   MCV 91 91 89    203 220      Most Recent 3 BMP's:  Recent Labs   Lab Test 10/26/20  0806 10/25/20  0813 10/24/20  1442 10/24/20  1037    139  --  134   POTASSIUM 3.8 3.8 3.7 3.3*   CHLORIDE 107 106  --  98   CO2 26 28  --  28   BUN 8 6*  --  5*   CR 0.51* 0.55*  --  0.54*   ANIONGAP 7 5  --  8   ARYA 8.7 8.5  --  8.1*   GLC 82 81  --  94     Most Recent 2 LFT's:  Recent Labs   Lab Test 10/28/20  0558 10/27/20  1003   * 317*   * 534*   ALKPHOS 159* 147   BILITOTAL 0.4 0.7     Most Recent INR's and Anticoagulation Dosing History:  Anticoagulation Dose History     Recent Dosing and Labs Latest Ref Rng & Units 6/1/2020 10/24/2020    INR 0.86 - 1.14 0.94 0.93        Most Recent 3 Troponin's:  Recent Labs   Lab Test 10/08/20  1408   TROPI <0.015     Most Recent Cholesterol Panel:  Recent Labs   Lab Test 02/17/19  0730   CHOL 114   LDL 38   HDL 63   TRIG 63     Most Recent 6 Bacteria Isolates From Any Culture (See EPIC Reports for Culture Details):  Recent Labs   Lab Test 10/08/20  1652 10/08/20  1642 10/08/20  1512 08/10/19  1810 08/10/19  1800 02/17/19  1045   CULT No growth No growth No growth No growth No growth No growth     Most Recent  TSH, T4 and A1c Labs:  Recent Labs   Lab Test 02/17/19  0730   TSH 2.26     Results for orders placed or performed during the hospital encounter of 10/23/20   US Abdomen Complete w Doppler Complete    Narrative    ULTRASOUND ABDOMEN COMPLETE WITH DOPPLER 10/25/2020 8:15 AM     HISTORY: Severe transaminitis.    COMPARISON: August 6, 2019    TECHNIQUE: Ultrasound gray scale, Color Doppler flow, and spectral  Doppler waveform analysis performed.    FINDINGS:  Liver is moderately fatty infiltrated as evidenced by a  diffuse increase in echogenicity without worrisome focal solid  lesions. Gallbladder is unremarkable without stones or sludge.  Extrahepatic bile duct is normal in diameter. Pancreas is normal where  visualized.  Spleen is normal in appearance. Kidneys are normal in  size. There is no hydronephrosis. Proximal aorta and IVC are  nonaneurysmal.    The portal vein and splenic vein are patent with appropriate direction  of flow. The intrahepatic portal vessels are patent with appropriate  directional flow. The hepatic venous system is patent with appropriate  direction of flow. The hepatic artery is patent with a normal  resistive index.      Impression    IMPRESSION:   1. Negative abdominal ultrasound.  2. No venous thrombosis demonstrated.    MADDI GHOTRA MD

## 2020-10-28 NOTE — PLAN OF CARE
Cognitive Concerns/ Orientation : A&O. Anxiety improved.  BEHAVIOR & AGGRESSION TOOL COLOR: Green   CIWA SCORE: 5 and 2  ABNL VS/O2: VSS on RA   MOBILITY: up independently  PAIN MANAGMENT: denies pain  DIET: Regular  BOWEL/BLADDER: Continent  ABNL LAB/BG: LFT elevated, trending down, recheck in am  DRAIN/DEVICES: no PIV  TELEMETRY RHYTHM: n/a  SKIN: WDL  TESTS/PROCEDURES: n/a  D/C DAY/GOALS/PLACE: Tomorrow. Taxi scheduled for 0900. Pt aware and looking forward to treatment. [facility] IN [facility]    OTHER IMPORTANT INFO: GI following signed off.  Psych  increased Gabapentin dose.

## 2020-10-28 NOTE — PROGRESS NOTES
Care Management Discharge Note    Discharge Planning:  Expected Discharge Date: 10/28/20(RX program)     Concerns to be Addressed:         Anticipated Discharge Disposition: Inpatient Chemical Dependency  Anticipated Discharge Services: Transportation Services  Anticipated Discharge DME:      Patient/family educated on Medicare website which has current facility and service quality ratings:    Referrals Placed by CM/SW:    Education Provided on the Discharge Plan:    Patient/Family in Agreement with the Plan: yes     Disposition Comments:  Patient was sent by [facility] today to [facility] at [address].  This facility is where he will attend the treatment program and then [facility] provides housing off site.  Patient medications were not ready at time of discharge, the medications were sent over early afternoon to [address]  Patient and [facility] office  was aware this was the plan for his medication to be delivered    Selected Continued Care - Discharged on 10/28/2020 Admission date: 10/23/2020 - Discharge disposition: Home or Self Care   No services have been selected for the patient.         Additional In      GENESIS CarnesSW

## 2020-10-28 NOTE — DISCHARGE INSTRUCTIONS
Patient medications sent to security AND NOW LOCKED IN LOCKER 11 IN RED POD- FOR FAST DISCHARGE :)

## 2020-10-28 NOTE — PLAN OF CARE
DATE & TIME: 10/27-10/28/20 Night shift  Cognitive Concerns/ Orientation : A&O. Anxiety improving  BEHAVIOR & AGGRESSION TOOL COLOR: Green   CIWA SCORE: 0, slept all night  ABNL VS/O2: VSS on RA   MOBILITY: up independently  PAIN MANAGMENT: denies pain  DIET: Regular  BOWEL/BLADDER: Continent  ABNL LAB/BG: LFT elevated, trending down, recheck in am  DRAIN/DEVICES: no PIV  TELEMETRY RHYTHM: n/a  SKIN: WDL  TESTS/PROCEDURES: n/a  D/C DAY/GOALS/PLACE: Today- Taxi scheduled for 0900. Pt aware and looking forward to treatment  OTHER IMPORTANT INFO: GI following and signed off.  Psych  increased Gabapentin dose.

## 2020-11-10 ENCOUNTER — OFFICE VISIT (OUTPATIENT)
Dept: FAMILY MEDICINE | Facility: CLINIC | Age: 30
End: 2020-11-10
Payer: COMMERCIAL

## 2020-11-10 VITALS
TEMPERATURE: 98.8 F | OXYGEN SATURATION: 99 % | HEART RATE: 83 BPM | BODY MASS INDEX: 26 KG/M2 | SYSTOLIC BLOOD PRESSURE: 100 MMHG | DIASTOLIC BLOOD PRESSURE: 68 MMHG | WEIGHT: 171 LBS

## 2020-11-10 DIAGNOSIS — F10.288 ALCOHOL DEPENDENCE WITH OTHER ALCOHOL-INDUCED DISORDER (H): ICD-10-CM

## 2020-11-10 DIAGNOSIS — Z09 HOSPITAL DISCHARGE FOLLOW-UP: Primary | ICD-10-CM

## 2020-11-10 DIAGNOSIS — R79.89 ELEVATED LIVER FUNCTION TESTS: ICD-10-CM

## 2020-11-10 PROCEDURE — 99204 OFFICE O/P NEW MOD 45 MIN: CPT | Performed by: NURSE PRACTITIONER

## 2020-11-10 PROCEDURE — 36415 COLL VENOUS BLD VENIPUNCTURE: CPT | Performed by: NURSE PRACTITIONER

## 2020-11-10 PROCEDURE — 80053 COMPREHEN METABOLIC PANEL: CPT | Performed by: NURSE PRACTITIONER

## 2020-11-10 NOTE — PROGRESS NOTES
SUBJECTIVE:                                                    Mohit Porter is a 30 year old male who presents to clinic today for the following health issues:    HPI  Patient reports that he is in treatment at [facility] since he was discharged from the hospital 10/28/2020.    Reports that anxiety and sleep have been a big factor with his alcohol use. Patient reports that he started drinking again to help with inmsomnia. Patient notes that he was a functional alcoholic until 27 years of age. Carrie was aware at the point, but after they had a daughter together, his fiance left with daughter because of safety concerns. Patient reports that since then he has been in and out of treatment.   Patient notes that he had never hospitalized prior to 2014- before he started struggling with alcohol use. Patient notes that he was typical healthy prior to all this. Was big into fitness in the past.   Denies any other drug use.   Has been on naltrexone and vivitrol. Would stop his vivitrol to use alcohol.   Was in sober house prior to homelessness and then in and out of the ED. Hotel for a month before this last treatment.   Has been using his seroquel as needed.   Anxiety is the most concern. Has been on selective serotonin reuptake inhibitor and does not like the side effects.   Would consider continuing with therapy weekly.       Hospital Follow-up Visit:  Hospital/Nursing Home/IP Rehab Facility: [facility]  Date of Admission: 10/23/2020  Date of Discharge: 10/28/2020  Reason(s) for Admission: Alcohol intoxication.       Was your hospitalization related to COVID-19? No   Problems taking medications regularly:  None  Medication changes since discharge: None  Problems adhering to non-medication therapy:  None    Summary of hospitalization:  [facility] discharge summary reviewed  Diagnostic Tests/Treatments reviewed.  Follow up needed: Labs recheck. Outpatient psychiatry recommended. Hepatology  follow-up 2-3 weeks.   Other Healthcare Providers Involved in Patient s Care:         Specialist appointment - Patient needs to call and scgedule with [facility].   Update since discharge: improved.       Post Discharge Medication Reconciliation: discharge medications reconciled, continue medications without change.  Plan of care communicated with patient                    Problems taking medications regularly: No    Medication side effects: none    Diet: regular (no restrictions)    Social History     Tobacco Use     Smoking status: Never Smoker     Smokeless tobacco: Current User     Types: Chew   Substance Use Topics     Alcohol use: Yes     Comment: 1.75L vodka/day        Problem list and histories reviwed & adjusted, as indicated.  Additional history: as documented    Patient Active Problem List   Diagnosis     Alcohol withdrawal (H)     Alcohol intoxication (H)     Alcohol dependence (H)     Elevated liver function tests     Altered mental status, unspecified altered mental status type     History reviewed. No pertinent surgical history.    Social History     Tobacco Use     Smoking status: Never Smoker     Smokeless tobacco: Current User     Types: Chew   Substance Use Topics     Alcohol use: Yes     Comment: 1.75L vodka/day     History reviewed. No pertinent family history.        Current Outpatient Medications   Medication Sig Dispense Refill     folic acid (FOLVITE) 1 MG tablet Take 1 tablet (1 mg) by mouth daily 30 tablet 0     gabapentin (NEURONTIN) 300 MG capsule Take 2 capsules (600 mg) by mouth 3 times daily 30 capsule 0     multivitamin w/minerals (THERA-VIT-M) tablet Take 1 tablet by mouth daily 30 tablet 0     nicotine (NICORETTE) 2 MG gum Place 1 each (2 mg) inside cheek every 2 hours as needed for smoking cessation 30 tablet 0     QUEtiapine (SEROQUEL) 400 MG tablet Take 400 mg by mouth At Bedtime       sertraline (ZOLOFT) 50 MG tablet Take 1 tablet (50 mg) by mouth daily 10 tablet 0     thiamine  (B-1) 100 MG tablet Take 1 tablet (100 mg) by mouth daily 30 tablet 0     No Known Allergies  Recent Labs   Lab Test 10/28/20  0558 10/27/20  1003 10/26/20  0806 10/25/20  0813 02/17/19  0730 02/17/19  0730   LDL  --   --   --   --   --  38   HDL  --   --   --   --   --  63   TRIG  --   --   --   --   --  63   * 534* 597* 756*   < >  --    CR  --   --  0.51* 0.55*   < >  --    GFRESTIMATED  --   --  >90 >90   < >  --    GFRESTBLACK  --   --  >90 >90   < >  --    POTASSIUM  --   --  3.8 3.8   < >  --    TSH  --   --   --   --   --  2.26    < > = values in this interval not displayed.      BP Readings from Last 3 Encounters:   11/10/20 100/68   10/28/20 (!) 130/91   10/23/20 (!) 138/98    Wt Readings from Last 3 Encounters:   11/10/20 77.6 kg (171 lb)   10/26/20 77.5 kg (170 lb 13.7 oz)   10/23/20 79.4 kg (175 lb)          Labs reviewed in EPIC  Problem list, Medication list, Allergies, and Medical/Social/Surgical histories reviewed in [facility] and updated as appropriate.    ROS: Constitutional, neuro, ENT, endocrine, pulmonary, cardiac, gastrointestinal, genitourinary, musculoskeletal, integument and psychiatric systems are negative, except as otherwise noted above in the HPI.     OBJECTIVE:                                                    /68   Pulse 83   Temp 98.8  F (37.1  C) (Temporal)   Wt 77.6 kg (171 lb)   SpO2 99%   BMI 26.00 kg/m    Body mass index is 26 kg/m .    GENERAL: healthy, alert and no distress  EYES: Eyes grossly normal to inspection, PERRL and conjunctivae and sclerae normal  NECK: no adenopathy, no asymmetry, masses, or scars and thyroid normal to palpation  RESP: lungs clear to auscultation - no rales, rhonchi or wheezes  CV: regular rate and rhythm, normal S1 S2, no S3 or S4, no murmur, click or rub, no peripheral edema and peripheral pulses strong  ABDOMEN: soft, nontender, no hepatosplenomegaly, no masses and bowel sounds normal  MS: no gross musculoskeletal defects noted, no  edema  NEURO: Normal strength and tone, mentation intact and speech normal    Mental Status Assessment:  Appearance:   Appropriate   Eye Contact:   Good   Psychomotor Behavior: Normal   Attitude:   Cooperative  Guarded   Orientation:   All  Speech   Rate / Production: Normal    Volume:  Normal   Mood:    Normal  Affect:    Appropriate   Thought Content:  Clear   Thought Form:  Coherent  Logical   Insight:    Good   Attention Span and Concentration: appropriate  Recent and Remote Memory:  intact  Fund of Knowledge: appropriate  Muscle Strength and Tone: normal   Suicidal Ideation: reports no thoughts, no intention    See [facility] notes     Diagnostic Test Results:  Results for orders placed or performed in visit on 11/10/20   Comprehensive metabolic panel (BMP + Alb, Alk Phos, ALT, AST, Total. Bili, TP)     Status: Abnormal   Result Value Ref Range    Sodium 143 133 - 144 mmol/L    Potassium 4.2 3.4 - 5.3 mmol/L    Chloride 107 94 - 109 mmol/L    Carbon Dioxide 30 20 - 32 mmol/L    Anion Gap 6 3 - 14 mmol/L    Glucose 78 70 - 99 mg/dL    Urea Nitrogen 7 7 - 30 mg/dL    Creatinine 0.64 (L) 0.66 - 1.25 mg/dL    GFR Estimate >90 >60 mL/min/[1.73_m2]    GFR Estimate If Black >90 >60 mL/min/[1.73_m2]    Calcium 9.3 8.5 - 10.1 mg/dL    Bilirubin Total 0.3 0.2 - 1.3 mg/dL    Albumin 4.2 3.4 - 5.0 g/dL    Protein Total 7.4 6.8 - 8.8 g/dL    Alkaline Phosphatase 86 40 - 150 U/L     (H) 0 - 70 U/L    AST 47 (H) 0 - 45 U/L        ASSESSMENT/PLAN:                                                    1. Hospital discharge follow-up  2. Alcohol dependence with other alcohol-induced disorder (H)  3. Elevated liver function tests  See HPI. Patient is here for hospital follow-up and to get a recheck on his liver function tests. Patient is also struggling with his anxiety but feels like he would do well with ongoing individual therapy at the sober house and also when he discharged.   Discussed with patient about medications options to  help with his anxiety but at the moment, he is ok with using previously prescribed  quetiapine as needed for his anxiety and his insomnia.   Patient is also struggling with not having to see his daughter with everything going on. Encouraged him to continue working on his sobriety and then work on repairing his broken relationships.   Discussed with patient about focusing on getting a dependable sponsor and getting groups set-up prior to his discharge to help with keeping his sobriety.   -Encouraged patient to call and schedule an appointment with [facility] even if his labs were back to the normal range because of the fatty liver disease noted on his recent abdominal ultrasound.   - Comprehensive metabolic panel (BMP + Alb, Alk Phos, ALT, AST, Total. Bili, TP)      Patient Instructions   - Labs today, will call with the results.   - Call and schedule to establish care in about 4 weeks.   - Call clinic with any questions or concerns.         MILAGRO Fraire [facility] [place]

## 2020-11-10 NOTE — PATIENT INSTRUCTIONS
- Labs today, will call with the results.   - Call and schedule to establish care in about 4 weeks.   - Call clinic with any questions or concerns.

## 2020-11-11 LAB
ALBUMIN SERPL-MCNC: 4.2 G/DL (ref 3.4–5)
ALP SERPL-CCNC: 86 U/L (ref 40–150)
ALT SERPL W P-5'-P-CCNC: 103 U/L (ref 0–70)
ANION GAP SERPL CALCULATED.3IONS-SCNC: 6 MMOL/L (ref 3–14)
AST SERPL W P-5'-P-CCNC: 47 U/L (ref 0–45)
BILIRUB SERPL-MCNC: 0.3 MG/DL (ref 0.2–1.3)
BUN SERPL-MCNC: 7 MG/DL (ref 7–30)
CALCIUM SERPL-MCNC: 9.3 MG/DL (ref 8.5–10.1)
CHLORIDE SERPL-SCNC: 107 MMOL/L (ref 94–109)
CO2 SERPL-SCNC: 30 MMOL/L (ref 20–32)
CREAT SERPL-MCNC: 0.64 MG/DL (ref 0.66–1.25)
GFR SERPL CREATININE-BSD FRML MDRD: >90 ML/MIN/{1.73_M2}
GLUCOSE SERPL-MCNC: 78 MG/DL (ref 70–99)
POTASSIUM SERPL-SCNC: 4.2 MMOL/L (ref 3.4–5.3)
PROT SERPL-MCNC: 7.4 G/DL (ref 6.8–8.8)
SODIUM SERPL-SCNC: 143 MMOL/L (ref 133–144)

## 2020-11-12 ENCOUNTER — MYC MEDICAL ADVICE (OUTPATIENT)
Dept: FAMILY MEDICINE | Facility: CLINIC | Age: 30
End: 2020-11-12

## 2020-11-13 NOTE — TELEPHONE ENCOUNTER
Good morning,     Yes, your results are almost back to normal. I would still encourage you to call and set-up an appointment with [facility] as instructed at discharge. They wanted you to follow-up within 2-3 weeks, they might also check labs again at the appointment.   MILAGRO gAuilar CNP

## 2020-12-30 ENCOUNTER — HOSPITAL ENCOUNTER (EMERGENCY)
Facility: CLINIC | Age: 30
Discharge: HOME | End: 2020-12-31
Attending: EMERGENCY MEDICINE | Admitting: EMERGENCY MEDICINE
Payer: COMMERCIAL

## 2020-12-30 DIAGNOSIS — U07.1 INFECTION DUE TO 2019 NOVEL CORONAVIRUS: ICD-10-CM

## 2020-12-30 DIAGNOSIS — F10.920 ALCOHOLIC INTOXICATION WITHOUT COMPLICATION (H): ICD-10-CM

## 2020-12-30 LAB — ALCOHOL BREATH TEST: 0.29 (ref 0–0.01)

## 2020-12-30 PROCEDURE — 99283 EMERGENCY DEPT VISIT LOW MDM: CPT

## 2020-12-30 PROCEDURE — 82075 ASSAY OF BREATH ETHANOL: CPT

## 2020-12-30 RX ORDER — HYDROXYZINE HYDROCHLORIDE 25 MG/1
25 TABLET, FILM COATED ORAL EVERY 4 HOURS PRN
Status: ON HOLD | COMMUNITY
End: 2021-01-19

## 2020-12-30 RX ORDER — MULTIVITAMIN,THERAPEUTIC
1 TABLET ORAL DAILY
Status: ON HOLD | COMMUNITY
End: 2021-01-17

## 2020-12-30 NOTE — ED PROVIDER NOTES
"  History   Chief Complaint:  Alcohol Intoxication     The history is provided by the EMS personnel. History limited by: Patient refusing to speak.      Mohit Porter is a 30 year old male with history of alcohol abuse and withdrawal who presents via EMS with alcohol intoxication. The patient was found by hotel staff, and when asked to leave he would not respond. EMS was called and he was able to walk himself to the stretcher, but still would not speak to EMS personnel. They did notice he appeared to be stumbling and intoxicated when he stood up. Upon arrival here, he is awake, but still not speaking to any staff. Notably, EMS reports that he was seen two days ago for the same issue.     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     Physical Exam     Patient Vitals for the past 24 hrs:   BP Temp Temp src Pulse Resp SpO2 Height   12/30/20 1800 -- -- -- -- -- 98 % --   12/30/20 1700 -- -- -- -- -- 94 % --   12/30/20 1600 -- -- -- -- -- 97 % --   12/30/20 1515 -- -- -- -- -- 96 % --   12/30/20 1500 -- -- -- -- -- 96 % --   12/30/20 1450 111/69 98.7  F (37.1  C) Temporal 115 16 99 % 1.727 m (5' 8\")       Physical Exam   SKIN:  Warm, dry.  No jaundice.  Atraumatic.  HEMATOLOGIC/IMMUNOLOGIC/LYMPHATIC:  No pallor.  No limb edema.  HENT: Painless passive range of motion of head and neck.  EYES:  Conjunctivae injected, anicteric.  CARDIOVASCULAR: Tachycardic rate with regular rhythm.  No murmur.  RESPIRATORY:  No respiratory distress, breath sounds equal and normal.  GASTROINTESTINAL:  Soft, nontender abdomen.  MUSCULOSKELETAL: Normal body habitus.  NEUROLOGIC:  " Alert, patient will not speak.  PSYCHIATRIC: Unable to perform psychiatric exam as patient will not speak on arrival.    Emergency Department Course   Laboratory:  Alcohol breath test POCT: 0.295 (H)     Emergency Department Course:    Reviewed:  1505 I reviewed vitals, past medical history and care everywhere    Assessments:  1500 I performed a physical exam of the patient as documented above.   2000 I rechecked the patient.   2157 I rechecked the patient.     Disposition:  The patient was discharged home.       Impression & Plan   Medical Decision Making:  Mohit Porter presented from a hotel with concern of intoxication and a welfare check. On arrival the patient would not speak, although he would quickly open his eyes to questioning, but again not answering. Eventually he did perform a breathalyzer which did reveal intoxication. On recheck, the patient was still quite intoxicated and essentially nonverbal and not interacting, but easily rousable. At this time the patient seems too intoxicated to safely send to detox and to get a better since of his mental status and any symptoms given he won't communicate. The patient will ultimately be discharged.     Diagnosis:    ICD-10-CM    1. Alcoholic intoxication without complication (H)  F10.920        Discharge Medications:  New Prescriptions    No medications on file       Scribe Disclosure:  FLAVIO, Yulissa Hernandez, am serving as a scribe at 3:06 PM on 12/30/2020 to document services personally performed by Alfredo Domínguez MD based on my observations and the provider's statements to me.          Alfredo Domínguez MD  12/30/20 6048

## 2020-12-30 NOTE — ED AVS SNAPSHOT
[facility] Emergency Dept  [address] [address]  Phone: [phone]  Fax: [phone]                                    Mohit Porter   MRN: [id number]    Department: [facility] Emergency Dept   Date of Visit: 12/30/2020           After Visit Summary Signature Page    I have received my discharge instructions, and my questions have been answered. I have discussed any challenges I see with this plan with the nurse or doctor.    ..........................................................................................................................................  Patient/Patient Representative Signature      ..........................................................................................................................................  Patient Representative Print Name and Relationship to Patient    ..................................................               ................................................  Date                                   Time    ..........................................................................................................................................  Reviewed by Signature/Title    ...................................................              ..............................................  Date                                               Time          22EPIC Rev 08/18

## 2020-12-30 NOTE — ED TRIAGE NOTES
At a motel.  Found in a room by staff.  Pt refused to get up so they called EMS.  Pt got up and walked to the stretcher of EMS.  Refused to talk to them or me.  Did sternal rub but continues to refuse to talk.  He opens his eyes.  Lots of alcohol bottles laying next to his bed.  EMS said he was here 2 days ago for the same thing.

## 2020-12-31 ENCOUNTER — HOSPITAL ENCOUNTER (INPATIENT)
Age: 30
End: 2020-12-31
Payer: COMMERCIAL

## 2020-12-31 VITALS
RESPIRATION RATE: 16 BRPM | SYSTOLIC BLOOD PRESSURE: 109 MMHG | OXYGEN SATURATION: 92 % | DIASTOLIC BLOOD PRESSURE: 78 MMHG | HEIGHT: 68 IN | HEART RATE: 115 BPM | TEMPERATURE: 98.7 F | BODY MASS INDEX: 26 KG/M2

## 2020-12-31 LAB
ALBUMIN SERPL-MCNC: 4.1 G/DL (ref 3.4–5)
ALP SERPL-CCNC: 66 U/L (ref 40–150)
ALT SERPL W P-5'-P-CCNC: 103 U/L (ref 0–70)
AMPHETAMINES UR QL SCN: NEGATIVE
ANION GAP SERPL CALCULATED.3IONS-SCNC: 13 MMOL/L (ref 3–14)
AST SERPL W P-5'-P-CCNC: 96 U/L (ref 0–45)
BARBITURATES UR QL: NEGATIVE
BASOPHILS # BLD AUTO: 0 10E9/L (ref 0–0.2)
BASOPHILS NFR BLD AUTO: 0.2 %
BENZODIAZ UR QL: NEGATIVE
BILIRUB SERPL-MCNC: 0.3 MG/DL (ref 0.2–1.3)
BUN SERPL-MCNC: 14 MG/DL (ref 7–30)
CALCIUM SERPL-MCNC: 8.6 MG/DL (ref 8.5–10.1)
CANNABINOIDS UR QL SCN: NEGATIVE
CHLORIDE SERPL-SCNC: 99 MMOL/L (ref 94–109)
CO2 SERPL-SCNC: 26 MMOL/L (ref 20–32)
COCAINE UR QL: NEGATIVE
CREAT SERPL-MCNC: 0.78 MG/DL (ref 0.66–1.25)
DIFFERENTIAL METHOD BLD: ABNORMAL
EOSINOPHIL # BLD AUTO: 0 10E9/L (ref 0–0.7)
EOSINOPHIL NFR BLD AUTO: 0 %
ERYTHROCYTE [DISTWIDTH] IN BLOOD BY AUTOMATED COUNT: 13.5 % (ref 10–15)
GFR SERPL CREATININE-BSD FRML MDRD: >90 ML/MIN/{1.73_M2}
GLUCOSE SERPL-MCNC: 85 MG/DL (ref 70–99)
HCT VFR BLD AUTO: 43.9 % (ref 40–53)
HGB BLD-MCNC: 14.9 G/DL (ref 13.3–17.7)
IMM GRANULOCYTES # BLD: 0.1 10E9/L (ref 0–0.4)
IMM GRANULOCYTES NFR BLD: 0.4 %
LABORATORY COMMENT REPORT: ABNORMAL
LYMPHOCYTES # BLD AUTO: 1.7 10E9/L (ref 0.8–5.3)
LYMPHOCYTES NFR BLD AUTO: 12.8 %
MCH RBC QN AUTO: 29.8 PG (ref 26.5–33)
MCHC RBC AUTO-ENTMCNC: 33.9 G/DL (ref 31.5–36.5)
MCV RBC AUTO: 88 FL (ref 78–100)
MONOCYTES # BLD AUTO: 1.3 10E9/L (ref 0–1.3)
MONOCYTES NFR BLD AUTO: 9.4 %
NEUTROPHILS # BLD AUTO: 10.4 10E9/L (ref 1.6–8.3)
NEUTROPHILS NFR BLD AUTO: 77.2 %
NRBC # BLD AUTO: 0 10*3/UL
NRBC BLD AUTO-RTO: 0 /100
OPIATES UR QL SCN: NEGATIVE
PCP UR QL SCN: NEGATIVE
PLATELET # BLD AUTO: 343 10E9/L (ref 150–450)
POTASSIUM SERPL-SCNC: 3.6 MMOL/L (ref 3.4–5.3)
PROT SERPL-MCNC: 7.4 G/DL (ref 6.8–8.8)
RBC # BLD AUTO: 5 10E12/L (ref 4.4–5.9)
SARS-COV-2 RNA SPEC QL NAA+PROBE: POSITIVE
SODIUM SERPL-SCNC: 138 MMOL/L (ref 133–144)
SPECIMEN SOURCE: ABNORMAL
WBC # BLD AUTO: 13.4 10E9/L (ref 4–11)

## 2020-12-31 PROCEDURE — C9803 HOPD COVID-19 SPEC COLLECT: HCPCS

## 2020-12-31 PROCEDURE — 85025 COMPLETE CBC W/AUTO DIFF WBC: CPT | Performed by: EMERGENCY MEDICINE

## 2020-12-31 PROCEDURE — 80053 COMPREHEN METABOLIC PANEL: CPT | Performed by: EMERGENCY MEDICINE

## 2020-12-31 PROCEDURE — 80307 DRUG TEST PRSMV CHEM ANLYZR: CPT | Performed by: EMERGENCY MEDICINE

## 2020-12-31 PROCEDURE — 87635 SARS-COV-2 COVID-19 AMP PRB: CPT | Performed by: EMERGENCY MEDICINE

## 2020-12-31 PROCEDURE — 250N000011 HC RX IP 250 OP 636: Performed by: EMERGENCY MEDICINE

## 2020-12-31 RX ORDER — ONDANSETRON 4 MG/1
4 TABLET, ORALLY DISINTEGRATING ORAL ONCE
Status: COMPLETED | OUTPATIENT
Start: 2020-12-31 | End: 2020-12-31

## 2020-12-31 RX ADMIN — ONDANSETRON 4 MG: 4 TABLET, ORALLY DISINTEGRATING ORAL at 04:40

## 2020-12-31 NOTE — ED NOTES
DATE:  12/31/2020   TIME OF RECEIPT FROM LAB:  0423  LAB TEST:  Covid19  LAB VALUE:  Positive  RESULTS GIVEN WITH READ-BACK TO (PROVIDER):  Daniel Miller*  TIME LAB VALUE REPORTED TO PROVIDER:   0425

## 2020-12-31 NOTE — ED PROVIDER NOTES
ED course:  Patient received as a handoff from my partner Dr. Domínguez.  On face-to-face evaluation patient reported mild nausea was provided Zofran.  Patient initially had requested detox.  Detox bed was initially secured at [facility] who requested labs prior to transfer.  Covid testing returned positive; patient asymptomatic and informed of positive result; discussed [place] Department of Health recommendations for quarantine.  Labs are also notable for mildly elevated white count (13.4, likely secondary to demargination/stress reaction from episodes of vomiting) and mild elevations of LFTs (likely secondary to alcohol abuse; patient denies abdominal pain).  Given positive Covid test patient was not accepted for transfer to [facility].  He was allowed to sober in the emergency department and discharged once clinically sober. Patient was offered but deferred resources for alcohol abuse.   Recommended close follow-up with his PCP as needed.  Return precautions discussed.  Patient discharged.      Labs Ordered and Resulted from Time of ED Arrival Up to the Time of Departure from the ED   CBC WITH PLATELETS DIFFERENTIAL - Abnormal; Notable for the following components:       Result Value    WBC 13.4 (*)     Absolute Neutrophil 10.4 (*)     All other components within normal limits   SARS-COV-2 (COVID-19) VIRUS RT-PCR - Abnormal; Notable for the following components:    SARS-CoV-2 PCR Result POSITIVE (*)     All other components within normal limits   COMPREHENSIVE METABOLIC PANEL - Abnormal; Notable for the following components:     (*)     AST 96 (*)     All other components within normal limits   ALCOHOL BREATH TEST POCT - Abnormal; Notable for the following components:    Alcohol Breath Test 0.295 (*)     All other components within normal limits   DRUG ABUSE SCREEN 77 URINE (FL, RH, SH)       Impression:    ICD-10-CM    1. Alcoholic intoxication without complication (H)  F10.920 CBC with platelets +  differential     Drug abuse screen urine     Asymptomatic SARS-CoV-2 COVID-19 Virus (Coronavirus) by PCR     Comprehensive metabolic panel     CANCELED: Comprehensive metabolic panel   2. Infection due to 2019 novel coronavirus  U07.1          Disposition:  Discharged       Daniel Miller, DO  12/31/20 0449

## 2020-12-31 NOTE — DISCHARGE INSTRUCTIONS
"Discharge Instructions for COVID-19 Patients  You have--or may have--COVID-19. Please follow the instructions listed below.   If you have a weakened immune system, discuss with your doctor any other actions you need to take.  How can I protect others?  If you have symptoms (fever, cough, body aches or trouble breathing):  Stay home and away from others (self-isolate) until:  At least 10 days have passed since your symptoms started, And   You've had no fever--and no medicine that reduces fever--for 1 full day (24 hours), And    Your other symptoms have resolved (gotten better).  If you don't show symptoms, but testing showed that you have COVID-19:  Stay home and away from others (self-isolate). Follow the tips under \"How do I self-isolate?\" below for 10 days (20 days if you have a weak immune system).  You don't need to be retested for COVID-19 before going back to school or work. As long as you're fever-free and feeling better, you can go back to school, work and other activities after waiting the 10 or 20 days.   How do I self-isolate?  Stay in your own room, even for meals. Use your own bathroom if you can.  Stay away from others in your home. No hugging, kissing or shaking hands. No visitors.  Don't go to work, school or anywhere else.  Clean \"high touch\" surfaces often (doorknobs, counters, handles). Use household cleaning spray or wipes. You'll find a full list of  on the EPA website: www.epa.gov/pesticide-registration/list-n-disinfectants-use-against-sars-cov-2.  Cover your mouth and nose with a mask or other face covering to avoid spreading germs.  Wash your hands and face often. Use soap and water.  Caregivers in these groups are at risk for severe illness due to COVID-19:  People 65 years and older  People who live in a nursing home or long-term care facility  People with chronic disease (lung, heart, cancer, diabetes, kidney, liver, immunologic)  People who have a weakened immune system, including " those who:  Are in cancer treatment  Take medicine that weakens the immune system, such as corticosteroids  Had a bone marrow or organ transplant  Have an immune deficiency  Have poorly controlled HIV or AIDS  Are obese (body mass index of 40 or higher)  Smoke regularly  Caregivers should wear gloves while washing dishes, handling laundry and cleaning bedrooms and bathrooms.  Use caution when washing and drying laundry: Don't shake dirty laundry and use the warmest water setting that you can.  For more tips on managing your health at home, go to www.cdc.gov/coronavirus/2019-ncov/downloads/10Things.pdf.  How can I take care of myself at home?  Get lots of rest. Drink extra fluids (unless a doctor has told you not to).    Take Tylenol (acetaminophen) for fever or pain. If you have liver or kidney problems, ask your family doctor if it's okay to take Tylenol.     Adults can take either:  650 mg (two 325 mg pills) every 4 to 6 hours, or   1,000 mg (two 500 mg pills) every 8 hours as needed.  Note: Don't take more than 3,000 mg in one day. Acetaminophen is found in many medicines (both prescribed and over-the-counter medicines). Read all labels to be sure you don't take too much.   For children, check the Tylenol bottle for the right dose. The dose is based on the child's age or weight.  If you have other health problems (like cancer, heart failure, an organ transplant or severe kidney disease): Call your specialty clinic if you don't feel better in the next 2 days.    Know when to call 911. Emergency warning signs include:  Trouble breathing or shortness of breath  Pain or pressure in the chest that doesn't go away  Feeling confused like you haven't felt before, or not being able to wake up  Bluish-colored lips or face    Your doctor may have prescribed a blood thinner medicine. Follow their instructions.  Where can I get more information?   [facility] - About COVID-19: [email or web address]  [facility] [place] [place] - What to  Do If You're Sick: www.cdc.gov/coronavirus/2019-ncov/about/steps-when-sick.html  CDC - Ending Home Isolation: www.cdc.gov/coronavirus/2019-ncov/hcp/disposition-in-home-patients.html  CDC - Caring for Someone: www.cdc.gov/coronavirus/2019-ncov/if-you-are-sick/care-for-someone.html  [facility] - Interim Guidance for Hospital Discharge to Home: www.[facility].[place]./diseases/coronavirus/hcp/hospdischarge.pdf  [facility] clinical trials (COVID-19 research studies): [email or web address].[facility]-clinical-trials  Below are the COVID-19 hotlines at the [place] Department of Health ([facility]). Interpreters are available.  For health questions: Call 989-021-2687 or 1-990.456.5412 (7 a.m. to 7 p.m.)  For questions about schools and childcare: Call 291-652-2044 or 1-785.628.8863 (7 a.m. to 7 p.m.)    For informational purposes only. Not to replace the advice of your health care provider. Clinically reviewed by the Infection Prevention Team. Copyright   2020 [facility]. All rights reserved. [employer] [id number] - REV 08/04/20.

## 2021-01-01 ENCOUNTER — APPOINTMENT (OUTPATIENT)
Dept: CT IMAGING | Facility: CLINIC | Age: 31
End: 2021-01-01
Attending: EMERGENCY MEDICINE
Payer: COMMERCIAL

## 2021-01-01 ENCOUNTER — HOSPITAL ENCOUNTER (INPATIENT)
Facility: CLINIC | Age: 31
LOS: 4 days | Discharge: LEFT BEFORE DISCHARGE | End: 2021-06-05
Attending: EMERGENCY MEDICINE | Admitting: INTERNAL MEDICINE
Payer: COMMERCIAL

## 2021-01-01 ENCOUNTER — PATIENT OUTREACH (OUTPATIENT)
Dept: CARE COORDINATION | Facility: CLINIC | Age: 31
End: 2021-01-01

## 2021-01-01 ENCOUNTER — MYC MEDICAL ADVICE (OUTPATIENT)
Dept: FAMILY MEDICINE | Facility: CLINIC | Age: 31
End: 2021-01-01

## 2021-01-01 ENCOUNTER — HEALTH MAINTENANCE LETTER (OUTPATIENT)
Age: 31
End: 2021-01-01

## 2021-01-01 ENCOUNTER — APPOINTMENT (OUTPATIENT)
Dept: CARDIOLOGY | Facility: CLINIC | Age: 31
End: 2021-01-01
Attending: NURSE PRACTITIONER
Payer: COMMERCIAL

## 2021-01-01 VITALS
DIASTOLIC BLOOD PRESSURE: 108 MMHG | OXYGEN SATURATION: 99 % | BODY MASS INDEX: 23.48 KG/M2 | HEART RATE: 141 BPM | HEIGHT: 70 IN | RESPIRATION RATE: 16 BRPM | WEIGHT: 164.02 LBS | SYSTOLIC BLOOD PRESSURE: 146 MMHG | TEMPERATURE: 97.5 F

## 2021-01-01 DIAGNOSIS — K70.10 ALCOHOLIC HEPATITIS, UNSPECIFIED WHETHER ASCITES PRESENT (H): ICD-10-CM

## 2021-01-01 DIAGNOSIS — K20.80 LOS ANGELES GRADE D ESOPHAGITIS: Primary | ICD-10-CM

## 2021-01-01 DIAGNOSIS — K92.2 GASTROINTESTINAL HEMORRHAGE, UNSPECIFIED GASTROINTESTINAL HEMORRHAGE TYPE: ICD-10-CM

## 2021-01-01 DIAGNOSIS — F10.129 ALCOHOL ABUSE WITH INTOXICATION, UNSPECIFIED (H): ICD-10-CM

## 2021-01-01 DIAGNOSIS — A41.9 SEPSIS, DUE TO UNSPECIFIED ORGANISM, UNSPECIFIED WHETHER ACUTE ORGAN DYSFUNCTION PRESENT (H): ICD-10-CM

## 2021-01-01 DIAGNOSIS — K20.0 EOSINOPHILIC ESOPHAGITIS: ICD-10-CM

## 2021-01-01 DIAGNOSIS — R00.0 TACHYCARDIA: ICD-10-CM

## 2021-01-01 DIAGNOSIS — Z20.822 COVID-19 RULED OUT BY LABORATORY TESTING: ICD-10-CM

## 2021-01-01 DIAGNOSIS — Z71.89 COUNSELING AND COORDINATION OF CARE: Primary | ICD-10-CM

## 2021-01-01 DIAGNOSIS — F10.20 UNCOMPLICATED ALCOHOL DEPENDENCE (H): Primary | ICD-10-CM

## 2021-01-01 DIAGNOSIS — G93.40 ENCEPHALOPATHY, UNSPECIFIED: ICD-10-CM

## 2021-01-01 DIAGNOSIS — Z71.89 OTHER SPECIFIED COUNSELING: ICD-10-CM

## 2021-01-01 LAB
ABO + RH BLD: NORMAL
ABO + RH BLD: NORMAL
ALBUMIN SERPL-MCNC: 2.5 G/DL (ref 3.4–5)
ALBUMIN SERPL-MCNC: 2.7 G/DL (ref 3.4–5)
ALBUMIN SERPL-MCNC: 2.8 G/DL (ref 3.4–5)
ALBUMIN SERPL-MCNC: 3.4 G/DL (ref 3.4–5)
ALBUMIN UR-MCNC: 200 MG/DL
ALP SERPL-CCNC: 63 U/L (ref 40–150)
ALP SERPL-CCNC: 69 U/L (ref 40–150)
ALP SERPL-CCNC: 92 U/L (ref 40–150)
ALP SERPL-CCNC: 95 U/L (ref 40–150)
ALT SERPL W P-5'-P-CCNC: 53 U/L (ref 0–70)
ALT SERPL W P-5'-P-CCNC: 57 U/L (ref 0–70)
ALT SERPL W P-5'-P-CCNC: 58 U/L (ref 0–70)
ALT SERPL W P-5'-P-CCNC: 76 U/L (ref 0–70)
AMMONIA PLAS-SCNC: <10 UMOL/L (ref 10–50)
AMORPH CRY #/AREA URNS HPF: ABNORMAL /HPF
AMPHETAMINES UR QL SCN: NEGATIVE
ANION GAP SERPL CALCULATED.3IONS-SCNC: 11 MMOL/L (ref 3–14)
ANION GAP SERPL CALCULATED.3IONS-SCNC: 16 MMOL/L (ref 3–14)
ANION GAP SERPL CALCULATED.3IONS-SCNC: 21 MMOL/L (ref 3–14)
ANION GAP SERPL CALCULATED.3IONS-SCNC: 4 MMOL/L (ref 3–14)
ANION GAP SERPL CALCULATED.3IONS-SCNC: 5 MMOL/L (ref 3–14)
ANION GAP SERPL CALCULATED.3IONS-SCNC: 6 MMOL/L (ref 3–14)
ANION GAP SERPL CALCULATED.3IONS-SCNC: 9 MMOL/L (ref 3–14)
APAP SERPL-MCNC: <2 MG/L (ref 10–20)
APPEARANCE UR: CLEAR
APTT PPP: 31 SEC (ref 22–37)
AST SERPL W P-5'-P-CCNC: 112 U/L (ref 0–45)
AST SERPL W P-5'-P-CCNC: 135 U/L (ref 0–45)
AST SERPL W P-5'-P-CCNC: 186 U/L (ref 0–45)
AST SERPL W P-5'-P-CCNC: 75 U/L (ref 0–45)
B-HCG FREE SERPL-ACNC: 1 [IU]/L (ref 0.86–1.14)
BACTERIA SPEC CULT: ABNORMAL
BACTERIA SPEC CULT: NO GROWTH
BARBITURATES UR QL: NEGATIVE
BASOPHILS # BLD AUTO: 0.1 10E9/L (ref 0–0.2)
BASOPHILS NFR BLD AUTO: 0.5 %
BENZODIAZ UR QL: NEGATIVE
BILIRUB DIRECT SERPL-MCNC: 0.2 MG/DL (ref 0–0.2)
BILIRUB SERPL-MCNC: 0.5 MG/DL (ref 0.2–1.3)
BILIRUB SERPL-MCNC: 0.6 MG/DL (ref 0.2–1.3)
BILIRUB SERPL-MCNC: 0.6 MG/DL (ref 0.2–1.3)
BILIRUB SERPL-MCNC: 0.8 MG/DL (ref 0.2–1.3)
BILIRUB UR QL STRIP: NEGATIVE
BLD GP AB SCN SERPL QL: NORMAL
BLOOD BANK CMNT PATIENT-IMP: NORMAL
BUN SERPL-MCNC: 15 MG/DL (ref 7–30)
BUN SERPL-MCNC: 18 MG/DL (ref 7–30)
BUN SERPL-MCNC: 3 MG/DL (ref 7–30)
BUN SERPL-MCNC: 3 MG/DL (ref 7–30)
BUN SERPL-MCNC: 6 MG/DL (ref 7–30)
BUN SERPL-MCNC: 6 MG/DL (ref 7–30)
BUN SERPL-MCNC: 8 MG/DL (ref 7–30)
CA-I BLD-SCNC: 3.4 MG/DL (ref 4.4–5.2)
CALCIUM SERPL-MCNC: 7.6 MG/DL (ref 8.5–10.1)
CALCIUM SERPL-MCNC: 7.6 MG/DL (ref 8.5–10.1)
CALCIUM SERPL-MCNC: 8 MG/DL (ref 8.5–10.1)
CALCIUM SERPL-MCNC: 8.3 MG/DL (ref 8.5–10.1)
CALCIUM SERPL-MCNC: 8.4 MG/DL (ref 8.5–10.1)
CALCIUM SERPL-MCNC: 8.9 MG/DL (ref 8.5–10.1)
CALCIUM SERPL-MCNC: 9.4 MG/DL (ref 8.5–10.1)
CANNABINOIDS UR QL SCN: NEGATIVE
CHLORIDE SERPL-SCNC: 100 MMOL/L (ref 94–109)
CHLORIDE SERPL-SCNC: 78 MMOL/L (ref 94–109)
CHLORIDE SERPL-SCNC: 86 MMOL/L (ref 94–109)
CHLORIDE SERPL-SCNC: 87 MMOL/L (ref 94–109)
CHLORIDE SERPL-SCNC: 90 MMOL/L (ref 94–109)
CHLORIDE SERPL-SCNC: 91 MMOL/L (ref 94–109)
CHLORIDE SERPL-SCNC: 95 MMOL/L (ref 94–109)
CO2 BLDCOV-SCNC: 32 MMOL/L (ref 21–28)
CO2 BLDCOV-SCNC: 33 MMOL/L (ref 21–28)
CO2 SERPL-SCNC: 24 MMOL/L (ref 20–32)
CO2 SERPL-SCNC: 26 MMOL/L (ref 20–32)
CO2 SERPL-SCNC: 29 MMOL/L (ref 20–32)
CO2 SERPL-SCNC: 30 MMOL/L (ref 20–32)
CO2 SERPL-SCNC: 34 MMOL/L (ref 20–32)
COCAINE UR QL: NEGATIVE
COLOR UR AUTO: YELLOW
COPATH REPORT: NORMAL
CREAT BLD-MCNC: 1.3 MG/DL (ref 0.66–1.25)
CREAT SERPL-MCNC: 0.54 MG/DL (ref 0.66–1.25)
CREAT SERPL-MCNC: 0.56 MG/DL (ref 0.66–1.25)
CREAT SERPL-MCNC: 0.59 MG/DL (ref 0.66–1.25)
CREAT SERPL-MCNC: 0.59 MG/DL (ref 0.66–1.25)
CREAT SERPL-MCNC: 0.6 MG/DL (ref 0.66–1.25)
CREAT SERPL-MCNC: 0.62 MG/DL (ref 0.66–1.25)
CREAT SERPL-MCNC: 0.63 MG/DL (ref 0.66–1.25)
DIFFERENTIAL METHOD BLD: ABNORMAL
EOSINOPHIL # BLD AUTO: 0 10E9/L (ref 0–0.7)
EOSINOPHIL NFR BLD AUTO: 0.1 %
ERYTHROCYTE [DISTWIDTH] IN BLOOD BY AUTOMATED COUNT: 14.1 % (ref 10–15)
ERYTHROCYTE [DISTWIDTH] IN BLOOD BY AUTOMATED COUNT: 14.4 % (ref 10–15)
ERYTHROCYTE [DISTWIDTH] IN BLOOD BY AUTOMATED COUNT: 14.6 % (ref 10–15)
ETHANOL SERPL-MCNC: 0.33 G/DL
ETHANOL UR QL SCN: POSITIVE
FERRITIN SERPL-MCNC: 922 NG/ML (ref 26–388)
GFR SERPL CREATININE-BSD FRML MDRD: 65 ML/MIN/{1.73_M2}
GFR SERPL CREATININE-BSD FRML MDRD: >90 ML/MIN/{1.73_M2}
GLUCOSE BLD-MCNC: 152 MG/DL (ref 70–99)
GLUCOSE BLDC GLUCOMTR-MCNC: 128 MG/DL (ref 70–99)
GLUCOSE BLDC GLUCOMTR-MCNC: 134 MG/DL (ref 70–99)
GLUCOSE BLDC GLUCOMTR-MCNC: 137 MG/DL (ref 70–99)
GLUCOSE BLDC GLUCOMTR-MCNC: 140 MG/DL (ref 70–99)
GLUCOSE BLDC GLUCOMTR-MCNC: 150 MG/DL (ref 70–99)
GLUCOSE BLDC GLUCOMTR-MCNC: 85 MG/DL (ref 70–99)
GLUCOSE BLDC GLUCOMTR-MCNC: 86 MG/DL (ref 70–99)
GLUCOSE BLDC GLUCOMTR-MCNC: 93 MG/DL (ref 70–99)
GLUCOSE BLDC GLUCOMTR-MCNC: 93 MG/DL (ref 70–99)
GLUCOSE SERPL-MCNC: 100 MG/DL (ref 70–99)
GLUCOSE SERPL-MCNC: 111 MG/DL (ref 70–99)
GLUCOSE SERPL-MCNC: 118 MG/DL (ref 70–99)
GLUCOSE SERPL-MCNC: 136 MG/DL (ref 70–99)
GLUCOSE SERPL-MCNC: 77 MG/DL (ref 70–99)
GLUCOSE SERPL-MCNC: 87 MG/DL (ref 70–99)
GLUCOSE SERPL-MCNC: 98 MG/DL (ref 70–99)
GLUCOSE UR STRIP-MCNC: NEGATIVE MG/DL
GRAN CASTS #/AREA URNS LPF: 7 /LPF
HCT VFR BLD AUTO: 36.3 % (ref 40–53)
HCT VFR BLD AUTO: 36.3 % (ref 40–53)
HCT VFR BLD AUTO: 37.8 % (ref 40–53)
HCT VFR BLD AUTO: 39.9 % (ref 40–53)
HCT VFR BLD AUTO: 45.7 % (ref 40–53)
HCT VFR BLD CALC: 50 %PCV (ref 40–53)
HGB BLD CALC-MCNC: 17 G/DL (ref 13.3–17.7)
HGB BLD-MCNC: 12.4 G/DL (ref 13.3–17.7)
HGB BLD-MCNC: 13.1 G/DL (ref 13.3–17.7)
HGB BLD-MCNC: 13.1 G/DL (ref 13.3–17.7)
HGB BLD-MCNC: 13.8 G/DL (ref 13.3–17.7)
HGB BLD-MCNC: 16.4 G/DL (ref 13.3–17.7)
HGB UR QL STRIP: ABNORMAL
IMM GRANULOCYTES # BLD: 0.3 10E9/L (ref 0–0.4)
IMM GRANULOCYTES NFR BLD: 1.2 %
INR PPP: 1 (ref 0.86–1.14)
INTERPRETATION ECG - MUSE: NORMAL
IRON SATN MFR SERPL: 18 % (ref 15–46)
IRON SERPL-MCNC: 31 UG/DL (ref 35–180)
KETONES UR STRIP-MCNC: 40 MG/DL
LABORATORY COMMENT REPORT: NORMAL
LACTATE BLD-SCNC: 1 MMOL/L (ref 0.7–2)
LACTATE BLD-SCNC: 1.4 MMOL/L (ref 0.7–2)
LACTATE BLD-SCNC: 5 MMOL/L (ref 0.7–2)
LACTATE BLD-SCNC: 7.6 MMOL/L (ref 0.7–2.1)
LEUKOCYTE ESTERASE UR QL STRIP: NEGATIVE
LIPASE SERPL-CCNC: 3355 U/L (ref 73–393)
LIPASE SERPL-CCNC: 430 U/L (ref 73–393)
LYMPHOCYTES # BLD AUTO: 0.7 10E9/L (ref 0.8–5.3)
LYMPHOCYTES NFR BLD AUTO: 3.1 %
Lab: ABNORMAL
Lab: NORMAL
MAGNESIUM SERPL-MCNC: 1.9 MG/DL (ref 1.6–2.3)
MAGNESIUM SERPL-MCNC: 2 MG/DL (ref 1.6–2.3)
MAGNESIUM SERPL-MCNC: 2.1 MG/DL (ref 1.6–2.3)
MCH RBC QN AUTO: 29.7 PG (ref 26.5–33)
MCH RBC QN AUTO: 29.8 PG (ref 26.5–33)
MCH RBC QN AUTO: 30 PG (ref 26.5–33)
MCH RBC QN AUTO: 30 PG (ref 26.5–33)
MCH RBC QN AUTO: 30.2 PG (ref 26.5–33)
MCHC RBC AUTO-ENTMCNC: 34.2 G/DL (ref 31.5–36.5)
MCHC RBC AUTO-ENTMCNC: 34.6 G/DL (ref 31.5–36.5)
MCHC RBC AUTO-ENTMCNC: 34.7 G/DL (ref 31.5–36.5)
MCHC RBC AUTO-ENTMCNC: 35.9 G/DL (ref 31.5–36.5)
MCHC RBC AUTO-ENTMCNC: 36.1 G/DL (ref 31.5–36.5)
MCV RBC AUTO: 83 FL (ref 78–100)
MCV RBC AUTO: 83 FL (ref 78–100)
MCV RBC AUTO: 87 FL (ref 78–100)
MONOCYTES # BLD AUTO: 1 10E9/L (ref 0–1.3)
MONOCYTES NFR BLD AUTO: 4 %
MRSA DNA SPEC QL NAA+PROBE: NEGATIVE
MUCOUS THREADS #/AREA URNS LPF: PRESENT /LPF
NEUTROPHILS # BLD AUTO: 21.8 10E9/L (ref 1.6–8.3)
NEUTROPHILS NFR BLD AUTO: 91.1 %
NITRATE UR QL: NEGATIVE
NRBC # BLD AUTO: 0 10*3/UL
NRBC BLD AUTO-RTO: 0 /100
OPIATES UR QL SCN: NEGATIVE
OSMOLALITY SERPL: 302 MMOL/KG (ref 275–295)
OSMOLALITY UR: 727 MMOL/KG (ref 100–1200)
PCO2 BLDV: 41 MM HG (ref 40–50)
PCO2 BLDV: 41 MM HG (ref 40–50)
PH BLDV: 7.5 PH (ref 7.32–7.43)
PH BLDV: 7.51 PH (ref 7.32–7.43)
PH UR STRIP: 6.5 PH (ref 5–7)
PHOSPHATE SERPL-MCNC: 1.3 MG/DL (ref 2.5–4.5)
PHOSPHATE SERPL-MCNC: 1.5 MG/DL (ref 2.5–4.5)
PHOSPHATE SERPL-MCNC: 1.8 MG/DL (ref 2.5–4.5)
PHOSPHATE SERPL-MCNC: 2.1 MG/DL (ref 2.5–4.5)
PHOSPHATE SERPL-MCNC: 2.2 MG/DL (ref 2.5–4.5)
PHOSPHATE SERPL-MCNC: 2.6 MG/DL (ref 2.5–4.5)
PHOSPHATE SERPL-MCNC: 3.2 MG/DL (ref 2.5–4.5)
PLATELET # BLD AUTO: 105 10E9/L (ref 150–450)
PLATELET # BLD AUTO: 108 10E9/L (ref 150–450)
PLATELET # BLD AUTO: 133 10E9/L (ref 150–450)
PLATELET # BLD AUTO: 134 10E9/L (ref 150–450)
PLATELET # BLD AUTO: 183 10E9/L (ref 150–450)
PO2 BLDV: 45 MM HG (ref 25–47)
PO2 BLDV: 45 MM HG (ref 25–47)
POTASSIUM BLD-SCNC: 3.2 MMOL/L (ref 3.4–5.3)
POTASSIUM SERPL-SCNC: 2.6 MMOL/L (ref 3.4–5.3)
POTASSIUM SERPL-SCNC: 2.9 MMOL/L (ref 3.4–5.3)
POTASSIUM SERPL-SCNC: 3.1 MMOL/L (ref 3.4–5.3)
POTASSIUM SERPL-SCNC: 3.2 MMOL/L (ref 3.4–5.3)
POTASSIUM SERPL-SCNC: 3.2 MMOL/L (ref 3.4–5.3)
POTASSIUM SERPL-SCNC: 3.4 MMOL/L (ref 3.4–5.3)
POTASSIUM SERPL-SCNC: 3.5 MMOL/L (ref 3.4–5.3)
POTASSIUM SERPL-SCNC: 3.6 MMOL/L (ref 3.4–5.3)
POTASSIUM SERPL-SCNC: 4 MMOL/L (ref 3.4–5.3)
PROCALCITONIN SERPL-MCNC: 1.37 NG/ML
PROT SERPL-MCNC: 5.7 G/DL (ref 6.8–8.8)
PROT SERPL-MCNC: 6.1 G/DL (ref 6.8–8.8)
PROT SERPL-MCNC: 6.4 G/DL (ref 6.8–8.8)
PROT SERPL-MCNC: 7.6 G/DL (ref 6.8–8.8)
RBC # BLD AUTO: 4.17 10E12/L (ref 4.4–5.9)
RBC # BLD AUTO: 4.37 10E12/L (ref 4.4–5.9)
RBC # BLD AUTO: 4.37 10E12/L (ref 4.4–5.9)
RBC # BLD AUTO: 4.57 10E12/L (ref 4.4–5.9)
RBC # BLD AUTO: 5.51 10E12/L (ref 4.4–5.9)
RBC #/AREA URNS AUTO: 0 /HPF (ref 0–2)
RETICS # AUTO: 56.4 10E9/L (ref 25–95)
RETICS/RBC NFR AUTO: 1.3 % (ref 0.5–2)
SALICYLATES SERPL-MCNC: <2 MG/DL
SAO2 % BLDV FROM PO2: 85 %
SAO2 % BLDV FROM PO2: 85 %
SARS-COV-2 RNA RESP QL NAA+PROBE: NEGATIVE
SODIUM BLD-SCNC: 127 MMOL/L (ref 133–144)
SODIUM SERPL-SCNC: 125 MMOL/L (ref 133–144)
SODIUM SERPL-SCNC: 126 MMOL/L (ref 133–144)
SODIUM SERPL-SCNC: 127 MMOL/L (ref 133–144)
SODIUM SERPL-SCNC: 128 MMOL/L (ref 133–144)
SODIUM SERPL-SCNC: 130 MMOL/L (ref 133–144)
SODIUM SERPL-SCNC: 133 MMOL/L (ref 133–144)
SODIUM SERPL-SCNC: 134 MMOL/L (ref 133–144)
SODIUM SERPL-SCNC: 135 MMOL/L (ref 133–144)
SODIUM UR-SCNC: 10 MMOL/L
SOURCE: ABNORMAL
SP GR UR STRIP: 1.01 (ref 1–1.03)
SPECIMEN EXP DATE BLD: NORMAL
SPECIMEN SOURCE: ABNORMAL
SPECIMEN SOURCE: NORMAL
SQUAMOUS #/AREA URNS AUTO: <1 /HPF (ref 0–1)
TIBC SERPL-MCNC: 171 UG/DL (ref 240–430)
TRANS CELLS #/AREA URNS HPF: 1 /HPF (ref 0–1)
TROPONIN I SERPL-MCNC: <0.015 UG/L (ref 0–0.04)
UPPER GI ENDOSCOPY: NORMAL
UROBILINOGEN UR STRIP-MCNC: NORMAL MG/DL (ref 0–2)
VANCOMYCIN SERPL-MCNC: 5 MG/L
WBC # BLD AUTO: 10.3 10E9/L (ref 4–11)
WBC # BLD AUTO: 10.5 10E9/L (ref 4–11)
WBC # BLD AUTO: 10.8 10E9/L (ref 4–11)
WBC # BLD AUTO: 19 10E9/L (ref 4–11)
WBC # BLD AUTO: 24 10E9/L (ref 4–11)
WBC #/AREA URNS AUTO: 5 /HPF (ref 0–5)

## 2021-01-01 PROCEDURE — 250N000013 HC RX MED GY IP 250 OP 250 PS 637: Performed by: NURSE PRACTITIONER

## 2021-01-01 PROCEDURE — 36415 COLL VENOUS BLD VENIPUNCTURE: CPT | Performed by: INTERNAL MEDICINE

## 2021-01-01 PROCEDURE — 250N000013 HC RX MED GY IP 250 OP 250 PS 637: Performed by: STUDENT IN AN ORGANIZED HEALTH CARE EDUCATION/TRAINING PROGRAM

## 2021-01-01 PROCEDURE — 83605 ASSAY OF LACTIC ACID: CPT | Performed by: NURSE PRACTITIONER

## 2021-01-01 PROCEDURE — 82728 ASSAY OF FERRITIN: CPT | Performed by: STUDENT IN AN ORGANIZED HEALTH CARE EDUCATION/TRAINING PROGRAM

## 2021-01-01 PROCEDURE — 250N000013 HC RX MED GY IP 250 OP 250 PS 637: Performed by: INTERNAL MEDICINE

## 2021-01-01 PROCEDURE — 84300 ASSAY OF URINE SODIUM: CPT | Performed by: STUDENT IN AN ORGANIZED HEALTH CARE EDUCATION/TRAINING PROGRAM

## 2021-01-01 PROCEDURE — 258N000003 HC RX IP 258 OP 636: Performed by: INTERNAL MEDICINE

## 2021-01-01 PROCEDURE — 250N000011 HC RX IP 250 OP 636: Performed by: INTERNAL MEDICINE

## 2021-01-01 PROCEDURE — 250N000009 HC RX 250: Performed by: INTERNAL MEDICINE

## 2021-01-01 PROCEDURE — 36416 COLLJ CAPILLARY BLOOD SPEC: CPT | Performed by: STUDENT IN AN ORGANIZED HEALTH CARE EDUCATION/TRAINING PROGRAM

## 2021-01-01 PROCEDURE — 84100 ASSAY OF PHOSPHORUS: CPT | Performed by: INTERNAL MEDICINE

## 2021-01-01 PROCEDURE — 84132 ASSAY OF SERUM POTASSIUM: CPT | Performed by: NURSE PRACTITIONER

## 2021-01-01 PROCEDURE — 70450 CT HEAD/BRAIN W/O DYE: CPT | Mod: 26 | Performed by: RADIOLOGY

## 2021-01-01 PROCEDURE — 80143 DRUG ASSAY ACETAMINOPHEN: CPT | Performed by: EMERGENCY MEDICINE

## 2021-01-01 PROCEDURE — 80048 BASIC METABOLIC PNL TOTAL CA: CPT | Performed by: STUDENT IN AN ORGANIZED HEALTH CARE EDUCATION/TRAINING PROGRAM

## 2021-01-01 PROCEDURE — 99291 CRITICAL CARE FIRST HOUR: CPT | Performed by: EMERGENCY MEDICINE

## 2021-01-01 PROCEDURE — 200N000002 HC R&B ICU UMMC

## 2021-01-01 PROCEDURE — 250N000011 HC RX IP 250 OP 636: Performed by: STUDENT IN AN ORGANIZED HEALTH CARE EDUCATION/TRAINING PROGRAM

## 2021-01-01 PROCEDURE — 120N000002 HC R&B MED SURG/OB UMMC

## 2021-01-01 PROCEDURE — 999N000111 HC STATISTIC OT IP EVAL DEFER: Performed by: OCCUPATIONAL THERAPIST

## 2021-01-01 PROCEDURE — 258N000003 HC RX IP 258 OP 636: Performed by: EMERGENCY MEDICINE

## 2021-01-01 PROCEDURE — 250N000009 HC RX 250: Performed by: EMERGENCY MEDICINE

## 2021-01-01 PROCEDURE — 250N000009 HC RX 250: Performed by: STUDENT IN AN ORGANIZED HEALTH CARE EDUCATION/TRAINING PROGRAM

## 2021-01-01 PROCEDURE — 36415 COLL VENOUS BLD VENIPUNCTURE: CPT | Performed by: NURSE PRACTITIONER

## 2021-01-01 PROCEDURE — C9113 INJ PANTOPRAZOLE SODIUM, VIA: HCPCS | Performed by: STUDENT IN AN ORGANIZED HEALTH CARE EDUCATION/TRAINING PROGRAM

## 2021-01-01 PROCEDURE — C9113 INJ PANTOPRAZOLE SODIUM, VIA: HCPCS | Performed by: NURSE PRACTITIONER

## 2021-01-01 PROCEDURE — 250N000011 HC RX IP 250 OP 636: Performed by: NURSE PRACTITIONER

## 2021-01-01 PROCEDURE — 87186 SC STD MICRODIL/AGAR DIL: CPT | Performed by: STUDENT IN AN ORGANIZED HEALTH CARE EDUCATION/TRAINING PROGRAM

## 2021-01-01 PROCEDURE — 93306 TTE W/DOPPLER COMPLETE: CPT | Mod: 26 | Performed by: INTERNAL MEDICINE

## 2021-01-01 PROCEDURE — 87077 CULTURE AEROBIC IDENTIFY: CPT | Performed by: EMERGENCY MEDICINE

## 2021-01-01 PROCEDURE — 96361 HYDRATE IV INFUSION ADD-ON: CPT | Performed by: EMERGENCY MEDICINE

## 2021-01-01 PROCEDURE — 999N001017 HC STATISTIC GLUCOSE BY METER IP

## 2021-01-01 PROCEDURE — 87040 BLOOD CULTURE FOR BACTERIA: CPT | Performed by: EMERGENCY MEDICINE

## 2021-01-01 PROCEDURE — 80320 DRUG SCREEN QUANTALCOHOLS: CPT | Performed by: STUDENT IN AN ORGANIZED HEALTH CARE EDUCATION/TRAINING PROGRAM

## 2021-01-01 PROCEDURE — 80179 DRUG ASSAY SALICYLATE: CPT | Performed by: EMERGENCY MEDICINE

## 2021-01-01 PROCEDURE — 258N000003 HC RX IP 258 OP 636: Performed by: STUDENT IN AN ORGANIZED HEALTH CARE EDUCATION/TRAINING PROGRAM

## 2021-01-01 PROCEDURE — U0005 INFEC AGEN DETEC AMPLI PROBE: HCPCS | Performed by: EMERGENCY MEDICINE

## 2021-01-01 PROCEDURE — 99233 SBSQ HOSP IP/OBS HIGH 50: CPT | Performed by: INTERNAL MEDICINE

## 2021-01-01 PROCEDURE — C9113 INJ PANTOPRAZOLE SODIUM, VIA: HCPCS | Performed by: EMERGENCY MEDICINE

## 2021-01-01 PROCEDURE — 84295 ASSAY OF SERUM SODIUM: CPT

## 2021-01-01 PROCEDURE — 84100 ASSAY OF PHOSPHORUS: CPT | Performed by: EMERGENCY MEDICINE

## 2021-01-01 PROCEDURE — 85730 THROMBOPLASTIN TIME PARTIAL: CPT | Performed by: EMERGENCY MEDICINE

## 2021-01-01 PROCEDURE — 87641 MR-STAPH DNA AMP PROBE: CPT | Performed by: STUDENT IN AN ORGANIZED HEALTH CARE EDUCATION/TRAINING PROGRAM

## 2021-01-01 PROCEDURE — 83550 IRON BINDING TEST: CPT | Performed by: STUDENT IN AN ORGANIZED HEALTH CARE EDUCATION/TRAINING PROGRAM

## 2021-01-01 PROCEDURE — 83735 ASSAY OF MAGNESIUM: CPT | Performed by: STUDENT IN AN ORGANIZED HEALTH CARE EDUCATION/TRAINING PROGRAM

## 2021-01-01 PROCEDURE — 86850 RBC ANTIBODY SCREEN: CPT | Performed by: EMERGENCY MEDICINE

## 2021-01-01 PROCEDURE — 82140 ASSAY OF AMMONIA: CPT | Performed by: EMERGENCY MEDICINE

## 2021-01-01 PROCEDURE — 83690 ASSAY OF LIPASE: CPT | Performed by: INTERNAL MEDICINE

## 2021-01-01 PROCEDURE — 85027 COMPLETE CBC AUTOMATED: CPT | Performed by: STUDENT IN AN ORGANIZED HEALTH CARE EDUCATION/TRAINING PROGRAM

## 2021-01-01 PROCEDURE — 82077 ASSAY SPEC XCP UR&BREATH IA: CPT | Performed by: EMERGENCY MEDICINE

## 2021-01-01 PROCEDURE — 87040 BLOOD CULTURE FOR BACTERIA: CPT | Performed by: STUDENT IN AN ORGANIZED HEALTH CARE EDUCATION/TRAINING PROGRAM

## 2021-01-01 PROCEDURE — 93306 TTE W/DOPPLER COMPLETE: CPT

## 2021-01-01 PROCEDURE — 999N000147 HC STATISTIC PT IP EVAL DEFER

## 2021-01-01 PROCEDURE — 85014 HEMATOCRIT: CPT

## 2021-01-01 PROCEDURE — 96368 THER/DIAG CONCURRENT INF: CPT | Performed by: EMERGENCY MEDICINE

## 2021-01-01 PROCEDURE — 96365 THER/PROPH/DIAG IV INF INIT: CPT | Performed by: EMERGENCY MEDICINE

## 2021-01-01 PROCEDURE — 84132 ASSAY OF SERUM POTASSIUM: CPT | Performed by: INTERNAL MEDICINE

## 2021-01-01 PROCEDURE — 85610 PROTHROMBIN TIME: CPT

## 2021-01-01 PROCEDURE — HZ2ZZZZ DETOXIFICATION SERVICES FOR SUBSTANCE ABUSE TREATMENT: ICD-10-PCS | Performed by: INTERNAL MEDICINE

## 2021-01-01 PROCEDURE — 84295 ASSAY OF SERUM SODIUM: CPT | Performed by: STUDENT IN AN ORGANIZED HEALTH CARE EDUCATION/TRAINING PROGRAM

## 2021-01-01 PROCEDURE — 80053 COMPREHEN METABOLIC PANEL: CPT | Performed by: EMERGENCY MEDICINE

## 2021-01-01 PROCEDURE — 99285 EMERGENCY DEPT VISIT HI MDM: CPT | Mod: 25 | Performed by: EMERGENCY MEDICINE

## 2021-01-01 PROCEDURE — 87086 URINE CULTURE/COLONY COUNT: CPT | Performed by: STUDENT IN AN ORGANIZED HEALTH CARE EDUCATION/TRAINING PROGRAM

## 2021-01-01 PROCEDURE — 82803 BLOOD GASES ANY COMBINATION: CPT

## 2021-01-01 PROCEDURE — 83930 ASSAY OF BLOOD OSMOLALITY: CPT | Performed by: STUDENT IN AN ORGANIZED HEALTH CARE EDUCATION/TRAINING PROGRAM

## 2021-01-01 PROCEDURE — 74177 CT ABD & PELVIS W/CONTRAST: CPT | Mod: 26 | Performed by: RADIOLOGY

## 2021-01-01 PROCEDURE — 87040 BLOOD CULTURE FOR BACTERIA: CPT | Performed by: NURSE PRACTITIONER

## 2021-01-01 PROCEDURE — 83935 ASSAY OF URINE OSMOLALITY: CPT | Performed by: STUDENT IN AN ORGANIZED HEALTH CARE EDUCATION/TRAINING PROGRAM

## 2021-01-01 PROCEDURE — 83540 ASSAY OF IRON: CPT | Performed by: STUDENT IN AN ORGANIZED HEALTH CARE EDUCATION/TRAINING PROGRAM

## 2021-01-01 PROCEDURE — 83735 ASSAY OF MAGNESIUM: CPT | Performed by: EMERGENCY MEDICINE

## 2021-01-01 PROCEDURE — 80307 DRUG TEST PRSMV CHEM ANLYZR: CPT | Performed by: STUDENT IN AN ORGANIZED HEALTH CARE EDUCATION/TRAINING PROGRAM

## 2021-01-01 PROCEDURE — 96366 THER/PROPH/DIAG IV INF ADDON: CPT | Performed by: EMERGENCY MEDICINE

## 2021-01-01 PROCEDURE — 80048 BASIC METABOLIC PNL TOTAL CA: CPT | Performed by: INTERNAL MEDICINE

## 2021-01-01 PROCEDURE — 84484 ASSAY OF TROPONIN QUANT: CPT | Performed by: EMERGENCY MEDICINE

## 2021-01-01 PROCEDURE — 82947 ASSAY GLUCOSE BLOOD QUANT: CPT

## 2021-01-01 PROCEDURE — 96376 TX/PRO/DX INJ SAME DRUG ADON: CPT | Performed by: EMERGENCY MEDICINE

## 2021-01-01 PROCEDURE — G0500 MOD SEDAT ENDO SERVICE >5YRS: HCPCS | Performed by: INTERNAL MEDICINE

## 2021-01-01 PROCEDURE — 99233 SBSQ HOSP IP/OBS HIGH 50: CPT | Mod: GC | Performed by: INTERNAL MEDICINE

## 2021-01-01 PROCEDURE — 258N000003 HC RX IP 258 OP 636: Performed by: NURSE PRACTITIONER

## 2021-01-01 PROCEDURE — 83605 ASSAY OF LACTIC ACID: CPT | Performed by: STUDENT IN AN ORGANIZED HEALTH CARE EDUCATION/TRAINING PROGRAM

## 2021-01-01 PROCEDURE — 85027 COMPLETE CBC AUTOMATED: CPT | Performed by: INTERNAL MEDICINE

## 2021-01-01 PROCEDURE — 86901 BLOOD TYPING SEROLOGIC RH(D): CPT | Performed by: EMERGENCY MEDICINE

## 2021-01-01 PROCEDURE — 87186 SC STD MICRODIL/AGAR DIL: CPT | Performed by: EMERGENCY MEDICINE

## 2021-01-01 PROCEDURE — 84100 ASSAY OF PHOSPHORUS: CPT | Performed by: STUDENT IN AN ORGANIZED HEALTH CARE EDUCATION/TRAINING PROGRAM

## 2021-01-01 PROCEDURE — 85045 AUTOMATED RETICULOCYTE COUNT: CPT | Performed by: STUDENT IN AN ORGANIZED HEALTH CARE EDUCATION/TRAINING PROGRAM

## 2021-01-01 PROCEDURE — 43239 EGD BIOPSY SINGLE/MULTIPLE: CPT | Performed by: INTERNAL MEDICINE

## 2021-01-01 PROCEDURE — 99255 IP/OBS CONSLTJ NEW/EST HI 80: CPT | Mod: GC | Performed by: STUDENT IN AN ORGANIZED HEALTH CARE EDUCATION/TRAINING PROGRAM

## 2021-01-01 PROCEDURE — 88305 TISSUE EXAM BY PATHOLOGIST: CPT | Mod: 26 | Performed by: PATHOLOGY

## 2021-01-01 PROCEDURE — 83735 ASSAY OF MAGNESIUM: CPT | Performed by: INTERNAL MEDICINE

## 2021-01-01 PROCEDURE — 83605 ASSAY OF LACTIC ACID: CPT

## 2021-01-01 PROCEDURE — 80202 ASSAY OF VANCOMYCIN: CPT | Performed by: INTERNAL MEDICINE

## 2021-01-01 PROCEDURE — 99223 1ST HOSP IP/OBS HIGH 75: CPT | Mod: GC | Performed by: INTERNAL MEDICINE

## 2021-01-01 PROCEDURE — 71260 CT THORAX DX C+: CPT

## 2021-01-01 PROCEDURE — 36415 COLL VENOUS BLD VENIPUNCTURE: CPT | Performed by: STUDENT IN AN ORGANIZED HEALTH CARE EDUCATION/TRAINING PROGRAM

## 2021-01-01 PROCEDURE — 80053 COMPREHEN METABOLIC PANEL: CPT | Performed by: STUDENT IN AN ORGANIZED HEALTH CARE EDUCATION/TRAINING PROGRAM

## 2021-01-01 PROCEDURE — 82565 ASSAY OF CREATININE: CPT

## 2021-01-01 PROCEDURE — 85025 COMPLETE CBC W/AUTO DIFF WBC: CPT | Performed by: EMERGENCY MEDICINE

## 2021-01-01 PROCEDURE — 999N001070 HC STATISTIC R-RUSH PROCESSING: Performed by: INTERNAL MEDICINE

## 2021-01-01 PROCEDURE — 87640 STAPH A DNA AMP PROBE: CPT | Performed by: STUDENT IN AN ORGANIZED HEALTH CARE EDUCATION/TRAINING PROGRAM

## 2021-01-01 PROCEDURE — 80048 BASIC METABOLIC PNL TOTAL CA: CPT | Performed by: NURSE PRACTITIONER

## 2021-01-01 PROCEDURE — 250N000011 HC RX IP 250 OP 636: Performed by: EMERGENCY MEDICINE

## 2021-01-01 PROCEDURE — 99207 PR APP CREDIT; MD BILLING SHARED VISIT: CPT | Performed by: INTERNAL MEDICINE

## 2021-01-01 PROCEDURE — 87800 DETECT AGNT MULT DNA DIREC: CPT | Performed by: EMERGENCY MEDICINE

## 2021-01-01 PROCEDURE — 80053 COMPREHEN METABOLIC PANEL: CPT | Performed by: INTERNAL MEDICINE

## 2021-01-01 PROCEDURE — 99239 HOSP IP/OBS DSCHRG MGMT >30: CPT | Mod: GC | Performed by: INTERNAL MEDICINE

## 2021-01-01 PROCEDURE — 999N000127 HC STATISTIC PERIPHERAL IV START W US GUIDANCE

## 2021-01-01 PROCEDURE — 83690 ASSAY OF LIPASE: CPT | Performed by: EMERGENCY MEDICINE

## 2021-01-01 PROCEDURE — 99207 PR NO CHARGE LOS: CPT | Performed by: STUDENT IN AN ORGANIZED HEALTH CARE EDUCATION/TRAINING PROGRAM

## 2021-01-01 PROCEDURE — U0003 INFECTIOUS AGENT DETECTION BY NUCLEIC ACID (DNA OR RNA); SEVERE ACUTE RESPIRATORY SYNDROME CORONAVIRUS 2 (SARS-COV-2) (CORONAVIRUS DISEASE [COVID-19]), AMPLIFIED PROBE TECHNIQUE, MAKING USE OF HIGH THROUGHPUT TECHNOLOGIES AS DESCRIBED BY CMS-2020-01-R: HCPCS | Performed by: EMERGENCY MEDICINE

## 2021-01-01 PROCEDURE — 84132 ASSAY OF SERUM POTASSIUM: CPT

## 2021-01-01 PROCEDURE — 99291 CRITICAL CARE FIRST HOUR: CPT | Performed by: INTERNAL MEDICINE

## 2021-01-01 PROCEDURE — 96367 TX/PROPH/DG ADDL SEQ IV INF: CPT | Performed by: EMERGENCY MEDICINE

## 2021-01-01 PROCEDURE — 86900 BLOOD TYPING SEROLOGIC ABO: CPT | Performed by: EMERGENCY MEDICINE

## 2021-01-01 PROCEDURE — 99254 IP/OBS CNSLTJ NEW/EST MOD 60: CPT

## 2021-01-01 PROCEDURE — 70450 CT HEAD/BRAIN W/O DYE: CPT

## 2021-01-01 PROCEDURE — 84145 PROCALCITONIN (PCT): CPT | Performed by: INTERNAL MEDICINE

## 2021-01-01 PROCEDURE — 80076 HEPATIC FUNCTION PANEL: CPT | Performed by: STUDENT IN AN ORGANIZED HEALTH CARE EDUCATION/TRAINING PROGRAM

## 2021-01-01 PROCEDURE — 85610 PROTHROMBIN TIME: CPT | Performed by: EMERGENCY MEDICINE

## 2021-01-01 PROCEDURE — 96375 TX/PRO/DX INJ NEW DRUG ADDON: CPT | Performed by: EMERGENCY MEDICINE

## 2021-01-01 PROCEDURE — 71260 CT THORAX DX C+: CPT | Mod: 26 | Performed by: RADIOLOGY

## 2021-01-01 PROCEDURE — 88305 TISSUE EXAM BY PATHOLOGIST: CPT | Mod: TC | Performed by: INTERNAL MEDICINE

## 2021-01-01 PROCEDURE — 250N000009 HC RX 250: Performed by: NURSE PRACTITIONER

## 2021-01-01 PROCEDURE — 0DB68ZX EXCISION OF STOMACH, VIA NATURAL OR ARTIFICIAL OPENING ENDOSCOPIC, DIAGNOSTIC: ICD-10-PCS | Performed by: INTERNAL MEDICINE

## 2021-01-01 PROCEDURE — 99233 SBSQ HOSP IP/OBS HIGH 50: CPT | Performed by: STUDENT IN AN ORGANIZED HEALTH CARE EDUCATION/TRAINING PROGRAM

## 2021-01-01 PROCEDURE — 99222 1ST HOSP IP/OBS MODERATE 55: CPT | Mod: GC | Performed by: INTERNAL MEDICINE

## 2021-01-01 PROCEDURE — 93005 ELECTROCARDIOGRAM TRACING: CPT | Performed by: EMERGENCY MEDICINE

## 2021-01-01 PROCEDURE — C9803 HOPD COVID-19 SPEC COLLECT: HCPCS | Performed by: EMERGENCY MEDICINE

## 2021-01-01 PROCEDURE — 87077 CULTURE AEROBIC IDENTIFY: CPT | Performed by: STUDENT IN AN ORGANIZED HEALTH CARE EDUCATION/TRAINING PROGRAM

## 2021-01-01 PROCEDURE — 81001 URINALYSIS AUTO W/SCOPE: CPT | Performed by: STUDENT IN AN ORGANIZED HEALTH CARE EDUCATION/TRAINING PROGRAM

## 2021-01-01 PROCEDURE — 82330 ASSAY OF CALCIUM: CPT

## 2021-01-01 RX ORDER — POTASSIUM CHLORIDE 750 MG/1
20 TABLET, EXTENDED RELEASE ORAL ONCE
Status: COMPLETED | OUTPATIENT
Start: 2021-01-01 | End: 2021-01-01

## 2021-01-01 RX ORDER — ONDANSETRON 2 MG/ML
4 INJECTION INTRAMUSCULAR; INTRAVENOUS EVERY 6 HOURS PRN
Status: DISCONTINUED | OUTPATIENT
Start: 2021-01-01 | End: 2021-01-01 | Stop reason: HOSPADM

## 2021-01-01 RX ORDER — MULTIPLE VITAMINS W/ MINERALS TAB 9MG-400MCG
1 TAB ORAL DAILY
Status: DISCONTINUED | OUTPATIENT
Start: 2021-01-01 | End: 2021-01-01

## 2021-01-01 RX ORDER — MAGNESIUM SULFATE HEPTAHYDRATE 40 MG/ML
2 INJECTION, SOLUTION INTRAVENOUS ONCE
Status: COMPLETED | OUTPATIENT
Start: 2021-01-01 | End: 2021-01-01

## 2021-01-01 RX ORDER — POTASSIUM CHLORIDE 750 MG/1
40 TABLET, EXTENDED RELEASE ORAL ONCE
Status: DISCONTINUED | OUTPATIENT
Start: 2021-01-01 | End: 2021-01-01

## 2021-01-01 RX ORDER — AMPICILLIN AND SULBACTAM 2; 1 G/1; G/1
3 INJECTION, POWDER, FOR SOLUTION INTRAMUSCULAR; INTRAVENOUS EVERY 6 HOURS
Status: DISCONTINUED | OUTPATIENT
Start: 2021-01-01 | End: 2021-01-01 | Stop reason: HOSPADM

## 2021-01-01 RX ORDER — QUETIAPINE FUMARATE 50 MG/1
50-100 TABLET, FILM COATED ORAL
Status: DISCONTINUED | OUTPATIENT
Start: 2021-01-01 | End: 2021-01-01

## 2021-01-01 RX ORDER — METOPROLOL TARTRATE 1 MG/ML
5 INJECTION, SOLUTION INTRAVENOUS EVERY 6 HOURS PRN
Status: DISCONTINUED | OUTPATIENT
Start: 2021-01-01 | End: 2021-01-01 | Stop reason: HOSPADM

## 2021-01-01 RX ORDER — POTASSIUM CHLORIDE 750 MG/1
40 TABLET, EXTENDED RELEASE ORAL ONCE
Status: COMPLETED | OUTPATIENT
Start: 2021-01-01 | End: 2021-01-01

## 2021-01-01 RX ORDER — LANOLIN ALCOHOL/MO/W.PET/CERES
100 CREAM (GRAM) TOPICAL 3 TIMES DAILY
Status: DISCONTINUED | OUTPATIENT
Start: 2021-01-01 | End: 2021-01-01 | Stop reason: HOSPADM

## 2021-01-01 RX ORDER — GABAPENTIN 300 MG/1
300 CAPSULE ORAL EVERY 8 HOURS
Status: DISCONTINUED | OUTPATIENT
Start: 2021-01-01 | End: 2021-01-01 | Stop reason: HOSPADM

## 2021-01-01 RX ORDER — FOLIC ACID 5 MG/ML
1 INJECTION, SOLUTION INTRAMUSCULAR; INTRAVENOUS; SUBCUTANEOUS DAILY
Status: COMPLETED | OUTPATIENT
Start: 2021-01-01 | End: 2021-01-01

## 2021-01-01 RX ORDER — QUETIAPINE FUMARATE 100 MG/1
300 TABLET, FILM COATED ORAL ONCE
Status: COMPLETED | OUTPATIENT
Start: 2021-01-01 | End: 2021-01-01

## 2021-01-01 RX ORDER — FOLIC ACID 1 MG/1
1 TABLET ORAL DAILY
Status: DISCONTINUED | OUTPATIENT
Start: 2021-01-01 | End: 2021-01-01 | Stop reason: HOSPADM

## 2021-01-01 RX ORDER — PIPERACILLIN SODIUM, TAZOBACTAM SODIUM 4; .5 G/20ML; G/20ML
4.5 INJECTION, POWDER, LYOPHILIZED, FOR SOLUTION INTRAVENOUS EVERY 6 HOURS
Status: DISCONTINUED | OUTPATIENT
Start: 2021-01-01 | End: 2021-01-01

## 2021-01-01 RX ORDER — SODIUM CHLORIDE 9 MG/ML
INJECTION, SOLUTION INTRAVENOUS CONTINUOUS
Status: DISCONTINUED | OUTPATIENT
Start: 2021-01-01 | End: 2021-01-01

## 2021-01-01 RX ORDER — GABAPENTIN 600 MG/1
1200 TABLET ORAL ONCE
Status: COMPLETED | OUTPATIENT
Start: 2021-01-01 | End: 2021-01-01

## 2021-01-01 RX ORDER — FOLIC ACID 5 MG/ML
1 INJECTION, SOLUTION INTRAMUSCULAR; INTRAVENOUS; SUBCUTANEOUS ONCE
Status: COMPLETED | OUTPATIENT
Start: 2021-01-01 | End: 2021-01-01

## 2021-01-01 RX ORDER — NICOTINE POLACRILEX 4 MG
15-30 LOZENGE BUCCAL
Status: DISCONTINUED | OUTPATIENT
Start: 2021-01-01 | End: 2021-01-01 | Stop reason: HOSPADM

## 2021-01-01 RX ORDER — POTASSIUM CHLORIDE 20MEQ/15ML
40 LIQUID (ML) ORAL ONCE
Status: DISCONTINUED | OUTPATIENT
Start: 2021-01-01 | End: 2021-01-01

## 2021-01-01 RX ORDER — PIPERACILLIN SODIUM, TAZOBACTAM SODIUM 4; .5 G/20ML; G/20ML
4.5 INJECTION, POWDER, LYOPHILIZED, FOR SOLUTION INTRAVENOUS ONCE
Status: COMPLETED | OUTPATIENT
Start: 2021-01-01 | End: 2021-01-01

## 2021-01-01 RX ORDER — QUETIAPINE FUMARATE 25 MG/1
25 TABLET, FILM COATED ORAL 2 TIMES DAILY PRN
Status: DISCONTINUED | OUTPATIENT
Start: 2021-01-01 | End: 2021-01-01 | Stop reason: HOSPADM

## 2021-01-01 RX ORDER — IOPAMIDOL 755 MG/ML
99 INJECTION, SOLUTION INTRAVASCULAR ONCE
Status: COMPLETED | OUTPATIENT
Start: 2021-01-01 | End: 2021-01-01

## 2021-01-01 RX ORDER — DIAZEPAM 10 MG/2ML
5-10 INJECTION, SOLUTION INTRAMUSCULAR; INTRAVENOUS EVERY 30 MIN PRN
Status: DISCONTINUED | OUTPATIENT
Start: 2021-01-01 | End: 2021-01-01

## 2021-01-01 RX ORDER — FENTANYL CITRATE 50 UG/ML
INJECTION, SOLUTION INTRAMUSCULAR; INTRAVENOUS PRN
Status: COMPLETED | OUTPATIENT
Start: 2021-01-01 | End: 2021-01-01

## 2021-01-01 RX ORDER — DEXTROSE MONOHYDRATE 25 G/50ML
25-50 INJECTION, SOLUTION INTRAVENOUS
Status: DISCONTINUED | OUTPATIENT
Start: 2021-01-01 | End: 2021-01-01 | Stop reason: HOSPADM

## 2021-01-01 RX ORDER — LIDOCAINE 40 MG/G
CREAM TOPICAL
Status: DISCONTINUED | OUTPATIENT
Start: 2021-01-01 | End: 2021-01-01 | Stop reason: HOSPADM

## 2021-01-01 RX ORDER — CLONIDINE HYDROCHLORIDE 0.1 MG/1
0.1 TABLET ORAL EVERY 8 HOURS
Status: DISCONTINUED | OUTPATIENT
Start: 2021-01-01 | End: 2021-01-01 | Stop reason: HOSPADM

## 2021-01-01 RX ORDER — FLUMAZENIL 0.1 MG/ML
0.2 INJECTION, SOLUTION INTRAVENOUS
Status: DISCONTINUED | OUTPATIENT
Start: 2021-01-01 | End: 2021-01-01 | Stop reason: HOSPADM

## 2021-01-01 RX ORDER — ONDANSETRON 4 MG/1
4 TABLET, ORALLY DISINTEGRATING ORAL EVERY 6 HOURS PRN
Status: DISCONTINUED | OUTPATIENT
Start: 2021-01-01 | End: 2021-01-01 | Stop reason: HOSPADM

## 2021-01-01 RX ORDER — NALTREXONE HYDROCHLORIDE 50 MG/1
50 TABLET, FILM COATED ORAL DAILY
Status: DISCONTINUED | OUTPATIENT
Start: 2021-01-01 | End: 2021-01-01 | Stop reason: HOSPADM

## 2021-01-01 RX ORDER — QUETIAPINE FUMARATE 100 MG/1
400 TABLET, FILM COATED ORAL
Status: DISCONTINUED | OUTPATIENT
Start: 2021-01-01 | End: 2021-01-01 | Stop reason: HOSPADM

## 2021-01-01 RX ORDER — FLUMAZENIL 0.1 MG/ML
0.2 INJECTION, SOLUTION INTRAVENOUS
Status: DISCONTINUED | OUTPATIENT
Start: 2021-01-01 | End: 2021-01-01

## 2021-01-01 RX ORDER — HYDROXYZINE HYDROCHLORIDE 25 MG/1
25 TABLET, FILM COATED ORAL EVERY 4 HOURS PRN
Status: DISCONTINUED | OUTPATIENT
Start: 2021-01-01 | End: 2021-01-01 | Stop reason: HOSPADM

## 2021-01-01 RX ORDER — GABAPENTIN 300 MG/1
900 CAPSULE ORAL EVERY 8 HOURS
Status: COMPLETED | OUTPATIENT
Start: 2021-01-01 | End: 2021-01-01

## 2021-01-01 RX ORDER — GABAPENTIN 300 MG/1
600 CAPSULE ORAL EVERY 8 HOURS
Status: DISCONTINUED | OUTPATIENT
Start: 2021-01-01 | End: 2021-01-01 | Stop reason: HOSPADM

## 2021-01-01 RX ORDER — PANTOPRAZOLE SODIUM 40 MG/1
40 TABLET, DELAYED RELEASE ORAL 2 TIMES DAILY
Qty: 120 TABLET | Refills: 0 | Status: SHIPPED | OUTPATIENT
Start: 2021-01-01

## 2021-01-01 RX ORDER — GABAPENTIN 100 MG/1
100 CAPSULE ORAL EVERY 8 HOURS
Status: DISCONTINUED | OUTPATIENT
Start: 2021-01-01 | End: 2021-01-01 | Stop reason: HOSPADM

## 2021-01-01 RX ORDER — PROCHLORPERAZINE MALEATE 5 MG
10 TABLET ORAL EVERY 6 HOURS PRN
Status: DISCONTINUED | OUTPATIENT
Start: 2021-01-01 | End: 2021-01-01 | Stop reason: HOSPADM

## 2021-01-01 RX ORDER — DEXMEDETOMIDINE HYDROCHLORIDE 4 UG/ML
.2-.7 INJECTION, SOLUTION INTRAVENOUS CONTINUOUS
Status: DISCONTINUED | OUTPATIENT
Start: 2021-01-01 | End: 2021-01-01

## 2021-01-01 RX ORDER — LANOLIN ALCOHOL/MO/W.PET/CERES
100 CREAM (GRAM) TOPICAL DAILY
Status: DISCONTINUED | OUTPATIENT
Start: 2021-01-01 | End: 2021-01-01 | Stop reason: HOSPADM

## 2021-01-01 RX ORDER — LORAZEPAM 1 MG/1
1-2 TABLET ORAL EVERY 30 MIN PRN
Status: DISCONTINUED | OUTPATIENT
Start: 2021-01-01 | End: 2021-01-01 | Stop reason: HOSPADM

## 2021-01-01 RX ORDER — PROCHLORPERAZINE 25 MG
25 SUPPOSITORY, RECTAL RECTAL EVERY 12 HOURS PRN
Status: DISCONTINUED | OUTPATIENT
Start: 2021-01-01 | End: 2021-01-01 | Stop reason: HOSPADM

## 2021-01-01 RX ORDER — DIAZEPAM 5 MG
10 TABLET ORAL EVERY 30 MIN PRN
Status: DISCONTINUED | OUTPATIENT
Start: 2021-01-01 | End: 2021-01-01

## 2021-01-01 RX ORDER — POTASSIUM CHLORIDE 7.45 MG/ML
10 INJECTION INTRAVENOUS
Status: COMPLETED | OUTPATIENT
Start: 2021-01-01 | End: 2021-01-01

## 2021-01-01 RX ORDER — HALOPERIDOL 5 MG/ML
2.5-5 INJECTION INTRAMUSCULAR EVERY 4 HOURS PRN
Status: DISCONTINUED | OUTPATIENT
Start: 2021-01-01 | End: 2021-01-01 | Stop reason: HOSPADM

## 2021-01-01 RX ORDER — LORAZEPAM 2 MG/ML
1-2 INJECTION INTRAMUSCULAR EVERY 30 MIN PRN
Status: DISCONTINUED | OUTPATIENT
Start: 2021-01-01 | End: 2021-01-01 | Stop reason: HOSPADM

## 2021-01-01 RX ORDER — THIAMINE HYDROCHLORIDE 100 MG/ML
100 INJECTION, SOLUTION INTRAMUSCULAR; INTRAVENOUS ONCE
Status: COMPLETED | OUTPATIENT
Start: 2021-01-01 | End: 2021-01-01

## 2021-01-01 RX ORDER — MULTIPLE VITAMINS W/ MINERALS TAB 9MG-400MCG
1 TAB ORAL DAILY
Status: DISCONTINUED | OUTPATIENT
Start: 2021-01-01 | End: 2021-01-01 | Stop reason: HOSPADM

## 2021-01-01 RX ADMIN — AMPICILLIN AND SULBACTAM 3 G: 2; 1 INJECTION, POWDER, FOR SOLUTION INTRAVENOUS at 08:19

## 2021-01-01 RX ADMIN — SODIUM PHOSPHATE, MONOBASIC, MONOHYDRATE 15 MMOL: 276; 142 INJECTION, SOLUTION INTRAVENOUS at 06:14

## 2021-01-01 RX ADMIN — Medication 1 TABLET: at 08:31

## 2021-01-01 RX ADMIN — LORAZEPAM 1 MG: 1 TABLET ORAL at 04:01

## 2021-01-01 RX ADMIN — PANTOPRAZOLE SODIUM 40 MG: 40 INJECTION, POWDER, FOR SOLUTION INTRAVENOUS at 07:53

## 2021-01-01 RX ADMIN — POTASSIUM CHLORIDE 40 MEQ: 750 TABLET, EXTENDED RELEASE ORAL at 07:52

## 2021-01-01 RX ADMIN — THIAMINE HYDROCHLORIDE 100 MG: 100 INJECTION, SOLUTION INTRAMUSCULAR; INTRAVENOUS at 23:21

## 2021-01-01 RX ADMIN — QUETIAPINE FUMARATE 300 MG: 100 TABLET ORAL at 00:22

## 2021-01-01 RX ADMIN — PROCHLORPERAZINE MALEATE 10 MG: 5 TABLET ORAL at 09:47

## 2021-01-01 RX ADMIN — NALTREXONE HYDROCHLORIDE 50 MG: 50 TABLET, FILM COATED ORAL at 12:10

## 2021-01-01 RX ADMIN — SODIUM CHLORIDE: 9 INJECTION, SOLUTION INTRAVENOUS at 07:54

## 2021-01-01 RX ADMIN — SODIUM CHLORIDE 8 MG/HR: 9 INJECTION, SOLUTION INTRAVENOUS at 00:22

## 2021-01-01 RX ADMIN — FOLIC ACID 1 MG: 5 INJECTION, SOLUTION INTRAMUSCULAR; INTRAVENOUS; SUBCUTANEOUS at 04:51

## 2021-01-01 RX ADMIN — Medication 1 TABLET: at 12:10

## 2021-01-01 RX ADMIN — LORAZEPAM 1 MG: 1 TABLET ORAL at 11:53

## 2021-01-01 RX ADMIN — CLONIDINE HYDROCHLORIDE 0.1 MG: 0.1 TABLET ORAL at 11:52

## 2021-01-01 RX ADMIN — FOLIC ACID 1 MG: 5 INJECTION, SOLUTION INTRAMUSCULAR; INTRAVENOUS; SUBCUTANEOUS at 08:32

## 2021-01-01 RX ADMIN — NALTREXONE HYDROCHLORIDE 50 MG: 50 TABLET, FILM COATED ORAL at 08:34

## 2021-01-01 RX ADMIN — FOLIC ACID 1 MG: 5 INJECTION, SOLUTION INTRAMUSCULAR; INTRAVENOUS; SUBCUTANEOUS at 07:53

## 2021-01-01 RX ADMIN — GABAPENTIN 900 MG: 300 CAPSULE ORAL at 16:26

## 2021-01-01 RX ADMIN — POTASSIUM CHLORIDE 40 MEQ: 750 TABLET, EXTENDED RELEASE ORAL at 10:02

## 2021-01-01 RX ADMIN — PANTOPRAZOLE SODIUM 40 MG: 40 INJECTION, POWDER, FOR SOLUTION INTRAVENOUS at 08:30

## 2021-01-01 RX ADMIN — POTASSIUM CHLORIDE 40 MEQ: 750 TABLET, EXTENDED RELEASE ORAL at 22:34

## 2021-01-01 RX ADMIN — Medication 100 MG: at 00:08

## 2021-01-01 RX ADMIN — AMPICILLIN AND SULBACTAM 3 G: 2; 1 INJECTION, POWDER, FOR SOLUTION INTRAVENOUS at 04:23

## 2021-01-01 RX ADMIN — POTASSIUM CHLORIDE 10 MEQ: 7.46 INJECTION, SOLUTION INTRAVENOUS at 21:04

## 2021-01-01 RX ADMIN — Medication 1 TABLET: at 09:47

## 2021-01-01 RX ADMIN — PIPERACILLIN SODIUM,TAZOBACTAM SODIUM 4.5 G: 4; .5 INJECTION, POWDER, FOR SOLUTION INTRAVENOUS at 23:48

## 2021-01-01 RX ADMIN — POTASSIUM CHLORIDE 10 MEQ: 7.46 INJECTION, SOLUTION INTRAVENOUS at 19:02

## 2021-01-01 RX ADMIN — SODIUM PHOSPHATE, MONOBASIC, MONOHYDRATE 9 MMOL: 276; 142 INJECTION, SOLUTION INTRAVENOUS at 08:33

## 2021-01-01 RX ADMIN — SODIUM CHLORIDE: 9 INJECTION, SOLUTION INTRAVENOUS at 12:50

## 2021-01-01 RX ADMIN — Medication 100 MG: at 12:11

## 2021-01-01 RX ADMIN — SODIUM CHLORIDE 500 ML: 9 INJECTION, SOLUTION INTRAVENOUS at 07:59

## 2021-01-01 RX ADMIN — AMPICILLIN AND SULBACTAM 3 G: 2; 1 INJECTION, POWDER, FOR SOLUTION INTRAVENOUS at 13:46

## 2021-01-01 RX ADMIN — POTASSIUM CHLORIDE 10 MEQ: 7.46 INJECTION, SOLUTION INTRAVENOUS at 10:44

## 2021-01-01 RX ADMIN — QUETIAPINE FUMARATE 25 MG: 25 TABLET ORAL at 00:13

## 2021-01-01 RX ADMIN — SODIUM CHLORIDE: 9 INJECTION, SOLUTION INTRAVENOUS at 23:43

## 2021-01-01 RX ADMIN — THIAMINE HYDROCHLORIDE 200 MG: 100 INJECTION, SOLUTION INTRAMUSCULAR; INTRAVENOUS at 16:25

## 2021-01-01 RX ADMIN — AMPICILLIN AND SULBACTAM 3 G: 2; 1 INJECTION, POWDER, FOR SOLUTION INTRAVENOUS at 23:50

## 2021-01-01 RX ADMIN — PANTOPRAZOLE SODIUM 40 MG: 40 INJECTION, POWDER, FOR SOLUTION INTRAVENOUS at 08:20

## 2021-01-01 RX ADMIN — THIAMINE HYDROCHLORIDE 200 MG: 100 INJECTION, SOLUTION INTRAMUSCULAR; INTRAVENOUS at 14:47

## 2021-01-01 RX ADMIN — Medication 1 TABLET: at 07:52

## 2021-01-01 RX ADMIN — GABAPENTIN 900 MG: 300 CAPSULE ORAL at 16:48

## 2021-01-01 RX ADMIN — CLONIDINE HYDROCHLORIDE 0.1 MG: 0.1 TABLET ORAL at 03:55

## 2021-01-01 RX ADMIN — POTASSIUM CHLORIDE 10 MEQ: 7.46 INJECTION, SOLUTION INTRAVENOUS at 12:36

## 2021-01-01 RX ADMIN — AMPICILLIN AND SULBACTAM 3 G: 2; 1 INJECTION, POWDER, FOR SOLUTION INTRAVENOUS at 22:54

## 2021-01-01 RX ADMIN — MIDAZOLAM 2 MG: 1 INJECTION INTRAMUSCULAR; INTRAVENOUS at 10:39

## 2021-01-01 RX ADMIN — AMPICILLIN AND SULBACTAM 3 G: 2; 1 INJECTION, POWDER, FOR SOLUTION INTRAVENOUS at 16:33

## 2021-01-01 RX ADMIN — LORAZEPAM 1 MG: 1 TABLET ORAL at 21:17

## 2021-01-01 RX ADMIN — VANCOMYCIN HYDROCHLORIDE 1250 MG: 10 INJECTION, POWDER, LYOPHILIZED, FOR SOLUTION INTRAVENOUS at 16:49

## 2021-01-01 RX ADMIN — THIAMINE HYDROCHLORIDE 200 MG: 100 INJECTION, SOLUTION INTRAMUSCULAR; INTRAVENOUS at 08:00

## 2021-01-01 RX ADMIN — QUETIAPINE 100 MG: 50 TABLET ORAL at 22:34

## 2021-01-01 RX ADMIN — CLONIDINE HYDROCHLORIDE 0.1 MG: 0.1 TABLET ORAL at 05:21

## 2021-01-01 RX ADMIN — CLONIDINE HYDROCHLORIDE 0.1 MG: 0.1 TABLET ORAL at 04:00

## 2021-01-01 RX ADMIN — FOLIC ACID 1 MG: 5 INJECTION, SOLUTION INTRAMUSCULAR; INTRAVENOUS; SUBCUTANEOUS at 23:21

## 2021-01-01 RX ADMIN — PANTOPRAZOLE SODIUM 40 MG: 40 INJECTION, POWDER, FOR SOLUTION INTRAVENOUS at 23:45

## 2021-01-01 RX ADMIN — GABAPENTIN 600 MG: 300 CAPSULE ORAL at 12:11

## 2021-01-01 RX ADMIN — POTASSIUM CHLORIDE 20 MEQ: 750 TABLET, EXTENDED RELEASE ORAL at 08:31

## 2021-01-01 RX ADMIN — PANTOPRAZOLE SODIUM 40 MG: 40 INJECTION, POWDER, FOR SOLUTION INTRAVENOUS at 19:24

## 2021-01-01 RX ADMIN — POTASSIUM CHLORIDE 40 MEQ: 750 TABLET, EXTENDED RELEASE ORAL at 12:00

## 2021-01-01 RX ADMIN — CLONIDINE HYDROCHLORIDE 0.1 MG: 0.1 TABLET ORAL at 19:24

## 2021-01-01 RX ADMIN — LORAZEPAM 1 MG: 1 TABLET ORAL at 22:34

## 2021-01-01 RX ADMIN — POTASSIUM CHLORIDE 10 MEQ: 7.46 INJECTION, SOLUTION INTRAVENOUS at 19:55

## 2021-01-01 RX ADMIN — ENOXAPARIN SODIUM 40 MG: 40 INJECTION, SOLUTION INTRAVENOUS; SUBCUTANEOUS at 14:08

## 2021-01-01 RX ADMIN — MAGNESIUM SULFATE IN WATER 2 G: 40 INJECTION, SOLUTION INTRAVENOUS at 08:31

## 2021-01-01 RX ADMIN — VANCOMYCIN HYDROCHLORIDE 1250 MG: 10 INJECTION, POWDER, LYOPHILIZED, FOR SOLUTION INTRAVENOUS at 04:36

## 2021-01-01 RX ADMIN — POTASSIUM CHLORIDE 10 MEQ: 7.46 INJECTION, SOLUTION INTRAVENOUS at 18:05

## 2021-01-01 RX ADMIN — LORAZEPAM 1 MG: 1 TABLET ORAL at 08:45

## 2021-01-01 RX ADMIN — POTASSIUM CHLORIDE 20 MEQ: 750 TABLET, EXTENDED RELEASE ORAL at 14:47

## 2021-01-01 RX ADMIN — GABAPENTIN 900 MG: 300 CAPSULE ORAL at 15:58

## 2021-01-01 RX ADMIN — PANTOPRAZOLE SODIUM 40 MG: 40 INJECTION, POWDER, FOR SOLUTION INTRAVENOUS at 21:05

## 2021-01-01 RX ADMIN — THIAMINE HYDROCHLORIDE 200 MG: 100 INJECTION, SOLUTION INTRAMUSCULAR; INTRAVENOUS at 22:48

## 2021-01-01 RX ADMIN — MAGNESIUM SULFATE IN WATER 2 G: 40 INJECTION, SOLUTION INTRAVENOUS at 08:09

## 2021-01-01 RX ADMIN — AMPICILLIN AND SULBACTAM 3 G: 2; 1 INJECTION, POWDER, FOR SOLUTION INTRAVENOUS at 17:37

## 2021-01-01 RX ADMIN — NALTREXONE HYDROCHLORIDE 50 MG: 50 TABLET, FILM COATED ORAL at 07:57

## 2021-01-01 RX ADMIN — GABAPENTIN 900 MG: 300 CAPSULE ORAL at 23:49

## 2021-01-01 RX ADMIN — AMPICILLIN AND SULBACTAM 3 G: 2; 1 INJECTION, POWDER, FOR SOLUTION INTRAVENOUS at 00:12

## 2021-01-01 RX ADMIN — GABAPENTIN 900 MG: 300 CAPSULE ORAL at 08:31

## 2021-01-01 RX ADMIN — VANCOMYCIN HYDROCHLORIDE 1250 MG: 10 INJECTION, POWDER, LYOPHILIZED, FOR SOLUTION INTRAVENOUS at 16:25

## 2021-01-01 RX ADMIN — POTASSIUM & SODIUM PHOSPHATES POWDER PACK 280-160-250 MG 2 PACKET: 280-160-250 PACK at 07:53

## 2021-01-01 RX ADMIN — AMPICILLIN AND SULBACTAM 3 G: 2; 1 INJECTION, POWDER, FOR SOLUTION INTRAVENOUS at 16:48

## 2021-01-01 RX ADMIN — QUETIAPINE 100 MG: 50 TABLET ORAL at 23:49

## 2021-01-01 RX ADMIN — LORAZEPAM 1 MG: 1 TABLET ORAL at 09:46

## 2021-01-01 RX ADMIN — SODIUM PHOSPHATE, MONOBASIC, MONOHYDRATE 9 MMOL: 276; 142 INJECTION, SOLUTION INTRAVENOUS at 16:49

## 2021-01-01 RX ADMIN — FENTANYL CITRATE 50 MCG: 50 INJECTION, SOLUTION INTRAMUSCULAR; INTRAVENOUS at 10:38

## 2021-01-01 RX ADMIN — NALTREXONE HYDROCHLORIDE 50 MG: 50 TABLET, FILM COATED ORAL at 12:38

## 2021-01-01 RX ADMIN — QUETIAPINE 50 MG: 50 TABLET ORAL at 22:57

## 2021-01-01 RX ADMIN — PIPERACILLIN SODIUM AND TAZOBACTAM SODIUM 4.5 G: 4; .5 INJECTION, POWDER, LYOPHILIZED, FOR SOLUTION INTRAVENOUS at 05:39

## 2021-01-01 RX ADMIN — VANCOMYCIN HYDROCHLORIDE 1750 MG: 10 INJECTION, POWDER, LYOPHILIZED, FOR SOLUTION INTRAVENOUS at 06:05

## 2021-01-01 RX ADMIN — ONDANSETRON 4 MG: 4 TABLET, ORALLY DISINTEGRATING ORAL at 08:15

## 2021-01-01 RX ADMIN — SODIUM PHOSPHATE, MONOBASIC, MONOHYDRATE AND SODIUM PHOSPHATE, DIBASIC, ANHYDROUS 20 MMOL: 276; 142 INJECTION, SOLUTION INTRAVENOUS at 18:05

## 2021-01-01 RX ADMIN — POTASSIUM CHLORIDE 10 MEQ: 7.46 INJECTION, SOLUTION INTRAVENOUS at 13:57

## 2021-01-01 RX ADMIN — CLONIDINE HYDROCHLORIDE 0.1 MG: 0.1 TABLET ORAL at 04:22

## 2021-01-01 RX ADMIN — CLONIDINE HYDROCHLORIDE 0.1 MG: 0.1 TABLET ORAL at 12:10

## 2021-01-01 RX ADMIN — LORAZEPAM 1 MG: 1 TABLET ORAL at 03:55

## 2021-01-01 RX ADMIN — PROCHLORPERAZINE MALEATE 10 MG: 5 TABLET ORAL at 21:18

## 2021-01-01 RX ADMIN — SODIUM CHLORIDE: 9 INJECTION, SOLUTION INTRAVENOUS at 21:18

## 2021-01-01 RX ADMIN — CLONIDINE HYDROCHLORIDE 0.1 MG: 0.1 TABLET ORAL at 12:00

## 2021-01-01 RX ADMIN — FOLIC ACID 1 MG: 1 TABLET ORAL at 12:12

## 2021-01-01 RX ADMIN — GABAPENTIN 1200 MG: 600 TABLET, FILM COATED ORAL at 04:01

## 2021-01-01 RX ADMIN — AMPICILLIN AND SULBACTAM 3 G: 2; 1 INJECTION, POWDER, FOR SOLUTION INTRAVENOUS at 11:53

## 2021-01-01 RX ADMIN — PANTOPRAZOLE SODIUM 40 MG: 40 INJECTION, POWDER, FOR SOLUTION INTRAVENOUS at 19:56

## 2021-01-01 RX ADMIN — CLONIDINE HYDROCHLORIDE 0.1 MG: 0.1 TABLET ORAL at 12:38

## 2021-01-01 RX ADMIN — SODIUM CHLORIDE, POTASSIUM CHLORIDE, SODIUM LACTATE AND CALCIUM CHLORIDE 1000 ML: 600; 310; 30; 20 INJECTION, SOLUTION INTRAVENOUS at 22:50

## 2021-01-01 RX ADMIN — Medication 100 MG: at 14:08

## 2021-01-01 RX ADMIN — ENOXAPARIN SODIUM 40 MG: 40 INJECTION, SOLUTION INTRAVENOUS; SUBCUTANEOUS at 14:47

## 2021-01-01 RX ADMIN — VANCOMYCIN HYDROCHLORIDE 1750 MG: 10 INJECTION, POWDER, LYOPHILIZED, FOR SOLUTION INTRAVENOUS at 15:58

## 2021-01-01 RX ADMIN — AMPICILLIN AND SULBACTAM 3 G: 2; 1 INJECTION, POWDER, FOR SOLUTION INTRAVENOUS at 06:58

## 2021-01-01 RX ADMIN — MIDAZOLAM 2 MG: 1 INJECTION INTRAMUSCULAR; INTRAVENOUS at 10:35

## 2021-01-01 RX ADMIN — AMPICILLIN AND SULBACTAM 3 G: 2; 1 INJECTION, POWDER, FOR SOLUTION INTRAVENOUS at 10:03

## 2021-01-01 RX ADMIN — GABAPENTIN 900 MG: 300 CAPSULE ORAL at 07:52

## 2021-01-01 RX ADMIN — THIAMINE HYDROCHLORIDE 200 MG: 100 INJECTION, SOLUTION INTRAMUSCULAR; INTRAVENOUS at 07:53

## 2021-01-01 RX ADMIN — AMPICILLIN AND SULBACTAM 3 G: 2; 1 INJECTION, POWDER, FOR SOLUTION INTRAVENOUS at 10:42

## 2021-01-01 RX ADMIN — SODIUM CHLORIDE 1000 ML: 9 INJECTION, SOLUTION INTRAVENOUS at 22:51

## 2021-01-01 RX ADMIN — Medication 100 MG: at 08:31

## 2021-01-01 RX ADMIN — Medication 100 MG: at 19:24

## 2021-01-01 RX ADMIN — IOPAMIDOL 99 ML: 755 INJECTION, SOLUTION INTRAVENOUS at 00:06

## 2021-01-01 RX ADMIN — LORAZEPAM 1 MG: 1 TABLET ORAL at 03:05

## 2021-01-01 RX ADMIN — THIAMINE HYDROCHLORIDE 200 MG: 100 INJECTION, SOLUTION INTRAMUSCULAR; INTRAVENOUS at 20:27

## 2021-01-01 RX ADMIN — ONDANSETRON 4 MG: 4 TABLET, ORALLY DISINTEGRATING ORAL at 16:26

## 2021-01-01 RX ADMIN — GABAPENTIN 900 MG: 300 CAPSULE ORAL at 00:09

## 2021-01-01 RX ADMIN — VANCOMYCIN HYDROCHLORIDE 1750 MG: 1 INJECTION, POWDER, LYOPHILIZED, FOR SOLUTION INTRAVENOUS at 00:26

## 2021-01-01 RX ADMIN — POTASSIUM CHLORIDE 10 MEQ: 7.46 INJECTION, SOLUTION INTRAVENOUS at 09:28

## 2021-01-01 RX ADMIN — FENTANYL CITRATE 100 MCG: 50 INJECTION, SOLUTION INTRAMUSCULAR; INTRAVENOUS at 10:35

## 2021-01-01 RX ADMIN — VANCOMYCIN HYDROCHLORIDE 1250 MG: 10 INJECTION, POWDER, LYOPHILIZED, FOR SOLUTION INTRAVENOUS at 04:04

## 2021-01-01 RX ADMIN — SODIUM CHLORIDE 8 MG/HR: 9 INJECTION, SOLUTION INTRAVENOUS at 09:56

## 2021-01-01 RX ADMIN — CLONIDINE HYDROCHLORIDE 0.1 MG: 0.1 TABLET ORAL at 19:56

## 2021-01-01 RX ADMIN — GABAPENTIN 900 MG: 300 CAPSULE ORAL at 09:47

## 2021-01-01 RX ADMIN — POTASSIUM & SODIUM PHOSPHATES POWDER PACK 280-160-250 MG 2 PACKET: 280-160-250 PACK at 12:00

## 2021-01-01 RX ADMIN — CLONIDINE HYDROCHLORIDE 0.1 MG: 0.1 TABLET ORAL at 21:05

## 2021-01-01 RX ADMIN — GABAPENTIN 900 MG: 300 CAPSULE ORAL at 01:19

## 2021-01-01 RX ADMIN — LORAZEPAM 1 MG: 1 TABLET ORAL at 00:23

## 2021-01-01 SDOH — HEALTH STABILITY: MENTAL HEALTH: HOW OFTEN DO YOU HAVE 6 OR MORE DRINKS ON ONE OCCASION?: DAILY OR ALMOST DAILY

## 2021-01-01 SDOH — HEALTH STABILITY: MENTAL HEALTH: HOW MANY STANDARD DRINKS CONTAINING ALCOHOL DO YOU HAVE ON A TYPICAL DAY?: 10 OR MORE

## 2021-01-01 SDOH — HEALTH STABILITY: MENTAL HEALTH: HOW OFTEN DO YOU HAVE A DRINK CONTAINING ALCOHOL?: 4 OR MORE TIMES A WEEK

## 2021-01-01 ASSESSMENT — ACTIVITIES OF DAILY LIVING (ADL)
DRESSING/BATHING_DIFFICULTY: NO
DIFFICULTY_EATING/SWALLOWING: NO
ADLS_ACUITY_SCORE: 16
ADLS_ACUITY_SCORE: 16
ADLS_ACUITY_SCORE: 18
ADLS_ACUITY_SCORE: 18
ADLS_ACUITY_SCORE: 17
ADLS_ACUITY_SCORE: 18
ADLS_ACUITY_SCORE: 16
ADLS_ACUITY_SCORE: 18
DEPENDENT_IADLS:: INDEPENDENT
ADLS_ACUITY_SCORE: 17
ADLS_ACUITY_SCORE: 18
ADLS_ACUITY_SCORE: 16
DIFFICULTY_COMMUNICATING: NO
ADLS_ACUITY_SCORE: 17
ADLS_ACUITY_SCORE: 18
ADLS_ACUITY_SCORE: 17
ADLS_ACUITY_SCORE: 17
TOILETING_ISSUES: NO
ADLS_ACUITY_SCORE: 18
ADLS_ACUITY_SCORE: 17
ADLS_ACUITY_SCORE: 18
ADLS_ACUITY_SCORE: 16

## 2021-01-01 ASSESSMENT — ENCOUNTER SYMPTOMS
TROUBLE SWALLOWING: 0
FATIGUE: 1
ABDOMINAL PAIN: 1
SEIZURES: 0
VOMITING: 1
COUGH: 0
BLOOD IN STOOL: 0
VOICE CHANGE: 0
NAUSEA: 1
NECK STIFFNESS: 0
FEVER: 0
MUSCULOSKELETAL NEGATIVE: 1
DIARRHEA: 0
CONSTIPATION: 0
SHORTNESS OF BREATH: 0
NECK PAIN: 0
NUMBNESS: 0
HEADACHES: 0
CHILLS: 1
WEAKNESS: 1
BACK PAIN: 0

## 2021-01-01 ASSESSMENT — MIFFLIN-ST. JEOR
SCORE: 1697.01
SCORE: 1693.25
SCORE: 1715.25

## 2021-01-03 ENCOUNTER — HEALTH MAINTENANCE LETTER (OUTPATIENT)
Age: 31
End: 2021-01-03

## 2021-01-04 ENCOUNTER — PATIENT OUTREACH (OUTPATIENT)
Dept: CARE COORDINATION | Facility: CLINIC | Age: 31
End: 2021-01-04

## 2021-01-04 ENCOUNTER — TELEPHONE (OUTPATIENT)
Dept: CARE COORDINATION | Facility: CLINIC | Age: 31
End: 2021-01-04

## 2021-01-04 DIAGNOSIS — U07.1 2019 NOVEL CORONAVIRUS DISEASE (COVID-19): Primary | ICD-10-CM

## 2021-01-04 NOTE — PROGRESS NOTES
Clinic Care Coordination Contact  [facility]       Clinical Data: Care Coordinator Outreach  Outreach attempted x 1.  Mailbox is full   Plan:  Care Coordinator will try to reach patient again in 1-2 business days.

## 2021-01-05 NOTE — PROGRESS NOTES
Clinic Care Coordination Contact  [facility]/Voicemail       Clinical Data: Care Coordinator Outreach  Outreach attempted x 2.  Left message on patient's voicemail with call back information and requested return call.  Plan:  Care Coordinator will do no further outreaches at this time.

## 2021-01-16 ENCOUNTER — APPOINTMENT (OUTPATIENT)
Dept: GENERAL RADIOLOGY | Facility: CLINIC | Age: 31
End: 2021-01-16
Attending: EMERGENCY MEDICINE
Payer: COMMERCIAL

## 2021-01-16 ENCOUNTER — HOSPITAL ENCOUNTER (INPATIENT)
Facility: CLINIC | Age: 31
LOS: 2 days | Discharge: PSYCHIATRIC OR SUBSTANCE USE FACILITY | End: 2021-01-19
Attending: EMERGENCY MEDICINE | Admitting: PSYCHIATRY & NEUROLOGY
Payer: COMMERCIAL

## 2021-01-16 DIAGNOSIS — Z86.16 HISTORY OF 2019 NOVEL CORONAVIRUS DISEASE (COVID-19): ICD-10-CM

## 2021-01-16 DIAGNOSIS — F10.920 ALCOHOLIC INTOXICATION WITHOUT COMPLICATION (H): ICD-10-CM

## 2021-01-16 DIAGNOSIS — R79.89 ELEVATED LFTS: ICD-10-CM

## 2021-01-16 DIAGNOSIS — R41.82 ALTERED MENTAL STATUS, UNSPECIFIED ALTERED MENTAL STATUS TYPE: ICD-10-CM

## 2021-01-16 DIAGNOSIS — F10.20 UNCOMPLICATED ALCOHOL DEPENDENCE (H): Primary | ICD-10-CM

## 2021-01-16 LAB
ALBUMIN SERPL-MCNC: 4 G/DL (ref 3.4–5)
ALBUMIN UR-MCNC: 100 MG/DL
ALP SERPL-CCNC: 148 U/L (ref 40–150)
ALT SERPL W P-5'-P-CCNC: 994 U/L (ref 0–70)
AMPHETAMINES UR QL SCN: NEGATIVE
ANION GAP SERPL CALCULATED.3IONS-SCNC: 9 MMOL/L (ref 3–14)
APAP SERPL-MCNC: <2 MG/L (ref 10–20)
APPEARANCE UR: CLEAR
AST SERPL W P-5'-P-CCNC: 943 U/L (ref 0–45)
BARBITURATES UR QL: POSITIVE
BASOPHILS # BLD AUTO: 0.1 10E9/L (ref 0–0.2)
BASOPHILS NFR BLD AUTO: 0.2 %
BENZODIAZ UR QL: POSITIVE
BILIRUB SERPL-MCNC: 0.7 MG/DL (ref 0.2–1.3)
BILIRUB UR QL STRIP: NEGATIVE
BUN SERPL-MCNC: 6 MG/DL (ref 7–30)
CALCIUM SERPL-MCNC: 8.6 MG/DL (ref 8.5–10.1)
CANNABINOIDS UR QL SCN: NEGATIVE
CHLORIDE SERPL-SCNC: 103 MMOL/L (ref 94–109)
CO2 SERPL-SCNC: 29 MMOL/L (ref 20–32)
COCAINE UR QL: NEGATIVE
COLOR UR AUTO: YELLOW
CREAT SERPL-MCNC: 0.75 MG/DL (ref 0.66–1.25)
DIFFERENTIAL METHOD BLD: ABNORMAL
EOSINOPHIL # BLD AUTO: 0 10E9/L (ref 0–0.7)
EOSINOPHIL NFR BLD AUTO: 0.1 %
ERYTHROCYTE [DISTWIDTH] IN BLOOD BY AUTOMATED COUNT: 14.1 % (ref 10–15)
ETHANOL SERPL-MCNC: 0.47 G/DL
ETHANOL UR QL SCN: POSITIVE
GFR SERPL CREATININE-BSD FRML MDRD: >90 ML/MIN/{1.73_M2}
GLUCOSE SERPL-MCNC: 101 MG/DL (ref 70–99)
GLUCOSE UR STRIP-MCNC: NEGATIVE MG/DL
HCT VFR BLD AUTO: 53 % (ref 40–53)
HGB BLD-MCNC: 17.8 G/DL (ref 13.3–17.7)
HGB UR QL STRIP: NEGATIVE
IMM GRANULOCYTES # BLD: 0.1 10E9/L (ref 0–0.4)
IMM GRANULOCYTES NFR BLD: 0.5 %
INR PPP: 0.89 (ref 0.86–1.14)
KETONES UR STRIP-MCNC: NEGATIVE MG/DL
LACTATE BLD-SCNC: 2.7 MMOL/L (ref 0.7–2)
LEUKOCYTE ESTERASE UR QL STRIP: NEGATIVE
LIPASE SERPL-CCNC: 224 U/L (ref 73–393)
LYMPHOCYTES # BLD AUTO: 2.3 10E9/L (ref 0.8–5.3)
LYMPHOCYTES NFR BLD AUTO: 9.9 %
MCH RBC QN AUTO: 30.8 PG (ref 26.5–33)
MCHC RBC AUTO-ENTMCNC: 33.6 G/DL (ref 31.5–36.5)
MCV RBC AUTO: 92 FL (ref 78–100)
MONOCYTES # BLD AUTO: 1.3 10E9/L (ref 0–1.3)
MONOCYTES NFR BLD AUTO: 5.7 %
MUCOUS THREADS #/AREA URNS LPF: PRESENT /LPF
NEUTROPHILS # BLD AUTO: 19.6 10E9/L (ref 1.6–8.3)
NEUTROPHILS NFR BLD AUTO: 83.6 %
NITRATE UR QL: NEGATIVE
NRBC # BLD AUTO: 0 10*3/UL
NRBC BLD AUTO-RTO: 0 /100
OPIATES UR QL SCN: NEGATIVE
PH UR STRIP: 6 PH (ref 5–7)
PLATELET # BLD AUTO: 240 10E9/L (ref 150–450)
POTASSIUM SERPL-SCNC: 3.8 MMOL/L (ref 3.4–5.3)
PROT SERPL-MCNC: 8.3 G/DL (ref 6.8–8.8)
RBC # BLD AUTO: 5.78 10E12/L (ref 4.4–5.9)
RBC #/AREA URNS AUTO: 0 /HPF (ref 0–2)
SALICYLATES SERPL-MCNC: <2 MG/DL
SODIUM SERPL-SCNC: 140 MMOL/L (ref 133–144)
SOURCE: ABNORMAL
SP GR UR STRIP: 1.01 (ref 1–1.03)
UROBILINOGEN UR STRIP-MCNC: NORMAL MG/DL (ref 0–2)
WBC # BLD AUTO: 23.5 10E9/L (ref 4–11)
WBC #/AREA URNS AUTO: 1 /HPF (ref 0–5)

## 2021-01-16 PROCEDURE — 82077 ASSAY SPEC XCP UR&BREATH IA: CPT | Performed by: INTERNAL MEDICINE

## 2021-01-16 PROCEDURE — 71045 X-RAY EXAM CHEST 1 VIEW: CPT

## 2021-01-16 PROCEDURE — 99285 EMERGENCY DEPT VISIT HI MDM: CPT | Mod: 25 | Performed by: EMERGENCY MEDICINE

## 2021-01-16 PROCEDURE — 80307 DRUG TEST PRSMV CHEM ANLYZR: CPT | Performed by: EMERGENCY MEDICINE

## 2021-01-16 PROCEDURE — 96361 HYDRATE IV INFUSION ADD-ON: CPT | Performed by: EMERGENCY MEDICINE

## 2021-01-16 PROCEDURE — 999N000127 HC STATISTIC PERIPHERAL IV START W US GUIDANCE

## 2021-01-16 PROCEDURE — 80053 COMPREHEN METABOLIC PANEL: CPT | Performed by: INTERNAL MEDICINE

## 2021-01-16 PROCEDURE — 85025 COMPLETE CBC W/AUTO DIFF WBC: CPT | Performed by: INTERNAL MEDICINE

## 2021-01-16 PROCEDURE — 80143 DRUG ASSAY ACETAMINOPHEN: CPT | Performed by: EMERGENCY MEDICINE

## 2021-01-16 PROCEDURE — 85610 PROTHROMBIN TIME: CPT | Performed by: INTERNAL MEDICINE

## 2021-01-16 PROCEDURE — 81001 URINALYSIS AUTO W/SCOPE: CPT | Performed by: EMERGENCY MEDICINE

## 2021-01-16 PROCEDURE — 258N000003 HC RX IP 258 OP 636: Performed by: EMERGENCY MEDICINE

## 2021-01-16 PROCEDURE — 83690 ASSAY OF LIPASE: CPT | Performed by: INTERNAL MEDICINE

## 2021-01-16 PROCEDURE — 71045 X-RAY EXAM CHEST 1 VIEW: CPT | Mod: 26

## 2021-01-16 PROCEDURE — 80320 DRUG SCREEN QUANTALCOHOLS: CPT | Performed by: EMERGENCY MEDICINE

## 2021-01-16 PROCEDURE — 80143 DRUG ASSAY ACETAMINOPHEN: CPT | Performed by: INTERNAL MEDICINE

## 2021-01-16 PROCEDURE — 82075 ASSAY OF BREATH ETHANOL: CPT | Performed by: EMERGENCY MEDICINE

## 2021-01-16 PROCEDURE — 83605 ASSAY OF LACTIC ACID: CPT | Performed by: EMERGENCY MEDICINE

## 2021-01-16 PROCEDURE — 99285 EMERGENCY DEPT VISIT HI MDM: CPT | Performed by: EMERGENCY MEDICINE

## 2021-01-16 PROCEDURE — 96360 HYDRATION IV INFUSION INIT: CPT | Performed by: EMERGENCY MEDICINE

## 2021-01-16 PROCEDURE — 80179 DRUG ASSAY SALICYLATE: CPT | Performed by: INTERNAL MEDICINE

## 2021-01-16 RX ORDER — MULTIPLE VITAMINS W/ MINERALS TAB 9MG-400MCG
1 TAB ORAL DAILY
Status: DISCONTINUED | OUTPATIENT
Start: 2021-01-17 | End: 2021-01-17

## 2021-01-16 RX ORDER — FOLIC ACID 1 MG/1
1 TABLET ORAL DAILY
Status: DISCONTINUED | OUTPATIENT
Start: 2021-01-17 | End: 2021-01-17

## 2021-01-16 RX ORDER — SODIUM CHLORIDE 9 MG/ML
INJECTION, SOLUTION INTRAVENOUS ONCE
Status: COMPLETED | OUTPATIENT
Start: 2021-01-16 | End: 2021-01-17

## 2021-01-16 RX ORDER — LANOLIN ALCOHOL/MO/W.PET/CERES
100 CREAM (GRAM) TOPICAL DAILY
Status: DISCONTINUED | OUTPATIENT
Start: 2021-01-17 | End: 2021-01-17

## 2021-01-16 RX ORDER — DIAZEPAM 5 MG
10 TABLET ORAL EVERY 30 MIN PRN
Status: DISCONTINUED | OUTPATIENT
Start: 2021-01-16 | End: 2021-01-17

## 2021-01-16 RX ADMIN — SODIUM CHLORIDE: 9 INJECTION, SOLUTION INTRAVENOUS at 16:19

## 2021-01-16 ASSESSMENT — MIFFLIN-ST. JEOR: SCORE: 1746.9

## 2021-01-16 NOTE — ED PROVIDER NOTES
[facility] EMERGENCY DEPARTMENT ([facility])  January 16, 2021  History     Chief Complaint   Patient presents with     Altered Mental Status     HPI  Mohit Porter is a 30 year old male with a PMH of alcohol abuse, alcohol withdrawal, elevated liver function tests, cerebellar ataxia, hepatitis C, anxiety, and suicidal ideation who presents to the ED today complaining of altered mental status.     Patient recently tested positive for COVID-19 as of 12/31/2020.    Patient was recently seen at [facility] on 1/12/2021 for acute alcohol intoxication.  He was BIBA from [facility] where patient had been residing but not paying for a room.  Patient was found asleep and altered.  Patient had slurred speech, moans with pain, and was able to stand with assistance for police.  In the ED, patient was endorsing feeling depressed and sad lately, but now thoughts of HI or SI.  He did admit to alcohol use, but denied drug use or any ingestion with intent to self-harm.    I have reviewed the Medications, Allergies, Past Medical and Surgical History, and Social History in the [employer] system.  PAST MEDICAL HISTORY:   Past Medical History:   Diagnosis Date     Alcohol abuse      Anxiety      Hepatitis C        PAST SURGICAL HISTORY: No past surgical history on file.    Past medical history, past surgical history, medications, and allergies were reviewed with the patient. Additional pertinent items: None    FAMILY HISTORY: No family history on file.    SOCIAL HISTORY:   Social History     Tobacco Use     Smoking status: Never Smoker     Smokeless tobacco: Current User     Types: Chew   Substance Use Topics     Alcohol use: Yes     Comment: 1.75L vodka/day     Social history was reviewed with the patient. Additional pertinent items: None      Patient's Medications   New Prescriptions    No medications on file   Previous Medications    FOLIC ACID (FOLVITE) 1 MG TABLET    Take 1 tablet (1 mg) by mouth daily     "HYDROXYZINE (ATARAX) 25 MG TABLET    Take 25 mg by mouth 3 times daily as needed for itching    MULTIVITAMIN, THERAPEUTIC (THERA-VIT) TABS TABLET    Take 1 tablet by mouth daily    NICOTINE (NICORETTE) 2 MG GUM    Place 1 each (2 mg) inside cheek every 2 hours as needed for smoking cessation    QUETIAPINE (SEROQUEL) 400 MG TABLET    Take 400 mg by mouth At Bedtime    THIAMINE (B-1) 100 MG TABLET    Take 1 tablet (100 mg) by mouth daily   Modified Medications    No medications on file   Discontinued Medications    No medications on file        No Known Allergies     Review of Systems  A complete review of systems was attempted but limited due to altered mental status.    Physical Exam   BP: (!) 122/92  Pulse: 108  Temp: 98.7  F (37.1  C)  Resp: 16  Height: 177.8 cm (5' 10\")  Weight: 77.6 kg (171 lb)  SpO2: 97 %  /80   Pulse 112   Temp 98.7  F (37.1  C) (Oral)   Resp 10   Ht 1.778 m (5' 10\")   Wt 77.6 kg (171 lb)   SpO2 93%   BMI 24.54 kg/m      Physical Exam   Gen: sedate, awakes to voice  HEENT:PERRL, no facial tenderness or wounds, head atraumatic, oropharynx clear, mucous membranes moist, TMs clear bilaterally  Neck:no bony tenderness  Back: no CVA tenderness, no midline bony tenderness  CV:RRR without murmurs  PULM:Clear to auscultation bilaterally  Abd:soft, nontender, nondistended. Bowel sounds present and normal  UE:No traumatic injuries, skin normal  LE:no traumatic injuries, skin normal, no LE edema.   Neuro: pt not cooperative with exam  Skin: no rashes or ecchymoses      ED Course        Procedures            Results for orders placed or performed during the hospital encounter of 01/16/21 (from the past 24 hour(s))   CBC with platelets differential   Result Value Ref Range    WBC 23.5 (H) 4.0 - 11.0 10e9/L    RBC Count 5.78 4.4 - 5.9 10e12/L    Hemoglobin 17.8 (H) 13.3 - 17.7 g/dL    Hematocrit 53.0 40.0 - 53.0 %    MCV 92 78 - 100 fl    MCH 30.8 26.5 - 33.0 pg    MCHC 33.6 31.5 - 36.5 g/dL    " RDW 14.1 10.0 - 15.0 %    Platelet Count 240 150 - 450 10e9/L    Diff Method Automated Method     % Neutrophils 83.6 %    % Lymphocytes 9.9 %    % Monocytes 5.7 %    % Eosinophils 0.1 %    % Basophils 0.2 %    % Immature Granulocytes 0.5 %    Nucleated RBCs 0 0 /100    Absolute Neutrophil 19.6 (H) 1.6 - 8.3 10e9/L    Absolute Lymphocytes 2.3 0.8 - 5.3 10e9/L    Absolute Monocytes 1.3 0.0 - 1.3 10e9/L    Absolute Eosinophils 0.0 0.0 - 0.7 10e9/L    Absolute Basophils 0.1 0.0 - 0.2 10e9/L    Abs Immature Granulocytes 0.1 0 - 0.4 10e9/L    Absolute Nucleated RBC 0.0    Comprehensive metabolic panel   Result Value Ref Range    Sodium 140 133 - 144 mmol/L    Potassium 3.8 3.4 - 5.3 mmol/L    Chloride 103 94 - 109 mmol/L    Carbon Dioxide 29 20 - 32 mmol/L    Anion Gap 9 3 - 14 mmol/L    Glucose 101 (H) 70 - 99 mg/dL    Urea Nitrogen 6 (L) 7 - 30 mg/dL    Creatinine 0.75 0.66 - 1.25 mg/dL    GFR Estimate >90 >60 mL/min/[1.73_m2]    GFR Estimate If Black >90 >60 mL/min/[1.73_m2]    Calcium 8.6 8.5 - 10.1 mg/dL    Bilirubin Total 0.7 0.2 - 1.3 mg/dL    Albumin 4.0 3.4 - 5.0 g/dL    Protein Total 8.3 6.8 - 8.8 g/dL    Alkaline Phosphatase 148 40 - 150 U/L     (HH) 0 - 70 U/L     (HH) 0 - 45 U/L   Alcohol ethyl   Result Value Ref Range    Ethanol g/dL 0.47 (HH) <0.01 g/dL   Acetaminophen level   Result Value Ref Range    Acetaminophen Level <2 mg/L   INR   Result Value Ref Range    INR 0.89 0.86 - 1.14   Lipase   Result Value Ref Range    Lipase 224 73 - 393 U/L   Salicylate level   Result Value Ref Range    Salicylate Level <2 mg/dL   XR Chest Port 1 View    Narrative    XR CHEST PORT 1 VW  1/16/2021 3:47 PM      HISTORY: altered mental status and + COVID a few weeks ago    COMPARISON: 10/17/2020, 10/8/2020    FINDINGS: AP view of the chest. Cardiac silhouette is within normal  limits. No focal airspace opacities. No pleural effusion or  pneumothorax. No acute osseous abnormality. Multiple fingers noted  over  the right lung base and lower mediastinum.      Impression    IMPRESSION: No acute cardiopulmonary abnormality.     I have personally reviewed the examination and initial interpretation  and I agree with the findings.    LIANG HENDERSON MD   Lactate for Sepsis Protocol   Result Value Ref Range    Lactate for Sepsis Protocol 2.7 (H) 0.7 - 2.0 mmol/L   UA with Microscopic reflex to Culture    Specimen: Urine Midstream; Midstream Urine   Result Value Ref Range    Color Urine Yellow     Appearance Urine Clear     Glucose Urine Negative NEG^Negative mg/dL    Bilirubin Urine Negative NEG^Negative    Ketones Urine Negative NEG^Negative mg/dL    Specific Gravity Urine 1.013 1.003 - 1.035    Blood Urine Negative NEG^Negative    pH Urine 6.0 5.0 - 7.0 pH    Protein Albumin Urine 100 (A) NEG^Negative mg/dL    Urobilinogen mg/dL Normal 0.0 - 2.0 mg/dL    Nitrite Urine Negative NEG^Negative    Leukocyte Esterase Urine Negative NEG^Negative    Source Midstream Urine     WBC Urine 1 0 - 5 /HPF    RBC Urine 0 0 - 2 /HPF    Mucous Urine Present (A) NEG^Negative /LPF   Drug abuse screen 6 urine (tox)   Result Value Ref Range    Amphetamine Qual Urine Negative NEG^Negative    Barbiturates Qual Urine Positive (A) NEG^Negative    Benzodiazepine Qual Urine Positive (A) NEG^Negative    Cannabinoids Qual Urine Negative NEG^Negative    Cocaine Qual Urine Negative NEG^Negative    Ethanol Qual Urine Positive (A) NEG^Negative    Opiates Qualitative Urine Negative NEG^Negative     Medications   diazepam (VALIUM) tablet 10 mg (10 mg Oral Given 1/17/21 0114)   thiamine (B-1) tablet 100 mg (has no administration in time range)   folic acid (FOLVITE) tablet 1 mg (has no administration in time range)   multivitamin w/minerals (THERA-VIT-M) tablet 1 tablet (has no administration in time range)   sodium chloride 0.9% infusion ( Intravenous Rate/Dose Verify 1/17/21 0013)             Assessments & Plan (with Medical Decision Making)     29 yo M  with a hx of alcohol abuse presenting with intoxication. Found altered in a hotel room.   Arrived afebrile and slightly tachycardic.   IV access obtained.   Treated with 125mL 0.9MS infusion.   CBC with increased WBC of 23.5, Hgb 17.8, plts 240.   CMP with increased LFTs. Has a hx of elevation to this level in 10/2020, but had improved with alcohol abstinence.   ABT 0.47 initially.   Acetaminophen and salicylate levels negative.     Pt sobered as expected.   On re-evaluation when awake, pt indicates binge drinking for 5 days, 1L vodka/day since getting out of [facility]. Has a hx of withdrawal symptoms but no DTs or withdrawal seizures.  No SI/HI. Has a hx of anxiety disorder.   He is interested in detox today.     Of note, pt had asymptomatic COVID test done 12/31 in preparation for hospital admission. Pt was asymptomatic at the time and has been ever since. Continues to deny fever, cough, shortness of breath, N/V/D, change in taste or smell.     Pt's case was discussed with CD intake and accepted for admission to detox.     I have reviewed the nursing notes.    I have reviewed the findings, diagnosis, plan and need for follow up with the patient.    New Prescriptions    No medications on file       Final diagnoses:   Alcoholic intoxication without complication (H)   Elevated LFTs       1/16/2021   [facility] EMERGENCY DEPARTMENT    MD SÁNCHEZ Price, Latrice Umana MD  01/17/21 0116

## 2021-01-16 NOTE — ED TRIAGE NOTES
Triage Assessment & Note:      Patient presents with: PT BIBA for confusion. Per EMS pt was found [place] room confused with many empty alcohol bottles open and empty. Pt reports drinking a lot.  Positive covid test 12/31.     Home Treatments/Remedies: None    Febrile / Afebrile? Afebrile     Duration of C/o:  Unknow.     Sohan Saenz RN  January 16, 2021

## 2021-01-16 NOTE — ED NOTES
Bed: ED03  Expected date: 1/16/21  Expected time: 1:56 PM  Means of arrival: Ambulance  Comments:  [facility] [address]    29 y/o male     Confused  ETOH

## 2021-01-17 PROBLEM — F10.929 ALCOHOL INTOXICATION (H): Status: ACTIVE | Noted: 2019-08-06

## 2021-01-17 PROBLEM — R79.89 ELEVATED LFTS: Status: ACTIVE | Noted: 2021-01-17

## 2021-01-17 LAB
ALBUMIN SERPL-MCNC: 3.2 G/DL (ref 3.4–5)
ALCOHOL BREATH TEST: NORMAL (ref 0–0.01)
ALP SERPL-CCNC: 110 U/L (ref 40–150)
ALT SERPL W P-5'-P-CCNC: 723 U/L (ref 0–70)
AST SERPL W P-5'-P-CCNC: 603 U/L (ref 0–45)
BASOPHILS # BLD AUTO: 0.1 10E9/L (ref 0–0.2)
BASOPHILS NFR BLD AUTO: 0.6 %
BILIRUB DIRECT SERPL-MCNC: 0.3 MG/DL (ref 0–0.2)
BILIRUB SERPL-MCNC: 0.8 MG/DL (ref 0.2–1.3)
CHOLEST SERPL-MCNC: 138 MG/DL
DIFFERENTIAL METHOD BLD: NORMAL
EOSINOPHIL # BLD AUTO: 0.1 10E9/L (ref 0–0.7)
EOSINOPHIL NFR BLD AUTO: 0.5 %
GGT SERPL-CCNC: 347 U/L (ref 0–75)
HDLC SERPL-MCNC: 64 MG/DL
IMM GRANULOCYTES # BLD: 0 10E9/L (ref 0–0.4)
IMM GRANULOCYTES NFR BLD: 0.2 %
LACTATE BLD-SCNC: 0.9 MMOL/L (ref 0.7–2)
LDLC SERPL CALC-MCNC: 67 MG/DL
LYMPHOCYTES # BLD AUTO: 2.4 10E9/L (ref 0.8–5.3)
LYMPHOCYTES NFR BLD AUTO: 21.7 %
MONOCYTES # BLD AUTO: 0.7 10E9/L (ref 0–1.3)
MONOCYTES NFR BLD AUTO: 6.8 %
NEUTROPHILS # BLD AUTO: 7.6 10E9/L (ref 1.6–8.3)
NEUTROPHILS NFR BLD AUTO: 70.2 %
NONHDLC SERPL-MCNC: 74 MG/DL
NRBC # BLD AUTO: 0 10*3/UL
NRBC BLD AUTO-RTO: 0 /100
PROT SERPL-MCNC: 6.5 G/DL (ref 6.8–8.8)
TRIGL SERPL-MCNC: 35 MG/DL
TSH SERPL DL<=0.005 MIU/L-ACNC: 0.97 MU/L (ref 0.4–4)
VIT B12 SERPL-MCNC: 919 PG/ML (ref 193–986)
WBC # BLD AUTO: 10.8 10E9/L (ref 4–11)

## 2021-01-17 PROCEDURE — 250N000013 HC RX MED GY IP 250 OP 250 PS 637: Performed by: EMERGENCY MEDICINE

## 2021-01-17 PROCEDURE — 99207 PR CONSULT E&M CHANGED TO INITIAL LEVEL: CPT | Performed by: PHYSICIAN ASSISTANT

## 2021-01-17 PROCEDURE — 82977 ASSAY OF GGT: CPT | Performed by: PHYSICIAN ASSISTANT

## 2021-01-17 PROCEDURE — HZ2ZZZZ DETOXIFICATION SERVICES FOR SUBSTANCE ABUSE TREATMENT: ICD-10-PCS | Performed by: PSYCHIATRY & NEUROLOGY

## 2021-01-17 PROCEDURE — 85004 AUTOMATED DIFF WBC COUNT: CPT | Performed by: PHYSICIAN ASSISTANT

## 2021-01-17 PROCEDURE — 82607 VITAMIN B-12: CPT | Performed by: PHYSICIAN ASSISTANT

## 2021-01-17 PROCEDURE — 80076 HEPATIC FUNCTION PANEL: CPT | Performed by: PHYSICIAN ASSISTANT

## 2021-01-17 PROCEDURE — 99222 1ST HOSP IP/OBS MODERATE 55: CPT | Mod: 95 | Performed by: PSYCHIATRY & NEUROLOGY

## 2021-01-17 PROCEDURE — 83605 ASSAY OF LACTIC ACID: CPT | Performed by: PHYSICIAN ASSISTANT

## 2021-01-17 PROCEDURE — 250N000013 HC RX MED GY IP 250 OP 250 PS 637: Performed by: PSYCHIATRY & NEUROLOGY

## 2021-01-17 PROCEDURE — 86709 HEPATITIS A IGM ANTIBODY: CPT | Performed by: PHYSICIAN ASSISTANT

## 2021-01-17 PROCEDURE — 86803 HEPATITIS C AB TEST: CPT | Performed by: PHYSICIAN ASSISTANT

## 2021-01-17 PROCEDURE — 87340 HEPATITIS B SURFACE AG IA: CPT | Performed by: PHYSICIAN ASSISTANT

## 2021-01-17 PROCEDURE — 36415 COLL VENOUS BLD VENIPUNCTURE: CPT | Performed by: PHYSICIAN ASSISTANT

## 2021-01-17 PROCEDURE — 80061 LIPID PANEL: CPT | Performed by: PHYSICIAN ASSISTANT

## 2021-01-17 PROCEDURE — 84443 ASSAY THYROID STIM HORMONE: CPT | Performed by: PHYSICIAN ASSISTANT

## 2021-01-17 PROCEDURE — 99221 1ST HOSP IP/OBS SF/LOW 40: CPT | Performed by: PHYSICIAN ASSISTANT

## 2021-01-17 PROCEDURE — 128N000004 HC R&B CD ADULT

## 2021-01-17 PROCEDURE — 85048 AUTOMATED LEUKOCYTE COUNT: CPT | Performed by: PHYSICIAN ASSISTANT

## 2021-01-17 RX ORDER — QUETIAPINE FUMARATE 400 MG/1
400 TABLET, FILM COATED ORAL AT BEDTIME
Status: DISCONTINUED | OUTPATIENT
Start: 2021-01-17 | End: 2021-01-19 | Stop reason: HOSPADM

## 2021-01-17 RX ORDER — MAGNESIUM HYDROXIDE/ALUMINUM HYDROXICE/SIMETHICONE 120; 1200; 1200 MG/30ML; MG/30ML; MG/30ML
30 SUSPENSION ORAL EVERY 4 HOURS PRN
Status: DISCONTINUED | OUTPATIENT
Start: 2021-01-17 | End: 2021-01-19 | Stop reason: HOSPADM

## 2021-01-17 RX ORDER — HYDROXYZINE HYDROCHLORIDE 25 MG/1
25 TABLET, FILM COATED ORAL EVERY 4 HOURS PRN
Status: DISCONTINUED | OUTPATIENT
Start: 2021-01-17 | End: 2021-01-19 | Stop reason: HOSPADM

## 2021-01-17 RX ORDER — MULTIPLE VITAMINS W/ MINERALS TAB 9MG-400MCG
1 TAB ORAL DAILY
Status: DISCONTINUED | OUTPATIENT
Start: 2021-01-17 | End: 2021-01-19 | Stop reason: HOSPADM

## 2021-01-17 RX ORDER — AMOXICILLIN 250 MG
1 CAPSULE ORAL 2 TIMES DAILY PRN
Status: DISCONTINUED | OUTPATIENT
Start: 2021-01-17 | End: 2021-01-19 | Stop reason: HOSPADM

## 2021-01-17 RX ORDER — LORAZEPAM 1 MG/1
1-4 TABLET ORAL EVERY 30 MIN PRN
Status: DISCONTINUED | OUTPATIENT
Start: 2021-01-17 | End: 2021-01-19 | Stop reason: HOSPADM

## 2021-01-17 RX ORDER — LANOLIN ALCOHOL/MO/W.PET/CERES
100 CREAM (GRAM) TOPICAL DAILY
Status: DISCONTINUED | OUTPATIENT
Start: 2021-01-17 | End: 2021-01-19 | Stop reason: HOSPADM

## 2021-01-17 RX ORDER — DIAZEPAM 5 MG
5-20 TABLET ORAL EVERY 30 MIN PRN
Status: DISCONTINUED | OUTPATIENT
Start: 2021-01-17 | End: 2021-01-17

## 2021-01-17 RX ORDER — TRAZODONE HYDROCHLORIDE 50 MG/1
50 TABLET, FILM COATED ORAL
Status: DISCONTINUED | OUTPATIENT
Start: 2021-01-17 | End: 2021-01-19 | Stop reason: HOSPADM

## 2021-01-17 RX ORDER — ATENOLOL 50 MG/1
50 TABLET ORAL DAILY PRN
Status: DISCONTINUED | OUTPATIENT
Start: 2021-01-17 | End: 2021-01-19 | Stop reason: HOSPADM

## 2021-01-17 RX ORDER — FOLIC ACID 1 MG/1
1 TABLET ORAL DAILY
Status: DISCONTINUED | OUTPATIENT
Start: 2021-01-17 | End: 2021-01-19 | Stop reason: HOSPADM

## 2021-01-17 RX ADMIN — NICOTINE POLACRILEX 2 MG: 2 GUM, CHEWING BUCCAL at 21:02

## 2021-01-17 RX ADMIN — FOLIC ACID 1 MG: 1 TABLET ORAL at 08:03

## 2021-01-17 RX ADMIN — LORAZEPAM 2 MG: 1 TABLET ORAL at 20:18

## 2021-01-17 RX ADMIN — DIAZEPAM 10 MG: 5 TABLET ORAL at 05:00

## 2021-01-17 RX ADMIN — LORAZEPAM 1 MG: 1 TABLET ORAL at 12:27

## 2021-01-17 RX ADMIN — LORAZEPAM 2 MG: 1 TABLET ORAL at 08:06

## 2021-01-17 RX ADMIN — THIAMINE HCL TAB 100 MG 100 MG: 100 TAB at 08:03

## 2021-01-17 RX ADMIN — ATENOLOL 50 MG: 50 TABLET ORAL at 08:06

## 2021-01-17 RX ADMIN — QUETIAPINE FUMARATE 400 MG: 400 TABLET ORAL at 05:00

## 2021-01-17 RX ADMIN — DIAZEPAM 10 MG: 5 TABLET ORAL at 00:12

## 2021-01-17 RX ADMIN — MULTIPLE VITAMINS W/ MINERALS TAB 1 TABLET: TAB at 08:03

## 2021-01-17 RX ADMIN — DIAZEPAM 10 MG: 5 TABLET ORAL at 01:14

## 2021-01-17 RX ADMIN — DIAZEPAM 10 MG: 5 TABLET ORAL at 02:22

## 2021-01-17 RX ADMIN — QUETIAPINE FUMARATE 400 MG: 400 TABLET ORAL at 22:28

## 2021-01-17 ASSESSMENT — ACTIVITIES OF DAILY LIVING (ADL)
HYGIENE/GROOMING: HANDWASHING;INDEPENDENT
DRESS: STREET CLOTHES;INDEPENDENT
ORAL_HYGIENE: PROMPTS
DRESS: INDEPENDENT
ORAL_HYGIENE: INDEPENDENT
ORAL_HYGIENE: INDEPENDENT
LAUNDRY: WITH SUPERVISION
HYGIENE/GROOMING: PROMPTS
DRESS: SCRUBS (BEHAVIORAL HEALTH)
HYGIENE/GROOMING: INDEPENDENT

## 2021-01-17 ASSESSMENT — MIFFLIN-ST. JEOR: SCORE: 1742.36

## 2021-01-17 NOTE — CONSULTS
Consult Date:  01/17/2021      HISTORY OF PRESENT ILLNESS:  The patient is a 30-year-old male with history of severe alcohol use disorder, admitted to station [facility] for alcohol abuse and detoxification.  States for the past week, he has been drinking 1 liter of vodka daily with little to no p.o. food or other fluid intake.  The patient was found passed out in the hotel lobby and brought to the emergency room.  Blood alcohol level initially was 0.47.  An Internal Medicine consultation was requested by Dr. Lombardi to assess medical problems including severe transaminitis.  At this time, Mohit admits to feeling groggy but was able to eat some breakfast this morning.  Denies fever, chills, sore throat and cough.  Denies chest pain, shortness of breath.  Denies abdominal pain and nausea.  Denies bowel or bladder concerns including melena.  He was hospitalized back in 10/2020 at [facility] and noted to have severely elevated liver enzymes at that time as well, and GI was consulted, of which patient did have a right upper quadrant abdominal ultrasound with Dopplers that was negative but did reveal moderately fatty infiltrated liver.  The patient's viral hepatitis screen was negative.  Antinuclear antibody was negative as well.  GI felt his severe elevated liver enzymes at that time were due to combination of heavy alcohol abuse combined with heavy Tylenol use for unknown chronic pain, which the patient currently denies.  He currently denies he was using Tylenol prior to admission; however, given the degree of how high current enzymes are question patient minimizing his Tylenol use.  Tylenol level was negative, however.  Most recent hepatic panel, liver enzymes on trend downward.  INR is within normal limits.  The patient did have an elevated white blood cell count and lactic acid, which have now normalized.      PAST MEDICAL HISTORY:   1.  Severe alcohol use disorder and other psychiatric history per   Maria C.   2.  Denies history major medical problems including cardiopulmonary disease, hypertension and diabetes.      PAST SURGICAL HISTORY:  None.      ADMISSION MEDICATIONS:  Seroquel 400 mg p.o. at bedtime.      ALLERGIES:  NO KNOWN DRUG ALLERGIES.      SOCIAL HISTORY:  Single.  Has 1 daughter.  States he is currently homeless.  Denies smoking cigarettes.      No family history on file.       REVIEW OF SYSTEMS:  Ten-point review of systems negative except as stated above in history of present illness.      PHYSICAL EXAMINATION:   GENERAL:  Nontoxic-appearing young man in no acute distress.   VITAL SIGNS:  Stable.  Temperature afebrile, pulse in the low 100s, blood pressure 114/66, oxygen saturation 94% on room air.   HEENT:  Negative.   NECK:  Supple.  No cervical lymphadenopathy or thyromegaly.   LUNGS:  Clear.   CARDIOVASCULAR:  Tachycardic yet regular, no murmurs appreciated.   ABDOMEN:  Soft, nontender, no organomegaly, no masses, no rebound.   EXTREMITIES:  No edema.   SKIN:  No rashes noted on exposed areas.   NEUROLOGIC:  He is awake, alert and oriented x 3.  Cranial nerves grossly intact.  Motor strength is symmetric.  He is not tremulous.      LABORATORY DATA:  White blood cell count 10.8 (23.5),  (994),  (943), .  Otherwise, rest of CBC and repeat white blood cell count with differential, comprehensive metabolic panel, repeat hepatic panel, lipid panel, TSH and vitamin B12 essentially normal.  Lipase level within normal limits.  Repeat lactic acid level within normal limits.  Urinalysis negative.  Chest x-ray negative.  Tylenol and salicylate levels negative.  Urine drug screen positive for barbiturates benzodiazepines and ethanol.  INR within normal limits.      ASSESSMENT:   1.  Severe alcohol use disorder and withdrawal per Dr. Lombardi.   2.  Severe transaminitis, resolving and most likely due to alcoholic hepatitis from his heavy alcohol abuse prior to admission combined  with probable Tylenol use.  However, patient currently denying, and Tylenol level on admission was negative.  Reassuring bilirubin and INR normal, as well as creatinine.  Based on Maddrey score, no indication for steroids.  Recent right upper quadrant abdominal ultrasound unremarkable save fatty infiltration of liver.  Abdominal exam benign.   3.  History of recent COVID positive infection, now recovered.   4.  History of leukocytosis and elevated lactic acid, likely due to stress response and dehydration, respectively, as repeat labs this morning after receiving IV fluids in the ED have now normalized.      PLAN:  Added on to labs already drawn are repeat viral serologies, which are still pending.  Medicine will follow up on results as well as repeat hepatic panel tomorrow a.m.  In the interim, the patient should obviously avoid drinking alcohol going forward, as well as use other potential hepatotoxic agents including Tylenol use.  No further medical recommendations at this time.  The patient is medically stable, and Medicine will continue to follow.  Please feel free to call with questions.      Thank you for this consultation.         WM SHARP PA-C             D: 2021   T: 2021   MT:       Name:     AMAYA GUSTAFSON   MRN:      [id number]        Account:       [id number]   :      1990           Consult Date:  2021      Document: [id number]       cc: Rena Lombardi MD

## 2021-01-17 NOTE — PLAN OF CARE
Problem: Substance Withdrawal  Goal: Substance Withdrawal  Description: Signs and symptoms of listed problems will be absent or manageable.  Outcome: No Change     Problem: Suicidal Behavior  Goal: Suicidal Behavior is Absent or Managed  Outcome: Completed    Pt admitted for alcohol withdrawal requiring Ativan X 2 this shift. Pt was on valium, switched to Ativan due to elevated liver functions.  His heart rate was elevated and was given PRN atenolol and it is now WNL.  He has been resting in bed all morning, ate most of his breakfast and lunch, taking fluids well.  Endorses depression without thoughts plan or intent for self harm, anxiety 6/10, he otherwise denies any specific needs or concerns.  Will continue to monitor withdrawal and assist with discharge planning.

## 2021-01-17 NOTE — PLAN OF CARE
Problem: Adult Inpatient Plan of Care  Goal: Plan of Care Review  1/17/2021 0442 by Denilson Ghotra, RN  Outcome: No Change  1/17/2021 0442 by Denilson Ghotra, RN  Outcome: No Change     S: Mohit is a 30 year old male admitted from [facility] ED due to altered mental status related to alcohol abuse. He is voluntary. Seeking for detox.     B: Pt.reports he has had a long history of alcohol abuse. He has been in and out of different emergency rooms in the past month. Has chronic HepC+ history.     A: Pt.appeared anxious, agitated, restless, and tremulous during admission search and interview. Started on MSSA with valium protocol. MSSA score = 14. Denied a history of withdrawal seizures. Endorsed withdrawal DTs. Endorsed feeling sad and depressed but not suicidal. Contracted for safety.     R: He is considered covid-19 recovered. Tested positive on 12/31/2020. Staff started 15 minutes safety checks. Will continue to monitor.

## 2021-01-17 NOTE — ED NOTES
[facility]    ED Nurse to Floor Handoff     Mohit Porter is a 30 year old male who speaks English and lives unknown,  in a home  They arrived in the ED by ambulance from hotel    ED Chief Complaint: Altered Mental Status    ED Dx;   Final diagnoses:   Alcoholic intoxication without complication (H)   Elevated LFTs         Needed?: No    Allergies: No Known Allergies.  Past Medical Hx:   Past Medical History:   Diagnosis Date     Alcohol abuse      Anxiety      Hepatitis C       Baseline Mental status: WDL  Current Mental Status changes: at basesline    Infection present or suspected this encounter: yes - unknown  Sepsis suspected: No  Isolation type: Special Precautions-COVID-19  Patient tested for COVID 19 prior to admission: YES     Activity level - Baseline/Home:  Independent  Activity Level - Current:   Stand with Assist    Bariatric equipment needed?: No    In the ED these meds were given:   Medications   diazepam (VALIUM) tablet 10 mg (has no administration in time range)   thiamine (B-1) tablet 100 mg (has no administration in time range)   folic acid (FOLVITE) tablet 1 mg (has no administration in time range)   multivitamin w/minerals (THERA-VIT-M) tablet 1 tablet (has no administration in time range)   sodium chloride 0.9% infusion ( Intravenous New Bag 1/16/21 1619)       Drips running?  Yes    Home pump  No    Current LDAs  Peripheral IV 01/16/21 Left;Anterior Lower forearm (Active)   Number of days: 0       Labs results:   Labs Ordered and Resulted from Time of ED Arrival Up to the Time of Departure from the ED   CBC WITH PLATELETS DIFFERENTIAL - Abnormal; Notable for the following components:       Result Value    WBC 23.5 (*)     Hemoglobin 17.8 (*)     Absolute Neutrophil 19.6 (*)     All other components within normal limits   COMPREHENSIVE METABOLIC PANEL - Abnormal; Notable for the following components:    Glucose 101 (*)     Urea  "Nitrogen 6 (*)      (*)      (*)     All other components within normal limits   ALCOHOL ETHYL - Abnormal; Notable for the following components:    Ethanol g/dL 0.47 (*)     All other components within normal limits   ROUTINE UA WITH MICROSCOPIC REFLEX TO CULTURE - Abnormal; Notable for the following components:    Protein Albumin Urine 100 (*)     Mucous Urine Present (*)     All other components within normal limits   LACTATE FOR SEPSIS PROTOCOL - Abnormal; Notable for the following components:    Lactate for Sepsis Protocol 2.7 (*)     All other components within normal limits   DRUG ABUSE SCREEN 6 CHEM DEP URINE ([facility]) - Abnormal; Notable for the following components:    Barbiturates Qual Urine Positive (*)     Benzodiazepine Qual Urine Positive (*)     Ethanol Qual Urine Positive (*)     All other components within normal limits   ACETAMINOPHEN LEVEL   INR   LIPASE   SALICYLATE LEVEL   CIWA-AR SCALE AND VS   ASSESS SUICIDALITY   NOTIFY PROVIDER   NOTIFY PROVIDER       Imaging Studies:   Recent Results (from the past 24 hour(s))   XR Chest Port 1 View    Narrative    XR CHEST PORT 1 VW  1/16/2021 3:47 PM      HISTORY: altered mental status and + COVID a few weeks ago    COMPARISON: 10/17/2020, 10/8/2020    FINDINGS: AP view of the chest. Cardiac silhouette is within normal  limits. No focal airspace opacities. No pleural effusion or  pneumothorax. No acute osseous abnormality. Multiple fingers noted  over the right lung base and lower mediastinum.      Impression    IMPRESSION: No acute cardiopulmonary abnormality.     I have personally reviewed the examination and initial interpretation  and I agree with the findings.    LIANG HENDERSON MD       Recent vital signs:   BP (!) 137/99   Pulse 115   Temp 98.7  F (37.1  C) (Oral)   Resp 18   Ht 1.778 m (5' 10\")   Wt 77.6 kg (171 lb)   SpO2 99%   BMI 24.54 kg/m      Hernando Coma Scale Score: 15 (01/16/21 1716)       Cardiac Rhythm: Normal " Sinus  Pt needs tele? Yes  Skin/wound Issues: None    Code Status: Full Code    Pain control: fair    Nausea control: fair    Abnormal labs/tests/findings requiring intervention: ETOH - CIWA    Family present during ED course? No   Family Comments/Social Situation comments: n/a    Tasks needing completion: None    Dulce Polk, RN  [phone] [place] [facility]  [phone] [facility]

## 2021-01-17 NOTE — PHARMACY-ADMISSION MEDICATION HISTORY
Admission Medication History Completed by Pharmacy    See [employer] Admission Navigator for allergy information, preferred outpatient pharmacy and prior to admission medications.     Medication History Sources:     Prescription fill history ([facility] Discharge pharmacy) via Epic Surescripts data    [facility] Discharge summary on 1/15     [facility] ED visit on 1/5    Changes made to PTA medication list (reason):    Added: all meds    Deleted: None    Changed: None    Additional Information:    All medications were prescribed on 1-5-2021 during [facility] ED encounter. Prescribed 14-day supply of quetiapine.     Prior to Admission medications    Medication Sig Last Dose     guaiFENesin (ROBITUSSIN) 100 MG/5ML SYRP Take 10 mLs by mouth every 4 hours as needed for cough     Past Week     hydrOXYzine (ATARAX) 25 MG tablet Take 25 mg by mouth every 4 hours as needed for anxiety      Past Week     nicotine (NICORETTE) 2 MG gum Place 1 each (2 mg) inside cheek every 2 hours as needed for smoking cessation     Past Week     QUEtiapine (SEROQUEL) 400 MG tablet     Take 400 mg by mouth At Bedtime Past Week       Date completed: 01/17/21    Medication history completed by:   Nya Alfredo, Pharm.D., [facility]  Behavioral Health Inpatient Pharmacist  [facility] ([facility])  Phone: *[phone] ([employer]) or [phone]

## 2021-01-17 NOTE — PROGRESS NOTES
01/17/21 0415   Patient Belongings   Did you bring any home meds/supplements to the hospital?  Yes   Disposition of meds  Sent to security/pharmacy per site process   Patient Belongings locker   Patient Belongings Put in Hospital Secure Location (Security or Locker, etc.) cash/credit card;cell phone/electronics;clothing;shoes;wallet   Belongings Search Yes   Clothing Search Yes   Second Staff Hector S   Comment All of patient's belongings are in a bin and his discharge bin. His important cards are in a security envelop in his discharge bin.     BIN:  Jogging pants, shirt, shorts, shoes and a back pack with clothing and some personal stuff.    DISCHARGE BIN:  Phone and wallet    IN THE WASHER:  Clothing    SECURITY ENV # [id number] - Social Security Card, 2 [employer] Card, 5 VISA    A               Admission:  I am responsible for any personal items that are not sent to the safe or pharmacy.  [facility] is not responsible for loss, theft or damage of any property in my possession.    Signature:  _________________________________ Date: _______  Time: _____                                              Staff Signature:  ____________________________ Date: ________  Time: _____      2nd Staff person, if patient is unable/unwilling to sign:    Signature: ________________________________ Date: ________  Time: _____     Discharge:  [facility] has returned all of my personal belongings:    Signature: _________________________________ Date: ________  Time: _____                                          Staff Signature:  ____________________________ Date: ________  Time: _____

## 2021-01-17 NOTE — H&P
"[facility] [place]   Psychiatric History and Physical  Admission date: 1/16/2021        Chief Complaint:   \"Alcohol\"         HPI:     The patient is a 31yo male with a history of alcohol use disorder and depression who was admitted with a BAL of 0.47. The patient's ALT is 994 and AST is 943 which are actually lower than previous levels. He reports that he \"could be better.\" Feels shaky and has had nausea and \"sweats.\" Denies a history of withdrawal seizures or DTs.  Mood is depressed and anxious. Has been off his Seroquel for the past week. Was able to get \"a tiny bit\" of sleep. Not eating much. Denies SI. Is looking at CD treatment. Says that he has been on both Naltrexone and Campral in the past and they were not helpful.     Per ER:  Mohit Porter is a 30 year old male with a PMH of alcohol abuse, alcohol withdrawal, elevated liver function tests, cerebellar ataxia, hepatitis C, anxiety, and suicidal ideation who presents to the ED today complaining of altered mental status.      Patient recently tested positive for COVID-19 as of 12/31/2020.     Patient was recently seen at [facility] ED on 1/12/2021 for acute alcohol intoxication.  He was BIBA from [facility] where patient had been residing but not paying for a room.  Patient was found asleep and altered.  Patient had slurred speech, moans with pain, and was able to stand with assistance for police.  In the ED, patient was endorsing feeling depressed and sad lately, but now thoughts of HI or SI.  He did admit to alcohol use, but denied drug use or any ingestion with intent to self-harm.        Past Psychiatric History:     Never been psychiatrically hospitalized. No history of suicide attempts.   Has been on Seroquel 400mg at bedtime. Also been on gabapentin and Zoloft most recently as well as Remeron in the past.         Substance Use and History:     Alcohol use disorder with history of elevated LFTs. Denies history of " withdrawal seizures or DTs but records indicate DTs in past.         Past Medical History:   PAST MEDICAL HISTORY:   Past Medical History:   Diagnosis Date     Alcohol abuse      Anxiety      Hepatitis C        PAST SURGICAL HISTORY: No past surgical history on file.          Family History:   FAMILY HISTORY: Anxiety in brother and dad.        Social History:   Please see the full psychosocial profile from the clinical treatment coordinator.   SOCIAL HISTORY:   Social History     Tobacco Use     Smoking status: Never Smoker     Smokeless tobacco: Current User     Types: Chew   Substance Use Topics     Alcohol use: Yes     Comment: 1.75L vodka/day            Physical ROS:   The patient endorsed nausea. The remainder of 10-point review of systems was negative except as noted in HPI.         PTA Medications:     Medications Prior to Admission   Medication Sig Dispense Refill Last Dose     hydrOXYzine (ATARAX) 25 MG tablet Take 25 mg by mouth 3 times daily as needed for itching        nicotine (NICORETTE) 2 MG gum Place 1 each (2 mg) inside cheek every 2 hours as needed for smoking cessation 30 tablet 0      QUEtiapine (SEROQUEL) 400 MG tablet Take 400 mg by mouth At Bedtime             Allergies:   No Known Allergies       Labs:     Recent Results (from the past 48 hour(s))   CBC with platelets differential    Collection Time: 01/16/21  2:47 PM   Result Value Ref Range    WBC 23.5 (H) 4.0 - 11.0 10e9/L    RBC Count 5.78 4.4 - 5.9 10e12/L    Hemoglobin 17.8 (H) 13.3 - 17.7 g/dL    Hematocrit 53.0 40.0 - 53.0 %    MCV 92 78 - 100 fl    MCH 30.8 26.5 - 33.0 pg    MCHC 33.6 31.5 - 36.5 g/dL    RDW 14.1 10.0 - 15.0 %    Platelet Count 240 150 - 450 10e9/L    Diff Method Automated Method     % Neutrophils 83.6 %    % Lymphocytes 9.9 %    % Monocytes 5.7 %    % Eosinophils 0.1 %    % Basophils 0.2 %    % Immature Granulocytes 0.5 %    Nucleated RBCs 0 0 /100    Absolute Neutrophil 19.6 (H) 1.6 - 8.3 10e9/L    Absolute  Lymphocytes 2.3 0.8 - 5.3 10e9/L    Absolute Monocytes 1.3 0.0 - 1.3 10e9/L    Absolute Eosinophils 0.0 0.0 - 0.7 10e9/L    Absolute Basophils 0.1 0.0 - 0.2 10e9/L    Abs Immature Granulocytes 0.1 0 - 0.4 10e9/L    Absolute Nucleated RBC 0.0    Comprehensive metabolic panel    Collection Time: 01/16/21  2:47 PM   Result Value Ref Range    Sodium 140 133 - 144 mmol/L    Potassium 3.8 3.4 - 5.3 mmol/L    Chloride 103 94 - 109 mmol/L    Carbon Dioxide 29 20 - 32 mmol/L    Anion Gap 9 3 - 14 mmol/L    Glucose 101 (H) 70 - 99 mg/dL    Urea Nitrogen 6 (L) 7 - 30 mg/dL    Creatinine 0.75 0.66 - 1.25 mg/dL    GFR Estimate >90 >60 mL/min/[1.73_m2]    GFR Estimate If Black >90 >60 mL/min/[1.73_m2]    Calcium 8.6 8.5 - 10.1 mg/dL    Bilirubin Total 0.7 0.2 - 1.3 mg/dL    Albumin 4.0 3.4 - 5.0 g/dL    Protein Total 8.3 6.8 - 8.8 g/dL    Alkaline Phosphatase 148 40 - 150 U/L     (HH) 0 - 70 U/L     (HH) 0 - 45 U/L   Alcohol ethyl    Collection Time: 01/16/21  2:47 PM   Result Value Ref Range    Ethanol g/dL 0.47 (HH) <0.01 g/dL   Acetaminophen level    Collection Time: 01/16/21  2:47 PM   Result Value Ref Range    Acetaminophen Level <2 mg/L   INR    Collection Time: 01/16/21  2:47 PM   Result Value Ref Range    INR 0.89 0.86 - 1.14   Lipase    Collection Time: 01/16/21  2:47 PM   Result Value Ref Range    Lipase 224 73 - 393 U/L   Salicylate level    Collection Time: 01/16/21  2:47 PM   Result Value Ref Range    Salicylate Level <2 mg/dL   Lactate for Sepsis Protocol    Collection Time: 01/16/21  3:54 PM   Result Value Ref Range    Lactate for Sepsis Protocol 2.7 (H) 0.7 - 2.0 mmol/L   UA with Microscopic reflex to Culture    Collection Time: 01/16/21 10:46 PM    Specimen: Urine Midstream; Midstream Urine   Result Value Ref Range    Color Urine Yellow     Appearance Urine Clear     Glucose Urine Negative NEG^Negative mg/dL    Bilirubin Urine Negative NEG^Negative    Ketones Urine Negative NEG^Negative mg/dL     "Specific Gravity Urine 1.013 1.003 - 1.035    Blood Urine Negative NEG^Negative    pH Urine 6.0 5.0 - 7.0 pH    Protein Albumin Urine 100 (A) NEG^Negative mg/dL    Urobilinogen mg/dL Normal 0.0 - 2.0 mg/dL    Nitrite Urine Negative NEG^Negative    Leukocyte Esterase Urine Negative NEG^Negative    Source Midstream Urine     WBC Urine 1 0 - 5 /HPF    RBC Urine 0 0 - 2 /HPF    Mucous Urine Present (A) NEG^Negative /LPF   Drug abuse screen 6 urine (tox)    Collection Time: 01/16/21 10:46 PM   Result Value Ref Range    Amphetamine Qual Urine Negative NEG^Negative    Barbiturates Qual Urine Positive (A) NEG^Negative    Benzodiazepine Qual Urine Positive (A) NEG^Negative    Cannabinoids Qual Urine Negative NEG^Negative    Cocaine Qual Urine Negative NEG^Negative    Ethanol Qual Urine Positive (A) NEG^Negative    Opiates Qualitative Urine Negative NEG^Negative   Alcohol breath test POCT    Collection Time: 01/17/21  1:29 AM   Result Value Ref Range    Alcohol Breath Test 0.129- DONE BY JOON CARBAJAL 0.00 - 0.01          Physical and Psychiatric Examination:     BP (!) 144/94   Pulse 140   Temp 98.6  F (37  C) (Temporal)   Resp 16   Ht 1.778 m (5' 10\")   Wt 77.1 kg (170 lb)   SpO2 94%   BMI 24.39 kg/m    Weight is 170 lbs 0 oz  Body mass index is 24.39 kg/m .    Physical Exam:  I have reviewed the physical exam as documented by by the medical team and agree with findings and assessment and have no additional findings to add at this time.    Mental Status Exam:  Appearance: awake, alert and adequately groomed  Attitude:  cooperative  Eye Contact:  fair  Mood:  anxious and depressed  Affect:  mood congruent  Speech:  clear, coherent  Language: fluent and intact in English  Psychomotor, Gait, Musculoskeletal:  no evidence of tardive dyskinesia, dystonia, or tics  Thought Process:  goal oriented  Associations:  no loose associations  Thought Content:  no evidence of suicidal ideation or homicidal ideation and no evidence of " psychotic thought  Insight:  fair  Judgement:  intact  Oriented to:  time, person, and place  Attention Span and Concentration:  intact  Recent and Remote Memory:  fair  Fund of Knowledge:  appropriate         Admission Diagnoses:      Alcoholic intoxication with complication (H)  Elevated LFTs  KALINA         Assessment & Plan:     1) MSSA with Ativan.   2) Seroquel held.     Disposition Plan   Reason for ongoing admission: requires detoxification from substance that poses a risk of bodily harm during withdrawal period  Discharge location: Chemical dependency treatment facility  Discharge Medications: not ordered  Follow-up Appointments: not scheduled  Legal Status: voluntary    Video-Visit Details    Type of service:  Video Visit    Video Start Time (time video started): 0930    Video End Time (time video stopped): 0940    Originating Location (pt. Location): Other Station [facility]    Distant Location (provider location): Provider remote location    Mode of Communication:  Video Conference via [employer]    Physician has received verbal consent for a Video Visit from the patient? Yes    Entered by: Jordan Tolbert on 1/17/2021 at 5:30 AM

## 2021-01-18 LAB
ALBUMIN SERPL-MCNC: 3.8 G/DL (ref 3.4–5)
ALP SERPL-CCNC: 140 U/L (ref 40–150)
ALT SERPL W P-5'-P-CCNC: 632 U/L (ref 0–70)
AST SERPL W P-5'-P-CCNC: 355 U/L (ref 0–45)
BILIRUB DIRECT SERPL-MCNC: 0.2 MG/DL (ref 0–0.2)
BILIRUB SERPL-MCNC: 0.7 MG/DL (ref 0.2–1.3)
HAV IGM SERPL QL IA: NONREACTIVE
HBV SURFACE AG SERPL QL IA: NONREACTIVE
HCV AB SERPL QL IA: NONREACTIVE
PROT SERPL-MCNC: 7.5 G/DL (ref 6.8–8.8)

## 2021-01-18 PROCEDURE — 36415 COLL VENOUS BLD VENIPUNCTURE: CPT | Performed by: PHYSICIAN ASSISTANT

## 2021-01-18 PROCEDURE — 80076 HEPATIC FUNCTION PANEL: CPT | Performed by: PHYSICIAN ASSISTANT

## 2021-01-18 PROCEDURE — 250N000013 HC RX MED GY IP 250 OP 250 PS 637: Performed by: PSYCHIATRY & NEUROLOGY

## 2021-01-18 PROCEDURE — 128N000004 HC R&B CD ADULT

## 2021-01-18 PROCEDURE — 99231 SBSQ HOSP IP/OBS SF/LOW 25: CPT | Performed by: PHYSICIAN ASSISTANT

## 2021-01-18 PROCEDURE — 99207 PR CDG-MDM COMPONENT: MEETS LOW - DOWN CODED: CPT | Performed by: PHYSICIAN ASSISTANT

## 2021-01-18 PROCEDURE — 99232 SBSQ HOSP IP/OBS MODERATE 35: CPT | Performed by: PSYCHIATRY & NEUROLOGY

## 2021-01-18 RX ADMIN — NICOTINE POLACRILEX 2 MG: 2 GUM, CHEWING BUCCAL at 10:49

## 2021-01-18 RX ADMIN — QUETIAPINE FUMARATE 400 MG: 400 TABLET ORAL at 21:43

## 2021-01-18 RX ADMIN — NICOTINE POLACRILEX 2 MG: 2 GUM, CHEWING BUCCAL at 14:45

## 2021-01-18 RX ADMIN — MULTIPLE VITAMINS W/ MINERALS TAB 1 TABLET: TAB at 08:24

## 2021-01-18 RX ADMIN — NICOTINE POLACRILEX 2 MG: 2 GUM, CHEWING BUCCAL at 17:52

## 2021-01-18 RX ADMIN — NICOTINE POLACRILEX 2 MG: 2 GUM, CHEWING BUCCAL at 19:45

## 2021-01-18 RX ADMIN — NICOTINE POLACRILEX 2 MG: 2 GUM, CHEWING BUCCAL at 16:44

## 2021-01-18 RX ADMIN — THIAMINE HCL TAB 100 MG 100 MG: 100 TAB at 08:24

## 2021-01-18 RX ADMIN — FOLIC ACID 1 MG: 1 TABLET ORAL at 08:24

## 2021-01-18 RX ADMIN — NICOTINE POLACRILEX 2 MG: 2 GUM, CHEWING BUCCAL at 12:01

## 2021-01-18 RX ADMIN — NICOTINE POLACRILEX 2 MG: 2 GUM, CHEWING BUCCAL at 05:46

## 2021-01-18 RX ADMIN — HYDROXYZINE HYDROCHLORIDE 25 MG: 25 TABLET, FILM COATED ORAL at 08:24

## 2021-01-18 RX ADMIN — NICOTINE POLACRILEX 2 MG: 2 GUM, CHEWING BUCCAL at 08:24

## 2021-01-18 ASSESSMENT — ACTIVITIES OF DAILY LIVING (ADL)
ORAL_HYGIENE: INDEPENDENT
LAUNDRY: UNABLE TO COMPLETE
HYGIENE/GROOMING: INDEPENDENT
DRESS: STREET CLOTHES

## 2021-01-18 NOTE — PLAN OF CARE
Behavioral Team Discussion: (1/18/2021)    Continued Stay Criteria/Rationale: Patient admitted for alcohol withdrawal and alcohol Use Disorder.  Plan: The following services will be provided to the patient; psychiatric assessment, medication management, therapeutic milieu, individual and group support, and skills groups.   Participants: 3A Provider: Dr. Rena Lombardi MD; 3A RN: Juanjo Gudino RN; 3A CM's: Nesha Matre .  Summary/Recommendation: Providers will assess today for treatment recommendations, discharge planning, and aftercare plans. CM will meet with pt for discharge planning.   Medical/Physical: None noted  Precautions:   Behavioral Orders   Procedures     Code 1 - Restrict to Unit     Routine Programming     As clinically indicated     Status 15     Every 15 minutes.     Withdrawal precautions     Rationale for change in precautions or plan: N/A  Progress: No Change.

## 2021-01-18 NOTE — PLAN OF CARE
Problem: Substance Withdrawal  Goal: Substance Withdrawal  Description: Signs and symptoms of listed problems will be absent or manageable.  1/17/2021 1838 by Aleida Gudino RN  Outcome: Improving    MSSA 4 so far this shift not requiring ativan.  Pt has been in bed all shift, he declined to get up for vital signs. He reports that he is tired, depressed and anxious but denies thoughts plan or intent for self harm.  He did get up and ate 100% of his dinner, he denies any specific needs or concerns.  Will continue to monitor.

## 2021-01-18 NOTE — PROGRESS NOTES
"[facility]    Provider Name and Credentials: Nesha Marte MS, [facility]     PATIENT'S NAME: Mohit Porter  PREFERRED NAME: Mohit  PRONOUNS: he/him/his     MRN: [id number]  : 1990   Last 4 SSN: [id number]  ACCT. NUMBER:  [id number]  DATE OF SERVICE: 2021   START TIME: 1330  END TIME: 1400  PREFERRED PHONE: [phone]  May we leave a program related message: Yes  SERVICE MODALITY:  In-person    UNIVERSAL ADULT Substance Use Disorder DIAGNOSTIC ASSESSMENT      Identifying Information:  Patient is a 30 year old, . The pronoun use throughout this assessment reflects the patient's chosen pronoun.  Patient was referred for an assessment by self.  Patient attended the session alone.     Chief Complaint:   The reason for seeking services at this time is: \"Because if I continue drinking it will kill me along with ruin every relationship in my life.\"  The problem(s) began at age 23. Patient has attempted to resolve these concerns in the past through multiple treatment episodes.  Patient is in active withdrawal, but is currently admitted to [facility] for medical detoxification and withdrawal monitoring and is not an imminent safety risk to self or others, and may proceed with the assessment interview    Social/Family History:  Patient reported he grew up in [place], [place]. Patient was raised by his biological parents.   Patient reported that his childhood was great.  Patient describes current relationships with family of origin as strained.      The patient describes his cultural background as [place].  Cultural influences and impact on patient's life structure, values, norms, and healthcare: None identified.  Contextual influences on patient's health include: Individual Factors substance use disorder.  Patient identified his preferred language to be English. Patient reported he does not need the assistance of an  or other support involved in therapy.     Patient " reported had no significant delays in developmental tasks.  Patient's highest education level was college graduate. Patient identified the following learning problems: none reported.  Patient reports he is able to understand written materials.    Patient reported the following relationship history Single.  Patient's current relationship status is single for lifetime.   Patient identified his sexual orientation as straight.  Patient reported having one child(davi).     Patient's current living/housing situation involves homelessness. Pt was in a sober home and attending OP treatment but was evicted due to relapse.  Patient identified parents and friends as part of his support system.  Patient identified the quality of these relationships as fair.      Patient reports engaging in the following recreational/leisure activities: Hockey, golf, and going to the lake.  Patient reports engaging in the following recreation/leisure activities while using:  All of them.     Patient is currently unemployed.  Patient reports his income is obtained through general assistance.  Patient does identify finances as a current stressor.      Patient reports the following substance related arrests or legal issues: DUI.  Patient does report being on probation / parole / under the jurisdiction of the court: : [place]. County: [place]  Probation Expiration date: 07/2022.    Patient's Strengths and Limitations:  Patient identified the following strengths or resources that will help him succeed in treatment: Personable, good communicator, caring, good listener. Things that may interfere with the patient's success in treatment include: housing instability.     Personal and Family Medical History:   Patient did report a family history of mental health concerns.  Patient reports the following family history: Father has anxiety       Patient reports no current medical concerns.  Patient denies any issues with pain..       Patient reported the following previous mental health diagnoses: Generalized anxiety.  Patient reports their primary mental health symptoms include:  Increased heart rate, panicky feeling, racing thoughts, sweaty palms, and fear in general and these do impact his ability to function.   Patient has received mental health services in the past: Medications and anxiety.  Psychiatric Hospitalizations: None.  Patient denies a history of civil commitment.  Current mental health services/providers include:  Denies current services.    GAIN-SS Tool:    When was the last time that you had significant problems...  a. with feeling very trapped, lonely, sad, blue, depressed or hopeless about the future? Past Month  b. with sleep trouble, such as bad dreams, sleeping restlessly, or falling asleep during the day? Past Month  c. with feeling very anxious, nervous, tense, scared, panicked or like something bad was going to happen?  Past Month  d. with becoming very distressed and upset when something reminded you of the past?  Past Month  e. with thinking about ending your life or committing suicide?  Never  When was the last time that you did the following things two or more times?  a. Lied or conned to get things you wanted or to avoid having to do something?   Never  b. Had a hard time paying attention at school, work or home? Never  c. Had a hard time listening to instructions at school, work or home?  Past Month  d. Were a bully or threatened other people?  1+ years ago  e. Started physical fights with other people?  Never    Patient has had a physical exam to rule out medical causes for current symptoms.  Date of last physical exam was within the past year. Client was encouraged to follow up with PCP if symptoms were to develop. The patient does not have a Primary Care Provider and was encouraged to establish care with a PCP..  Patient reports no current medical concerns.   .  There are not significant appetite /  nutritional concerns / weight changes.   Patient does not report a history of head injury / trauma / cognitive impairment.      Patient reports current meds as:   Outpatient Medications Marked as Taking for the 1/16/21 encounter (Hospital Encounter)   Medication Sig     guaiFENesin (ROBITUSSIN) 100 MG/5ML SYRP Take 10 mLs by mouth every 4 hours as needed for cough     hydrOXYzine (ATARAX) 25 MG tablet Take 25 mg by mouth every 4 hours as needed for anxiety      nicotine (NICORETTE) 2 MG gum Place 1 each (2 mg) inside cheek every 2 hours as needed for smoking cessation     QUEtiapine (SEROQUEL) 400 MG tablet Take 400 mg by mouth At Bedtime       Medication Adherence:  Patient reports taking prescribed medications as prescribed.    Patient Allergies:  No Known Allergies    Medical History:    Past Medical History:   Diagnosis Date     Alcohol abuse      Anxiety      Hepatitis C        Rating Scales:    PHQ9:  No flowsheet data found.;      Substance Use:  Patient reported the following biological family members or relatives with chemical health issues: None.  Patient has received substance use disorder and/or gambling treatment in the past.  Patient reports the following dates and locations of treatment services:  [facility], [facility], [facility], and [facility].  Patient has been to detox.  Patient is not currently receiving any chemical dependency treatment.       Substance Number of times Per day/  Week  /month   Average amount Period of heaviest use Date of last use     Age of 1st use Route of administration   has used Alcohol 7 week Binge drinking for the last month   current 11/15/21 18 oral   has used Nicotine  7 week 1/2 can current 1/15/21 17 oral       CAGE- AID:  The CAGE screening questions (asking whether patients felt they should cut down on drinking, were annoyed by others criticizing his drinking, felt guilty about use, or ever had an eye opener) were asked of the patient to determine  "possible ETOH or chemical abuse issues.   His positive answers are as follows  Have you ever:  C    Patient felt they ought to CUT down on your drinking (or drug use)., A     Patient felt ANNOYED by people criticizing their drinking (or drug use)., G     Patient felt bad or GUILTY about their drinking (or drug use). and E      Patient had a drink (or drug use) as an EYE OPENER first thing in the morning to steady his/her nerves, get the day started, or get rid of a hangover.      Patient reported the following problems as a result of their substance use: child custody, DUI, family problems, financial problems, legal issues, occupational / vocational problems and relationship problems.  Patient is concerned about substance use.     Patient reports experiencing the following withdrawal symptoms within the past 12 months: none and the following within the past 30 days: sweating, shaky/jittery/tremors, unable to sleep, agitation, headache, fatigue, sad/depressed feeling, muscle aches, irritability, high blood pressure, nausea/vomiting, dizziness, diminished appetite, unable to eat and anxiety/worry.   Patients reports urges to use Alcohol.  Patient reports he has used more Alcohol than intended and over a longer period of time than intended. Patient reports he has had unsuccessful attempts to cut down or control use of Alcohol.  Patient reports longest period of abstinence was 6 months including treatment and return to use was due to \"Emotions\". Patient reports he has needed to use more Alcohol to achieve the same effect.  Patient does  report diminished effect with use of same amount of Alcohol.     Patient does  report a great deal of time is spent in activities necessary to obtain, use, or recover from Alcohol effects.  Patient does  report important social, occupational, or recreational activities are given up or reduced because of Alcohol use.  Alcohol use is continued despite knowledge of having a persistent or " "recurrent physical or psychological problem that is likely to have caused or exacerbated by use.  Patient reports the following problem behaviors while under the influence of substances DUI. Patient reports his recovery goals are \"Complete a treatment program.     Patient does not have other addictive behaviors he is concerned about.   Patient reports substance use has not impacted his ability to function in a school setting. Patient reports substance use has not impacted his ability to function in a work setting.  Patients demographics and history impact his recovery in the following ways:  Pt is high relapse risk.    Dimension Scale Ratings:    Dimension 1 -  Acute Intoxication/Withdrawal: 1 - Minor Problem    Dimension 2 - Biomedical: 0 - No Problem    Dimension 3 - Emotional/Behavioral/Cognitive Conditions: 2 - Moderate Problem    Dimension 4 - Readiness to Change:  2 - Moderate Problem    Dimension 5 - Relapse/Continued Use/ Continued Problem Potential: 4 - Extreme Problem    Dimension 6 - Recovery Environment:  4 - Extreme Problem    Significant Losses / Trauma / Abuse / Neglect Issues:   Patient did not serve in the .  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 and homelessness Pt just lost his sober housing due to relapse.  Concerns for possible neglect are not present.    Safety Assessment:   Current Safety Concerns:  Bowman Suicide Severity Rating Scale (Short Version)  Bowman Suicide Severity Rating (Short Version) 10/17/2020 10/23/2020 10/23/2020 10/23/2020 12/30/2020 1/16/2021 1/17/2021   Over the past 2 weeks have you felt down, depressed, or hopeless? patient unable to complete no - no no no no   Comments - - - - - - -   Over the past 2 weeks have you had thoughts of killing yourself? patient unable to complete no - no no no no   Have you ever " attempted to kill yourself? patient unable to complete no - no no no no   Q1 Wished to be Dead (Past Month) - - no - - - no   Q2 Suicidal Thoughts (Past Month) - - no - - - no   Screening Not Complete - - - - - - -   Q3 Suicidal Thought Method - - no - - - no   Comments - - - - - - -   Q4 Suicidal Intent without Specific Plan - - no - - - no   Q5 Suicide Intent with Specific Plan - - no - - - no   Q6 Suicide Behavior (Lifetime) - - no - - - no   High Risk Required Interventions - - - - - - -   Required Interventions - - - - - - -   Interventions - - - - - - -     Patient denies current homicidal ideation and behaviors.  Patient denies current self-injurious ideation and behaviors.    Patient reported unsafe motor vehicle operation associated with substance use.  Patient denies any high risk behaviors associated with mental health symptoms.  Patient reports the following current concerns for their personal safety: None.  Patient reports there are not  firearms in the house. .     History of Safety Concerns:  Patient denied a history of homicidal ideation.     Patient denied a history of personal safety concerns.    Patient denied a history of assaultive behaviors.    Patient denied a history of sexual assault behaviors.     Patient reported a history unsafe motor vehicle operation associated with substance use.  Patient denies any history of high risk behaviors associated with mental health symptoms.  Patient reports the following protective factors: positive relationships positive family connections, dedication to family/friends, abstinence from substances, adherence with prescribed medication, uses community crisis resources and committment to well-being    Risk Plan:  See Recommendations for Safety and Risk Management Plan    Review of Symptoms per patient report:  Substance Use:  blackouts, passing out, vomiting, hangovers, family relationship problems due to substance use, social problems related to substance  use and cravings/urges to use     Diagnostic Criteria:  OP BEH RYAN CRITERIA: Substance is often taken in larger amounts or over a longer period than was intended.  Met for:  Alcohol, There is persistent desire or unsuccessful efforts to cut down or control use of the substance.  Met for:  Alcohol,  A great deal of time is spent in activities necessary to obtain the substance, use the substance, or recover from its effects.  Met for:  Alcohol, Craving, or a strong desire or urge to use the substance.  Met for:  Alcohol, Recurrent use of the substance resulting in a failure to fulfill major role obligations at work, school, or home.  Met for:  Alcohol, Continued use of the substance despite having persistent or recurrent social or interpersonal problems caused or exacerbated by the effects of its use.  Met for:  Alcohol, Important social, occupational, or recreational activities are given up or reduced because of the substance.  Met for:  Alcohol, Recurrent use of the substance in which it is physically hazardous.  Met for:  Alcohol, Use of the substance is continued despite knowledge of having a persistent or recurrent physical or psychological problem that is likely to have been cause or exacerbated by the substance.  Met for:  Alcohol, Tolerance:  either a need for markedly increased amounts of the substance to achieve the desired effect or a markedly diminished effect with continued use of the dame amount of the substance.  Met for:  Alcohol, Withdrawal:  either patient endorses characteristic withdrawal syndrome for the substance or the substance (or closely related substance) is taken to relieve or avoid withdrawal symptoms.  Met for:  Alcohol       As evidenced by self report and criteria, client meets the following DSM5 Diagnoses:   (Sustained by DSM5 Criteria Listed Above)  Alcohol Use Disorder   303.90 (F10.20) Severe current.    Recommendations:     1. Plan for Safety and Risk Management:   Recommended that  patient call 911 or go to the local ED should there be a change in any of these risk factors..          Report to child / adult protection services was NA.     2. Patient's identified substance use concerns with a cultural influence will be addressed by treatment.     3. Recommendations for treatment focus:    Alcohol / Substance Use - disorder  DBT.      4.  Mental Health Referrals:   The following referral(s) will be initiated: [facility]  [facility]. Next Scheduled Appointment: TBD.    5. RYAN Referrals: Gena #: [id number]   Recommendations:  Residential or IOP with [facility] like [facility] .  Patient reports they are willing to follow these recommendations. Patient does not have a history of opiate use.    6. Records:   These were reviewed at time of assessment.   Information in this assessment was obtained from the medical record and  provided by patient who is a limited historian.    Patient will have open access to their mental health medical record.    Provider Name/ Credentials:  Nesha Marte MS, [facility]   January 18, 2021

## 2021-01-18 NOTE — PROGRESS NOTES
[facility], [place]   Psychiatric Progress Note  TTelemedicine Visit: The patient's condition can be safely assessed and treated via synchronous audio and visual telemedicine encounter.   Start Time: 8.55  Stop Time: 9.08  Reason for Telemedicine Visit: Covid-19   Originating Site (Patient Location): Station 3aw  Distant Site (Provider Location): Provider Remote Setting   Consent: The patient/guardian has verbally consented to: the potential risks and benefits of telemedicine (video visit) versus in person care; bill my insurance or make self-payment for services provided; and responsibility for payment of non-covered services.   Mode of Communication: Video Conference via polycom  As the provider I attest to compliance with applicable laws and regulations related to telemedicine.       The patient/guardian has been notified of the following:   This telemedicine visit is conducted live between you and your clinician. We have found that certain health care needs can be provided without the need for a physical exam. This service lets us provide the care you need with a telemedicine conversation.        Interim history   This is a 30 year old male with alcohol use disorder severe  Alcohol withdrawal severe  Elevated transaminases alcoholic transaminases alcoholic hepatitis.Pt seen in rounds.   The patient's care was discussed with the treatment team during the daily team meeting and/or staff's chart notes were reviewed.  Staff report patient has been visible in the milieu,  no acute eventsovernight.     Patient's mood is better  Patient had 19 ED visits 19 ed visits and several admissions  He minimizes his drinking he was recently discharged from treatment  Energy Level:MODERATE  Sleep:No concerns, sleeps well through night  Appetite:fair improving motivation interest   Denied any Suicidal/homicidal ideation/plan intent.  Denied psychosis  No prior suicde attempts  No access to gun  Pt is  "in alcohol withdrawal still being monitered every 4 hrs for it,   Pt mssa score are monitered  Tolerating meds and has no side effects.              Medications:     Current Facility-Administered Medications   Medication     alum & mag hydroxide-simethicone (MAALOX) suspension 30 mL     atenolol (TENORMIN) tablet 50 mg     folic acid (FOLVITE) tablet 1 mg     hydrOXYzine (ATARAX) tablet 25 mg     LORazepam (ATIVAN) tablet 1-4 mg     multivitamin w/minerals (THERA-VIT-M) tablet 1 tablet     nicotine (NICORETTE) gum 2 mg     QUEtiapine (SEROquel) tablet 400 mg     senna-docusate (SENOKOT-S/PERICOLACE) 8.6-50 MG per tablet 1 tablet     thiamine (B-1) tablet 100 mg     traZODone (DESYREL) tablet 50 mg             Allergies:   No Known Allergies         Psychiatric Examination:   Blood pressure (!) 151/109, pulse 86, temperature 98.3  F (36.8  C), temperature source Temporal, resp. rate 16, height 1.778 m (5' 10\"), weight 77.1 kg (170 lb), SpO2 99 %.  Weight is 170 lbs 0 oz  Body mass index is 24.39 kg/m .    Appearance:  awake, alert and adequately groomed  Attitude:  cooperative  Eye Contact:  good  Mood:  better  Affect:  appropriate and in normal range and mood congruent  Speech:  clear, coherent rate /rhythm are good  Psychomotor Behavior:  no evidence of tardive dyskinesia, dystonia, or tics and intact station, gait and muscle tone  Throught Process:  logical  Associations:  no loose associations  Thought Content:  no evidence of suicidal ideation or homicidal ideation, no evidence of psychotic thought, no auditory hallucinations present and no visual hallucinations present  Insight:  limited  Judgement:  limited  Oriented to:  time, person, and place  Attention Span and Concentration:  intact  Recent and Remote Memory:  intact  Language fund of knowledge are adequate         Labs:     No results found for: NTBNPI, NTBNP  Lab Results   Component Value Date    WBC 10.8 01/17/2021    HGB 17.8 (H) 01/16/2021    HCT " 53.0 01/16/2021    MCV 92 01/16/2021     01/16/2021     Lab Results   Component Value Date    TSH 0.97 01/17/2021         DX   Alcohol use disorder severe  Alcohol withdrawal severe  Alcoholic hepatitis   PLAN   Alcohol intoxication/withdrawal, presently is on [facility] protocol with Valium. Continue the same [facility] protocol as ordered. Continue thiamine 100 mg p.o. daily, M.V.I. one p.o. daily and folate 1 mg p.o. Daily  Will continue mssa protocal to detox off alcohol on Ativan given his elevated liver enzymes  Pt is c/o of termor , agitation poor sleep and poor appetite, he has sweats, feels shakey  On mssa client scored scored 6 today and  needed needed 0 mg po as of yet , total dose since admission was 5  He did receive 40 mg of Valium on the 16th    [facility]    Eating Disturbances: ate and enjoyed all of it or not applicable  Tremor: 2  Sleep Disturbance: slept through the night or not applicable  Clouding of Sensorium: no evidence  Hallucinations: 0 - none  Quality of Contact: 0 - awareness of examiner and people around him/her  Agitation: 1 - somewhat more than normal activity  Paroxysmal Sweats: 0 - no observed sweating  Temperature: 99.5 or below  Pulse: 2 - 80 to 89  [facility]  Alcoholic hepatitis  Lab results reviewed on the 18th  Liver enzymes trending down reviewed level today Alt initially was 994 came down to 723 ALT now to 632   came down to 603 today at 355  Laboratory/Imaging: reviewed with patient   Consults: internal medicine consult reviewed  Patient will be treated in therapeutic milieu with appropriate individual and group therapies as described.  PDMP CHECKED     Supportive psychotherapy provided, curtis talked about recovery enviroment, relapse prevention, triggers to use.  Discussed with patient many issues of addiction,triggers, relapse, and establishing a solid recovery program.  Asked pt to be med complinat   Medical diagnoses to be addressed this admission:    Plan:  BORATORY  DATA:  White blood cell count 10.8 (23.5),  (994),  (943), .  Otherwise, rest of CBC and repeat white blood cell count with differential, comprehensive metabolic panel, repeat hepatic panel, lipid panel, TSH and vitamin B12 essentially normal.  Lipase level within normal limits.  Repeat lactic acid level within normal limits.  Urinalysis negative.  Chest x-ray negative.  Tylenol and salicylate levels negative.  Urine drug screen positive for barbiturates benzodiazepines and ethanol.  INR within normal limits.      ASSESSMENT:   1.  Severe alcohol use disorder and withdrawal per Dr. Lombardi.   2.  Severe transaminitis, resolving and most likely due to alcoholic hepatitis from his heavy alcohol abuse prior to admission combined with probable Tylenol use.  However, patient currently denying, and Tylenol level on admission was negative.  Reassuring bilirubin and INR normal, as well as creatinine.  Based on Maddrey score, no indication for steroids.  Recent right upper quadrant abdominal ultrasound unremarkable save fatty infiltration of liver.  Abdominal exam benign.   3.  History of recent COVID positive infection, now recovered.   4.  History of leukocytosis and elevated lactic acid, likely due to stress response and dehydration, respectively, as repeat labs this morning after receiving IV fluids in the ED have now normalized.      PLAN:  Added on to labs already drawn are repeat viral serologies, which are still pending.  Medicine will follow up on results as well as repeat hepatic panel tomorrow a.m.  In the interim, the patient should obviously avoid drinking alcohol going forward, as well as use other potential hepatotoxic agents including Tylenol use.  No further medical recommendations at this time.  The patient is medically stable, and Medicine will continue to follow.  Please feel free to call with questions.      Thank you for this consultation.          Legal Status: voluntary    Safety  Assessment:   Checks:  15 min  Precautions: withdrawal precautions  Pt has not required locked seclusion or restraints in the past 24 hours to maintain safety, please refer to RN documentation for further details.  Discussed with patient many issues of addiction,triggers, relapse, and establishing a solid recovery program.  Able to give informed consent:  YES   Discussed Risks/Benefits/Side Effects/Alternatives: YES    After discussion of the indications, risks, benefits, alternatives and consequences of no treatment, the patient elects to do treatment  Patient is relapse prone has poor insight.  Extensive discussion with  to see if patient will benefit from going directly to treatment.

## 2021-01-18 NOTE — PROGRESS NOTES
[facility]     Medicine Progress Note - Hospitalist Service       Date of Admission:  2021  Assessment & Plan       30-year-old male with history of severe alcohol use disorder, admitted to station 3A for alcohol abuse and detoxification who was found to have transaminitis on admission. Further work up reveals no additional underlying cause outside of alcohol use. Transaminitis is slowly improving. Mr. Porter denies any additional concerns at this time. Per our discussion, we recommend continuing with the followin.  Severe alcohol use disorder and withdrawal  2.  Severe transaminitis secondary to alcoholic hepatitis, resolving   -AST and ALT today are 355 / 632 and continue to slowly trend down daily. Recommend repeating daily while he is her. No  -Bilirubin and INR normal  -Recent RUQ abdominal ultrasound unremarkable save fatty infiltration of liver.   -Hepatitis screening negative  -Tylenol level negative on admission  -Positive use of Benzos, barbituates and alcohol on admission          JOHN Rod  Hospitalist Service  [facility]   Contact information available via [facility] Paging/Directory    ______________________________________________________________________    Interval History   Mr. Porter was eating lunch today, reporting that he feels pretty well and has no concerns outside of his chemical dependency treatment. He denies any chest pain, shortness of breath, fevers, chills or additional new concerns. We reviewed his recent lab results, plan of care and he has no further questions or concerns.    Data reviewed today: I reviewed all medications, new labs and imaging results over the last 24 hours.     Physical Exam   Vital Signs: Temp: 98.4  F (36.9  C) Temp src: Temporal BP: (!) 154/101 Pulse: 73   Resp: 16 SpO2: 99 % O2 Device: None (Room air)    Weight: 170 lbs 0 oz  General  Appearance: 30 year old gentleman in no acute distress, denies pain, pleasant in conversation  Respiratory: breathing comfortably on room air, no adventitious sounds to bilateral auscultation  Cardiovascular: regular rate and rhythm, no appreciable murmurs, rubs or gallops  GI: faint bowel sounds present, soft, non-tender to palpation throughout, no rebound or guarding    Data   Recent Labs   Lab 01/18/21  0753 01/17/21  0830 01/16/21  1447   WBC  --  10.8 23.5*   HGB  --   --  17.8*   MCV  --   --  92   PLT  --   --  240   INR  --   --  0.89   NA  --   --  140   POTASSIUM  --   --  3.8   CHLORIDE  --   --  103   CO2  --   --  29   BUN  --   --  6*   CR  --   --  0.75   ANIONGAP  --   --  9   ARYA  --   --  8.6   GLC  --   --  101*   ALBUMIN 3.8 3.2* 4.0   PROTTOTAL 7.5 6.5* 8.3   BILITOTAL 0.7 0.8 0.7   ALKPHOS 140 110 148   * 723* 994*   * 603* 943*   LIPASE  --   --  224

## 2021-01-18 NOTE — PROGRESS NOTES
[place]  Results:  Mohit Porter, 29MPowered by [facility]  Narx Report  Resources  Date: 01/18/2021Download PDF  Mohit Porter  Risk Indicators  NARX SCORES  Narcotic  020  Sedative  040  Stimulant  000  Explanation and GuidanceOVERDOSE RISK SCORE 110  (Range 000-999)  Explanation and Guidance  ADDITIONAL RISK INDICATORS ( 0 )  Explanation and GuidanceThis [facility] report is based on search criteria supplied and the data entered by the dispensing pharmacy. For more information about any prescription, please contact the dispensing pharmacy or the prescriber. [facility] scores and reports are intended to aid, not replace, medical decision making. None of the information presented should be used as sole justification for providing or refusing to provide medications. The information on this report is not warranted as accurate or complete.  Graphs  RX GRAPH   Narcotic  Buprenorphine  Sedative  Stimulant  Other  All Prescribers  Prescribers  2 - Nilam Palmer,  1 - BRAD Hung  Timeline  01/18  2m  6m  1y  2y  Buprenorphine mg  16  4  0  28  Timeline  01/18  2m  6m  1y  2y  Morphine MgEq (MME)  200  80  0  320  Timeline  01/18  2m  6m  1y  2y  Lorazepam MgEq (LME)  10  2  0  18  Timeline  01/18  2m  6m  1y  2y  *Per CDC guidance, the MME conversion factors prescribed or provided as part of the medication-assisted treatment for opioid use disorder should not be used to benchmark against dosage thresholds meant for opioids prescribed for pain. Buprenorphine products have no agreed upon morphine equivalency, and as partial opioid agonists, are not expected to be associated with overdose risk in the same dose-dependent manner as doses for full agonist opioids. MME = morphine milligram equivalents. LME = Lorazepam milligram equivalents. mg = dose in milligrams.  Summary  Summary  Total Prescriptions:  2  Total Prescribers:  2  Total Pharmacies:  2  Narcotics* (excluding Buprenorphine)  Current Qty:  0  Current  MME/day:  0.00  30 Day Avg MME/day:  0.00  Sedatives*  Current Qty:  0  Current LME/day:  0.00  30 Day Avg LME/day:  0.00  Buprenorphine*  Current Qty:  0  Current mg/day:  0.00  30 Day Avg mg/day:  0.00  Rx Data  PRESCRIPTIONS  Total Prescriptions: 2  Total Private Pay: 0  Fill Date ID Written Sold Drug Qty Days Prescriber Rx # Pharmacy Refill Daily Dose * Pymt Type   10/28/2020 2 10/28/2020 11/01/2020 Gabapentin 300 Mg Capsule  30.00 5 Sr Graciela [id number] Akash (1928) 0/0  Medicaid [place]  03/13/2020 1 03/13/2020 03/13/2020 Diazepam 10 Mg Tablet  10.00 3 Er Lee [id number] [place] ([id number]) 0/0 3.33 LME Medicaid [place]  *Per CDC guidance, the MME conversion factors prescribed or provided as part of the medication-assisted treatment for opioid use disorder should not be used to benchmark against dosage thresholds meant for opioids prescribed for pain. Buprenorphine products have no agreed upon morphine equivalency, and as partial opioid agonists, are not expected to be associated with overdose risk in the same dose-dependent manner as doses for full agonist opioids. MME = morphine milligram equivalents. LME = Lorazepam milligram equivalents. mg = dose in milligrams.  Providers  Total Providers: 2  Name Address [facility] Zipcode Phone  Smooth Lawrence MD [address] [place] [address] [phone]  Nilam Palmer MD [address] [facility] [id number] [phone]  Pharmacies  Total Pharmacies: 2  Name Address [facility] Zipcode Phone  [facility] [phone] [address] [place] [address] [phone]  [facility] ([facility]) [address] [facility] [address] [phone]  The report provided is based upon the search criteria entered and the corresponding data as it has been reported by dispenser(s). If erroneous information is identified or additional information is needed, please contact the dispenser or the prescriber provided on the report. Date Sold signifies the date the prescription  was sold (left the pharmacy). The absence of Date Sold does not necessarily indicate the prescription was not dispensed. Fill Date represents the date the medication was filled or prepared by the pharmacy. Note, federal regulation (CFR Title 42: Part 2) requires patient consent prior to releasing certain patient data from federally funded opioid treatment programs (OTPs). As such, controlled substances dispensed from OTPs for medication-assisted treatment may not appear in the [place]  report. Morphine milligram equivalent (MME) conversion factors published by the CDC are used in the MME calculation. Per the CDC, the MME conversion factor is intended only for analytic purposes where prescription data are used to retrospectively calculate daily MME to inform analyses of risks associated with opioid prescribing. This value does not constitute clinical guidance or recommendations for converting patients from one form of opioid analgesic to another. Per the CDC, the conversion factors for drugs prescribed or provided, as part of medication-assisted treatment for opioid use disorder should not be used to benchmark against MME dosage thresholds meant for opioids prescribed for pain. Buprenorphine products listed in the CDC s MME file do not have an associated conversion factor. Lastly, the CDC notes, in clinical practice, calculating MME for methadone often involves a sliding-scale approach, whereby the conversion factor increases with increasing dose. The conversion factor of 3 for methadone presented in this file could underestimate MME for a given patient. This report contains confidential information, including patient identifiers, and is not a public record. The information on this report must be treated as protected health information and is only to be disclosed to others as authorized by applicable state and Federal regulations.

## 2021-01-18 NOTE — PROGRESS NOTES
Met with Pt to complete assessment.  Discussed Pt's need for more inpatient treatment prior to transfer to sober housing.  Pt attempted to negotiate discharge to sober housing and was informed that the treatment team is recommending residential treatment for at least 30 days.  Pt informed that he could accept and follow recommendation or that we would file for commitment and ask the court to decide.  Pt agreed to enter [facility].  Pt is concerned about his belongings which are at [facility].  Overheard Pt this AM on the phone coordinating the  of those belongings with the help of a friend.  Pt states he is uncertain if they will follow through.  Pt also overheard on the phone discussing meeting up with a recently discharged Pt at a hotel. At this time Pt agrees to referral to [facility].  Pt has [employer] insurance.

## 2021-01-18 NOTE — PLAN OF CARE
Problem: Substance Withdrawal  Goal: Substance Withdrawal  Description: Signs and symptoms of listed problems will be absent or manageable.  Outcome: Improving    Pt admitted for alcohol withdrawal MSSA- 6 this am.  He reports that he slept well with Seroquel but woke up a bit early this morning. He ate 100% of breakfast, affect is bright, he is social with peers and visible on the unit. He reports depression 2/10 denies thoughts plan or intent for self harm. He reports some anxiety and was given vistaril.  He is asking about discharge, explained [facility] protocol and he was encouraged to discuss with Dr. Lombardi and meet with CM for discharge planning.

## 2021-01-18 NOTE — PROVIDER NOTIFICATION
Updated Dr. Lombardi about pt nicotine gum being 1 piece and order obtained to change to 2 pieces. Pt happy with increase in the dosage.

## 2021-01-18 NOTE — PROGRESS NOTES
Pt.slept most of the night. He was out for snacks. Out in the lounge chatting with a peer. MSSA scores 4 & 6. Did not need any valium to manage withdrawal. Will continue to monitor.

## 2021-01-18 NOTE — PROGRESS NOTES
Met with Pt to initiate discharge planning.  Pt is requesting a referral to IOP with [facility].  Pt was recently in [facility] and was discharged due to use.  Pt completed paperwork for assessment and will complete assessment and referral today.  Pt reports he can stay with a sober friend until he can get into treatment if needed.  Of note, a review of Pt's chart indicates 19 ED visits and 5 hospital admissions in the last 6 months.  Pt may be appropriate for more structured setting.  Will allow assessment to dictate.

## 2021-01-19 ENCOUNTER — APPOINTMENT (OUTPATIENT)
Dept: ULTRASOUND IMAGING | Facility: CLINIC | Age: 31
End: 2021-01-19
Attending: PHYSICIAN ASSISTANT
Payer: COMMERCIAL

## 2021-01-19 ENCOUNTER — HOSPITAL ENCOUNTER (OUTPATIENT)
Dept: BEHAVIORAL HEALTH | Facility: CLINIC | Age: 31
End: 2021-01-19
Attending: FAMILY MEDICINE
Payer: COMMERCIAL

## 2021-01-19 ENCOUNTER — BEH TREATMENT PLAN (OUTPATIENT)
Dept: BEHAVIORAL HEALTH | Facility: CLINIC | Age: 31
End: 2021-01-19
Attending: FAMILY MEDICINE

## 2021-01-19 VITALS
HEIGHT: 70 IN | BODY MASS INDEX: 24.34 KG/M2 | DIASTOLIC BLOOD PRESSURE: 108 MMHG | WEIGHT: 170 LBS | RESPIRATION RATE: 16 BRPM | OXYGEN SATURATION: 97 % | TEMPERATURE: 98 F | SYSTOLIC BLOOD PRESSURE: 157 MMHG | HEART RATE: 113 BPM

## 2021-01-19 VITALS — TEMPERATURE: 98 F | OXYGEN SATURATION: 98 %

## 2021-01-19 DIAGNOSIS — F19.20 CHEMICAL DEPENDENCY (H): ICD-10-CM

## 2021-01-19 LAB
ALBUMIN SERPL-MCNC: 4.3 G/DL (ref 3.4–5)
ALP SERPL-CCNC: 131 U/L (ref 40–150)
ALT SERPL W P-5'-P-CCNC: 737 U/L (ref 0–70)
ANION GAP SERPL CALCULATED.3IONS-SCNC: 9 MMOL/L (ref 3–14)
AST SERPL W P-5'-P-CCNC: 496 U/L (ref 0–45)
BILIRUB DIRECT SERPL-MCNC: 0.4 MG/DL (ref 0–0.2)
BILIRUB SERPL-MCNC: 0.9 MG/DL (ref 0.2–1.3)
BUN SERPL-MCNC: 5 MG/DL (ref 7–30)
CALCIUM SERPL-MCNC: 9.7 MG/DL (ref 8.5–10.1)
CHLORIDE SERPL-SCNC: 100 MMOL/L (ref 94–109)
CO2 SERPL-SCNC: 27 MMOL/L (ref 20–32)
CREAT SERPL-MCNC: 0.67 MG/DL (ref 0.66–1.25)
GFR SERPL CREATININE-BSD FRML MDRD: >90 ML/MIN/{1.73_M2}
GLUCOSE SERPL-MCNC: 99 MG/DL (ref 70–99)
POTASSIUM SERPL-SCNC: 3.6 MMOL/L (ref 3.4–5.3)
PROT SERPL-MCNC: 8.2 G/DL (ref 6.8–8.8)
SODIUM SERPL-SCNC: 136 MMOL/L (ref 133–144)

## 2021-01-19 PROCEDURE — 80076 HEPATIC FUNCTION PANEL: CPT | Performed by: PHYSICIAN ASSISTANT

## 2021-01-19 PROCEDURE — 80048 BASIC METABOLIC PNL TOTAL CA: CPT | Performed by: PHYSICIAN ASSISTANT

## 2021-01-19 PROCEDURE — 76705 ECHO EXAM OF ABDOMEN: CPT | Mod: 26 | Performed by: RADIOLOGY

## 2021-01-19 PROCEDURE — 250N000013 HC RX MED GY IP 250 OP 250 PS 637: Performed by: PSYCHIATRY & NEUROLOGY

## 2021-01-19 PROCEDURE — 1002N00001 HC LODGING PLUS FACILITY CHARGE ADULT

## 2021-01-19 PROCEDURE — 36415 COLL VENOUS BLD VENIPUNCTURE: CPT | Performed by: PHYSICIAN ASSISTANT

## 2021-01-19 PROCEDURE — 99239 HOSP IP/OBS DSCHRG MGMT >30: CPT | Mod: 95 | Performed by: PSYCHIATRY & NEUROLOGY

## 2021-01-19 PROCEDURE — 76705 ECHO EXAM OF ABDOMEN: CPT

## 2021-01-19 RX ORDER — HYDROXYZINE HYDROCHLORIDE 25 MG/1
25 TABLET, FILM COATED ORAL EVERY 4 HOURS PRN
Qty: 30 TABLET | Refills: 1 | Status: ON HOLD | OUTPATIENT
Start: 2021-01-19 | End: 2021-01-01

## 2021-01-19 RX ORDER — QUETIAPINE FUMARATE 25 MG/1
25 TABLET, FILM COATED ORAL 2 TIMES DAILY PRN
Status: DISCONTINUED | OUTPATIENT
Start: 2021-01-19 | End: 2021-01-19 | Stop reason: HOSPADM

## 2021-01-19 RX ORDER — LANOLIN ALCOHOL/MO/W.PET/CERES
100 CREAM (GRAM) TOPICAL DAILY
Qty: 30 TABLET | Refills: 0 | Status: SHIPPED | OUTPATIENT
Start: 2021-01-20

## 2021-01-19 RX ORDER — MULTIPLE VITAMINS W/ MINERALS TAB 9MG-400MCG
1 TAB ORAL DAILY
Qty: 30 TABLET | Refills: 1 | Status: SHIPPED | OUTPATIENT
Start: 2021-01-20

## 2021-01-19 RX ORDER — LORATADINE 10 MG/1
10 TABLET ORAL DAILY PRN
COMMUNITY
End: 2021-02-14

## 2021-01-19 RX ORDER — AMOXICILLIN 250 MG
2 CAPSULE ORAL DAILY PRN
COMMUNITY
End: 2021-02-14

## 2021-01-19 RX ORDER — MAGNESIUM HYDROXIDE/ALUMINUM HYDROXICE/SIMETHICONE 120; 1200; 1200 MG/30ML; MG/30ML; MG/30ML
30 SUSPENSION ORAL EVERY 6 HOURS PRN
COMMUNITY
End: 2021-02-14

## 2021-01-19 RX ORDER — LANOLIN ALCOHOL/MO/W.PET/CERES
1 CREAM (GRAM) TOPICAL
COMMUNITY
End: 2021-02-14

## 2021-01-19 RX ORDER — QUETIAPINE FUMARATE 25 MG/1
25 TABLET, FILM COATED ORAL 2 TIMES DAILY PRN
Qty: 60 TABLET | Refills: 0 | Status: ON HOLD | OUTPATIENT
Start: 2021-01-19 | End: 2021-01-01

## 2021-01-19 RX ORDER — QUETIAPINE FUMARATE 400 MG/1
400 TABLET, FILM COATED ORAL AT BEDTIME
Qty: 30 TABLET | Refills: 0 | Status: SHIPPED | OUTPATIENT
Start: 2021-01-19 | End: 2021-01-22

## 2021-01-19 RX ORDER — IBUPROFEN 200 MG
400 TABLET ORAL EVERY 6 HOURS PRN
COMMUNITY
End: 2021-02-14

## 2021-01-19 RX ORDER — ACETAMINOPHEN 325 MG/1
325-650 TABLET ORAL EVERY 4 HOURS PRN
COMMUNITY
End: 2021-02-14

## 2021-01-19 RX ADMIN — MULTIPLE VITAMINS W/ MINERALS TAB 1 TABLET: TAB at 07:52

## 2021-01-19 RX ADMIN — NICOTINE POLACRILEX 2 MG: 2 GUM, CHEWING BUCCAL at 12:51

## 2021-01-19 RX ADMIN — THIAMINE HCL TAB 100 MG 100 MG: 100 TAB at 07:51

## 2021-01-19 RX ADMIN — FOLIC ACID 1 MG: 1 TABLET ORAL at 07:51

## 2021-01-19 RX ADMIN — NICOTINE POLACRILEX 4 MG: 2 GUM, CHEWING BUCCAL at 07:52

## 2021-01-19 RX ADMIN — HYDROXYZINE HYDROCHLORIDE 25 MG: 25 TABLET, FILM COATED ORAL at 08:34

## 2021-01-19 ASSESSMENT — ANXIETY QUESTIONNAIRES
6. BECOMING EASILY ANNOYED OR IRRITABLE: SEVERAL DAYS
4. TROUBLE RELAXING: NEARLY EVERY DAY
2. NOT BEING ABLE TO STOP OR CONTROL WORRYING: NEARLY EVERY DAY
GAD7 TOTAL SCORE: 15
7. FEELING AFRAID AS IF SOMETHING AWFUL MIGHT HAPPEN: SEVERAL DAYS
3. WORRYING TOO MUCH ABOUT DIFFERENT THINGS: NEARLY EVERY DAY
1. FEELING NERVOUS, ANXIOUS, OR ON EDGE: MORE THAN HALF THE DAYS
5. BEING SO RESTLESS THAT IT IS HARD TO SIT STILL: MORE THAN HALF THE DAYS

## 2021-01-19 ASSESSMENT — PATIENT HEALTH QUESTIONNAIRE - PHQ9: SUM OF ALL RESPONSES TO PHQ QUESTIONS 1-9: 14
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
## 2021-01-19 NOTE — PLAN OF CARE
Problem: Substance Withdrawal  Goal: Substance Withdrawal  Description: Signs and symptoms of listed problems will be absent or manageable.  Outcome: Completed    S: Patient scored less than 8 for greater than 24 hours. Pt has required no medication for withdrawal for > 24 hours,.  B: Patient admitted for alcohol withdrawal and detoxification.  A: Patient stable in the alcohol withdrawal process AEB MSSA scores < 8 for > 24 hours.  R: Patient removed from detox status per unit Protocol.

## 2021-01-19 NOTE — PROGRESS NOTES
Initial Services Plan        Service Initiation Date: 1/19/2021    Immediate health and/or safety concerns: No    Identify health and safety concern(s) below and include plan to address:    None Identified    Treatment suggestions for client during the time between intake (admit date) and completion of the individual treatment plan:     Look for a sober support network, i.e. 12 step, [facility], [facility], etc  Tour the treatment center or outpatient clinic  Introduce yourself to your treatment group. Spend time getting to know your peers  Review your patient or client handbook  Begin working on your treatment goal list    Completed by: VINAYAK Henriquez  Date completed: 1/19/2021 at 1:59 PM

## 2021-01-19 NOTE — PROGRESS NOTES
Name: Mohit Porter  Date: 1/19/2021  Medical Record: [id number]  Envelope Number: [id number]  List of Contents (List each item separately in new row):   Hydroxyzine Hydrochloride 25mg Tablet (4 Tablets),  Nicotine Polacrilex Gum USP, 2mg (85 Gums)   Admission:  I am responsible for any personal items that are not sent to the safe or pharmacy.  [facility] is not responsible for loss, theft or damage of any property in my possession.    Patient Signature:  ___________________________________________       Date/Time:__________________________    Staff Signature: __________________________________       Date/Time:__________________________    2nd Staff person, if patient is unable/unwilling to sign:      __________________________________________________________       Date/Time: __________________________    Discharge:  [facility] has returned all of my personal belongings:    Patient Signature: ________________________________________     Date/Time: ____________________________________    Staff Signature: ______________________________________     Date/Time:_____________________________________

## 2021-01-19 NOTE — DISCHARGE INSTRUCTIONS
Behavioral Discharge Planning and Instructions  THANK YOU FOR CHOOSING THE [facility]  3A  [phone]    Summary: You were admitted to Station [facility] on 1/17/21 for detoxification from alcohol.  A medical exam was performed that included lab work. You have met with a  and opted to enter and complete [facility].  Please take care and make your recovery a priority, Mohit!      Main Diagnosis: Per Dr. Rena Lombardi MD;  303.90 (F10.20) Alcohol Use Disorder Severe      Major Treatments, Procedures and Findings:  You were detoxed from alcohol with the Modified Selective Severity Protocol using Valium. You have met with a  to develop a treatment plan for discharge.  You have had labs drawn, showing elevated liver functions, you also had an abdominal ultra sound performed.  You have been provided with a copy of those labs please bring your lab results with to your follow up doctor appointment.    Symptoms to Report:  If you experience more anxiety, confusion, sleeplessness, deep sadness or thoughts of suicide, notify your treatment team or notify your primary care physician. IF ANY OF THE SYMPTOMS YOU ARE EXPERIENCING ARE A MEDICAL EMERGENCY CALL 911 IMMEDIATELY.     Lifestyle Adjustment: Adjust your lifestyle to get enough sleep, relaxation, exercise and  good nutrition. Continue to develop healthy coping skills to decrease stress and promote a sober living environment. Do not use alcohol, illegal drugs or addictive medications other than what is currently prescribed. AA, NA, and  Sponsor are excellent resources for support.     Primary Provider:    22 Office Visit 8:30 AM   MILAGRO [facility]   [address]    [address]   [place] [address] [phone]  Bring a current list of meds and any records pertaining to this visit.  For Physicals, please bring immunization records and any forms needing to be filled out.  "Please arrive 10 minutes early to complete paperwork.           Disposition: [facility]      Facts about COVID19 at www.cdc.gov/COVID19 and www.[place].gov/covid19    Keeping hands clean is one of the most important steps we can take to avoid getting sick and spreading germs to others.  Please wash your hands frequently and lather with soap for at least 20 seconds!    Resources:     Resources for on line recovery meetings:      *due to covid-19 AA/NA meetings are being held online*      AA meetings can be found online; search for them at: http://aa-intergroup.org/directory.php  AA meetings via [place] for [place] area can be found online at: https://aaIntertwine.org/find-a-meeting/holiday-closings/  NA meetings via [place] for [place] area can be found online at: https://sites.[employer].com/view/[place]regionofnarcoticsanonymous/home?authuser=2    Www.Prisync  has online resources for meeting and recovery care including Podcast \"Let's Talk:Addiction & Recovery Podcasts    Www.mnrecovery.org     DISCHARGE RESOURCES:  -SMART Recovery - self management for addiction recovery:  www.smartrecovery.org    -Pathways ~ A Health Crisis Resource & Support Center: 262.684.9272.  -[facility] [phone]   -[facility] Behavioral Intake 526-281-9757 or 308-343-6065.  -Suicide Awareness Voices of Education (SAVE) (www.save.org): 418-285-UTHM (8679)  -National Suicide Prevention Line (www.mentalhealthmn.org): 261-386-OKWG (9644)  -National [place] on Mental Illness (www.[place].dinorah.org): 918.896.2070 or 015-179-3059.  -Ofli5hsmm: text the word LIFE to 05357 for immediate support and crisis intervention  -Mental Health Consumer/Survivor Network of [place] (www.mhcsn.net): 522.261.6427 or 032-406-4737  -Mental Health Association of [place] (www.mentalhealth.org): 989.886.2266 or 275-646-9143     -Substance Abuse and Mental Health Services (www.samhsa.gov)  -Harm Reduction Coalition (www. " Harmreduction.org)  -www.prescribetoprevent.org or http://prescribetoprevent.org/video  -Poison control 1-976.899.9304   **[place] Opioid Prevention Coalition: www.opioidcoalition.org    Sober Support Group Information:  AA/NA & Sponsor/Support  -Alcoholics Anonymous (www.alcoholics-anonymous.org): for local information 24 hours/day  -AA Intergroup service office in [place] (http://www.aastpaul.org/) 514.356.3912  -AA Intergroup service office in [facility]: 881.691.6429. (http://www.aamin[place].org/)  -Narcotics Anonymous (www.naminn[place].org) (655) 620-5363   **Sober Fun Activities: www.sober-activities.[facility]//[facility] Recovery Connection ([facility])  [facility] connects people seeking recovery to resources that help foster and sustain long-term recovery.  Whether you are seeking resources for treatment, transportation, housing, job training, education, health care or other pathways to recovery, [facility] is a great place to start.    Phone: 599.122.3561.  www.[place]BigML (Great listing of all types of recovery and non-recovery related resources)      General Medication Instructions:   See your medication sheet(s) for instructions.   Take all medicines as directed.  Make no changes unless your doctor suggests them.   Go to all your doctor visits.  Be sure to have all your required lab tests. This way, your medicines can be refilled on time.  Do not use any drugs not prescribed by your provider.  AA/NA and Sponsors are excellent resources for support  Avoid alcohol.    Any follow up concerns:  Nursing questions call the [facility] [phone]  Medical Record call [phone]  Outpatient Behavioral Intake call [phone]  LP+ Wait List/Bed Availability call [phone]    The entire treatment team has appreciated the opportunity to work with you Mohit.  We wish you the best in the future and with your lifelong recovery goals. Please bring this discharge folder with you  to all follow up appointments.  It contains your lab results, diagnosis, medication list and discharge recommendations.    THANK YOU FOR CHOOSING THE [facility]

## 2021-01-19 NOTE — PROGRESS NOTES
Progress Note    This patient had a Comprehensive Substance Abuse assessment on 01/18/2021 completed by VINAYAK Lee.  This patient was seen for a face to face update of the Comprehensive Substance Abuse assessment on 1/19/2021 by VINAYAK Henriquez.  INSIDE: The patient's Comprehensive Substance Abuse assessment completed on 1/18/2021 is in the patient's electronic medical record in Epic in the Chart Review section under the Notes/Trans Tab.    Alcohol/Drug use since the last CD evaluation (include date of last use):     No additional substances use since the last CD evaluation     Please note any other clinical changes since the last CD evaluation (such as medication changes, additional legal charges, detoxification admissions, overdoses, etc.)     No significant changes since the last CD evaluation       ASAM Dimensions Original scores Current Scores   I.) Intoxication and Withdrawal: 1 0   II.) Biomedical:  0 0   III.) Emotional and Behavioral:  2 2   IV.) Readiness to Change:  2 2   V.) Relapse Potential: 4 4   VI.) Recovery Environmental: 4 4     Please list clinical justifications for the above ASAM score changes since the original comprehensive assessment:     The patient's score on Dimension I changed from a 1 to a 0.  The patient's withdrawal symptoms had been addressed by his physicians while he had been on 3 A IP detoxification unit at [facility] in [place], [place].         Current JOANA: Current UA:     No JOANA as the patient was a direct transfer from 3 A IP detoxification unit at [facility] in [place], [place].     No UA screen as the patient was a direct transfer from 3 A IP detoxification unit at [facility] in [place], [place].        PHQ-9, KALINA-7   PHQ-9 on 1/19/2021 KALINA-7 on 1/19/2021   The patient's PHQ-9 score was 14 out of 27, indicating moderate depression.   The patient's KALINA-7 score was 15 out of 21, indicating severe anxiety.       [place]-Suicide Severity  Rating Scale Reassessment   Have you ever wished you were dead or that you could go to sleep and not wake up?  Past Month:  No     Have you actually had any thoughts of killing yourself?  Past Month:  No     Have you been thinking about how you might do this?     Past Month:  No   Lifetime:  No   Have you had these thoughts and had some intention of acting on them?     Past Month:  No   Lifetime:  No   Have you started to work out the details of how to kill yourself?   Past Month:  No   Lifetime:  No   Do you intend to carry out this plan?   No     When you have the thoughts how long do they last?  The patient denied ever having any suicidal thoughts in life.     Are there things - anyone or anything (i.e. family, Worship, pain of death) that stopped you from wanting to die or acting on thoughts of suicide?  Does not apply       2008  [employer]  Used with permission by Andreia Kwon, PhD.       Guide to C-SSRS Risk Ratings   NO IDEATION:  with no active thoughts IDEATION: with a wish to die. IDEATION: with active thoughts. Risk Ratings   If Yes No No 0 - Very Low Risk   If NA Yes No 1 - Low Risk   If NA Yes Yes 2 - Low/moderate risk   IDEATION: associated thoughts of methods without intent or plan INTENT: Intent to follow through on suicide PLAN: Plan to follow through on suicide Risk Ratings cont...   If Yes No No 3 - Moderate Risk   If Yes Yes No 4 - High Risk   If Yes Yes Yes 5 - High Risk   The patient's ADDITIONAL RISK FACTORS and lack of PROTECTIVE FACTORS may increase their overall suicide risk ratings.     Additional Risk Factors:    Significant history of having untreated or poorly treated mental health symptoms     Tendency to be socially isolated and/or cut off from the support of others   Protective Factors:    Having people in his/her life that would prevent the patient from considering a suicide attempt (i.e. young children, spouse, parents, etc.)     An absence  "of chronic health problems or stable and well treated chronic health issues     Having easy access to supportive family members     Having a good community support network     Having restricted access to highly lethal means of suicide     Risk Status   0. - Very Low Risk:  Evaluation Counselors:  Document in Epic / SBAR to counselor \"Very Low Risk\".      Treatment Counselors:  Reassess upon admission as applicable, assess weekly in progress notes under Dimension 3 and summarize in Discharge / Treatment summary under Dimension 3.     Additional information to support suicide risk rating: There was no additional information to provide at this time.       "

## 2021-01-19 NOTE — PROGRESS NOTES
"[facility] Nursing Health Assessment      Vital signs:     There were no vitals taken for this visit.      Transfer from     Counselor: Zuleyka  Drug of Choice: Alcohol  Last use: 1/16/21  Home clinic/MD: [facility] - Lisandra Carver - upcoming appt 1/22/2021 8:30am (Pt has not yet established care with Primary , but will ask Lisandra if she can take him as a pt)  Psychiatrist/therapist: none    Medical history/current conditions:  Alcohol use disorder severe  Alcoholic hepatitis LFTs at the time of discharge attending slightly up    Generalized anxiety disorder    H&P Screen:  H&P within the last 90 days: Yes.  Date: 1/19/2021 Location: 3A/detox      Mental Health diagnosis: Alcohol use disorder severe  Medication compliant?: yes  Recent sucidal thoughts? no     When? na  Current thought of self-harm? no    Plan? na    Pain assessment:   Pt. Experiencing pain at this time?  No     Community Medical Screen for COVID19    In the last 2 weeks have you been in an large group gatherings (more than 10 people)? no    Have you been covering your nose and mouth while out in the community? yes    Have you come in contact with anyone in the last month who \"was suspected of\" or \"tested positive\" for COVID-19?   Pt Recovered COVID  Added: 1/18/2021 by Charis Dennis  Onset date: 12/31/2020    Have you tested positive for COVID-19 in the past 90 days?   Pt Recovered COVID  Added: 1/18/2021 by Charis Dennis  Onset date: 12/31/2020  -If yes pt should not be re-tested until 90 days from day one of symptom onset   UNLESS patient is COVID symptomatic, contact infection prevention for recommendation    \"Do you\" or \"have you\" had....any of the following symptoms in the last 2 weeks? no      Fever or chills     Cough     Shortness of breath or difficulty breathing     New muscle or body aches    New headache     New loss of taste or smell    Sore throat     Congestion or runny nose     New and unexplained " Nausea or vomiting     Diarrhea    New and unexplained fatigue    Does the above COVID screen need to be reviewed by Infection Prevention? No. They are aware    COVID TEST COMPLETED BY [facility] ? no    COVID-19 - Pt informed of the following while at LP:    1)Staff will take temperature and O2Sats twice daily    2) Practice good hand washing hygiene and avoid touching face    3) If pt has any of the symptoms below, notify staff immediately.      Fever     Cough     Shortness of breath or difficulty breathing     Chills     Repeated shaking with chills    Muscle pain     4) COVID testing maybe initiated at admission. Depending on the situation amd symptoms patients may be tested more than once during their stay.  Patient will be required to stay in room until lab results confirm negative. If you are unable to stay in your room you may be asked to leave the program.  If COVID results are positive, You will have to exit the program quarantine as recommended per CDC and then may return for CD treatment after symptoms have resolved    5) Per COVID protocol, during your stay at LP, social distancing is required AND mouth and nose must be covered at all times with facial mask while out in milieu.      6) Patients will not be allowed to go to any outside appointments, all outside appointments will need to be virtual or by phone      Integrative Therapies: Essential Oils    Patient requesting essential oil inhaler to manage (Mood/Mental Health/Physical/Spiritual symptoms).     Discussed appropriate use of essential oil inhalers and instructed patient not to leave labeled product out on unit.     Patient was screened for kidney disease, asthma/reactive airway disease and rashes and wounds or 1st trimester of pregnancy    List Essential Oils requested by pt Lavender,     Patient verbalized and demonstrated understanding of how to use essential oil inhaler correctly and will notify LP RN with any concerns or side effects. Patient  agrees not to share their essential oil inhaler with other clients.  Continue to support the patient in safely utilizing integrative therapies as able to manage symptoms during treatment.     Patient tobacco use:    Do you use tobacco? yes   Type? Chewing   How often? daily  How much? 1/3 of a tin   Are you interested in quitting? yes    NRT (Nicotine Replacement therapy) ordered? yes   Pt is aware of the dangers of tobacco cessation and in contemplation.    Pt given written education.    Patient flu vaccine (screen during flu season / offer vaccination at [facility]) pt would like the flu vaccination     Nutritional Assessment:    Have you ever purged, binged or restricted yourself as a way to control your weight?   No     Are you on a special diet?   No     Do you have any concerns regarding your nutritional status?   No     Have you had any appetite changes in the last 3 months?   No   Have you had weight loss or weight gain of more than 10 lbs in the last 3 months?   If patient gained or lost more than 10 lbs, then refer to program RN / attending Physician for assessment.   No   Was the patient informed of BMI?    Normal, No Intervention   Yes  24.39 kg/m    Have you engaged in any risk-taking behavior that would put you at risk for exposure to blood-borne or sexually transmitted diseases?   No   Do you have any dental problems?   No     Nursing Assessment Summary:  Pt directed to use Mask over nose and mouth while out in pt milieu     On-going nursing intervention required?   No    Acute care visit recommended: yes.   is admitted for alcohol dependence and is therefore at risk for medical complications such as liver disease. Pt will be seen this Friday for ellevated  Liver values

## 2021-01-19 NOTE — PROGRESS NOTES
[facility]  [address] [place] [address] [address] and [address] [place] [address]        ADULT CD ASSESSMENT ADDENDUM      Patient Name: Mohit Porter  Cell Phone:   Home: [phone] (home)    Mobile:   No relevant phone numbers on file.       Email:  [email or web address]  Emergency Contact: Darling Monge   Tel: [phone]    The patient reported being:  Single, no serious involvement    With which race do you identify? White    Initial Screening Questions     1. Are you currently having severe withdrawal symptoms that are putting yourself or others in danger?  No    2. Are you currently having severe medical problems that require immediate attention?  No    3. Are you currently having severe emotional or behavioral problems that are putting yourself or others at risk of harm?  No    4. Do you currently participate in community alex activities, such as attending Mu-ism, temple, Sabianism or Restorationism services?  The patient denied currently being involved in any community alex activities.    5. How does your spirituality impact your recovery?  I don't really have any spirituality    6. Do you currently self-administer your medications?  Yes    Do you have a valid 's license?    Yes       Mental Health Status   Physical Appearance/Attire: Appears stated age   Hygiene: well groomed   Eye Contact: at examiner   Speech Rate:  regular   Speech Volume: regular   Speech Quality: fluid   Cognitive/Perceptual:  reality based   Cognition: memory intact    Judgment: intact and able to concentrate   Insight: intact and able to concentrate   Orientation:  time, place, person and situation   Thought: logical    Hallucinations:  none   General Behavioral Tone: cooperative   Psychomotor Activity: no problem noted   Gait:  no problem   Mood: appropriate and anxious   Affect: congruence/appropriate   Counselor Notes: NA     Criteria for Diagnosis: DSM-5 Criteria for Substance Use Disorders       Alcohol Use Disorder Severe - 303.90 (F10.20)  Nicotine Use Disorder Severe    Level of Care   I.) Intoxication and Withdrawal: 0   II.) Biomedical:  0   III.) Emotional and Behavioral:  2   IV.) Readiness to Change:  2   V.) Relapse Potential: 4   VI.) Recovery Environmental: 4     Initial Problem List     The patient is currently homeless  The patient lacks relapse prevention skills  The patient has poor coping skills  The patient has poor refusal skills   The patient lacks a sober peer support network  The patient has a tendency to isolate  The patient has dual issues of MI and CD  The patient lacks the ability to effectively manage his/her mental health issues  The patient has legal issues.    Patient/Client is willing to follow treatment recommendations.  Yes    Counselor: VINAYAK Henriquez

## 2021-01-19 NOTE — DISCHARGE SUMMARY
Mohit Porter MRN# [id number]   Age: 30 year old YOB: 1990     Date of Admission:  1/16/2021  Date of Discharge:  No discharge date for patient encounter.  Admitting Physician:  Rena Lombardi MD  Discharge Physician:  Rena Lombardi MD      DISCHARGE  DX  Alcohol use disorder severe  Alcoholic hepatitis LFTs at the time of discharge attending slightly up    Generalized anxiety disorder         Event Leading to Hospitalization:     See Admission note by admitting provider for patient encounter. for additional details.          Hospital Course:   PATIENT was [facility] to [facility]  under DR carr, please review the detailed admit note on    The patient was placed under status 15 (15 minute checks) to ensure patient safety.   MSSA protocol was initiated due to the patient's history of alcohol abuse and concern for withdrawal symptoms.  CBC, BMP and utox obtained.    All outpatient medications were continued    PATIENTdid participate in groups and was visible in the milieu.     The patient's symptoms of alcohol withdrawal improved.     Patients energy motivation , sleep appetite improved.  Pt completed detox . It was un eventful.  Patient has a lot of anxiety he would benefit from taking medications but he declined to take SSRIs SNRIs and declined to take BuSpar.  He was willing to let me increase Seroquel as needed during the day      Discussed with patient medications for craving.  Spoke with patient about triggers coping skills relapse prevention.    CONSULTS DONE DURING PATIENTS HOSPITALIZATION.  Patient was seen by medicine on date    This as per their medical consult      LABORATORY DATA:  White blood cell count 10.8 (23.5),  (994),  (943), .  Otherwise, rest of CBC and repeat white blood cell count with differential, comprehensive metabolic panel, repeat hepatic panel, lipid panel, TSH and vitamin B12 essentially normal.  Lipase  level within normal limits.  Repeat lactic acid level within normal limits.  Urinalysis negative.  Chest x-ray negative.  Tylenol and salicylate levels negative.  Urine drug screen positive for barbiturates benzodiazepines and ethanol.  INR within normal limits.      ASSESSMENT:   1.  Severe alcohol use disorder and withdrawal per Dr. Lombardi.   2.  Severe transaminitis, resolving and most likely due to alcoholic hepatitis from his heavy alcohol abuse prior to admission combined with probable Tylenol use.  However, patient currently denying, and Tylenol level on admission was negative.  Reassuring bilirubin and INR normal, as well as creatinine.  Based on Maddrey score, no indication for steroids.  Recent right upper quadrant abdominal ultrasound unremarkable save fatty infiltration of liver.  Abdominal exam benign.   3.  History of recent COVID positive infection, now recovered.   4.  History of leukocytosis and elevated lactic acid, likely due to stress response and dehydration, respectively, as repeat labs this morning after receiving IV fluids in the ED have now normalized.      PLAN:  Added on to labs already drawn are repeat viral serologies, which are still pending.  Medicine will follow up on results as well as repeat hepatic panel tomorrow a.m.  In the interim, the patient should obviously avoid drinking alcohol going forward, as well as use other potential hepatotoxic agents including Tylenol use.  No further medical recommendations at this time.  The patient is medically stable, and Medicine will continue to follow.  Please feel free to call with questions.      Thank you for this consultation.         On on 1/19/2021 patient was seen again by medicine who cleared him to be discharged for treatment  He did get an ultrasound done  Pt was seen by cezar  As per recommendations from Nesha Almendarez, VINAYAK      Chemical Dependency   Progress Notes   Signed   Date of Service:  1/18/2021  2:33 PM    Creation Time:  1/18/2021  2:33 PM                 []Hide copied text    []Madeleine for details  Met with Pt to complete assessment.  Discussed Pt's need for more inpatient treatment prior to transfer to sober housing.  Pt attempted to negotiate discharge to sober housing and was informed that the treatment team is recommending residential treatment for at least 30 days.  Pt informed that he could accept and follow recommendation or that we would file for commitment and ask the court to decide.  [facility].  Pt is concerned about his belongings which are at [facility].  Overheard Pt this AM on the phone coordinating the  of those belongings with the help of a friend.  Pt states he is uncertain if they will follow through.  Pt also overheard on the phone discussing meeting up with a recently discharged Pt at a hotel. At this time Pt agrees to referral to [facility].  Pt has [employer] insurance.         Patient was willing to go to treatment at [facility]         Labs:reviewed with patient       Recent Results (from the past 48 hour(s))   Hepatic panel    Collection Time: 01/18/21  7:53 AM   Result Value Ref Range    Bilirubin Direct 0.2 0.0 - 0.2 mg/dL    Bilirubin Total 0.7 0.2 - 1.3 mg/dL    Albumin 3.8 3.4 - 5.0 g/dL    Protein Total 7.5 6.8 - 8.8 g/dL    Alkaline Phosphatase 140 40 - 150 U/L     (HH) 0 - 70 U/L     (H) 0 - 45 U/L   Hepatic panel    Collection Time: 01/19/21  7:39 AM   Result Value Ref Range    Bilirubin Direct 0.4 (H) 0.0 - 0.2 mg/dL    Bilirubin Total 0.9 0.2 - 1.3 mg/dL    Albumin 4.3 3.4 - 5.0 g/dL    Protein Total 8.2 6.8 - 8.8 g/dL    Alkaline Phosphatase 131 40 - 150 U/L     (HH) 0 - 70 U/L     (H) 0 - 45 U/L   Basic metabolic panel    Collection Time: 01/19/21  7:39 AM   Result Value Ref Range    Sodium 136 133 - 144 mmol/L    Potassium 3.6 3.4 - 5.3 mmol/L    Chloride 100 94 - 109 mmol/L    Carbon Dioxide 27 20 - 32 mmol/L    Anion Gap 9 3  - 14 mmol/L    Glucose 99 70 - 99 mg/dL    Urea Nitrogen 5 (L) 7 - 30 mg/dL    Creatinine 0.67 0.66 - 1.25 mg/dL    GFR Estimate >90 >60 mL/min/[1.73_m2]    GFR Estimate If Black >90 >60 mL/min/[1.73_m2]    Calcium 9.7 8.5 - 10.1 mg/dL         Recent Results (from the past 240 hour(s))   CBC with platelets differential    Collection Time: 01/16/21  2:47 PM   Result Value Ref Range    WBC 23.5 (H) 4.0 - 11.0 10e9/L    RBC Count 5.78 4.4 - 5.9 10e12/L    Hemoglobin 17.8 (H) 13.3 - 17.7 g/dL    Hematocrit 53.0 40.0 - 53.0 %    MCV 92 78 - 100 fl    MCH 30.8 26.5 - 33.0 pg    MCHC 33.6 31.5 - 36.5 g/dL    RDW 14.1 10.0 - 15.0 %    Platelet Count 240 150 - 450 10e9/L    Diff Method Automated Method     % Neutrophils 83.6 %    % Lymphocytes 9.9 %    % Monocytes 5.7 %    % Eosinophils 0.1 %    % Basophils 0.2 %    % Immature Granulocytes 0.5 %    Nucleated RBCs 0 0 /100    Absolute Neutrophil 19.6 (H) 1.6 - 8.3 10e9/L    Absolute Lymphocytes 2.3 0.8 - 5.3 10e9/L    Absolute Monocytes 1.3 0.0 - 1.3 10e9/L    Absolute Eosinophils 0.0 0.0 - 0.7 10e9/L    Absolute Basophils 0.1 0.0 - 0.2 10e9/L    Abs Immature Granulocytes 0.1 0 - 0.4 10e9/L    Absolute Nucleated RBC 0.0    Comprehensive metabolic panel    Collection Time: 01/16/21  2:47 PM   Result Value Ref Range    Sodium 140 133 - 144 mmol/L    Potassium 3.8 3.4 - 5.3 mmol/L    Chloride 103 94 - 109 mmol/L    Carbon Dioxide 29 20 - 32 mmol/L    Anion Gap 9 3 - 14 mmol/L    Glucose 101 (H) 70 - 99 mg/dL    Urea Nitrogen 6 (L) 7 - 30 mg/dL    Creatinine 0.75 0.66 - 1.25 mg/dL    GFR Estimate >90 >60 mL/min/[1.73_m2]    GFR Estimate If Black >90 >60 mL/min/[1.73_m2]    Calcium 8.6 8.5 - 10.1 mg/dL    Bilirubin Total 0.7 0.2 - 1.3 mg/dL    Albumin 4.0 3.4 - 5.0 g/dL    Protein Total 8.3 6.8 - 8.8 g/dL    Alkaline Phosphatase 148 40 - 150 U/L     () 0 - 70 U/L     () 0 - 45 U/L   Alcohol ethyl    Collection Time: 01/16/21  2:47 PM   Result Value Ref Range     Ethanol g/dL 0.47 (HH) <0.01 g/dL   Acetaminophen level    Collection Time: 01/16/21  2:47 PM   Result Value Ref Range    Acetaminophen Level <2 mg/L   INR    Collection Time: 01/16/21  2:47 PM   Result Value Ref Range    INR 0.89 0.86 - 1.14   Lipase    Collection Time: 01/16/21  2:47 PM   Result Value Ref Range    Lipase 224 73 - 393 U/L   Salicylate level    Collection Time: 01/16/21  2:47 PM   Result Value Ref Range    Salicylate Level <2 mg/dL   Lactate for Sepsis Protocol    Collection Time: 01/16/21  3:54 PM   Result Value Ref Range    Lactate for Sepsis Protocol 2.7 (H) 0.7 - 2.0 mmol/L   UA with Microscopic reflex to Culture    Collection Time: 01/16/21 10:46 PM    Specimen: Urine Midstream; Midstream Urine   Result Value Ref Range    Color Urine Yellow     Appearance Urine Clear     Glucose Urine Negative NEG^Negative mg/dL    Bilirubin Urine Negative NEG^Negative    Ketones Urine Negative NEG^Negative mg/dL    Specific Gravity Urine 1.013 1.003 - 1.035    Blood Urine Negative NEG^Negative    pH Urine 6.0 5.0 - 7.0 pH    Protein Albumin Urine 100 (A) NEG^Negative mg/dL    Urobilinogen mg/dL Normal 0.0 - 2.0 mg/dL    Nitrite Urine Negative NEG^Negative    Leukocyte Esterase Urine Negative NEG^Negative    Source Midstream Urine     WBC Urine 1 0 - 5 /HPF    RBC Urine 0 0 - 2 /HPF    Mucous Urine Present (A) NEG^Negative /LPF   Drug abuse screen 6 urine (tox)    Collection Time: 01/16/21 10:46 PM   Result Value Ref Range    Amphetamine Qual Urine Negative NEG^Negative    Barbiturates Qual Urine Positive (A) NEG^Negative    Benzodiazepine Qual Urine Positive (A) NEG^Negative    Cannabinoids Qual Urine Negative NEG^Negative    Cocaine Qual Urine Negative NEG^Negative    Ethanol Qual Urine Positive (A) NEG^Negative    Opiates Qualitative Urine Negative NEG^Negative   Alcohol breath test POCT    Collection Time: 01/17/21  1:29 AM   Result Value Ref Range    Alcohol Breath Test 0.129- DONE BY JOON CARBAJAL 0.00 -  0.01   Hepatic panel    Collection Time: 01/17/21  8:30 AM   Result Value Ref Range    Bilirubin Direct 0.3 (H) 0.0 - 0.2 mg/dL    Bilirubin Total 0.8 0.2 - 1.3 mg/dL    Albumin 3.2 (L) 3.4 - 5.0 g/dL    Protein Total 6.5 (L) 6.8 - 8.8 g/dL    Alkaline Phosphatase 110 40 - 150 U/L     (HH) 0 - 70 U/L     (HH) 0 - 45 U/L   GGT    Collection Time: 01/17/21  8:30 AM   Result Value Ref Range     (H) 0 - 75 U/L   Lipid panel    Collection Time: 01/17/21  8:30 AM   Result Value Ref Range    Cholesterol 138 <200 mg/dL    Triglycerides 35 <150 mg/dL    HDL Cholesterol 64 >39 mg/dL    LDL Cholesterol Calculated 67 <100 mg/dL    Non HDL Cholesterol 74 <130 mg/dL   TSH with free T4 reflex and/or T3 as indicated    Collection Time: 01/17/21  8:30 AM   Result Value Ref Range    TSH 0.97 0.40 - 4.00 mU/L   Vitamin B12    Collection Time: 01/17/21  8:30 AM   Result Value Ref Range    Vitamin B12 919 193 - 986 pg/mL   WBC and differential    Collection Time: 01/17/21  8:30 AM   Result Value Ref Range    WBC 10.8 4.0 - 11.0 10e9/L    Diff Method Automated Method     % Neutrophils 70.2 %    % Lymphocytes 21.7 %    % Monocytes 6.8 %    % Eosinophils 0.5 %    % Basophils 0.6 %    % Immature Granulocytes 0.2 %    Nucleated RBCs 0 0 /100    Absolute Neutrophil 7.6 1.6 - 8.3 10e9/L    Absolute Lymphocytes 2.4 0.8 - 5.3 10e9/L    Absolute Monocytes 0.7 0.0 - 1.3 10e9/L    Absolute Eosinophils 0.1 0.0 - 0.7 10e9/L    Absolute Basophils 0.1 0.0 - 0.2 10e9/L    Abs Immature Granulocytes 0.0 0 - 0.4 10e9/L    Absolute Nucleated RBC 0.0    Lactic acid whole blood    Collection Time: 01/17/21  8:30 AM   Result Value Ref Range    Lactic Acid 0.9 0.7 - 2.0 mmol/L   Hepatitis A antibody IgM    Collection Time: 01/17/21  8:30 AM   Result Value Ref Range    Hepatitis A IgM Gisell Nonreactive NR^Nonreactive   Hepatitis B surface antigen    Collection Time: 01/17/21  8:30 AM   Result Value Ref Range    Hep B Surface Agn Nonreactive  NR^Nonreactive   Hepatitis C antibody    Collection Time: 01/17/21  8:30 AM   Result Value Ref Range    Hepatitis C Antibody Nonreactive NR^Nonreactive   Hepatic panel    Collection Time: 01/18/21  7:53 AM   Result Value Ref Range    Bilirubin Direct 0.2 0.0 - 0.2 mg/dL    Bilirubin Total 0.7 0.2 - 1.3 mg/dL    Albumin 3.8 3.4 - 5.0 g/dL    Protein Total 7.5 6.8 - 8.8 g/dL    Alkaline Phosphatase 140 40 - 150 U/L     (HH) 0 - 70 U/L     (H) 0 - 45 U/L   Hepatic panel    Collection Time: 01/19/21  7:39 AM   Result Value Ref Range    Bilirubin Direct 0.4 (H) 0.0 - 0.2 mg/dL    Bilirubin Total 0.9 0.2 - 1.3 mg/dL    Albumin 4.3 3.4 - 5.0 g/dL    Protein Total 8.2 6.8 - 8.8 g/dL    Alkaline Phosphatase 131 40 - 150 U/L     (HH) 0 - 70 U/L     (H) 0 - 45 U/L   Basic metabolic panel    Collection Time: 01/19/21  7:39 AM   Result Value Ref Range    Sodium 136 133 - 144 mmol/L    Potassium 3.6 3.4 - 5.3 mmol/L    Chloride 100 94 - 109 mmol/L    Carbon Dioxide 27 20 - 32 mmol/L    Anion Gap 9 3 - 14 mmol/L    Glucose 99 70 - 99 mg/dL    Urea Nitrogen 5 (L) 7 - 30 mg/dL    Creatinine 0.67 0.66 - 1.25 mg/dL    GFR Estimate >90 >60 mL/min/[1.73_m2]    GFR Estimate If Black >90 >60 mL/min/[1.73_m2]    Calcium 9.7 8.5 - 10.1 mg/dL            Because this patient meets criteria for an Alcohol Use Disorder, I performed the following brief intervention on the date of this note:              1) Expressed concern that the patient is drinking at unhealthy levels known to increase their risk of alcohol related problems              2) Gave feedback linking alcohol use and health, including personalized feedback explaining how alcohol use can interact with their medical and/or psychiatric problems, and with prescribed medications.              3) Advised patient to abstain.    PT counseled on nicotine cessation and nicotine replacement provided    Discussed with patient many issues of addiction,triggers,  relapse, and establishing a solid recovery program.    DISCHARGE MENTAL STATUS EXAMINATION:  The patient is alert, oriented x3.  Good fund of knowledge.  Good use of language.  Recent and remote memory, language, fund of knowledge are all adequate.  Euthymic mood congruent affect  Speech normal rate/rhythm linear tp no loose asso,The patient does not have any active suicidal or homicidal ideation.  Does not have any auditory or visual hallucination.  Fair insight/judgment At this time, the patient was stable to be discharged.        Pt was not determined to not be a danger to himself or others. At the current time of discharge, the patient does not meet criteria for involuntary hospitalization. On the day of discharge, the patient reports that they do not have suicidal or homicidal ideation and would never hurt themselves or others. Steps taken to minimize risk include: assessing patient s behavior and thought process daily during hospital stay, discharging patient with adequate plan for follow up for mental and physical health and discussing safety plan of returning to the hospital should the patient ever have thoughts of harming themselves or others. Therefore, based on all available evidence including the factors cited above, the patient does not appear to be at imminent risk for self-harm, and is appropriate for outpatient level of care.     Educated about side effects/risk vs benefits /alternative including non treatment.Pt consented to be on medication.     .Total time spent on discharge summary more than 35 min  More than  20 min  planning, coordination of care, medication reconciliation and performance of physical exam on day of discharge.Care was coordinated with unit RN and unit therapist     Mohit Porter   Home Medication Instructions CHRISTIANNE:[id number]    Printed on:01/19/21 1110   Medication Information                      hydrOXYzine (ATARAX) 25 MG tablet  Take 1 tablet (25 mg) by mouth every 4  "hours as needed for anxiety             multivitamin w/minerals (THERA-VIT-M) tablet  Take 1 tablet by mouth daily             nicotine (NICORETTE) 2 MG gum  Place 1-2 each (2-4 mg) inside cheek every hour as needed for other (nicotine withdrawal symptoms)             QUEtiapine (SEROQUEL) 25 MG tablet  Take 1 tablet (25 mg) by mouth 2 times daily as needed (psychotic anxiety)             QUEtiapine (SEROQUEL) 400 MG tablet  Take 1 tablet (400 mg) by mouth At Bedtime             thiamine (B-1) 100 MG tablet  Take 1 tablet (100 mg) by mouth daily                    Primary Provider:     22 Office Visit 8:30 AM   MILAGRO Fraire [address]   [address]   [facility] [address] [phone]  Bring a current list of meds and any records pertaining to this visit.  For Physicals, please bring immunization records and any forms needing to be filled out. Please arrive 10 minutes early to complete paperwork.               Disposition: [facility]         .        \"Much or all of the text in this note was generated through the use of Dragon Dictate voice to text software. Errors in spelling or words which appear to be out of contact are unintentional, may be present due having escaped editing\"     Telemedicine Visit: The patient's condition can be safely assessed and treated via synchronous audio and visual telemedicine encounter.   Start Time: 8.34  Stop Time: 8.45  Reason for Telemedicine Visit: Covid-19   Originating Site (Patient Location): [facility]  Distant Site (Provider Location): Provider Remote Setting   Consent: The patient/guardian has verbally consented to: the potential risks and benefits of telemedicine (video visit) versus in person care; bill my insurance or make self-payment for services provided; and responsibility for payment of non-covered services.   Mode of Communication: Video Conference via polycom  As the provider I attest to " compliance with applicable laws and regulations related to telemedicine.       The patient/guardian has been notified of the following:   This telemedicine visit is conducted live between you and your clinician. We have found that certain health care needs can be provided without the need for a physical exam. This service lets us provide the care you need with a telemedicine conversation.

## 2021-01-19 NOTE — PROGRESS NOTES
Name: Mohit Porter  Date: 1/19/2021  Medical Record: [id number]  Envelope Number: [id number]  List of Contents (List each item separately in new row):   1 Cell phone (Broken from right corner & cracked screen), 1   and 1 power bank.     Admission:  I am responsible for any personal items that are not sent to the safe or pharmacy.  [facility] is not responsible for loss, theft or damage of any property in my possession.    Patient Signature:  ___________________________________________       Date/Time:__________________________    Staff Signature: __________________________________       Date/Time:__________________________    [facility] Staff person, if patient is unable/unwilling to sign:    __________________________________________________________       Date/Time: __________________________    Discharge:  [facility] has returned all of my personal belongings:    Patient Signature: ________________________________________     Date/Time: ____________________________________    Staff Signature: ______________________________________     Date/Time:_____________________________________

## 2021-01-19 NOTE — PROGRESS NOTES
Met with Pt to discuss transfer to [facility].  Addressed with Pt the phone calls he has been receiving from a Pt who discharged the other day and their plan to meet up at a hotel.  Informed Pt that we are giving him an opportunity to show us that he is serious about recovery and that we are hopeful that he will take this time to earn some recovery collateral.  Pt became teary eyed and stated he does not plan on meeting anyone at the hotel and does plan to go to [facility].  Pt is requesting assistance getting his belongings from [facility].  Call placed to [facility] and assistance with retrieving belongings was requested.  [facility] states they have strict policies regarding client belongings and cannot release belongings to anyone other than those Pt listed on the form.  Plan is to contact a supervisor and see if we can coordinate a  service to  and deliver Pt belongings.

## 2021-01-19 NOTE — PROGRESS NOTES
"Patient discharged to [facility], report called to nurse , \"Jeannine.\" Transported by psych associate, Roberto Carlos.      All patient belongings from room, locked bin and security were sent with patient. This RN went over discharge instructions, teachings, patient's labs, and discharge  recommendations with patient. This RN went over patient's prescribed medications being sent home with patient from pharmacy. Patient verbalizes and demonstrates understanding of all teachings. Patient denies thoughts of harm towards self or others.  Pt denies any current medical issues at this time. Patient denies any further questions and is now discharged at this time  "

## 2021-01-19 NOTE — PROVIDER NOTIFICATION
Pt scheduled for abdominal ultra sound, they called requesting it be rescheduled when patient is NPO.  Notified internal Medicine Tabby English and patient does not need to be NPO.  He is scheduled to transfer to [facility] at 1345 and PA reports pt may be discharged from a medical stand point. He has scheduled a follow up appointment with [facility] includes the [facility] clinic for 1- at 8:30 am. Obtained Code 2 from Dr. Lombardi and patient was sent to ultra sound with MADHAVI Gorman. Plan is for patient to discharge to [facility] today at 1345.

## 2021-01-20 ENCOUNTER — HOSPITAL ENCOUNTER (OUTPATIENT)
Dept: BEHAVIORAL HEALTH | Facility: CLINIC | Age: 31
End: 2021-01-20
Attending: FAMILY MEDICINE
Payer: COMMERCIAL

## 2021-01-20 VITALS — TEMPERATURE: 99.5 F | OXYGEN SATURATION: 100 %

## 2021-01-20 PROBLEM — F19.20 CHEMICAL DEPENDENCY (H): Status: ACTIVE | Noted: 2021-01-20

## 2021-01-20 PROCEDURE — H2035 A/D TX PROGRAM, PER HOUR: HCPCS | Mod: HQ

## 2021-01-20 PROCEDURE — 1002N00001 HC LODGING PLUS FACILITY CHARGE ADULT

## 2021-01-20 ASSESSMENT — ANXIETY QUESTIONNAIRES: GAD7 TOTAL SCORE: 15

## 2021-01-20 NOTE — PROGRESS NOTES
Comprehensive Assessment Summary     Based on client interview, review of previous assessments and   comprehensive assessment interview the following diagnosis and recommendations are:     Patient: Mohit Porter  MRN; [id number]   : 1990  Age: 30 year old Sex: male  Client meets criteria for:  303.90 Alcohol Dependence    Dimension One: Acute Intoxication/Withdrawal Potential     Ratin  (Consider the client's ability to cope with withdrawal symptoms and current state of intoxication)     Patient reports his last date of alcohol use was on 2021. Patient denies any symptoms of withdrawal at this time.     Dimension Two: Biomedical Condition and Complications    Ratin   (Consider the degree to which any physical disorder would interfere with treatment for substance abuse, and the client's ability to tolerate any related discomfort; determine the impact of continued substance use on the unborn child if the client is pregnant)     Patient denies any biomedical concerns that would interfere with treatment programming. Patient has a history of alcoholic hepatitis and elevated liver enzymes.     Dimension Three: Emotional/Behavioral/Cognitive Conditions & Complications  Ratin  (Determine the degree to which any condition or complications are likely to interfere with treatment for substance abuse or with functioning in significant life areas and the likelihood of risk of harm to self or others)     Patient reports a diagnosis of generalized anxiety disorder. He reports symptoms include racing heart, racing thoughts, and feelings of impending doom. Patient appears to struggle with a negative sense of self, guilt/shame, and hopelessness about his situation. Patient reports perfectionism and difficulty tolerating uncomfortable emotions. Patient is interested in meeting with a staff therapist and has had prior therapy in the community. He reports difficulty maintaining structure and routine  "outside of treatment. Patient's PHQ-9 indicated moderate depression. His KALINA-7 indicated severe anxiety. Patient's suicide risk assessment indicated \"very low-risk.\" He denies current suicidal ideation or thoughts of self-harm.     Dimension Four: Treatment Acceptance/Resistance     Ratin  (Consider the amount of support and encouragement necessary to keep the client involved in treatment)     Patient reports external motivation for treatment due to legals and homelessness. Patient reports he feels hopeless about his alcohol use after completing several treatments. Patient recognizes his poor follow-through with treatment recommendations. Patient appears to be in the contemplation stage of change at this time.     Dimension Five: Continued Use/Relaspe Prevention     Ratin  (Consider the degree to which the client's recognizes relapse issues and has the skills to prevent relapse of either substance use or mental health problems)     Patient reports he's successfully completed inpatient treatment at the [facility], and was most recently at [facility]. Patient states he does \"well with inpatient structure\" and relapses shortly after completion. Patient struggles with life balance and interpersonal relationship triggers which have contributed to relapse. Patient has few coping skills in addition to untreated mental health symptoms which increase his risk of relapse.     Dimension Six: Recovery Environment     Ratin  (Consider the degree to which key areas of the client's life are supportive of or antagonistic to treatment participation and recovery)     Patient reports he is currently homeless an unemployed. Patient states he lost employment as a  and had recently been working as a . Patient has prior 12-step experience and appears motivated to resume participation in AA. Patient reports strained relationships with family due to use. He " verbalizes his mom is supportive,  and he rarely speaks to his two younger brothers or father. Patient has a daughter that he has not seen in one year, with alcohol contributing to the breakup with the mother. Patient reports interest in sober housing with outpatient and continuing with therapy.     I have reviewed the information on the assessment, psychosocial and medical history and checklist:        it is current

## 2021-01-20 NOTE — GROUP NOTE
Group Therapy Documentation    PATIENT'S NAME: Mohit Porter  MRN:   [id number]  :   1990  ACCT. NUMBER: [id number]  DATE OF SERVICE: 21  START TIME: 10:00 AM  END TIME: 11:30 AM  FACILITATOR(S): Hailey Linares  TOPIC: BEH Group Therapy  Number of patients attending the group: 5  Group Length:  1.5 Hours    Group Therapy Type: Recovery strategies, Emotion processing, and Daily living/independence skills    Summary of Group / Topics Discussed:    Recovery Principles, Sober coping skills, Balanced lifestyle, Disease of addiction, and Emotions/expression      Group Attendance:  Attended group session    Patient's response to the group topic/interactions:  cooperative with task    Patient appeared to be Actively participating, Attentive and Engaged.        Client specific details:  Patient was attentive and participative during group session.

## 2021-01-20 NOTE — PROGRESS NOTES
Patient:  Mohit Porter    Date: January 20, 2021    Comprehensive Assessment UPDATE       Comprehensive Summary Update and Review  Counselor met with patient on 1/20/21 and reviewed the Comprehensive Assessment.    There were no changes/updates identified by patient or in chart entries.

## 2021-01-20 NOTE — GROUP NOTE
Group Therapy Documentation    PATIENT'S NAME: Mohit Porter  MRN:   [id number]  :   1990  ACCT. NUMBER: [id number]  DATE OF SERVICE: 21  START TIME:  8:30 AM  END TIME:  9:30 AM  FACILITATOR(S): Sidney Kline LADC  TOPIC: BEH Group Therapy  Number of patients attending the group:  5  Group Length:  1 Hours    Group Therapy Type: Recovery strategies    Summary of Group / Topics Discussed:    Recovery Principles      Group Attendance:  Attended group session    Patient's response to the group topic/interactions:  cooperative with task    Patient appeared to be Attentive.        Client specific details:  Mohit gave appropriate feedback..

## 2021-01-20 NOTE — GROUP NOTE
Group Therapy Documentation    PATIENT'S NAME: Mohit Porter  MRN:   [id number]  :   1990  ACCT. NUMBER: [id number]  DATE OF SERVICE: 21  START TIME: 12:30 PM  END TIME:  2:30 PM  FACILITATOR(S): Zuleyka Peterson LADC; Hailey Linares  TOPIC: BEH Group Therapy  Number of patients attending the group:  6  Group Length:  2 Hours    Group Therapy Type: Health and wellbeing     Summary of Group / Topics Discussed:    Spiritual Care      Group Attendance:  Attended group session    Patient's response to the group topic/interactions:  cooperative with task    Patient appeared to be Engaged.        Client specific details: Patient participated in spirituality group facilitated by Geri Soni. He appeared engaged during group discussion.

## 2021-01-20 NOTE — PROGRESS NOTES
Name: Mohit Porter  Date: 1/20/2021  Medical Record: [id number]    Envelope Number: [id number]    List of Contents (List each item separately in new row):   Selenium sulfide 2.5% lotion  Admission:  I am responsible for any personal items that are not sent to the safe or pharmacy.  [facility] is not responsible for loss, theft or damage of any property in my possession.    Patient Signature:  ___________________________________________       Date/Time:__________________________    Staff Signature: __________________________________       Date/Time:__________________________    2nd Staff person, if patient is unable/unwilling to sign:      __________________________________________________________       Date/Time: __________________________    Discharge:  [facility] has returned all of my personal belongings:    Patient Signature: ________________________________________     Date/Time: ____________________________________    Staff Signature: ______________________________________     Date/Time:_____________________________________

## 2021-01-21 ENCOUNTER — HOSPITAL ENCOUNTER (OUTPATIENT)
Dept: BEHAVIORAL HEALTH | Facility: CLINIC | Age: 31
End: 2021-01-21
Attending: FAMILY MEDICINE
Payer: COMMERCIAL

## 2021-01-21 VITALS — OXYGEN SATURATION: 100 % | TEMPERATURE: 98.1 F

## 2021-01-21 PROCEDURE — H2035 A/D TX PROGRAM, PER HOUR: HCPCS | Mod: HQ

## 2021-01-21 PROCEDURE — 1002N00001 HC LODGING PLUS FACILITY CHARGE ADULT

## 2021-01-21 NOTE — GROUP NOTE
Group Therapy Documentation    PATIENT'S NAME: Mohit Porter  MRN:   [id number]  :   1990  ACCT. NUMBER: [id number]  DATE OF SERVICE: 21  START TIME: 12:30 PM  END TIME:  2:30 PM  FACILITATOR(S): Zuleyka Peterson LADC  TOPIC: BEH Group Therapy  Number of patients attending the group:  5  Group Length:  2 Hours    Group Therapy Type: Daily living/independence skills    Summary of Group / Topics Discussed:    Relationship/socialization and Mindfulness/Relaxation      Group Attendance:  Attended group session    Patient's response to the group topic/interactions:  cooperative with task    Patient appeared to be Engaged.        Client specific details: Mohit participated in mindfulness/relaxation activity creating a visual callage.

## 2021-01-21 NOTE — GROUP NOTE
Psychoeducation Group Documentation    PATIENT'S NAME: Mohit Porter  MRN:   [id number]  :   1990  ACCT. NUMBER: [id number]  DATE OF SERVICE: 21  START TIME:  3:00 PM  END TIME:  4:00 PM  FACILITATOR(S): Cher Santizo LADC; aHiley Linares; Russel Uribe LADC  TOPIC: BEH Pyschoeducation  Number of patients attending the group:  22  Group Length:  1 Hours    Skills Group Therapy Type: Recovery skills    Summary of Group / Topics Discussed:    Symptom management skills.          Group Attendance:  Attended group session    Patient's response to the group topic/interactions:  cooperative with task    Patient appeared to be Engaged.         Client specific details:Pt was attentive and appropriate in skills group, lecture is by Dr. Downey.

## 2021-01-21 NOTE — GROUP NOTE
Group Therapy Documentation    PATIENT'S NAME: Mohit Porter  MRN:   [id number]  :   1990  ACCT. NUMBER: [id number]  DATE OF SERVICE: 21  START TIME:  9:00 AM  END TIME: 11:00 AM  FACILITATOR(S): Hailey Linares  TOPIC: BEH Group Therapy  Number of patients attending the group:  5  Group Length:  2 Hours    Group Therapy Type: Recovery strategies, Emotion processing, and Daily living/independence skills    Summary of Group / Topics Discussed:    Recovery Principles, Sober coping skills, Relationship/socialization, Disease of addiction, and Emotions/expression      Group Attendance:  Attended group session    Patient's response to the group topic/interactions:  cooperative with task    Patient appeared to be Actively participating, Attentive and Engaged.        Client specific details:  Patient was attentive and participative during group session..

## 2021-01-21 NOTE — PROGRESS NOTES
[facility]  Adult Chemical Dependency Program  Treatment Plan Requirements    These services are provided by the facility for each patient/client according to the individual's treatment plan:    Individual and group counseling    Education    Transition services    Services to address any co-occurring mental illness    Service coordination    Initial Treatment Plan Goals:  1. Complete all the requirements of Program Orientation.  2. Maintain medication compliance throughout the program.  3. Complete requirements for workshop/skills groups based on identified issues on your problem list.  4. Complete the support group attendance feedback sheet weekly.  5. Gain family involvement in treatment process to address family issues from the problem list.  6. Attend and participate in all required groups per individual treatment plan.  7. Focus attention to individualized issues from the treatment plan.  8. Complete all requirements for UA's, alcohol screening tests and other testing.  9. Schedule a physical examination if recommended.    In addition to the above, complete all individual goals as specifically outlines on your treatment plan.    Criteria for discharge:  Patients/clients are discharged from the program following completion of the entire program including Phase I and II or acceptance of other post-treatment referrals such as group home house, or aftercare at other facilities.  Patients/clients may also be discharged for inappropriate behavior or chemical use.      Favorable Discharge - Patients/clients have completed agreed upon treatment goals, understand their diagnosis and appear motivated about the follow-up care.    Guarded Discharge - Patients/clients have demonstrated some understanding of their diagnosis and recovery process, and have completed some of their treatment goals.  This prognosis also includes patients/clients who have completed some treatment goals but have not made  commitment to community support or follow through with referrals.    Unfavorable Discharge - Patients/clients have not completed agreed upon treatment goals due to their own choice, have limited understanding of their diagnosis, and have shown minimal or inconsistent behavior conducive to recovery.  Those patients/clients discharged due to behavioral problems will also be unfavorable discharges.                                  Adult CD Treatment Plan     Mohit Porter [id number]   1990 30 year old male      Acute Intoxication/Withdrawal Potential     DIMENSION 1  RISK FACTOR: 0            Date Source Problem/Goal/  Intervention Target  Date Initial Out  come Completion  Date   1/21/2021 Self,  Assess-Current  Problem: Substance use, cravings and urges.    Last use date was reported as 1/16/2021.       Goal: Develop effective strategies to maintain sobriety.     Be able to manage mild to moderate withdrawal symptoms.    Intervention:   1.Report to counselor and group any alcohol or drug use.   2. Report to nurse any increase in withdrawal symptoms.  3. Attend Sunday lecture on Smoking Cessation 2/16/21 2/7/21 KK EFF 2/15/21       Biomedical Conditions and Complaints     DIMENSION 2  RISK FACTOR: 0            Date Source Problem/Goal/  Intervention Target  Date Initials Out  come Completion  Date   1/21/2021 Self,  History-  Current  Problem:  Patient denies any biomedical concerns that would interfere with treatment programming. Patient has a history of alcoholic hepatitis and elevated liver enzymes.    Goal: Follow recommendations of medical provider.    Intervention:     1.Continue to take prescribed medications and follow-up with medical interventions as needed. 2/16/21 KK EFF 2/15/21       Emotional/Behavioral/Cognitive Conditions and Complications     DIMENSION 3  RISK FACTOR: 2           Date Source Problem/Goal/  Intervention Target  Date Initials Out  come Completion  Date  "  1/21/2021 Self, History- Current    Problem: Patient reports a history of depression and anxiety.     Goal: Stabilize and maintain mental health    Intervention:    1.See staff mental health therapist and follow recommendations.  2. Patient suicide risk on admission was \"No Identified Risk\".  3.Safety plan completed.           2/16/21 1/21/21 KK             EFF      EFF             2/15/21      1/21/21   1/21/2021 Self, History-  Current Problem: Patient reports negative cognitions contribute to mental health symptoms and substance use.    Goal: Verbalize thought re framing and self-affirmations.     Intervention:   1. Read informational handouts on anxiety and write affirmations from the  \"Anxiety Self-talk\" worksheet.   2. Read and complete \"Self-Esteem\" packet.                1/23/21 1/31/21 KK               EFF  EFF               1/23/21 1/31/21          Readiness to Change     DIMENSION 4  RISK FACTOR: 2           Date Source Problem/Goal/  Intervention Target  Date Initial Out  come Complete  Date   1/21/2021 History, Assess  Current Problem: Patient has consequences to self and others due to substance use.    Goal: Acknowledge negative consequences to self and others    Intervention:   1.Complete \"Drug Use History  assignment share in group.               1/28/21 KK               EFF               1/28/21 1/21/2021 Self,  History-  Current Problem: Verbalizes external reinforcement for change due to living environment and past legals.     Goal: Increase internal motivation for sobriety.    Intervention:   1.Complete group project: Individual collage, what life will look in 5 years sober versus 5 years of continued use.              1/22/21 KK             EFF             1/22/21       Relapse/Continued Use/Continued Problem Potential     DIMENSION 5  RISK FACTOR:  4               Date Source Problem/Goal/  Intervention Target  Date Initial Out  come Complete  Date   1/21/2021 Self, History-Current " "Problem: Patient lacks insight into personal relapse process, triggers, warnings signs and lacks coping skills.    Goal: Gain insight about personal relapse process, triggers, warning signs and develop coping skills to prevent relapse    Intervention:   1.Attend relapse prevention workshops  2..Complete \"Identifying Relapse Triggers.\"                  2/6/21 2/13/21 2/14/21 KK                 EFF  EFF  EFF                 2/6/21 2/13/21 2/12/21 1/21/2021 Self,  History-Current Problem: Patient reports guilt and shame for past using behaviors, and resentment toward self.    Goal: Begin the process of self-forgiveness.     Intervention:   1.Read handout on shame and complete \"Guilt and Shame\" packet.   2. Attend Spirituality group on Wednesdays.              2/1/21    Weekly KK             EFF    EFF             2/6/21    2/10/21   1/21/21 Self Problem: Patient reports unprocessed negative emotions that may contribute to engaging in self-sabotaging behaviors.     Goal: Begin to process uncomfortable emotions and verbalize coping strategies.     Intervention:   1. Read \"Handling Grief as a Man.\"   2. \"Self-Forgiveness\" packet.                  2/10/21  2/13/21 KK                 EFF  EFF                 2/10/21  2/10/21   1/21/2021 Self, History-Current Problem: Patient lacks coping skills for stress management and emotional regulation.     Goal: Identify the lifestyle changes and activities that will support your recovery efforts.    Intervention:   1. Practice diaphragmatic breathing 2x's daily.   2. Complete the \"Skills and Tools for Cravings and Urges\" handout.                2/16/21   KK               EFF  EFF               2/15/21  2/10/21       Recovery Environment     DIMENSION 6  RISK FACTOR: 4           Date Source Problem/Goal/  Intervention Target  Date Initial Out  come Complete  Date   1/21/2021 Self, History-Current  Problem: Patient reports strained relationships with loved ones due to use. " "    Goal: Provide opportunity for open, honest communication.    Intervention:   1.Participate in weekend \"Relationship workshop\" and complete all activities.  2. Complete \"Self-Assessment on [place] Issues.\"               1/23/21 1/30/21 2/8/21 KK               EFF  EFF  EFF               1/23/21 1/30/21 2/8/21 1/21/2021 Assess,  History-Current Problem: Patient lacks a sober support network of men in recovery.    Goal: Develop relationships with men in recovery.    Intervention:   1.Attend @ least 3 zoom 12-step meetings per week while in [place].  2.Seek opportunity to secure a temporary sponsor.             2/16/21 KK               EFF  Decline               2/14/21 1/21/2021 Self, History-Current  Defer Problem: Patient reports challenges with unstructured free time.     Goal: Explore sober environments to work.  Learn to have sober fun.    Intervention:   1. Complete \"Setting and Pursuing Goals\" in recovery, include daily schedule outline.                2/13/21 KK     1/21/2021 Self, History-Current   Problem: Patient's living environment is not conducive to recovery.    Goal: Secure safe sober housing.Consider options for safe sober housing.    Intervention:    1.Interview at houses with availability.  Discuss with counselor housing options.               2/15/21 KK               EFF               2/15/21   1/21/2021 Self, History-Current   Problem: Patient has current legal involvement.     Goal:  Cooperate with legal/courts to resolve legal issues.    Intervention:   1.Sign a release of information for legal worker.           1/21/21 KK           EFF           1/21/21     All interventions that are designated as \"current\" will need to be completed in order to transition out of treatment with a favorable prognosis. The treatment plan is a fluid document and a work in progress. Interventions and goals may be added at any time to customize the plan to each individual's needs. Patients may work with " counselors to change interventions as long as they pertain to the goals stipulated in the plan and/or are clinically driven.    No additional protection measures required other than the Program Abuse Prevention Plan -     Treatment amount, frequency and duration:    A total of 30 hours of therapeutic programming will be completed weekly.    A total of 7 hours of peer-led recovery group attendance will be completed weekly.    Treatment schedule to be followed weekly for duration of treatment:    2 sessions of 2-hour long group therapy each day Monday through Saturday for duration of treatment  1 session of 1-hour group therapy each Sunday for duration of treatment  3 1-hour sessions of skills development Tuesday, Wednesday and Thursday weekly for duration of treatment  1 2-hour session of skills development Sunday  1 half-hour individual session with RYAN Counselor 1 time weekly for duration of treatment.    1 hour of peer-led recovery meetings each night, Monday through Sunday, for duration of treatment.       Acknowledgement of Current Treatment Plan - Initial Treatment Plan     INITIAL TREATMENT PLAN:     1. I have participated in creating my treatment plan with my therapist / counselor on 1/21/21.  I agree with the plan as it is written in the electronic health record.    Name   Signature/Date   Mohit Porter    Name of Therapist / Counselor Signature/Date   Zuleyka Peterson [facility]      2. I have completed and reviewed my Safety Plan with my counselor and signed this on 1/21/21 . I have been given the hard copy of this plan.    Patient signature/date:      ________________________________________________________________    3. Last Use Date: 1/16/21    Patient signature/date:     ________________________________________________________________

## 2021-01-22 ENCOUNTER — OFFICE VISIT (OUTPATIENT)
Dept: FAMILY MEDICINE | Facility: CLINIC | Age: 31
End: 2021-01-22
Payer: COMMERCIAL

## 2021-01-22 ENCOUNTER — TELEPHONE (OUTPATIENT)
Dept: BEHAVIORAL HEALTH | Facility: CLINIC | Age: 31
End: 2021-01-22

## 2021-01-22 ENCOUNTER — HOSPITAL ENCOUNTER (OUTPATIENT)
Dept: BEHAVIORAL HEALTH | Facility: CLINIC | Age: 31
End: 2021-01-22
Attending: FAMILY MEDICINE
Payer: COMMERCIAL

## 2021-01-22 VITALS
SYSTOLIC BLOOD PRESSURE: 110 MMHG | HEART RATE: 119 BPM | BODY MASS INDEX: 25.11 KG/M2 | TEMPERATURE: 98.3 F | OXYGEN SATURATION: 100 % | DIASTOLIC BLOOD PRESSURE: 70 MMHG | WEIGHT: 175 LBS

## 2021-01-22 VITALS — OXYGEN SATURATION: 99 % | TEMPERATURE: 97.8 F

## 2021-01-22 DIAGNOSIS — F41.9 ANXIETY: ICD-10-CM

## 2021-01-22 DIAGNOSIS — F10.288 ALCOHOL DEPENDENCE WITH OTHER ALCOHOL-INDUCED DISORDER (H): ICD-10-CM

## 2021-01-22 DIAGNOSIS — F10.920 ALCOHOLIC INTOXICATION WITHOUT COMPLICATION (H): ICD-10-CM

## 2021-01-22 DIAGNOSIS — F10.20 UNCOMPLICATED ALCOHOL DEPENDENCE (H): ICD-10-CM

## 2021-01-22 DIAGNOSIS — R79.89 ELEVATED LIVER FUNCTION TESTS: Primary | ICD-10-CM

## 2021-01-22 LAB
ALBUMIN SERPL-MCNC: 4 G/DL (ref 3.4–5)
ALP SERPL-CCNC: 98 U/L (ref 40–150)
ALT SERPL W P-5'-P-CCNC: 489 U/L (ref 0–70)
ANION GAP SERPL CALCULATED.3IONS-SCNC: 7 MMOL/L (ref 3–14)
AST SERPL W P-5'-P-CCNC: 179 U/L (ref 0–45)
BASOPHILS # BLD AUTO: 0 10E9/L (ref 0–0.2)
BASOPHILS NFR BLD AUTO: 1.1 %
BILIRUB SERPL-MCNC: 0.6 MG/DL (ref 0.2–1.3)
BUN SERPL-MCNC: 7 MG/DL (ref 7–30)
CALCIUM SERPL-MCNC: 9.3 MG/DL (ref 8.5–10.1)
CHLORIDE SERPL-SCNC: 107 MMOL/L (ref 94–109)
CO2 SERPL-SCNC: 25 MMOL/L (ref 20–32)
CREAT SERPL-MCNC: 0.75 MG/DL (ref 0.66–1.25)
DIFFERENTIAL METHOD BLD: ABNORMAL
EOSINOPHIL # BLD AUTO: 0.1 10E9/L (ref 0–0.7)
EOSINOPHIL NFR BLD AUTO: 1.6 %
ERYTHROCYTE [DISTWIDTH] IN BLOOD BY AUTOMATED COUNT: 14.9 % (ref 10–15)
GFR SERPL CREATININE-BSD FRML MDRD: >90 ML/MIN/{1.73_M2}
GLUCOSE SERPL-MCNC: 103 MG/DL (ref 70–99)
HCT VFR BLD AUTO: 43.2 % (ref 40–53)
HGB BLD-MCNC: 14.4 G/DL (ref 13.3–17.7)
LYMPHOCYTES # BLD AUTO: 1.4 10E9/L (ref 0.8–5.3)
LYMPHOCYTES NFR BLD AUTO: 37.2 %
MCH RBC QN AUTO: 30.9 PG (ref 26.5–33)
MCHC RBC AUTO-ENTMCNC: 33.3 G/DL (ref 31.5–36.5)
MCV RBC AUTO: 93 FL (ref 78–100)
MONOCYTES # BLD AUTO: 0.7 10E9/L (ref 0–1.3)
MONOCYTES NFR BLD AUTO: 18.9 %
NEUTROPHILS # BLD AUTO: 1.5 10E9/L (ref 1.6–8.3)
NEUTROPHILS NFR BLD AUTO: 41.2 %
PLATELET # BLD AUTO: 342 10E9/L (ref 150–450)
POTASSIUM SERPL-SCNC: 4 MMOL/L (ref 3.4–5.3)
PROT SERPL-MCNC: 7.7 G/DL (ref 6.8–8.8)
RBC # BLD AUTO: 4.66 10E12/L (ref 4.4–5.9)
SODIUM SERPL-SCNC: 139 MMOL/L (ref 133–144)
WBC # BLD AUTO: 3.7 10E9/L (ref 4–11)

## 2021-01-22 PROCEDURE — 85025 COMPLETE CBC W/AUTO DIFF WBC: CPT | Performed by: NURSE PRACTITIONER

## 2021-01-22 PROCEDURE — 36415 COLL VENOUS BLD VENIPUNCTURE: CPT | Performed by: NURSE PRACTITIONER

## 2021-01-22 PROCEDURE — H2035 A/D TX PROGRAM, PER HOUR: HCPCS | Mod: HQ

## 2021-01-22 PROCEDURE — 1002N00001 HC LODGING PLUS FACILITY CHARGE ADULT

## 2021-01-22 PROCEDURE — 99495 TRANSJ CARE MGMT MOD F2F 14D: CPT | Performed by: NURSE PRACTITIONER

## 2021-01-22 PROCEDURE — 80053 COMPREHEN METABOLIC PANEL: CPT | Performed by: NURSE PRACTITIONER

## 2021-01-22 RX ORDER — CLONIDINE HYDROCHLORIDE 0.1 MG/1
0.1 TABLET ORAL 2 TIMES DAILY
Qty: 60 TABLET | Refills: 0 | Status: SHIPPED | OUTPATIENT
Start: 2021-01-22 | End: 2021-02-01

## 2021-01-22 RX ORDER — QUETIAPINE FUMARATE 400 MG/1
200-400 TABLET, FILM COATED ORAL AT BEDTIME
Qty: 30 TABLET | Refills: 0 | Status: SHIPPED | OUTPATIENT
Start: 2021-01-22 | End: 2021-02-23

## 2021-01-22 NOTE — PROGRESS NOTES
Assessment & Plan      Patient has had elevated LFT's in the past but was not able to follow-up with hepatology as previously recommended. Since patient is currently inpatient for alcohol use treatment, we will get the referral placed and have him schedule an appointment to follow-up.   - Continue to trend his LFT's and follow-up with patient after he is discharged.   - schedule with clinic [facility] [place] for individual therapy. Patient was able to meet with Amena ([facility] [place]) and will follow-up with her for individual therapy visits and hopefully continue with her.   - Start scheduled clonidine to help with anxiety.       ICD-10-CM    1. Elevated liver function tests  R79.89 CBC with platelets and differential     Comprehensive metabolic panel (BMP + Alb, Alk Phos, ALT, AST, Total. Bili, TP)     GASTROENTEROLOGY ADULT REF CONSULT ONLY   2. Alcohol dependence with other alcohol-induced disorder (H)  F10.288 CBC with platelets and differential     Comprehensive metabolic panel (BMP + Alb, Alk Phos, ALT, AST, Total. Bili, TP)   3. Alcoholic intoxication without complication (H)  F10.920 QUEtiapine (SEROQUEL) 400 MG tablet   4. Uncomplicated alcohol dependence (H)  F10.20 QUEtiapine (SEROQUEL) 400 MG tablet   5. Anxiety  F41.9 cloNIDine (CATAPRES) 0.1 MG tablet     0956}     Tobacco Cessation:   reports that he has never smoked. His smokeless tobacco use includes chew.  Tobacco Cessation Action Plan: Self help information given to patient      Patient Instructions   - Follow-up in 2 weeks.   - Can switch to Seroquel 200-400 mg at bedtime.  - Start Clonidine 0.1 mg twice per day.   - Patient will call when needed call with the results.       Return in about 2 weeks (around 2/5/2021) for Routine Visit.    MILAGRO Fraire CNP  [facility]    John Rueda is a 30 year old who presents to clinic today for the following health issues:      HPI     Patient is here today to establish care and would  like to get his liver enzymes checked.     Alcohol Dependence:   Patient reports that he was diagnosed with Covid 12/31- went to a Covid hotel- discharged because of his drinking.  Currently at [facility] for inpatient treatment. Admitted since the 19th.     Depression: Takes his Seroquel for sleep- instant gratification. Reports that he has been more emotional and also has more anxiety. Using hydroxyzine at bedtime to help with his anxiety. Has not used hydroxyzine in the daytime because he prefers to take all his medications at night.     Patient reports that he had been binging since he left [facility]. Several emergency room and also hospitalization since our last visit.       Hospital Follow-up Visit:  Hospital/Nursing Home/IP Rehab Facility: [facility]  Date of Admission: 1/16/2021  Date of Discharge: currently inpatient for chemical dependence.   Reason(s) for Admission: Alcohol dependence.       Was your hospitalization related to COVID-19? No   Problems taking medications regularly:  None  Medication changes since discharge: Seroquel on schedule.   Problems adhering to non-medication therapy:  None    Summary of hospitalization:  [facility] discharge summary reviewed  Diagnostic Tests/Treatments reviewed.  Follow up needed: Follow-up hepatic panel and CBC.   Other Healthcare Providers Involved in Patient s Care:         Therapy.      Update since discharge: stable.     Post Discharge Medication Reconciliation: discharge medications reconciled, continue medications without change.  Plan of care communicated with patient          Review of Systems   Constitutional, HEENT, cardiovascular, pulmonary, gi and gu systems are negative, except as otherwise noted.      Objective    /70   Pulse 119   Temp 98.3  F (36.8  C) (Temporal)   Wt 79.4 kg (175 lb)   SpO2 100%   BMI 25.11 kg/m    Body mass index is 25.11 kg/m .  Physical Exam   GENERAL: healthy, alert and no  distress  EYES: Eyes grossly normal to inspection, PERRL and conjunctivae and sclerae normal  RESP: lungs clear to auscultation - no rales, rhonchi or wheezes  CV: regular rate and rhythm, normal S1 S2, no S3 or S4, no murmur, click or rub, no peripheral edema and peripheral pulses strong  ABDOMEN: soft, nontender, no hepatosplenomegaly, no masses and bowel sounds normal  MS: no gross musculoskeletal defects noted, no edema  NEURO: Normal strength and tone, sensory exam grossly normal, mentation intact    Results for orders placed or performed in visit on 01/22/21   CBC with platelets and differential     Status: Abnormal   Result Value Ref Range    WBC 3.7 (L) 4.0 - 11.0 10e9/L    RBC Count 4.66 4.4 - 5.9 10e12/L    Hemoglobin 14.4 13.3 - 17.7 g/dL    Hematocrit 43.2 40.0 - 53.0 %    MCV 93 78 - 100 fl    MCH 30.9 26.5 - 33.0 pg    MCHC 33.3 31.5 - 36.5 g/dL    RDW 14.9 10.0 - 15.0 %    Platelet Count 342 150 - 450 10e9/L    % Neutrophils 41.2 %    % Lymphocytes 37.2 %    % Monocytes 18.9 %    % Eosinophils 1.6 %    % Basophils 1.1 %    Absolute Neutrophil 1.5 (L) 1.6 - 8.3 10e9/L    Absolute Lymphocytes 1.4 0.8 - 5.3 10e9/L    Absolute Monocytes 0.7 0.0 - 1.3 10e9/L    Absolute Eosinophils 0.1 0.0 - 0.7 10e9/L    Absolute Basophils 0.0 0.0 - 0.2 10e9/L    Diff Method Automated Method    Comprehensive metabolic panel (BMP + Alb, Alk Phos, ALT, AST, Total. Bili, TP)     Status: Abnormal   Result Value Ref Range    Sodium 139 133 - 144 mmol/L    Potassium 4.0 3.4 - 5.3 mmol/L    Chloride 107 94 - 109 mmol/L    Carbon Dioxide 25 20 - 32 mmol/L    Anion Gap 7 3 - 14 mmol/L    Glucose 103 (H) 70 - 99 mg/dL    Urea Nitrogen 7 7 - 30 mg/dL    Creatinine 0.75 0.66 - 1.25 mg/dL    GFR Estimate >90 >60 mL/min/[1.73_m2]    GFR Estimate If Black >90 >60 mL/min/[1.73_m2]    Calcium 9.3 8.5 - 10.1 mg/dL    Bilirubin Total 0.6 0.2 - 1.3 mg/dL    Albumin 4.0 3.4 - 5.0 g/dL    Protein Total 7.7 6.8 - 8.8 g/dL    Alkaline  Phosphatase 98 40 - 150 U/L     (H) 0 - 70 U/L     (H) 0 - 45 U/L

## 2021-01-22 NOTE — GROUP NOTE
Group Therapy Documentation    PATIENT'S NAME: Mohit Porter  MRN:   [id number]  :   1990  ACCT. NUMBER: [id number]  DATE OF SERVICE: 21  START TIME:  9:00 AM  END TIME: 11:00 AM  FACILITATOR(S): Noah Aguilar LADC  TOPIC: BEH Group Therapy  Number of patients attending the group:  5  Group Length:  2 Hours    Group Therapy Type: Recovery strategies    Summary of Group / Topics Discussed:    Recovery Principles, Sober coping skills, and Disease of addiction      Group Attendance:  Attended group session    Patient's response to the group topic/interactions:  cooperative with task    Patient appeared to be Actively participating.        Client specific details:  Patient shared extensively about his using history. Struggles with notion that he drinks because he can't cope. Was given feedback suggesting that his drinking is what makes it difficult for him to cope.

## 2021-01-22 NOTE — TELEPHONE ENCOUNTER
[facility] met with patient briefly during his visit with PCP today. Patient is interested in establishing therapy services at [facility]. Patient is currently attending [facility]. Recommended patient check with [facility] to see what times he can schedule sessions as to not overlap with [facility] programming.

## 2021-01-22 NOTE — GROUP NOTE
Group Therapy Documentation    PATIENT'S NAME: Mohit Porter  MRN:   [id number]  :   1990  ACCT. NUMBER: [id number]  DATE OF SERVICE: 21  START TIME: 12:30 PM  END TIME:  2:30 PM  FACILITATOR(S): Noah Aguilar LADC  TOPIC: BEH Group Therapy  Number of patients attending the group:  5  Group Length:  2 Hours    Group Therapy Type: Recovery strategies    Summary of Group / Topics Discussed:    Recovery Principles      Group Attendance:  Attended group session    Patient's response to the group topic/interactions:  cooperative with task    Patient appeared to be Attentive.        Client specific details:  Patient viewed film on mental illness. Participated in group discussion regarding stigma.

## 2021-01-22 NOTE — PATIENT INSTRUCTIONS
- Follow-up in 2 weeks.   - Can switch to Seroquel 200-400 mg at bedtime.  - Start Clonidine 0.1 mg twice per day.   - Patient will call when needed call with the results.

## 2021-01-23 ENCOUNTER — HOSPITAL ENCOUNTER (OUTPATIENT)
Dept: BEHAVIORAL HEALTH | Facility: CLINIC | Age: 31
End: 2021-01-23
Attending: FAMILY MEDICINE
Payer: COMMERCIAL

## 2021-01-23 VITALS — OXYGEN SATURATION: 100 % | TEMPERATURE: 97.8 F

## 2021-01-23 PROCEDURE — H2035 A/D TX PROGRAM, PER HOUR: HCPCS | Mod: HQ

## 2021-01-23 PROCEDURE — 1002N00001 HC LODGING PLUS FACILITY CHARGE ADULT

## 2021-01-23 NOTE — GROUP NOTE
Group Therapy Documentation    PATIENT'S NAME: Mohit Porter  MRN:   [id number]  :   1990  ACCT. NUMBER: [id number]  DATE OF SERVICE: 21  START TIME:  9:00 AM  END TIME: 11:00 AM  FACILITATOR(S): Jeannine Cardenas RN; Endy Sanchez LADC  TOPIC: BEH Group Therapy  Number of patients attending the group:  24  Group Length:  2 Hours    Group Therapy Type: Health and wellbeing  and Medication education    Summary of Group / Topics Discussed:    Medication management and HIV/AIDS      Group Attendance:  Attended group session    Patient's response to the group topic/interactions:  cooperative with task, expressed understanding of topic and listened actively    Patient appeared to be Attentive.        Client specific details: Pt learned about HIV/AIDS and COVID-19 .

## 2021-01-23 NOTE — GROUP NOTE
Group Therapy Documentation    PATIENT'S NAME: Mohit Porter  MRN:   [id number]  :   1990  ACCT. NUMBER: [id number]  DATE OF SERVICE: 21  START TIME: 12:30 PM  END TIME:  2:30 PM  FACILITATOR(S): Ann-Marie Ham LADC; Russel Uribe LADC  TOPIC: BEH Group Therapy  Number of patients attending the group: 23  Group Length:  2 Hours    Group Therapy Type: Recovery strategies    Summary of Group / Topics Discussed:    Relationship/socialization      Group Attendance:  Attended group session    Patient's response to the group topic/interactions:  cooperative with task    Patient appeared to be Actively participating, Attentive and Engaged.        Client specific details: Mohit was an active participant in afternoon Relationships workshop on Honesty.

## 2021-01-24 ENCOUNTER — HOSPITAL ENCOUNTER (OUTPATIENT)
Dept: BEHAVIORAL HEALTH | Facility: CLINIC | Age: 31
End: 2021-01-24
Attending: FAMILY MEDICINE
Payer: COMMERCIAL

## 2021-01-24 VITALS — OXYGEN SATURATION: 100 % | TEMPERATURE: 98.4 F

## 2021-01-24 PROCEDURE — 1002N00001 HC LODGING PLUS FACILITY CHARGE ADULT

## 2021-01-24 PROCEDURE — H2035 A/D TX PROGRAM, PER HOUR: HCPCS | Mod: HQ

## 2021-01-24 NOTE — PROGRESS NOTES
Med Room staff observed PT attempting to palm Quetiapine.  PT stated he wanted to take it later in the evening.  PT was redirected and told he can only take it in the med room.

## 2021-01-24 NOTE — GROUP NOTE
Group Therapy Documentation    PATIENT'S NAME: Mohit Porter  MRN:   [id number]  :   1990  ACCT. NUMBER: [id number]  DATE OF SERVICE: 21  START TIME: 12:30 PM  END TIME:  1:30 PM  FACILITATOR(S): Ann-Marie Ham LADC; Jeannine Cardenas RN  TOPIC: BEH Group Therapy  Number of patients attending the group: 22  Group Length:  1 Hours    Group Therapy Type: Health and wellbeing     Summary of Group / Topics Discussed:    Balanced lifestyle and Self-care activities      Group Attendance:  Attended group session    Patient's response to the group topic/interactions:  cooperative with task    Patient appeared to be Attentive and Engaged.        Client specific details: Mohit was an active participant in Skills Group on sleep and sleep hygiene.

## 2021-01-24 NOTE — PROGRESS NOTES
See note from 1/23 from [facility] staff. Pt tried to take his bedtime dose of Seroquel to his room with him to take it later. Writer spoke with pt about the incident and pt apologized and said he understands why that is not allowed. Pt said it was because he didn't want to fall asleep at that time and wanted to be awake a little later and then take his med.   Writer also spoke with pt about his behavior during Saturday lecture. Pt was talking most of the lecture with a peer. Pt said this behavior would not happen again.

## 2021-01-24 NOTE — GROUP NOTE
Group Therapy Documentation    PATIENT'S NAME: Mohit Porter  MRN:   [id number]  :   1990  ACCT. NUMBER: [id number]  DATE OF SERVICE: 21  START TIME:  9:00 AM  END TIME: 11:00 AM  FACILITATOR(S): Ann-Marie Ham LADC; Russel Uribe LADC  TOPIC: BEH Group Therapy  Number of patients attending the group:  23  Group Length:  2 Hours    Group Therapy Type: Recovery strategies    Summary of Group / Topics Discussed:    Relationship/socialization      Group Attendance:  Attended group session    Patient's response to the group topic/interactions:  cooperative with task    Patient appeared to be Actively participating, Attentive and Engaged.        Client specific details: Mohit was an active participant in morning Relationships workshop.

## 2021-01-25 ENCOUNTER — HOSPITAL ENCOUNTER (OUTPATIENT)
Dept: BEHAVIORAL HEALTH | Facility: CLINIC | Age: 31
End: 2021-01-25
Attending: FAMILY MEDICINE
Payer: COMMERCIAL

## 2021-01-25 VITALS — TEMPERATURE: 97.6 F | OXYGEN SATURATION: 99 %

## 2021-01-25 PROCEDURE — 1002N00001 HC LODGING PLUS FACILITY CHARGE ADULT

## 2021-01-25 PROCEDURE — H2035 A/D TX PROGRAM, PER HOUR: HCPCS | Mod: HQ

## 2021-01-25 NOTE — GROUP NOTE
Group Therapy Documentation    PATIENT'S NAME: Mohit Porter  MRN:   [id number]  :   1990  ACCT. NUMBER: [id number]  DATE OF SERVICE: 21  START TIME:  9:00 AM  END TIME: 11:00 AM  FACILITATOR(S): Meenakshi Salinas LADC; Zuleyka Peterson LADC  TOPIC: BEH Group Therapy  Number of patients attending the group:  5  Group Length:  2 hours    Group Therapy Type: Recovery strategies    Summary of Group / Topics Discussed:    Recovery Principles      Group Attendance:  Attended group session    Patient's response to the group topic/interactions:  cooperative with task    Patient appeared to be Actively participating, Attentive and Engaged.        Client specific details:  Mohit attended morning group therapy session.  He participated in discussions about how habituation affects impulse control and executive functioning.  He participated in a mindfulness exercise as well as examining how certain music can trigger emotions and relapse, and how exposure therapy can reprogram one's maladaptive habits.

## 2021-01-25 NOTE — GROUP NOTE
"Group Therapy Documentation    PATIENT'S NAME: Mohit Porter  MRN:   [id number]  :   1990  ACCT. NUMBER: [id number]  DATE OF SERVICE: 21  START TIME: 12:30 PM  END TIME:  2:30 PM  FACILITATOR(S): Ann-Marie Ham LADC  TOPIC: BEH Group Therapy  Number of patients attending the group: 5  Group Length:  2 Hours    Group Therapy Type: Emotion processing    Summary of Group / Topics Discussed:    Recovery Principles, Sober coping skills, Disease of addiction, and Emotions/expression      Group Attendance:  Attended group session    Patient's response to the group topic/interactions:  cooperative with task    Patient appeared to be Actively participating, Attentive and Engaged.        Client specific details: Mohit was an active participant in afternoon group therapy session.  He engaged fully in the group discussion and presented his assignment on \"Self-Forgiveness\".  He was receptive to feedback from his peers and from staff.  "

## 2021-01-26 ENCOUNTER — HOSPITAL ENCOUNTER (OUTPATIENT)
Dept: BEHAVIORAL HEALTH | Facility: CLINIC | Age: 31
End: 2021-01-26
Attending: FAMILY MEDICINE
Payer: COMMERCIAL

## 2021-01-26 VITALS — OXYGEN SATURATION: 98 % | TEMPERATURE: 98.2 F

## 2021-01-26 PROCEDURE — H2035 A/D TX PROGRAM, PER HOUR: HCPCS | Mod: HQ

## 2021-01-26 PROCEDURE — 1002N00001 HC LODGING PLUS FACILITY CHARGE ADULT

## 2021-01-26 NOTE — PROGRESS NOTES
Patient:  Mohit Porter            Adult CD Progress Note and Treatment Plan Review     Attendance  Please refer to OP BEH CD Adult Attendance Record Documentation Flowsheet    Support group attended this week: yes    Reporting sobriety:  yes    Treatment Plan     Treatment Plan Review competed on: 1/26/2021       Client preferred learning style: Visual  Hands on  Verbal  Demonstration    Staff Members contributing VINAYAK Turk; VINAYAK Nieto; VINAYAK Ruiz                         Received Supervision: YES    Client: contributed to goals and plan: Yes    Client received copy of plan/revised plan: Yes    Client agrees with plan/revised plan: Yes    Changes to Treatment Plan: No    New Goals added since last review No additional goals added at this time    Goals worked on since last review: Self-forgiveness assignment, spirituality group, relationship workshop, and maintaining stabilization.     Strategies effective: yes    Strategies need these changes: No changes needed at this time    1) Care Coordination Activities:  None this week   2) Medical, Mental Health and other appointments the client attended: Patient met with Lisandra Carver NP on 1/22/2021 for H and P.   3) Medication issues: See MAR  4) Physical and mental health problems: See Dimension 2 and 3  5) Any changes in Vulnerable Adult Status?  No If yes, add to treatment plan and individual abuse prevention plan.  6) Review and evaluation of the individual abuse prevention plan: Current IAPP for this program is adequate for this client    ASAM Risk Rating:  Dimension 1, 0: Patient reports their last date of use as 1/16/2021. Patient denies any withdrawal symptoms that would interfere with full participation in treatment programming at this time. Patient will continue to be monitored throughout treatment.   Dimension 2, 0: Patient denies any biomedical concerns that would interfere with full participation in treatment  "programming at this time. Patient reports that he is currently medication compliant. Patient appears able to access medical aid as needed and will continue to be monitored throughout treatment. Patient attended staff RN lecture on HIV/AIDS.  Dimension 3, 2: Patient reports a mental health diagnosis of generalized anxiety disorder. Patient reports feeling \"emotional\" this week and has become tearful during group therapy during process time. Patient acknowledged the need for self-forgiveness as part of his healing process. Patient denies any suicidal thoughts or ideations at this time. Patient will continue to be monitored throughout treatment.   Dimension 4, 2: Patient reports current motivation for treatment is for his health. Patient is participating in all groups and lectures and appears to be gaining internal motivation for change. Patient appears to be in the contemplation stage of change at this time.   Dimension 5, 4: Patient rates urges and cravings this week a \" 5\" on a scale of 1-10(high). Patient continues to learn and practice coping skills and verbalizes reading and talking in group  have been helpful.   Dimension 6, 4: Patient is attending sober support meetings and building relationships with peers. Patient's aftercare plan at this time includes 12-step participation and outpatient programming with sober housing.     Guide to Risk Ratings for Suicidality:   IDEATION: Active thoughts of suicide? INTENT: Intent to follow on suicide? PLAN: Plan to follow through on suicide? Level of Risk:   IF Yes Yes Yes Patient = High Emergent   IF Yes Yes No Patient = High Urgent/Non-Emergent   IF Yes No No Patient = Moderate Non-Urgent   IF No No   No Patient = Low Risk   The patient's ADDITIONAL RISK FACTORS and lack of PROTECTIVE FACTORS may increase their overall suicide risk ratings.     Patient's/client's current risk rating:  Low Risk    Family Involvement:   ABBIE signed    Data:   offered feedback good insight " client did actively participate    Intervention:   Behavior modification  Cognitive Behavioral Therapy  Counselor feedback  Education  Emotional management  Group feedback  Motivational Enhancement Therapy  Relapse prevention  Twelve Step facilitation  Client & counselor reviewed and signed ISP & assessment summary  Mental health education    Assessment:   Stages of Change Model  Contemplation    Appears/Sounds:  Cooperative  Engaged  Depressed  Anxious    Plan:  Focus on recovery environment  Monitor emotional/physical health  Continue working through treatment plan goals.     Zuleyka Peterson, [facility]

## 2021-01-26 NOTE — GROUP NOTE
Group Therapy Documentation    PATIENT'S NAME: Mohit Porter  MRN:   [id number]  :   1990  ACCT. NUMBER: [id number]  DATE OF SERVICE: 21  START TIME: 12:30 PM  END TIME:  2:30 PM  FACILITATOR(S): Noah Aguilar LADC  TOPIC: BEH Group Therapy  Number of patients attending the group:  6  Group Length:  2 Hours    Group Therapy Type: Recovery strategies    Summary of Group / Topics Discussed:    Recovery Principles, Sober coping skills, Relationship/socialization, and Disease of addiction      Group Attendance:  Attended group session    Patient's response to the group topic/interactions:  cooperative with task    Patient appeared to be Actively participating.        Client specific details:  Patient shared assignment on 12 things that were supportive of his recovery. Patient read through list in a general fashion. When asked to go back over these things and be more specific he was cooperative. He received feedback from others that angered him as they were contradicting his plans for a relationship with his daughter. Patient was accurate in his feelings and dismissing some of the advice he was given. After group patient stayed behind and discussed his anger. Patient is starting to understand some of the things he needs to start changing while he is here rather than waiting until he is out of treatment.

## 2021-01-26 NOTE — GROUP NOTE
Group Therapy Documentation    PATIENT'S NAME: Mohit Porter  MRN:   [id number]  :   1990  ACCT. NUMBER: [id number]  DATE OF SERVICE: 21  START TIME:  3:00 PM  END TIME:  4:00 PM  FACILITATOR(S): Hailey Linares  TOPIC: BEH Group Therapy  Number of patients attending the group:  6  Group Length:  1 Hours    Group Therapy Type: Recovery strategies and Daily living/independence skills    Summary of Group / Topics Discussed:    Relationship/socialization      Group Attendance:  Attended group session    Patient's response to the group topic/interactions:  cooperative with task    Patient appeared to be Actively participating, Attentive and Engaged.        Client specific details:  Patient was attentive and participative during group  session.

## 2021-01-27 ENCOUNTER — HOSPITAL ENCOUNTER (OUTPATIENT)
Dept: BEHAVIORAL HEALTH | Facility: CLINIC | Age: 31
End: 2021-01-27
Attending: FAMILY MEDICINE
Payer: COMMERCIAL

## 2021-01-27 VITALS — TEMPERATURE: 98.4 F | OXYGEN SATURATION: 99 %

## 2021-01-27 PROCEDURE — 1002N00001 HC LODGING PLUS FACILITY CHARGE ADULT

## 2021-01-27 PROCEDURE — H2035 A/D TX PROGRAM, PER HOUR: HCPCS | Mod: HQ

## 2021-01-27 NOTE — GROUP NOTE
Group Therapy Documentation    PATIENT'S NAME: Mohit Porter  MRN:   [id number]  :   1990  ACCT. NUMBER: [id number]  DATE OF SERVICE: 21  START TIME:  8:30 AM  END TIME:  9:30 AM  FACILITATOR(S): Sathish Huitron MD; Sidney Kline LADC  TOPIC: BEH Group Therapy  Number of patients attending the group:  5  Group Length:  1 Hours    Group Therapy Type: Recovery strategies    Summary of Group / Topics Discussed:    Recovery Principles and Medication management      Group Attendance:  Attended group session    Patient's response to the group topic/interactions:  cooperative with task    Patient appeared to be Attentive.        Client specific details:  Mohit gave appropriate feedback..

## 2021-01-27 NOTE — GROUP NOTE
Group Therapy Documentation    PATIENT'S NAME: Mohit Porter  MRN:   [id number]  :   1990  ACCT. NUMBER: [id number]  DATE OF SERVICE: 21  START TIME: 10:00 AM  END TIME: 11:30 AM  FACILITATOR(S): Hailey Linares  TOPIC: BEH Group Therapy  Number of patients attending the group:  5  Group Length:  1.5 Hours    Group Therapy Type: Recovery strategies and Daily living/independence skills    Summary of Group / Topics Discussed:    Recovery Principles, Sober coping skills, Disease of addiction, and Emotions/expression      Group Attendance:  Attended group session    Patient's response to the group topic/interactions:  cooperative with task    Patient appeared to be Actively participating, Attentive and Engaged.        Client specific details:  Patient was attentive and participative during group session.

## 2021-01-28 ENCOUNTER — HOSPITAL ENCOUNTER (OUTPATIENT)
Dept: BEHAVIORAL HEALTH | Facility: CLINIC | Age: 31
End: 2021-01-28
Attending: FAMILY MEDICINE
Payer: COMMERCIAL

## 2021-01-28 VITALS — OXYGEN SATURATION: 100 % | TEMPERATURE: 97.6 F

## 2021-01-28 PROCEDURE — H2035 A/D TX PROGRAM, PER HOUR: HCPCS | Mod: HQ

## 2021-01-28 PROCEDURE — 1002N00001 HC LODGING PLUS FACILITY CHARGE ADULT

## 2021-01-28 NOTE — GROUP NOTE
Group Therapy Documentation    PATIENT'S NAME: Mohit Porter  MRN:   [id number]  :   1990  ACCT. NUMBER: [id number]  DATE OF SERVICE: 21  START TIME: 12:30 PM  END TIME:  2:30 PM  FACILITATOR(S): Hailey Linares  TOPIC: BEH Group Therapy  Number of patients attending the group:  6  Group Length:  2 Hours    Group Therapy Type: Recovery strategies, Emotion processing, and Daily living/independence skills    Summary of Group / Topics Discussed:    Recovery Principles, Sober coping skills, Relationship/socialization, Balanced lifestyle, Disease of addiction, and Emotions/expression      Group Attendance:  Attended group session    Patient's response to the group topic/interactions:  cooperative with task    Patient appeared to be Actively participating, Attentive and Engaged.        Client specific details:  Patient was attentive and participative during group session.

## 2021-01-28 NOTE — GROUP NOTE
Psychoeducation Group Documentation    PATIENT'S NAME: Mohit Porter  MRN:   [id number]  :   1990  [id number]  DATE OF SERVICE: 21  START TIME:  3:00 PM  END TIME:  4:00 PM  FACILITATOR(S): Russel Uribe LADC; Lyndsay Hernandez LADC; Hailey Linares  TOPIC: BEH Pyschoeducation  Number of patients attending the group:  27  Group Length:  1 Hours    Skills Group Therapy Type: Brain response to Addiction and recovery     Summary of Group / Topics Discussed:    Symptom management skills, Medication management skills, and cognitive development        Group Attendance:  Attended group session    Patient's response to the group topic/interactions:  cooperative with task    Patient appeared to be Attentive and Engaged.         Client specific details:  .

## 2021-01-28 NOTE — GROUP NOTE
Group Therapy Documentation    PATIENT'S NAME: Mohit Porter  MRN:   [id number]  :   1990  ACCT. NUMBER: [id number]  DATE OF SERVICE: 21  START TIME:  9:00 AM  END TIME: 11:00 AM  FACILITATOR(S): Hailey Linares  TOPIC: BEH Group Therapy  Number of patients attending the group:  6  Group Length:  2 Hours    Group Therapy Type: Recovery strategies, Emotion processing, and Daily living/independence skills    Summary of Group / Topics Discussed:    Recovery Principles, Sober coping skills, Relationship/socialization, Balanced lifestyle, and Disease of addiction      Group Attendance:  Attended group session    Patient's response to the group topic/interactions:  cooperative with task    Patient appeared to be Actively participating, Attentive and Engaged.        Client specific details:  Patient was attentive and participative during group session.

## 2021-01-29 ENCOUNTER — HOSPITAL ENCOUNTER (OUTPATIENT)
Dept: BEHAVIORAL HEALTH | Facility: CLINIC | Age: 31
End: 2021-01-29
Attending: FAMILY MEDICINE
Payer: COMMERCIAL

## 2021-01-29 ENCOUNTER — OFFICE VISIT (OUTPATIENT)
Dept: BEHAVIORAL HEALTH | Facility: CLINIC | Age: 31
End: 2021-01-29
Payer: COMMERCIAL

## 2021-01-29 VITALS — TEMPERATURE: 98.5 F | OXYGEN SATURATION: 98 %

## 2021-01-29 DIAGNOSIS — F41.1 GAD (GENERALIZED ANXIETY DISORDER): Primary | ICD-10-CM

## 2021-01-29 PROCEDURE — 90834 PSYTX W PT 45 MINUTES: CPT

## 2021-01-29 PROCEDURE — 1002N00001 HC LODGING PLUS FACILITY CHARGE ADULT

## 2021-01-29 PROCEDURE — H2035 A/D TX PROGRAM, PER HOUR: HCPCS | Mod: HQ

## 2021-01-29 ASSESSMENT — ANXIETY QUESTIONNAIRES
5. BEING SO RESTLESS THAT IT IS HARD TO SIT STILL: SEVERAL DAYS
3. WORRYING TOO MUCH ABOUT DIFFERENT THINGS: NEARLY EVERY DAY
2. NOT BEING ABLE TO STOP OR CONTROL WORRYING: NEARLY EVERY DAY
1. FEELING NERVOUS, ANXIOUS, OR ON EDGE: NEARLY EVERY DAY
7. FEELING AFRAID AS IF SOMETHING AWFUL MIGHT HAPPEN: NEARLY EVERY DAY
6. BECOMING EASILY ANNOYED OR IRRITABLE: MORE THAN HALF THE DAYS
GAD7 TOTAL SCORE: 18

## 2021-01-29 ASSESSMENT — COLUMBIA-SUICIDE SEVERITY RATING SCALE - C-SSRS
ATTEMPT LIFETIME: NO
TOTAL  NUMBER OF INTERRUPTED ATTEMPTS LIFETIME: NO
5. HAVE YOU STARTED TO WORK OUT OR WORKED OUT THE DETAILS OF HOW TO KILL YOURSELF? DO YOU INTEND TO CARRY OUT THIS PLAN?: NO
ATTEMPT PAST THREE MONTHS: NO
1. IN THE PAST MONTH, HAVE YOU WISHED YOU WERE DEAD OR WISHED YOU COULD GO TO SLEEP AND NOT WAKE UP?: NO
TOTAL  NUMBER OF ABORTED OR SELF INTERRUPTED ATTEMPTS PAST 3 MONTHS: NO
6. HAVE YOU EVER DONE ANYTHING, STARTED TO DO ANYTHING, OR PREPARED TO DO ANYTHING TO END YOUR LIFE?: NO
TOTAL  NUMBER OF INTERRUPTED ATTEMPTS PAST 3 MONTHS: NO
5. HAVE YOU STARTED TO WORK OUT OR WORKED OUT THE DETAILS OF HOW TO KILL YOURSELF? DO YOU INTEND TO CARRY OUT THIS PLAN?: NO
4. HAVE YOU HAD THESE THOUGHTS AND HAD SOME INTENTION OF ACTING ON THEM?: NO
TOTAL  NUMBER OF ABORTED OR SELF INTERRUPTED ATTEMPTS PAST LIFETIME: NO
2. HAVE YOU ACTUALLY HAD ANY THOUGHTS OF KILLING YOURSELF?: NO
3. HAVE YOU BEEN THINKING ABOUT HOW YOU MIGHT KILL YOURSELF?: NO
1. IN THE PAST MONTH, HAVE YOU WISHED YOU WERE DEAD OR WISHED YOU COULD GO TO SLEEP AND NOT WAKE UP?: NO
6. HAVE YOU EVER DONE ANYTHING, STARTED TO DO ANYTHING, OR PREPARED TO DO ANYTHING TO END YOUR LIFE?: NO
4. HAVE YOU HAD THESE THOUGHTS AND HAD SOME INTENTION OF ACTING ON THEM?: NO
2. HAVE YOU ACTUALLY HAD ANY THOUGHTS OF KILLING YOURSELF LIFETIME?: NO

## 2021-01-29 ASSESSMENT — PATIENT HEALTH QUESTIONNAIRE - PHQ9: 5. POOR APPETITE OR OVEREATING: NEARLY EVERY DAY

## 2021-01-29 NOTE — GROUP NOTE
Group Therapy Documentation    PATIENT'S NAME: Mohit Porter  MRN:   [id number]  :   1990  ACCT. NUMBER: [id number]  DATE OF SERVICE: 21  START TIME: 10:00 AM  END TIME: 11:00 AM  FACILITATOR(S): Meenakshi Salinas LADC  TOPIC: BEH Group Therapy  Number of patients attending the group:  6  Group Length:  1 hour    Group Therapy Type: Recovery strategies    Summary of Group / Topics Discussed:    Recovery Principles      Group Attendance:  Attended group session    Patient's response to the group topic/interactions:  cooperative with task    Patient appeared to be Actively participating, Attentive and Engaged.        Client specific details:  Mohit attended morning group therapy session.  He participated in discussions on cognitive distortions and ways to untwist maladaptive thinking.

## 2021-01-29 NOTE — GROUP NOTE
"Group Therapy Documentation    PATIENT'S NAME: Mohit Porter  MRN:   [id number]  :   1990  ACCT. NUMBER: [id number]  DATE OF SERVICE: 21  START TIME: 12:30 AM  END TIME:  2:30 PM  FACILITATOR(S): Zuleyka Peterson LADC; Samuel Riojas LADC  TOPIC: BEH Group Therapy  Number of patients attending the group:  6  Group Length:  2 Hours    Group Therapy Type: Recovery strategies    Summary of Group / Topics Discussed:    Recovery Principles and Disease of addiction      Group Attendance:  Attended group session    Patient's response to the group topic/interactions:  cooperative with task    Patient appeared to be Attentive.        Client specific details: Patient participated in group activity on \"The Hero's Journey\" as it relates to addiction recovery.  "

## 2021-01-30 ENCOUNTER — HOSPITAL ENCOUNTER (OUTPATIENT)
Dept: BEHAVIORAL HEALTH | Facility: CLINIC | Age: 31
End: 2021-01-30
Attending: FAMILY MEDICINE
Payer: COMMERCIAL

## 2021-01-30 VITALS — TEMPERATURE: 97.3 F | OXYGEN SATURATION: 100 %

## 2021-01-30 PROCEDURE — 1002N00001 HC LODGING PLUS FACILITY CHARGE ADULT

## 2021-01-30 PROCEDURE — H2035 A/D TX PROGRAM, PER HOUR: HCPCS | Mod: HQ

## 2021-01-30 NOTE — GROUP NOTE
Group Therapy Documentation    PATIENT'S NAME: Mohit Porter  MRN:   [id number]  :   1990  ACCT. NUMBER: [id number]  DATE OF SERVICE: 21  START TIME: 12:30 PM  END TIME:  2:30 PM  FACILITATOR(S): Endy Sanchez [facility]; Niko Lopez [facility]; Cher Santizo [facility]  TOPIC: BEH Group Therapy  Number of patients attending the group:  6  Group Length:  2 Hours    Group Therapy Type: Daily living/independence skills    Summary of Group / Topics Discussed:    Relationship/socialization      Group Attendance:  Attended group session    Patient's response to the group topic/interactions:  cooperative with task    Patient appeared to be Engaged.        Client specific details:  Pt was attentive and appropriate in group. Pt participated in  Relationship  workshop, activities and in discussion on healthy, unhealthy relationship, codependency, and healthy communication skills.

## 2021-01-30 NOTE — GROUP NOTE
Group Therapy Documentation    PATIENT'S NAME: Mohit Porter  MRN:   [id number]  :   1990  ACCT. NUMBER: [id number]  DATE OF SERVICE: 21  START TIME:  9:00 AM  END TIME: 11:00 AM  FACILITATOR(S): Endy Sanchez [facility]; Niko Lopez [facility]; Cher Santizo [facility]  TOPIC: BEH Group Therapy  Number of patients attending the group:  6  Group Length:  2 Hours    Group Therapy Type: Recovery strategies    Summary of Group / Topics Discussed:    Relapse prevention      Group Attendance:  Attended group session    Patient's response to the group topic/interactions:  cooperative with task    Patient appeared to be Engaged.        Client specific details:  Pt participated in Relapse prevention lecture, on processes leading to relapse, Triggers and warning signs. Pt was appropriate and attentive

## 2021-01-31 ENCOUNTER — HOSPITAL ENCOUNTER (OUTPATIENT)
Dept: BEHAVIORAL HEALTH | Facility: CLINIC | Age: 31
End: 2021-01-31
Attending: FAMILY MEDICINE
Payer: COMMERCIAL

## 2021-01-31 VITALS — OXYGEN SATURATION: 98 % | TEMPERATURE: 98.3 F

## 2021-01-31 PROCEDURE — H2035 A/D TX PROGRAM, PER HOUR: HCPCS | Mod: HQ

## 2021-01-31 PROCEDURE — 1002N00001 HC LODGING PLUS FACILITY CHARGE ADULT

## 2021-01-31 NOTE — GROUP NOTE
Psychoeducation Group Documentation    PATIENT'S NAME: Mohit Porter  MRN:   [id number]  :   1990  ACCT. NUMBER: [id number]  DATE OF SERVICE: 21  START TIME:  9:00 AM  END TIME: 11:00 AM  FACILITATOR(S): Rosalina Bender RN; Niko Lopez [facility]; Cher Santizo [facility]  TOPIC: BEH Pyschoeducation  Number of patients attending the group: 25  Group Length:  2 Hours    Skills Group Therapy Type: Healthy behaviors development    Summary of Group / Topics Discussed:    Balanced lifestyle skills          Group Attendance:  Attended group session    Patient's response to the group topic/interactions:  cooperative with task    Patient appeared to be Engaged.         Client specific details: Pt participated in RN s lecture on TB/Hep AB&C. Pt was attentive and appropriate.

## 2021-01-31 NOTE — GROUP NOTE
Group Therapy Documentation    PATIENT'S NAME: Mohit Porter  MRN:   [id number]  :   1990  ACCT. NUMBER: [id number]  DATE OF SERVICE: 21  START TIME:  1:00 PM  END TIME:  2:00 PM  FACILITATOR(S): Cher Santizo [facility]; [facility]  TOPIC: BEH Group Therapy  Number of patients attending the group:  25  Group Length:  1 Hours    Group Therapy Type: Recovery strategies    Summary of Group / Topics Discussed:    Recovery Principles      Group Attendance:  Attended group session    Patient's response to the group topic/interactions:  cooperative with task    Patient appeared to be Passively engaged.        Client specific details:  Mohit attended the afternoon Skills group. They took part in an exercise/discussion on personal strengths and how those can assist them in recovery.

## 2021-02-01 ENCOUNTER — HOSPITAL ENCOUNTER (OUTPATIENT)
Dept: BEHAVIORAL HEALTH | Facility: CLINIC | Age: 31
End: 2021-02-01
Attending: FAMILY MEDICINE
Payer: COMMERCIAL

## 2021-02-01 ENCOUNTER — OFFICE VISIT (OUTPATIENT)
Dept: FAMILY MEDICINE | Facility: CLINIC | Age: 31
End: 2021-02-01
Payer: COMMERCIAL

## 2021-02-01 VITALS
WEIGHT: 176 LBS | TEMPERATURE: 98 F | SYSTOLIC BLOOD PRESSURE: 90 MMHG | OXYGEN SATURATION: 100 % | BODY MASS INDEX: 25.25 KG/M2 | DIASTOLIC BLOOD PRESSURE: 60 MMHG | HEART RATE: 60 BPM

## 2021-02-01 VITALS — OXYGEN SATURATION: 100 % | TEMPERATURE: 97.6 F

## 2021-02-01 DIAGNOSIS — F19.20 CHEMICAL DEPENDENCY (H): Primary | ICD-10-CM

## 2021-02-01 DIAGNOSIS — F41.1 GAD (GENERALIZED ANXIETY DISORDER): ICD-10-CM

## 2021-02-01 PROCEDURE — 99214 OFFICE O/P EST MOD 30 MIN: CPT | Performed by: NURSE PRACTITIONER

## 2021-02-01 PROCEDURE — H2035 A/D TX PROGRAM, PER HOUR: HCPCS | Mod: HQ

## 2021-02-01 PROCEDURE — 1002N00001 HC LODGING PLUS FACILITY CHARGE ADULT

## 2021-02-01 NOTE — GROUP NOTE
Group Therapy Documentation    PATIENT'S NAME: Mohit Porter  MRN:   [id number]  :   1990  ACCT. NUMBER: [id number]  DATE OF SERVICE: 21  START TIME: 12:30 PM  END TIME:  2:30 PM  FACILITATOR(S): Noah Aguilar LADC  TOPIC: BEH Group Therapy  Number of patients attending the group:  5  Group Length:  2 Hours    Group Therapy Type: Recovery strategies    Summary of Group / Topics Discussed:    Recovery Principles      Group Attendance:  Attended group session    Patient's response to the group topic/interactions:  did not share thoughts verbally    Patient appeared to be Passively engaged.        Client specific details:  Patient appeared frustrated or distracted. Said he didn't like it that counselor did not have a prepared topic. When topics were presented patient did not respond. Patient did give feedback to another patient as it appeared he related to what the other patient was saying.

## 2021-02-01 NOTE — PROGRESS NOTES
Assessment & Plan     Chemical dependency (H)  Sober and in inpatient treatment.   - Continue with the recommended treatment.     KALINA (generalized anxiety disorder)  Patient reports that he has been doing ok and does not feel like the clonidine is helpful for his anxiety. Discussed that he can take it at night for a few days and discontinue.   - Continue with hydroxyzine as needed for anxiety.         Patient Instructions   - Can titrate off his clonidine, decrease to nightly for a few days and then discontinue.   - Continue with thiamine daily.   - Call clinic with any questions or concerns.       Return in about 4 weeks (around 3/1/2021) for Routine Visit.    MIL[facility]    John Rueda is a 30 year old who presents to clinic today for the following health issues:    HPI     Alcohol Dependence: Patient states that inpatient treatment has been going well. States that he is getting used to the group of people around him and this has been helpful with his anxiety. Will be discharging to sober living for intensive outpatient treatment.    Anxiety: States that he has only been taking clonidine about 50% of the time in the morning, Feels like it has not been helpful to him. Patient reports that he has not really used his hydroxyzine since we last spoke. Patient notes that when he is inpatient, he typically does well and he does not get the opportunity to get too much into his head, because of all the resources around him.     Mental Health:  Sleep: good with Seroquel 200 mg.   Appetite: good, three meals per day.   Exercise: none at this time.    Review of Systems   Constitutional, HEENT, cardiovascular, pulmonary, gi and gu systems are negative, except as otherwise noted.      Objective    BP 90/60   Pulse 60   Temp 98  F (36.7  C) (Temporal)   Wt 79.8 kg (176 lb)   SpO2 100%   BMI 25.25 kg/m    Body mass index is 25.25 kg/m .     Physical Exam    GENERAL: healthy, alert and no distress  EYES: Eyes grossly normal to inspection, PERRL and conjunctivae and sclerae normal  RESP: lungs clear to auscultation - no rales, rhonchi or wheezes  CV: regular rate and rhythm, normal S1 S2, no S3 or S4, no murmur, click or rub  ABDOMEN: soft, nontender, and bowel sounds normal  MS: no gross musculoskeletal defects noted, no edema  NEURO: Normal strength and tone, mentation intact and speech normal  PSYCH: mentation appears normal and anxious    Labs reviewed.

## 2021-02-01 NOTE — PROGRESS NOTES
Patient:  Mohit Porter            Adult CD Progress Note and Treatment Plan Review     Attendance  Please refer to OP BEH CD Adult Attendance Record Documentation Flowsheet    Support group attended this week: yes    Reporting sobriety:  yes    Treatment Plan     Treatment Plan Review competed on: 2/1/21   Patient received 30 hours of combined service including group therapy and skills groups.    Client preferred learning style: Hands on    Staff Members contributing Zuleyka Peterson [facility], Hailey Linares [facility], Noah Aguilar [facility]                     Received Supervision:No    Client: contributed to goals and plan.    Client received copy of plan/revised plan: Yes    Client agrees with plan/revised plan: Yes        Changes to Treatment Plan: No    New Goals added since last review N/A    Goals worked on since last review Patient continues working on identifying changes to be made and initiated while in residential treatment.    Strategies effective: yes    Strategies need these changes: N/A    ASAM Risk Rating:    Dimension 1 0 No issues    Dimension 2 0 No issues    Dimension 3 2 Dimension 3 issues appear stable    Dimension 4 3 Patient continues to struggle with identifying changes he needs to make to increase chances of remaining abstinent    Dimension 5 4 patient has severe alcoholism and multiple treatments. He is adept at determining acceptable responses. Still has difficulty impelmenting changes in behavior that can improve his recovery chances.      Dimension 6 3 Patient has strong family support but will require follow-up referral to sober living community.    Any changes in Vulnerable Adult Status?  No  If yes, add to treatment plan and individual abuse prevention plan.    Family Involvement:   N/A    Data:   client did actively participate      Intervention:   Group feedback      Assessment:   Stages of Change Model  Preparation/Determination    Appears/Sounds:  Cooperative      Plan:  Focus on  recovery environment      VINAYAK Beebe

## 2021-02-01 NOTE — GROUP NOTE
Group Therapy Documentation    PATIENT'S NAME: Mohit Porter  MRN:   [id number]  :   1990  ACCT. NUMBER: [id number]  DATE OF SERVICE: 21  START TIME:  9:00 AM  END TIME: 11:00 AM  FACILITATOR(S): Hailey Linares  TOPIC: BEH Group Therapy  Number of patients attending the group:  5  Group Length:  2 Hours    Group Therapy Type: Recovery strategies, Emotion processing, Daily living/independence skills, and Health and wellbeing     Summary of Group / Topics Discussed:    Recovery Principles, Relationship/socialization, Balanced lifestyle, Disease of addiction, and Emotions/expression      Group Attendance:  Attended group session    Patient's response to the group topic/interactions:  cooperative with task    Patient appeared to be Actively participating, Attentive and Engaged.        Client specific details:  Patient was attentive and participative during group session.

## 2021-02-02 ENCOUNTER — HOSPITAL ENCOUNTER (OUTPATIENT)
Dept: BEHAVIORAL HEALTH | Facility: CLINIC | Age: 31
End: 2021-02-02
Attending: FAMILY MEDICINE
Payer: COMMERCIAL

## 2021-02-02 VITALS — TEMPERATURE: 97.9 F | OXYGEN SATURATION: 100 %

## 2021-02-02 DIAGNOSIS — F17.200 NICOTINE DEPENDENCE: Primary | ICD-10-CM

## 2021-02-02 PROCEDURE — H2035 A/D TX PROGRAM, PER HOUR: HCPCS | Mod: HQ

## 2021-02-02 PROCEDURE — 1002N00001 HC LODGING PLUS FACILITY CHARGE ADULT

## 2021-02-02 NOTE — GROUP NOTE
Group Therapy Documentation    PATIENT'S NAME: Mohit Porter  MRN:   [id number]  :   1990  ACCT. NUMBER: [id number]  DATE OF SERVICE: 21  START TIME:  9:00 AM  END TIME: 11:00 AM  FACILITATOR(S): Hailey Linares  TOPIC: BEH Group Therapy  Number of patients attending the group:  7  Group Length:  2 Hours    Group Therapy Type: Recovery strategies, Emotion processing, and Health and wellbeing     Summary of Group / Topics Discussed:    Recovery Principles, Sober coping skills, Relationship/socialization, Balanced lifestyle, Disease of addiction, and Emotions/expression      Group Attendance:  Attended group session    Patient's response to the group topic/interactions:  cooperative with task    Patient appeared to be Actively participating, Attentive and Engaged.        Client specific details:  Patient was attentive and participative during group session..

## 2021-02-02 NOTE — GROUP NOTE
Group Therapy Documentation    PATIENT'S NAME: Mohit Porter  MRN:   [id number]  :   1990  ACCT. NUMBER: [id number]  DATE OF SERVICE: 21  START TIME:  3:00 PM  END TIME:  4:00 PM  FACILITATOR(S): Samuel Riojas LADC; Russel Uribe LADC; Hailey Linares  TOPIC: BEH Group Therapy  Number of patients attending the group:  7  Group Length:  1 Hours    Group Therapy Type: Recovery strategies    Summary of Group / Topics Discussed:    Recovery Principles and Sober coping skills      Group Attendance:  Attended group session    Patient's response to the group topic/interactions:  cooperative with task    Patient appeared to be Actively participating.        Client specific details:  Pt was receptive to group topics and participated actively in discussion and activities.

## 2021-02-02 NOTE — GROUP NOTE
Group Therapy Documentation    PATIENT'S NAME: Mohit Porter  MRN:   [id number]  :   1990  ACCT. NUMBER: [id number]  DATE OF SERVICE: 21  START TIME: 12:30 PM  END TIME:  2:30 PM  FACILITATOR(S): Zuleyka Peterson LADC  TOPIC: BEH Group Therapy  Number of patients attending the group:   7  Group Length:  2 Hours    Group Therapy Type: Recovery strategies    Summary of Group / Topics Discussed:    Sober coping skills and Cognitive behavioral therapy skills      Group Attendance:  Attended group session    Patient's response to the group topic/interactions:  cooperative with task    Patient appeared to be Engaged.        Client specific details:  Mohit participated in DBT visualization exercise.  Patient also practiced a group exercise on progressive muscle relaxation training.

## 2021-02-03 ENCOUNTER — HOSPITAL ENCOUNTER (OUTPATIENT)
Dept: BEHAVIORAL HEALTH | Facility: CLINIC | Age: 31
End: 2021-02-03
Attending: FAMILY MEDICINE
Payer: COMMERCIAL

## 2021-02-03 PROCEDURE — H2035 A/D TX PROGRAM, PER HOUR: HCPCS | Mod: HQ

## 2021-02-03 PROCEDURE — 1002N00001 HC LODGING PLUS FACILITY CHARGE ADULT

## 2021-02-03 NOTE — PROGRESS NOTES
Confirmed receipt of required documents for placement from both [facility] and [facility]. Currently under clinical review.

## 2021-02-03 NOTE — GROUP NOTE
"Group Therapy Documentation    PATIENT'S NAME: Mohit Porter  MRN:   [id number]  :   1990  ACCT. NUMBER: [id number]  DATE OF SERVICE: 21  START TIME: 10:00 AM  END TIME: 11:30 AM  FACILITATOR(S): Hailey Linares  TOPIC: BEH Group Therapy  Number of patients attending the group:  7  Group Length:  2 Hours    Group Therapy Type: Recovery strategies, Emotion processing, and Daily living/independence skills    Summary of Group / Topics Discussed:    Recovery Principles, Relationship/socialization, Balanced lifestyle, Disease of addiction, Emotions/expression, and Relapse prevention      Group Attendance:  Attended group session    Patient's response to the group topic/interactions:  cooperative with task    Patient appeared to be Actively participating, Attentive and Engaged.        Client specific details:  Patient completed his \"Avoiding Fear\" assignment and presented I  group.  "

## 2021-02-03 NOTE — GROUP NOTE
Group Therapy Documentation    PATIENT'S NAME: Mohit Porter  MRN:   [id number]  :   1990  ACCT. NUMBER: [id number]  DATE OF SERVICE: 21  START TIME: 12:30 PM  END TIME:  2:30 PM  FACILITATOR(S): Zuleyka Peterson LADC; Geri Soni; Hailey Linares  TOPIC: BEH Group Therapy  Number of patients attending the group:  6  Group Length:  2 Hours    Group Therapy Type: Health and wellbeing     Summary of Group / Topics Discussed:    Spiritual Care      Group Attendance:  Attended group session    Patient's response to the group topic/interactions:  cooperative with task    Patient appeared to be Engaged.        Client specific details:  Mohit participated in spiritual care group facilitated by Geri Soni and supervised by VINAYAK Turk.

## 2021-02-03 NOTE — GROUP NOTE
Group Therapy Documentation    PATIENT'S NAME: Mohit Porter  MRN:   [id number]  :   1990  ACCT. NUMBER: [id number]  DATE OF SERVICE: 21  START TIME:  8:30 AM  END TIME:  9:30 AM  FACILITATOR(S): Sidney Kline LADC  TOPIC: BEH Group Therapy  Number of patients attending the group:  7  Group Length:  1 Hours    Group Therapy Type: Recovery strategies and Emotion processing    Summary of Group / Topics Discussed:    Recovery Principles and Cognitive behavioral therapy skills      Group Attendance:  Attended group session    Patient's response to the group topic/interactions:  cooperative with task    Patient appeared to be Attentive.        Client specific details:  Mohit gave appropriate feedback..

## 2021-02-04 ENCOUNTER — HOSPITAL ENCOUNTER (OUTPATIENT)
Dept: BEHAVIORAL HEALTH | Facility: CLINIC | Age: 31
End: 2021-02-04
Attending: FAMILY MEDICINE
Payer: COMMERCIAL

## 2021-02-04 VITALS — OXYGEN SATURATION: 100 % | TEMPERATURE: 98 F

## 2021-02-04 PROCEDURE — 1002N00001 HC LODGING PLUS FACILITY CHARGE ADULT

## 2021-02-04 PROCEDURE — H2035 A/D TX PROGRAM, PER HOUR: HCPCS | Mod: HQ

## 2021-02-04 NOTE — GROUP NOTE
Group Therapy Documentation    PATIENT'S NAME: Mohit Porter  MRN:   [id number]  :   1990  ACCT. NUMBER: [id number]  DATE OF SERVICE: 21  START TIME: 12:30 PM  END TIME:  2:30 PM  FACILITATOR(S): Ann-Marie Ham LADC  TOPIC: BEH Group Therapy  Number of patients attending the group: 6  Group Length:  2 Hours    Group Therapy Type: Emotion processing    Summary of Group / Topics Discussed:    Recovery Principles, Sober coping skills, and Disease of addiction      Group Attendance:  Attended group session    Patient's response to the group topic/interactions:  cooperative with task    Patient appeared to be Actively participating, Attentive and Engaged.        Client specific details: Mohit was an active participant in afternoon group therapy session. He contributed to the discussion and gave appropriate feedback to a peer who shared a treatment plan assignment.

## 2021-02-04 NOTE — GROUP NOTE
Group Therapy Documentation    PATIENT'S NAME: Mohit Porter  MRN:   [id number]  :   1990  ACCT. NUMBER: [id number]  DATE OF SERVICE: 21  START TIME:  9:00 AM  END TIME: 11:00 AM  FACILITATOR(S): Zuleyka Peterson LADC  TOPIC: BEH Group Therapy  Number of patients attending the group:  6  Group Length:  2 Hours    Group Therapy Type: Emotion processing    Summary of Group / Topics Discussed:    Relationship/socialization and Emotions/expression      Group Attendance:  Attended group session    Patient's response to the group topic/interactions:  cooperative with task    Patient appeared to be Engaged.        Client specific details:  Mohit participated in a group discussion on grief and loss. He expressed feelings of compassion towards a peer and gave verbal support.

## 2021-02-04 NOTE — GROUP NOTE
Group Therapy Documentation    PATIENT'S NAME: Mohit Porter  MRN:   [id number]  :   1990  ACCT. NUMBER: [id number]  DATE OF SERVICE: 21  START TIME:  3:00 PM  END TIME:  4:00 PM  FACILITATOR(S): Ann-Marie Ham LADC; Melanie Rodriguez LAD; Russ[facility]ribe [facility]  TOPIC: BEH Group Therapy  Number of patients attending the group: 25  Group Length:  1 Hours    Group Therapy Type: Daily living/independence skills    Summary of Group / Topics Discussed:    Disease of addiction and Self-care activities      Group Attendance:  Attended group session    Patient's response to the group topic/interactions:  cooperative with task    Patient appeared to be Attentive and Engaged.        Client specific details: Mohit was an active participant in Skills Group on gambling and personal finances, presented by Melanie Rodriguez.

## 2021-02-05 ENCOUNTER — HOSPITAL ENCOUNTER (OUTPATIENT)
Dept: BEHAVIORAL HEALTH | Facility: CLINIC | Age: 31
End: 2021-02-05
Attending: FAMILY MEDICINE
Payer: COMMERCIAL

## 2021-02-05 VITALS — TEMPERATURE: 97.6 F | OXYGEN SATURATION: 100 %

## 2021-02-05 PROCEDURE — H2035 A/D TX PROGRAM, PER HOUR: HCPCS | Mod: HQ

## 2021-02-05 PROCEDURE — 1002N00001 HC LODGING PLUS FACILITY CHARGE ADULT

## 2021-02-05 NOTE — GROUP NOTE
Group Therapy Documentation    PATIENT'S NAME: Mohit Porter  MRN:   [id number]  :   1990  ACCT. NUMBER: [id number]  DATE OF SERVICE: 21  START TIME: 12:30 PM  END TIME:  2:30 PM  FACILITATOR(S): Samuel Riojas LADC; Noah Aguilar LADC; My Patel ADC-T  TOPIC: BEH Group Therapy  Number of patients attending the group: 6  Group Length:  2 Hours    Group Therapy Type: Daily living/independence skills    Summary of Group / Topics Discussed:    Leisure explorations/use of leisure time and Self-care activities    Group Attendance:  Attended group session    Patient's response to the group topic/interactions:  cooperative with task    Patient appeared to be Attentive and Engaged.        Client specific details: Pt participated in an exploration of leisure time and learning to have sober fun by watching a recovery-related film with peers.

## 2021-02-05 NOTE — GROUP NOTE
"Group Therapy Documentation    PATIENT'S NAME: Mohit Porter  MRN:   [id number]  :   1990  ACCT. NUMBER: [id number]  DATE OF SERVICE: 21  START TIME:  9:00 AM  END TIME: 11:00 AM  FACILITATOR(S): Noah Aguilar LADC  TOPIC: BEH Group Therapy  Number of patients attending the group:  6  Group Length:  2 Hours    Group Therapy Type: Recovery strategies    Summary of Group / Topics Discussed:    Recovery Principles, Sober coping skills, Relationship/socialization, and Disease of addiction      Group Attendance:  Attended group session    Patient's response to the group topic/interactions:  cooperative with task    Patient appeared to be Actively participating.        Client specific details:  Patient gave assignment: Viewing problems in a new light\". Presentation appeared meaningful to patient.  "

## 2021-02-05 NOTE — GROUP NOTE
Group Therapy Documentation    PATIENT'S NAME: Mohit Porter  MRN:   [id number]  :   1990  ACCT. NUMBER: [id number]  DATE OF SERVICE: 21  START TIME: 12:30 PM  END TIME:  2:30 PM  FACILITATOR(S): Ann-Marie Ham LADC; Samuel Riojas LADC; My Patel LADC  TOPIC: BEH Group Therapy  Number of patients attending the group: 6  Group Length:  2 Hours    Group Therapy Type: Emotion processing    Summary of Group / Topics Discussed:    Leisure explorations/use of leisure time and Self-care activities      Group Attendance:  Attended group session    Patient's response to the group topic/interactions:  cooperative with task    Patient appeared to be Attentive and Engaged.        Client specific details: Patient participated in a therapeutic leisure activity and learning to have sober fun by watching a recovery-related film with peers.

## 2021-02-06 ENCOUNTER — HOSPITAL ENCOUNTER (OUTPATIENT)
Dept: BEHAVIORAL HEALTH | Facility: CLINIC | Age: 31
End: 2021-02-06
Attending: FAMILY MEDICINE
Payer: COMMERCIAL

## 2021-02-06 VITALS — TEMPERATURE: 97 F | OXYGEN SATURATION: 100 %

## 2021-02-06 VITALS — OXYGEN SATURATION: 100 % | TEMPERATURE: 98.5 F

## 2021-02-06 PROCEDURE — 1002N00001 HC LODGING PLUS FACILITY CHARGE ADULT

## 2021-02-06 PROCEDURE — H2035 A/D TX PROGRAM, PER HOUR: HCPCS | Mod: HQ

## 2021-02-06 NOTE — GROUP NOTE
"Group Therapy Documentation    PATIENT'S NAME: Mohit Porter  MRN:   [id number]  :   1990  ACCT. NUMBER: [id number]  DATE OF SERVICE: 21  START TIME: 12:30 PM  END TIME:  2:30 PM  FACILITATOR(S): Zuleyka Peterson LADC; Hailey Linares  TOPIC: BEH Group Therapy  Number of patients attending the group:  6  Group Length:  2 Hours    Group Therapy Type: Recovery strategies    Summary of Group / Topics Discussed:    Relapse prevention      Group Attendance:  Attended group session    Patient's response to the group topic/interactions:  cooperative with task    Patient appeared to be Attentive.        Client specific details: Patient participated a group activity on the \"voice of addiction\" and developing positive coping statements.   "

## 2021-02-06 NOTE — GROUP NOTE
Group Therapy Documentation    PATIENT'S NAME: Mohit Porter  MRN:   [id number]  :   1990  ACCT. NUMBER: [id number]  DATE OF SERVICE: 21  START TIME:  9:00 AM  END TIME: 11:00 AM  FACILITATOR(S): Zuleyka Peterson LADC; Kamlesh Yarbrough LADC  TOPIC: BEH Group Therapy  Number of patients attending the group:  6  Group Length:  2 Hours    Group Therapy Type: Recovery strategies    Summary of Group / Topics Discussed:    Relapse prevention      Group Attendance:  Attended group session    Patient's response to the group topic/interactions:  cooperative with task    Patient appeared to be Engaged.        Client specific details: Mohit participated in a relapse prevention workshop. He appeared engaged with the educational material and discussion.  .

## 2021-02-07 ENCOUNTER — HOSPITAL ENCOUNTER (OUTPATIENT)
Dept: BEHAVIORAL HEALTH | Facility: CLINIC | Age: 31
End: 2021-02-07
Attending: FAMILY MEDICINE
Payer: COMMERCIAL

## 2021-02-07 PROCEDURE — H2035 A/D TX PROGRAM, PER HOUR: HCPCS | Mod: HQ

## 2021-02-07 PROCEDURE — 1002N00001 HC LODGING PLUS FACILITY CHARGE ADULT

## 2021-02-07 NOTE — GROUP NOTE
Group Therapy Documentation    PATIENT'S NAME: Mohit Porter  MRN:   [id number]  :   1990  ACCT. NUMBER: [id number]  DATE OF SERVICE: 21  START TIME: 12:30 PM  END TIME:  1:30 PM  FACILITATOR(S): Hailey Linares  TOPIC: BEH Group Therapy  Number of patients attending the group:  23  Group Length:  1 Hours    Group Therapy Type: Recovery strategies, Emotion processing, and Daily living/independence skills    Summary of Group / Topics Discussed:    Spiritual Care      Group Attendance:  Attended group session    Patient's response to the group topic/interactions:  cooperative with task    Patient appeared to be Actively participating, Attentive and Engaged.        Client specific details:  Patient was attentive and participative during group session.

## 2021-02-08 ENCOUNTER — OFFICE VISIT (OUTPATIENT)
Dept: BEHAVIORAL HEALTH | Facility: CLINIC | Age: 31
End: 2021-02-08
Payer: COMMERCIAL

## 2021-02-08 ENCOUNTER — HOSPITAL ENCOUNTER (OUTPATIENT)
Dept: BEHAVIORAL HEALTH | Facility: CLINIC | Age: 31
End: 2021-02-08
Attending: FAMILY MEDICINE
Payer: COMMERCIAL

## 2021-02-08 VITALS — OXYGEN SATURATION: 99 % | TEMPERATURE: 97.9 F

## 2021-02-08 VITALS — TEMPERATURE: 97.7 F | OXYGEN SATURATION: 96 %

## 2021-02-08 DIAGNOSIS — F41.1 GAD (GENERALIZED ANXIETY DISORDER): Primary | ICD-10-CM

## 2021-02-08 PROCEDURE — H2035 A/D TX PROGRAM, PER HOUR: HCPCS | Mod: HQ

## 2021-02-08 PROCEDURE — 1002N00001 HC LODGING PLUS FACILITY CHARGE ADULT

## 2021-02-08 PROCEDURE — 90791 PSYCH DIAGNOSTIC EVALUATION: CPT

## 2021-02-08 NOTE — ADDENDUM NOTE
Encounter addended by: Sidney Kline LADC on: 2/8/2021 7:12 AM   Actions taken: Charge Capture section accepted

## 2021-02-08 NOTE — PROGRESS NOTES
"[facility] Weekly Treatment Plan Review  Patient: Mohit Porter    ATTENDANCE for the following date span:  2/1/21-2/7/21    Day Monday Tuesday Wednesday Thursday Friday Saturday Sunday   Group Hours   4 4 4 4 4 4 2   Skills Hours    1 1 1   1   Individual Session (LADC)            Individual Session  (psychotherapy)            Peer-led Recovery Group   1 1 1 1 1 1 1             Adult CD Progress Note and Treatment Plan Review     Attendance  Please refer to OP BEH CD Adult Attendance Record Documentation Flowsheet    Support group attended this week: Yes    Reporting sobriety:  Yes    Treatment Plan     Treatment Plan Review completed on: 2/8/2021    Client preferred learning style: Hands on    Staff Members contributing: Zuleyka Peterson [facility]; VINAYAK Nieto; Noah Aguilar [facility]; VINAYAK Ruiz                      Received Supervision: No    Client: Patient contributed to goals and plan.    Client received copy of plan/revised plan: Yes    Client agrees with plan/revised plan: Yes    Changes to Treatment Plan: None    New Goals added since last review: No additional goals added at this time    Goals worked on since last review: Sobriety, aftercare planning, group therapy, tx plan assignments, building a sober support network, psychoeducation. Patient reports \"Trying to keep an open mind as well as reach out to people and get to my scheduled appointments\".    Strategies effective: Yes    Strategies need these changes: No changes needed at this time    1) Care Coordination Activities: Patient plans to attend [facility] with [facility].  2) Medical, Mental Health and other appointments the client attended: Patient attended an individual therapy appointment on 2/8 and a medical appointment last week.   3) Medication issues: None reported.  4) Physical and mental health problems: See dimensions 2 and 3 below.  5) Any changes in Vulnerable Adult Status?  No If yes, add to treatment plan " "and individual abuse prevention plan.  6) Review and evaluation of the individual abuse prevention plan: Current IAPP for this program is adequate for this client    ASAM Risk Rating:    Dimension 1, 0: Patient reports his last date of use as 1/16/21. Patient denies any withdrawal symptoms that would interfere with full participation in treatment programming at this time; however, he does endorse feeling \"shakes at times, irritability, slight insomnia, and high anxiety\".    Dimension 2, 0: Patient denies any biomedical concerns that would interfere with full participation in treatment programming at this time.     Dimension 3, 2: Patient reports that he has noticed his mood \"leveling out\" and feeling \"not as emotional or stressed\". Patient met with staff therapist Cynthia Gunter for an individual therapy session. Patient reports \"utilizing my supports in the facility\" as his primary coping skills he uses to manage stress and difficult emotions. Patient denies any suicidal thoughts or ideations at this time.    Dimension 4, 2: Patient verbally reports being motivated for long-term recovery. His motivation for change continues to increase, and his risk severity rating was decreased from a \"3\" to a \"2\" at this time. Patient reports \"relationships, legals, health, and mental health\" as his primary motivation to stay sober and in treatment this week.  Patient's group attendance and participation have been positive, and he continues to complete treatment plan assignments.      Dimension 5, 4: Patient rated his cravings this week on a scale from 1(low) to 10(high) as a \"3\".  He reports his cravings are \"not horrible\" while he is in treatment, and that \"utilizing supports here as well as reaching out to loved ones\" as the coping skills he has utilized to manage these cravings.    Dimension 6, 3: Patient's aftercare plan currently includes IOP with [facility].    Guide to Risk Ratings for Suicidality:   IDEATION: " Active thoughts of suicide? INTENT: Intent to follow on suicide? PLAN: Plan to follow through on suicide? Level of Risk:   IF Yes Yes Yes Patient = High Emergent   IF Yes Yes No Patient = High Urgent/Non-Emergent   IF Yes No No Patient = Moderate Non-Urgent   IF No No   No Patient = Low Risk   The patient's ADDITIONAL RISK FACTORS and lack of PROTECTIVE FACTORS may increase their overall suicide risk ratings.     Patient's/client's current risk rating:  Very Low Risk    Family Involvement:   Patient reports having phone contact with loved ones.    Data:   offered feedback good insight client did actively participate    Intervention:   Aftercare planning  Behavior modification  Cognitive Behavioral Therapy  Counselor feedback  Education  Emotional management  Group feedback  Relapse prevention  Twelve Step facilitation  Mental health education    Assessment:   Stages of Change Model  Preparation/Determination    Appears/Sounds:  Cooperative  Engaged  Anxious    Plan:  Focus on recovery environment  Monitor emotional/physical health  Continue working through treatment plan goals.     VINAYAK Nieto

## 2021-02-08 NOTE — GROUP NOTE
Group Therapy Documentation    PATIENT'S NAME: Mohit Porter  MRN:   [id number]  :   1990  ACCT. NUMBER: [id number]  DATE OF SERVICE: 21  START TIME: 10:00AM  END TIME: 11:00 AM  FACILITATOR(S): Zuleyka Peterson LADC  TOPIC: BEH Group Therapy  GROUP LENGTH: 1 Hour    Group Attendance:  Attended group session    Patient's response to the group topic/interactions:  cooperative with task    Patient appeared to be Engaged.        Client specific details:  Mohit shared his high and low from the weekend. He provided feedback to a peer during process time and agreed to complete an assignment on writing a letter to his daughter.

## 2021-02-08 NOTE — GROUP NOTE
"Group Therapy Documentation    PATIENT'S NAME: Mohit Porter  MRN:   [id number]  :   1990  ACCT. NUMBER: [id number]  DATE OF SERVICE: 21  START TIME: 12:30 PM  END TIME:  2:30 PM  FACILITATOR(S): Ann-Marie Ham LADC  TOPIC: BEH Group Therapy  Number of patients attending the group: 5  Group Length:  2 Hours    Group Therapy Type: Emotion processing    Summary of Group / Topics Discussed:    Sober coping skills, Disease of addiction, and Emotions/expression      Group Attendance:  Attended group session    Patient's response to the group topic/interactions:  cooperative with task    Patient appeared to be Actively participating, Attentive and Engaged.        Client specific details: Mohit was an active participant in afternoon group therapy session. He contributed appropriately to the discussion and presented his assignment \"Self-Esteem\".  "

## 2021-02-09 ENCOUNTER — HOSPITAL ENCOUNTER (OUTPATIENT)
Dept: BEHAVIORAL HEALTH | Facility: CLINIC | Age: 31
End: 2021-02-09
Attending: FAMILY MEDICINE
Payer: COMMERCIAL

## 2021-02-09 VITALS — OXYGEN SATURATION: 98 % | TEMPERATURE: 98 F

## 2021-02-09 PROCEDURE — 1002N00001 HC LODGING PLUS FACILITY CHARGE ADULT

## 2021-02-09 PROCEDURE — H2035 A/D TX PROGRAM, PER HOUR: HCPCS | Mod: HQ

## 2021-02-09 NOTE — GROUP NOTE
Group Therapy Documentation    PATIENT'S NAME: Mohit Porter  MRN:   [id number]  :   1990  ACCT. NUMBER: [id number]  DATE OF SERVICE: 21  START TIME: 12:30 PM  END TIME:  2:30 PM  FACILITATOR(S): Zuleyka Peterson LADC  TOPIC: BEH Group Therapy  Number of patients attending the group:  5  Group Length:  2 Hours    Group Therapy Type: Recovery strategies    Summary of Group / Topics Discussed:    Sober coping skills and Trauma informed care      Group Attendance:  Attended group session    Patient's response to the group topic/interactions:  cooperative with task    Patient appeared to be Engaged.        Client specific details:  Mohit participated in a group discussion on trauma and safe coping skills. He shared shared several coping skills he could apply to a variety of triggering situations.

## 2021-02-09 NOTE — PROGRESS NOTES
Patient has been accepted into [facility]. A staff member from that program will pick patient up from the hospital lobby on Monday 2/15 @ 10am.

## 2021-02-09 NOTE — GROUP NOTE
Group Therapy Documentation    PATIENT'S NAME: Mohit Porter  MRN:   [id number]  :   1990  ACCT. NUMBER: [id number]  DATE OF SERVICE: 21  START TIME:  3:00 PM  END TIME:  4:00 PM  FACILITATOR(S): Hailey Linares  TOPIC: BEH Group Therapy  Number of patients attending the group:  23  Group Length:  1 Hours    Group Therapy Type: Health and wellbeing     Summary of Group / Topics Discussed:    Recovery Principles, Balanced lifestyle, and Self-care activities      Group Attendance:  Attended group session    Patient's response to the group topic/interactions:  cooperative with task    Patient appeared to be Actively participating, Attentive and Engaged.        Client specific details:  Patient was attentive and participative during lecture..

## 2021-02-09 NOTE — GROUP NOTE
"Group Therapy Documentation    PATIENT'S NAME: Mohit Porter  MRN:   [id number]  :   1990  ACCT. NUMBER: [id number]  DATE OF SERVICE: 21  START TIME:  9:00 AM  END TIME: 11:00 AM  FACILITATOR(S): Hailey Linares  TOPIC: BEH Group Therapy  Number of patients attending the group: 5  Group Length:  2 Hours    Group Therapy Type: Recovery strategies, Emotion processing, and Daily living/independence skills    Summary of Group / Topics Discussed:    Recovery Principles, Relationship/socialization, Disease of addiction, and Emotions/expression      Group Attendance:  Attended group session    Patient's response to the group topic/interactions:  cooperative with task    Patient appeared to be Actively participating, Attentive and Engaged.        Client specific details:  Patient completed his \"A Letter To My Daughter\" assignment explaining why drinking has been more important than her and presented in group..  "

## 2021-02-10 ENCOUNTER — HOSPITAL ENCOUNTER (OUTPATIENT)
Dept: BEHAVIORAL HEALTH | Facility: CLINIC | Age: 31
End: 2021-02-10
Attending: FAMILY MEDICINE
Payer: COMMERCIAL

## 2021-02-10 VITALS — TEMPERATURE: 98 F | OXYGEN SATURATION: 97 %

## 2021-02-10 PROCEDURE — 1002N00001 HC LODGING PLUS FACILITY CHARGE ADULT

## 2021-02-10 PROCEDURE — H2035 A/D TX PROGRAM, PER HOUR: HCPCS | Mod: HQ

## 2021-02-10 NOTE — GROUP NOTE
Group Therapy Documentation    PATIENT'S NAME: Mohit Porter  MRN:   [id number]  :   1990  ACCT. NUMBER: [id number]  DATE OF SERVICE: 2/10/21  START TIME:  8:30 AM  END TIME:  9:30 AM  FACILITATOR(S): Sidney Kline LADC  TOPIC: BEH Group Therapy  Number of patients attending the group:  5  Group Length:  1 Hours    Group Therapy Type: Recovery strategies    Summary of Group / Topics Discussed:    Disease of addiction      Group Attendance:  Attended group session    Patient's response to the group topic/interactions:  cooperative with task    Patient appeared to be Attentive.        Client specific details:  Mohit gave appropriate feedback..

## 2021-02-10 NOTE — GROUP NOTE
Group Therapy Documentation    PATIENT'S NAME: Mohit Porter  MRN:   [id number]  :   1990  ACCT. NUMBER: [id number]  DATE OF SERVICE: 2/10/21  START TIME: 12:30 PM  END TIME:  2:30 PM  FACILITATOR(S): Hailey Linares  TOPIC: BEH Group Therapy  Number of patients attending the group: 5  Group Length:  2 Hours    Group Therapy Type: Recovery strategies, Emotion processing, and Daily living/independence skills    Summary of Group / Topics Discussed:    Recovery Principles, Sober coping skills, Relationship/socialization, and Emotions/expression      Group Attendance:  Attended group session    Patient's response to the group topic/interactions:  cooperative with task    Patient appeared to be Actively participating, Attentive and Engaged.        Client specific details:  Patient was attentive and participative during group session.

## 2021-02-10 NOTE — GROUP NOTE
Group Therapy Documentation    PATIENT'S NAME: Mohit Porter  MRN:   [id number]  :   1990  ACCT. NUMBER: [id number]  DATE OF SERVICE: 2/10/21  START TIME: 10:00 AM  END TIME: 11:30 AM  FACILITATOR(S): Zuleyka Peterson LADC  TOPIC: BEH Group Therapy  Number of patients attending the group:  5  Group Length:  2 Hours    Group Therapy Type: Recovery strategies and Emotion processing    Summary of Group / Topics Discussed:    Recovery Principles and Mindfulness/Relaxation      Group Attendance:  Attended group session    Patient's response to the group topic/interactions:  cooperative with task    Patient appeared to be Engaged.        Client specific details:  Mohit participated in a group meditation exercise. Mohit wrote a letter of self-compassion as part of a group activity.    .

## 2021-02-11 ENCOUNTER — HOSPITAL ENCOUNTER (OUTPATIENT)
Dept: BEHAVIORAL HEALTH | Facility: CLINIC | Age: 31
End: 2021-02-11
Attending: FAMILY MEDICINE
Payer: COMMERCIAL

## 2021-02-11 VITALS — OXYGEN SATURATION: 100 % | TEMPERATURE: 97.7 F

## 2021-02-11 PROCEDURE — H2035 A/D TX PROGRAM, PER HOUR: HCPCS | Mod: HQ

## 2021-02-11 PROCEDURE — 1002N00001 HC LODGING PLUS FACILITY CHARGE ADULT

## 2021-02-11 NOTE — GROUP NOTE
Group Therapy Documentation    PATIENT'S NAME: Mohit Porter  MRN:   [id number]  :   1990  ACCT. NUMBER: [id number]  DATE OF SERVICE: 21  START TIME:  3:00 PM  END TIME:  4:00 PM  Number of patients attending the group: 4  Group Length:  1 Hours    Group Therapy Type: Health and wellbeing     Summary of Group / Topics Discussed:    Balanced lifestyle and Self-care activities    Group Attendance:  Attended group session    Patient's response to the group topic/interactions:  cooperative with task and listened actively    Patient appeared to be Actively participating, Attentive and Engaged.        Client specific details: Pt was attentive and participatory during an interactive lecture on nutrition/balanced diet.

## 2021-02-11 NOTE — GROUP NOTE
"Group Therapy Documentation    PATIENT'S NAME: Mohit Porter  MRN:   [id number]  :   1990  ACCT. NUMBER: [id number]  DATE OF SERVICE: 21  START TIME:  9:00 AM  END TIME: 11:00 AM  FACILITATOR(S): Hailey Linares  TOPIC: BEH Group Therapy  Number of patients attending the group:  5  Group Length:  2 Hours    Group Therapy Type: Recovery strategies, Emotion processing, and Daily living/independence skills    Summary of Group / Topics Discussed:    Recovery Principles, Sober coping skills, Relationship/socialization, Balanced lifestyle, Disease of addiction, and Emotions/expression      Group Attendance:  Attended group session    Patient's response to the group topic/interactions:  cooperative with task    Patient appeared to be Actively participating, Attentive and Engaged.        Client specific details:  Patient completed his \"Guilt and Shame\" assignment and presented in group which caused extended group discussion and positive feedback from his peers..  "

## 2021-02-12 ENCOUNTER — HOSPITAL ENCOUNTER (OUTPATIENT)
Dept: BEHAVIORAL HEALTH | Facility: CLINIC | Age: 31
End: 2021-02-12
Attending: FAMILY MEDICINE
Payer: COMMERCIAL

## 2021-02-12 VITALS — OXYGEN SATURATION: 100 % | TEMPERATURE: 97.8 F

## 2021-02-12 PROCEDURE — 1002N00001 HC LODGING PLUS FACILITY CHARGE ADULT

## 2021-02-12 PROCEDURE — H2035 A/D TX PROGRAM, PER HOUR: HCPCS | Mod: HQ

## 2021-02-12 NOTE — GROUP NOTE
"Group Therapy Documentation    PATIENT'S NAME: Mohit Porter  MRN:   [id number]  :   1990  ACCT. NUMBER: [id number]  DATE OF SERVICE: 21  START TIME:  9:00 AM  END TIME: 11:00 AM  FACILITATOR(S): Hailey Linares  TOPIC: BEH Group Therapy  Number of patients attending the group:  5  Group Length:  2 Hours    Group Therapy Type: Recovery strategies, Emotion processing, and Daily living/independence skills    Summary of Group / Topics Discussed:    Recovery Principles, Sober coping skills, Relationship/socialization, Disease of addiction, Emotions/expression, and Self-care activities      Group Attendance:  Attended group session    Patient's response to the group topic/interactions:  cooperative with task    Patient appeared to be Actively participating, Attentive and Engaged.        Client specific details:  Patient completed his \"Setting and Pursuing Goals\" assignment and presented in group which inspired good discussion and feedback from his peers..  "

## 2021-02-12 NOTE — GROUP NOTE
"Group Therapy Documentation    PATIENT'S NAME: Mohit Porter  MRN:   [id number]  :   1990  ACCT. NUMBER: [id number]  DATE OF SERVICE: 21  START TIME:  9:00 AM  END TIME: 11:00 AM  FACILITATOR(S): Hailey Linares  TOPIC: BEH Group Therapy  Number of patients attending the group: 5  Group Length:  2 Hours    Group Therapy Type: Recovery strategies, Emotion processing, and Daily living/independence skills    Summary of Group / Topics Discussed:    Recovery Principles, Sober coping skills, Relationship/socialization, Disease of addiction, Emotions/expression, and Self-care activities      Group Attendance:  Attended group session    Patient's response to the group topic/interactions:  cooperative with task    Patient appeared to be Actively participating, Attentive and Engaged.        Client specific details:  Patient completed his \"Setting and Pursuing Goals\" assignment and presented in group which ignited discussion and feedback from his peers.  "

## 2021-02-12 NOTE — GROUP NOTE
Group Therapy Documentation    PATIENT'S NAME: Mohit Porter  MRN:   [id number]  :   1990  ACCT. NUMBER: [id number]  DATE OF SERVICE: 21  START TIME: 12:30 PM  END TIME:  2:30 PM  FACILITATOR(S): Zuleyka Peterson LADC  TOPIC: BEH Group Therapy  Number of patients attending the group:  4  Group Length:  2 Hours    Group Therapy Type: Daily living/independence skills    Summary of Group / Topics Discussed:    Leisure explorations/use of leisure time      Group Attendance:  Attended group session    Patient's response to the group topic/interactions:  cooperative with task    Patient appeared to be Attentive.        Client specific details: Mohit participated in sober living activity and discussion on the role of humor in recovery.

## 2021-02-13 ENCOUNTER — HOSPITAL ENCOUNTER (OUTPATIENT)
Dept: BEHAVIORAL HEALTH | Facility: CLINIC | Age: 31
End: 2021-02-13
Attending: FAMILY MEDICINE
Payer: COMMERCIAL

## 2021-02-13 VITALS — TEMPERATURE: 97.7 F | OXYGEN SATURATION: 100 %

## 2021-02-13 PROCEDURE — 1002N00001 HC LODGING PLUS FACILITY CHARGE ADULT

## 2021-02-13 PROCEDURE — H2035 A/D TX PROGRAM, PER HOUR: HCPCS | Mod: HQ

## 2021-02-13 NOTE — GROUP NOTE
Group Therapy Documentation    PATIENT'S NAME: Mohit Porter  MRN:   [id number]  :   1990  ACCT. NUMBER: [id number]  DATE OF SERVICE: 21  START TIME: 12:30 PM  END TIME:  2:30 PM  FACILITATOR(S): Sidney Kline LADC; Samuel Riojas LADC  TOPIC: BEH Group Therapy  Number of patients attending the group:  5  Group Length:  2 Hours    Group Therapy Type: Recovery strategies    Summary of Group / Topics Discussed:    Mindfulness/Relaxation      Group Attendance:  Attended group session    Patient's response to the group topic/interactions:  cooperative with task    Patient appeared to be Attentive.        Client specific details:  Mohit gave appropriate feedback..

## 2021-02-13 NOTE — GROUP NOTE
Group Therapy Documentation    PATIENT'S NAME: Mohit Porter  MRN:   [id number]  :   1990  ACCT. NUMBER: [id number]  DATE OF SERVICE: 21  START TIME:  9:00 AM  END TIME: 11:00 AM  FACILITATOR(S): Sidney Kline LADC  TOPIC: BEH Group Therapy  Number of patients attending the group:  5  Group Length:  2 Hours    Group Therapy Type: Recovery strategies    Summary of Group / Topics Discussed:    Relapse prevention      Group Attendance:  Attended group session    Patient's response to the group topic/interactions:  cooperative with task    Patient appeared to be Actively participating, Attentive and Engaged.        Client specific details:  Mohit gave appropriate feedback..

## 2021-02-14 ENCOUNTER — HOSPITAL ENCOUNTER (OUTPATIENT)
Dept: BEHAVIORAL HEALTH | Facility: CLINIC | Age: 31
End: 2021-02-14
Attending: FAMILY MEDICINE
Payer: COMMERCIAL

## 2021-02-14 VITALS — TEMPERATURE: 98.2 F | OXYGEN SATURATION: 99 %

## 2021-02-14 PROCEDURE — 1002N00001 HC LODGING PLUS FACILITY CHARGE ADULT

## 2021-02-14 PROCEDURE — H2035 A/D TX PROGRAM, PER HOUR: HCPCS | Mod: HQ

## 2021-02-14 NOTE — PROGRESS NOTES
Nursing Discharge Planning Meeting    Writer completed discharge planning meeting with patient. Discharge is planned for Monday, 2/15/2021.    Discussed appropriate follow up care to manage RYAN/MH and to obtain medication refills. Patient given a copy of their current medications for reference. Questions were answered at this time and the patient verbalized an understanding of the post-discharge follow up plan.    Patient has upcoming appts at [facility] with MILAGRO Aleman, CNP and GARY Silverman    Pt aware of dates and time of appts    Continue to support patient in discharge planning as needed to assure appropriate continuity of care.     Tobacco Cessation  Patient participated in the nicotine replacement therapy for tobacco cessation or reduction during their treatment programming: Yes    The patient was provided with community resources for follow-up to continue tobacco cessation support once in the community. Also the patient was encoruaged to discuss their tobacco cessation efforts with the primary care provider.

## 2021-02-14 NOTE — GROUP NOTE
Group Therapy Documentation    PATIENT'S NAME: Mohit Porter  MRN:   [id number]  :   1990  ACCT. NUMBER: [id number]  DATE OF SERVICE: 21  START TIME:  8:40 AM  END TIME: 10:30 AM  FACILITATOR(S): Sidney Kline LADC; Rosalina Bender RN  TOPIC: BEH Group Therapy  Number of patients attending the group:  5  Group Length:  2 Hours    Group Therapy Type: Health and wellbeing     Summary of Group / Topics Discussed:    Self-care activities      Group Attendance:  Attended group session    Patient's response to the group topic/interactions:  cooperative with task    Patient appeared to be Attentive.        Client specific details:  Mohit gave appropriate feedback..

## 2021-02-14 NOTE — PROGRESS NOTES
Name: Mohit Porter  Date: 2/14/2021  Medical Record: [id number]    Envelope Number: Destroyed in waste bin    List of Contents (List each item separately in new row):   Clonidine HCL .1mg Tabs    Admission:  I am responsible for any personal items that are not sent to the safe or pharmacy.  [facility] is not responsible for loss, theft or damage of any property in my possession.      Patient Signature:  ___________________________________________       Date/Time:__________________________    Staff Signature: __________________________________       Date/Time:__________________________    2nd Staff person, if patient is unable/unwilling to sign:      __________________________________________________________       Date/Time: __________________________      Discharge:  [facility] has returned all of my personal belongings:    Patient Signature: ________________________________________     Date/Time: ____________________________________    Staff Signature: ______________________________________     Date/Time:_____________________________________

## 2021-02-14 NOTE — IP AVS SNAPSHOT
Medication List       Patient: AMAYA GUSTAFSON   : 1990   Physician: Lisandra Carver APRN CNP           This is your record.  Keep this with you and show to your community pharmacist(s) and physician(s) at each visit.     Allergies:  No Known Allergies          Medications  Valid as of: 2021 -  4:11 PM    Generic Name Brand Name Tablet Size Instructions for use    hydrOXYzine HCl ATARAX 25 MG Take 1 tablet (25 mg) by mouth every 4 hours as needed for anxiety        Multiple Vitamins-Minerals THERA-VIT-M  Take 1 tablet by mouth daily        Nicotine Polacrilex NICORETTE 2 MG Place 1-2 each (2-4 mg) inside cheek every hour as needed for other (nicotine withdrawal symptoms)        Nicotine Polacrilex NICORETTE 2 MG Place 1 each (2 mg) inside cheek as needed for smoking cessation        QUEtiapine Fumarate SEROquel 25 MG Take 1 tablet (25 mg) by mouth 2 times daily as needed (psychotic anxiety)        QUEtiapine Fumarate SEROquel 400 MG Take 0.5-1 tablets (200-400 mg) by mouth At Bedtime        Thiamine HCl B-1 100 MG Take 1 tablet (100 mg) by mouth daily        .           .           .           .

## 2021-02-14 NOTE — IP AVS SNAPSHOT
MRN:[id number]                      After Visit Summary   2/14/2021    Mohit Porter    MRN: [id number]           Visit Information        Provider Department      2/14/2021  7:00 AM [facility]        Your next 10 appointments already scheduled    Feb 15, 2021  7:00 AM  Treatment with ADULT [facility]  [facility] ([facility] ) [address] [place] [id number] [phone]      Feb 19, 2021  2:00 PM  Return Visit with MILAGRO Costello CNP  [facility] ([facility]  ) [address]  [address]  [facility] [place] [phone]      Feb 23, 2021  2:00 PM  Return Visit with GARY Silverman  [facility] ([facility] )  Arrive at: [facility] [address]  [address]  [facility] [place] [phone]         MyChart Information    [facility] gives you secure access to your electronic health record. If you see a primary care provider, you can also send messages to your care team and make appointments. If you have questions, please call your primary care clinic.  If you do not have a primary care provider, please call [phone] and they will assist you.       Care EveryWhere ID    This is your Care EveryWhere ID. This could be used by other organizations to access your [facility] medical records  [id number]       Equal Access to Services    [facility] : Hadii aad ku hadasho Soomaali, waaxda luqadaha, qaybta kaalmada adeegyada, caitlyn nicholas . So [facility] [phone].    ATENCIÓN: Si habla español, tiene a real disposición servicios gratuitos de asistencia lingüística. Llame al [phone].    We comply with applicable federal and state civil rights laws, including the  [place] Human Rights Act. We do not discriminate on the basis of race, color, creed, Congregation, national origin, marital status, age, disability, sex, sexual orientation, or gender identity.    If you would like an itemization of your charges they will now be available in [facility] 30 days after discharge. To access the itemized statements in [employer] go to billing/billing summary. From there select view account. There will be multiple tabs showing an overview of your account, detail, payments, and communications. From the communications tab you can see your monthly statements, your itemized statements, and any billing letters generated for your account. If you do not have a Ghost account and need help getting access please contact [employer] support at [phone].  If you would prefer to have your itemized statements mailed please contact our automated itemized bill request line at [phone] option  2.

## 2021-02-14 NOTE — GROUP NOTE
Group Therapy Documentation    PATIENT'S NAME: Mohit Porter  MRN:   [id number]  :   1990  ACCT. NUMBER: [id number]  DATE OF SERVICE: 21  START TIME: 12:30 PM  END TIME:  1:30 PM  FACILITATOR(S): Sidney Kline LADC  TOPIC: BEH Group Therapy  Number of patients attending the group:  5  Group Length:  1 Hours    Group Therapy Type: Recovery strategies    Summary of Group / Topics Discussed:    Relapse prevention      Group Attendance:  Attended group session    Patient's response to the group topic/interactions:  cooperative with task    Patient appeared to be Attentive.        Client specific details:  Mohit gave appropriate feedback..

## 2021-02-15 ENCOUNTER — HOSPITAL ENCOUNTER (OUTPATIENT)
Dept: BEHAVIORAL HEALTH | Facility: CLINIC | Age: 31
End: 2021-02-15
Attending: FAMILY MEDICINE
Payer: COMMERCIAL

## 2021-02-15 VITALS — OXYGEN SATURATION: 99 % | TEMPERATURE: 97.3 F

## 2021-02-15 PROCEDURE — H2035 A/D TX PROGRAM, PER HOUR: HCPCS | Mod: HQ

## 2021-02-15 NOTE — PROGRESS NOTES
[facility] Discharge Summary/Instructions     Patient: Mohit Porter  MRN: [id number]   : 1990 Age: 30 year old Sex: male   -  Focus of Treatment / Discharge Recommendations    Personal Safety/ Management of Symptoms    * Follow your safety plan.  Report increased symptoms to your care team and /or go to the nearest Emergency Department.    * Call crisis lines as needed    [facility] [phone]                [facility] [phone]  [facility] [phone]              [facility] [phone]  [facility] [phone]              [facility] [phone]  [facility] [phone]          National Suicide Prevention [phone]  [facility] [phone]            Suicide Prevention [phone]  [facility] [phone]    Abstinence/Relapse Prevention  * Take all medicines as directed.  Carry a current list of medicines with you.  * Use coping skills: Breathing, journaling, 12-step support, sleep hygiene, physical activity, pros and cons list, body scan exercise, and any sober related hobbies and activities that you enjoy.   * Do not use illicit (street) drugs, controlled substances (narcotics) or alcohol.    Develop/Improve Independent Living/Socialization Skills: Ensure living environment is conducive to sobriety    Community Resources/Supports and Discharge Planning:    Follow up with psychiatrist / main caregiver: Lisandra Carver   Next visit: TBD    Follow up with your therapist:Madhavi Box   Next visit: TBD    Go to group therapy and / or support groups at: [facility]       Client Signature:_______________________   Date / Time:___________    Staff Signature:________________________   Date / Time:___________

## 2021-02-15 NOTE — GROUP NOTE
Group Therapy Documentation    PATIENT'S NAME: Mohit Porter  MRN:   [id number]  :   1990  ACCT. NUMBER: [id number]  DATE OF SERVICE: 2/15/21  START TIME:  9:00 AM  END TIME: 11:00 AM  FACILITATOR(S): Zuleyka Peterson LADC; Noah Aguilar LADC  TOPIC: BEH Group Therapy  GROUP LENGTH: 1 Hour    Group Attendance:  Attended group session    Patient's response to the group topic/interactions:  cooperative with task    Patient appeared to be Engaged.        Client specific details:  Mohit participated in a graduation ceremony and spoke of his hopes for the next phase of his recovery journey.

## 2021-02-15 NOTE — PROGRESS NOTES
[address] [place] [address]          Mohit Porter, 1990, was admitted for evaluation/treatment of chemical dependency at [facility].  This person took part in these program(s):    ______ The Inpatient Program   ______ The Outpatient Program   _X_____ The [facility] Program   _____ [facility] Day Outpatient       Date admitted: 1/19/21  Date discharged: 2/15/21    Type of discharge:   __X___ Satisfactory - completed evaluation / treatment   ______ Discharged without completing   ______ Behavioral discharge   ______ Transferred to another chemical dependency program   ______ Transferred to another type of service   ______ Left against medical advice (AMA) / Fabiánoped         Counselor: VINAYAK Turk                       Date: 2/15/2021             Time: 8:37 AM

## 2021-02-16 ENCOUNTER — VIRTUAL VISIT (OUTPATIENT)
Dept: ADDICTION MEDICINE | Facility: CLINIC | Age: 31
End: 2021-02-16
Payer: COMMERCIAL

## 2021-02-16 DIAGNOSIS — F10.21 ALCOHOL USE DISORDER, SEVERE, IN EARLY REMISSION (H): Primary | ICD-10-CM

## 2021-02-16 PROCEDURE — 99204 OFFICE O/P NEW MOD 45 MIN: CPT | Mod: 95 | Performed by: FAMILY MEDICINE

## 2021-02-16 RX ORDER — NALTREXONE HYDROCHLORIDE 50 MG/1
50 TABLET, FILM COATED ORAL DAILY
Qty: 30 TABLET | Refills: 1 | Status: SHIPPED | OUTPATIENT
Start: 2021-02-16

## 2021-02-16 NOTE — PROGRESS NOTES
Visit Date:   02/15/2021      CHEMICAL DEPENDENCY DISCHARGE SUMMARY      EVALUATION COUNSELOR:  VINAYAK Kim     TREATMENT COUNSELORS:  VINAYAK Turk; VINAYAK Ruiz.      REFERRAL:  VINAYAK Mercedes      PROGRAM:  Adult Chemical Dependency [facility].      ADMISSION DATE:  01/19/2021     DISCHARGE DATE:  02/15/2021     LAST SEEN DATE:  02/15/2021       ADMISSION DIAGNOSIS:  Alcohol Use Disorder, Severe, F10.20-(303.90.)     DISCHARGE DIAGNOSIS:  Alcohol Use Disorder, Severe,( F10..90.)     DISCHARGE STATUS:  The patient completed program with staff approval.      LAST USE DATE:  01/16/2021      DAYS/HOURS OF TREATMENT COMPLETED:  28.      PRESENTING INFORMATION:  The patient is a 30-year-old male with a history of severe alcohol use disorder admitted to station 3A for alcohol abuse and detoxification.  The patient states for the past week he has been drinking 1 liter of vodka with little to no p.o. food or other fluid intake.  The patient was found passed out in the lobby of a hotel and brought to the emergency room.      SERVICES PROVIDED:  Our services included assessment, treatment planning and education regarding chemical dependency, mental illness and relapse prevention.  The patient also participated in individual and group therapy, recovery oriented workshops, spiritual care counseling, recovery skills training and aftercare planning.      ISSUES ADDRESSED IN TREATMENT:   DIMENSION 1:  Acute Intoxication Withdrawal Potential:    Risk Rating at admission 0.  Risk Rating at discharge 0.    Problem:  Substance use, cravings and urges.  The last use date was reported at 01/16/2021.  The goal was for the patient to develop effective strategies to maintain sobriety and be able to manage mild-to-moderate withdrawal symptoms.  In order to address this, the patient was to report to counselor and group any alcohol or drug use.  The patient was to report to nurse any increase in  withdrawal symptoms and attend Sunday lecture on smoking cessation.      DIMENSION 2:  Biomedical Conditions and Complaints:    Risk Rating at admission 0.  Risk Rating at discharge 0.    Problem: The patient denies any biomedical concerns that would interfere with treatment programming. The patient has a history of alcohol hepatitis and elevated liver enzymes.  The goal was for the patient to follow recommendations of medical provider.  In order to address this, the patient was to continue to take prescribed medications and follow up with medical interventions as needed while in treatment.      DIMENSION 3:  Emotional/Behavioral/Cognitive Conditions and Complications:    Risk Rating at admission 0.  Risk Rating at discharge 0.  Problem:  The patient reports a history of depression and anxiety.  The goal was for the patient to stabilize and maintain mental health.  In order to address this, the patient was to see staff mental health therapist and follow recommendations.  The patient had a suicide risk assessment and was rated as no identified risk.  The patient also completed a safety plan and turned into counselors.  Problem:  The patient reports negative cognitions that contribute to mental health symptoms and substance use.    The goal was for the patient to verbalize his thoughts and learn how to reframe with affirmations.  In order to address this, the patient was to read informational handouts on anxiety and write affirmations from the anxiety, self-talk worksheet.  The patient was also to read and complete the Self-Esteem packet.      DIMENSION 4:  Readiness to Change:    Risk Rating at admission 2.  Risk Rating at discharge 2.  Problem:  The patient has consequences to self and others due to his substance abuse.  The goal was for the patient to acknowledge negative consequences to self and others.  In order to address this, the patient was to complete his drug use history assignment and present it in group.   Problem:  The patient verbalizes external reinforcement for change due to living environment and past legal situations. The goal was for the patient to increase his internal motivation for sobriety.  In order to address this, the patient was to complete a group project individual collage on what life would look like 5 years from now if he is sober and what life would look like 5 years from now with continued use.      DIMENSION 5:  Relapse, Continued Use, Continued Problem Potential:    Risk Rating at admission 4.  Risk Rating at discharge 4.    Problem:  The patient lacks insight into his personal relapse process, triggers, warning signs and lacks coping skills.  The goal was for the patient to gain insight about personal relapse process, triggers, warning signs and develop coping skills to prevent relapse.  In order to address this, the patient was to attend relapse prevention workshops.  The patient was also to complete Identifying Relapse Triggers and Cues assignment and present it in group.  Problem:  The patient reports guilt and shame for past use and behaviors and resentment toward himself.  The goal was for the patient to begin the process of self-forgiveness.  In order to address this, the patient was to read a handout on shame and complete the Guilt and Shame packet and presented it in group.  The patient was to attend spirituality group workshops on Wednesdays.  Problem:  The patient reports a process of negative emotions that may contribute to engaging in self-sabotaging behaviors.  The goal was for the patient to begin to process uncomfortable emotions and verbalize coping strategies.  In order to address this, the patient was to read Handling Grief as a Man and a Self-Forgiveness packet and present both in group.  Problem: The patient lacks coping skills for stress management and emotional regulation.  The goal was for the patient to identify the lifestyle changes and activities that will support his  recovery efforts.  In order to address this, the patient was to practice diaphragmatic breathing twice daily and complete the skills and tools for cravings and urges handout and present it in group.      DIMENSION 6:  Recovery Environment:    Risk Rating at admission 4.  Risk Rating at discharge 3.    Problem:  The patient reports strained relationships with loved ones due to his use. The goal was for the patient to provide an opportunity for open and honest communication.  In order to address this, the patient was to participate in relationships workshop on the weekend and complete  activities.  The patient was also to complete the self-assessment on boundary issues and present it in group.  Problem:  The patient lacks a sober support network of men in recovery.  The goal was for the patient to develop relationships with men in recovery.  In order to address this, the patient was to attend at least three Zoom 12-step meetings per week while in treatment at [facility] and seek opportunity to secure a temporary sponsor.  Problem:  The patient reports challenges with unstructured free time, the goal was for the patient to explore sober environments to work in and learn to have sober fun.  Intervention:  In order to address this, the patient was to complete setting and pursuing goals in recovery and outline a detailed schedule outline and present it in group.  Problem:  The patient's living environment is not conducive to recovery.  The goal was for the patient to secure safe sober housing and consider options for safe sober housing treatment.  In order to address this, the patient was to interview at [facility] with availability and discuss with counselor various housing options after graduation from [facility].  Problem: The patient has a current legal involvement.  The goal was for the patient to cooperate with the legal courts to resolve his legal issues.  In order to address this, the patient was to sign a  release of information for his legal worker.        STRENGTHS:  Mohit maintained a positive attitude while in treatment.  He was an active participant in the group settings and was open for feedback from his peers.  He worked to remain open and honest and earnestly attempted to challenge himself to work on self-improvement.    Mohit appears motivated for recovery at this time and willing to incorporate positive changes into his life.      PROGNOSIS:  The prognosis for Mohit is favorable as long as he continues to follow the continuing care recommendations listed below.      LIVING ARRANGEMENTS AT DISCHARGE:  Mohit is to report to [facility] inpatient treatment program and complete program.      CONTINUING CARE RECOMMENDATIONS AND REFERRALS:   1.  Abstain from all mood-altering chemicals, except those prescribed if non-addictive.   2.  Attendance at a minimum of three 12-step meetings per week.   3.  Obtain a permanent male sponsor and maintain regular contact with him.   4.  Admit immediately to [facility] program and complete program.   5.  Monitor and complete with the advice of doctor regarding mental and physical health issues.  Take all medications as prescribed.   6.  Continue to invest in building a sober support network.   7.  Continue to monitor and gain an understanding of relapse triggers and stressors through the use and development of healthy coping skills.         This information has been disclosed to you from records protected by Federal confidentiality rules (42 CFR part 2). The Federal rules prohibit you from making any further disclosure of this information unless further disclosure is expressly permitted by the written consent of the person to whom it pertains or as otherwise permitted by 42 CFR part 2. A general authorization for the release of medical or other information is NOT sufficient for this purpose. The Federal rules restrict any use of the information to criminally  investigate or prosecute any alcohol or drug abuse patient.      VINAYAK RAMOS            D: 2021   T: 2021   MT: YAO      Name:     AMAYA GUSTAFSON   MRN:      [id number]        Account:      [id number]   :      1990           Visit Date:   02/15/2021      Document: [id number]

## 2021-02-16 NOTE — PROGRESS NOTES
Mohit is a 30 year old who is being evaluated via a billable video visit.      How would you like to obtain your AVS? MyChart  If the video visit is dropped, the invitation should be resent by: Text to cell phone: [phone]  Will anyone else be joining your video visit? No       Video-Visit Details    Type of service:  Video Visit    Video Start Time: 2:30pm  Video End Time: 3:10pm    Originating Location (pt. Location): Home    Distant Location (provider location):  [facility]     Platform used for Video Visit: [facility]                                                      Mohit Porter is a 30 year old male who presents for  initial visit for addiction consultation and management referred by PCP for vivitrol treatment      HPI:   Mohit Porter is a 30 year old male with history of alcohol use who presents for consideration of vivitrol maintenance.    Long history of recurrent AUD issues. Just left [facility] and moved to sober housing. Has been on vivitrol in the past and reported abstinence for 6mo during that time. Denies any SE or concerns; states naltrexone pills were unhelpful and he had a hard time remembering to take them.    Plans to slowly taper his seroquel as able.          Substance Use History:   ROUTE OF SUBSTANCE ADMINISTRATION - oral ingestion of alcohol    LONGEST PERIOD OF SOBRIETY - 6mo abstinence with vivitrol    PREVIOUS DETOX/TREATMENT PROGRAMS - recently completed residential tx at [facility]. Has attended [facility] as his first treatment episode around [id number]    HISTORY OF OVERDOSE - Intubated from intoxication/withdrawal but no seizures    PREVIOUS MEDICATION ASSISTED TREATMENT - Has taken naltrexone and vivitrol shot in the past. Noted abstinence for 6mo while using vivitrol    Other Substances:    ALCOHOL- started drinking around age 16-18. Started with isolated drinking age 21; minimal consequences until about age 27. Ex-fiance  "split up with him around that time, went to [facility] at that time, and has been in an \"endless cycle\" of treatments since then.  MARIJUANA- Last use about 5 years ago  PRESCRIPTION STIMULANTS- has taken adderall twice in his life  COCAINE/CRACK- never  METH/AMPHETAMINES- never  OPIATES- took vicodin age [age] for wisdom tooth removal. \"It felt good when I had it\" but didn't overuse it or seek more at that time.  BENZODIAZEPINES- has taken valium/ativan during detox  KRATOM- none  HALLUCINOGENS - none  OTHER - \"I'd say I have a very addictive personality\". Has experienced over-doing it with exercise, work, and TV, often to avoid thinking about life and troubles.    NICOTINE- chewing tobacco - none for a couple weeks; started around age [age]   Desire to quit - yes, largely d/t finances, ideally wants to quit long-term      HepC/HIV Status - negative    PAST PSYCHIATRIC HISTORY  - Taking seroquel at night on a daily basis, between 200-400mg. Takes hydroxyzine PRN for anxiety. History of KALINA, also has a history of prolonged depressed moods but believes this is related to substances. Sees Lisandra Carver and Madhavi Box for PCP and [facility] services.    Patient Active Problem List    Diagnosis Date Noted     Alcohol use disorder, severe, in early remission (H) 02/16/2021     Priority: Medium     KALINA (generalized anxiety disorder) 02/01/2021     Priority: Medium     Chemical dependency (H) 01/20/2021     Priority: Medium     Elevated LFTs 01/17/2021     Priority: Medium     Elevated liver function tests 10/24/2020     Priority: Medium     Altered mental status, unspecified altered mental status type 10/24/2020     Priority: Medium     Alcohol dependence (H) 06/01/2020     Priority: Medium     Alcohol intoxication (H) 08/06/2019     Priority: Medium     Alcohol withdrawal (H) 02/16/2019     Priority: Medium       Problem list and histories reviewed & adjusted, as indicated.  Additional history: as documented     Past " Medical History:   Diagnosis Date     Alcohol abuse      Anxiety        History reviewed. No pertinent surgical history.      History reviewed. No pertinent family history.   Some extended family history of alcohol use concerns, both sides of the family. No immediate relatives    Social history    Living situation - [facility] for extended stay  Single// - single  Children - 3 1/1yo daughter; trying to reconnect, but unable to see her until he can have sustained recovery  Support network - Family, close friends; Has a lot of support  Work - waiting to get a job so he can setup better sober support  Education - BS special education  Legal issues - DWI; on probation. Has violated several times. Nothing upcoming as long as he engages in recovery      Current Outpatient Medications   Medication Sig Dispense Refill     naltrexone (DEPADE/REVIA) 50 MG tablet Take 1 tablet (50 mg) by mouth daily 30 tablet 1     hydrOXYzine (ATARAX) 25 MG tablet Take 1 tablet (25 mg) by mouth every 4 hours as needed for anxiety 30 tablet 1     multivitamin w/minerals (THERA-VIT-M) tablet Take 1 tablet by mouth daily 30 tablet 1     nicotine (NICORETTE) 2 MG gum Place 1 each (2 mg) inside cheek as needed for smoking cessation (Patient not taking: Reported on 2/14/2021) 108 each 1     nicotine (NICORETTE) 2 MG gum Place 1-2 each (2-4 mg) inside cheek every hour as needed for other (nicotine withdrawal symptoms) 30 each 1     QUEtiapine (SEROQUEL) 25 MG tablet Take 1 tablet (25 mg) by mouth 2 times daily as needed (psychotic anxiety) 60 tablet 0     QUEtiapine (SEROQUEL) 400 MG tablet Take 0.5-1 tablets (200-400 mg) by mouth At Bedtime 30 tablet 0     thiamine (B-1) 100 MG tablet Take 1 tablet (100 mg) by mouth daily 30 tablet 0         No Known Allergies        REVIEW OF SYSTEMS:    General: No acute withdrawal symptoms.  No recent infections or fever  Psychiatric: anxiety spikes from time to time; has trouble  redirecting himself when he is fearful. Feels things have leveled-out some. Medications helping. In a good place to live.          OBJECTIVE                                                      EXAM    There were no vitals taken for this visit.    GENERAL APPEARANCE: alert, comfortable appearing  EYES: Eyes grossly normal to inspection  NEURO: Normal strength and tone, sensory exam grossly normal, no tremor  MENTAL STATUS EXAM:  Appearance/Behavior:No appearant distress  Speech: Normal  Mood/Affect: normal affect  Insight: Adequate      LAB  No results found for any visits on 02/16/21.  Reviewed results from [facility] stay - left 2 days ago.      [facility] Board of Pharmacy Data Base Reviewed;    Consistent with patient reports and Epic records.           A/P                                                      ASSESSMENT/PLAN:  Mohit was seen today for video visit.    Diagnoses and all orders for this visit:    Alcohol use disorder, severe, in early remission (H)  -     naltrexone (DEPADE/REVIA) 50 MG tablet; Take 1 tablet (50 mg) by mouth daily      Mohit is a 31yo M establishing care for alcohol use disorder - referred by PCP for naltrexone/vivitrol therapy. Has used vivitrol with some success in the past; would like to start this ASAP. Orders placed, and process started to get this accomplished. He had COVID diagnosed Dec 31, 2020 and should not need COVID testing.    He has co-occurring anxiety managed by PCP and [facility]. Reports h/o KALINA but no other psychaitric diagnoses. Notes a tendency to jump back into work instead of focusing on recovery, and plans to stick with recommendations for building a recovery community before he restarts work.        ENCOUNTER FOR LONG TERM USE OF HIGH RISK MEDICATION   High Risk Drug Monitoring?  YES   Drug being monitored: Naltrexone/Vivitrol   Reason for drug: Alcohol Use Disorder   What is being monitored?: Dosage, Cravings, Triggers and side effects       Counseled the  patient on the importance of having a recovery program in addition to medication to manage recovery.  Components include avoiding isolating, having willingness to change, avoiding triggers and managing cravings. Encouraged having some type of sober network and practicing honesty with trusted support person(s). Encouraged other services such as counseling, 12 step or other self-help organizations.            RTC   4 weeks      Sathish Huitron MD  [facility]  [phone]

## 2021-02-23 ENCOUNTER — OFFICE VISIT (OUTPATIENT)
Dept: BEHAVIORAL HEALTH | Facility: CLINIC | Age: 31
End: 2021-02-23
Payer: COMMERCIAL

## 2021-02-23 ENCOUNTER — TELEPHONE (OUTPATIENT)
Dept: FAMILY MEDICINE | Facility: CLINIC | Age: 31
End: 2021-02-23

## 2021-02-23 DIAGNOSIS — F10.20 UNCOMPLICATED ALCOHOL DEPENDENCE (H): ICD-10-CM

## 2021-02-23 DIAGNOSIS — F41.1 GAD (GENERALIZED ANXIETY DISORDER): Primary | ICD-10-CM

## 2021-02-23 DIAGNOSIS — F10.920 ALCOHOLIC INTOXICATION WITHOUT COMPLICATION (H): ICD-10-CM

## 2021-02-23 PROCEDURE — 90834 PSYTX W PT 45 MINUTES: CPT

## 2021-02-23 RX ORDER — QUETIAPINE FUMARATE 400 MG/1
200-400 TABLET, FILM COATED ORAL AT BEDTIME
Qty: 30 TABLET | Refills: 0 | Status: ON HOLD | OUTPATIENT
Start: 2021-02-23 | End: 2021-01-01

## 2021-02-23 NOTE — TELEPHONE ENCOUNTER
2- at 1:48 PM    Reason  in person message:  Medication or medication refill:    Do you use a [facility]?  [place] of the pharmacy and phone number for the current request:  [id number] - [place], [place] - [address]  P: [phone]  F: [phone]    Name of the medication requested: Quetiapine (Seroquel) 400 mg tablet     Other request: Requests Rx go to [facility], [address] [place] [address]    Can we leave a detailed message on this number? YES    Phone number patient can be reached at: Cell number on file:    Telephone Information:   Mobile [phone]       Best Time: anytime    Additional comments: Patient said he had an appt scheduled with pcp and suddenly it was no longer scheduled. Chart reflects clinic cancelled it because pcp was not available.  staffer offered to reschedule the appt - patient declined at this time.    ALSO, patient also requested orders for labs. See other encounter from 2- for that request    Phone number Patient can be reached at:  Cell number on file:    Telephone Information:   Mobile [phone]       Best Time:  anytime    Can we leave a detailed message on this number?  YES    In Person message created on 2/23/2021 at 1:52 PM by Viviana Nava

## 2021-02-23 NOTE — TELEPHONE ENCOUNTER
"Requested Prescriptions   Pending Prescriptions Disp Refills     QUEtiapine (SEROQUEL) 400 MG tablet 30 tablet 0     Sig: Take 0.5-1 tablets (200-400 mg) by mouth At Bedtime   Last Written Prescription Date:  1/22/2021  Last Fill Quantity: 30 tablet,  # refills: 0   Last office visit: Visit date not found with prescribing provider:     Future Office Visit:            Antipsychotic Medications Passed - 2/23/2021  2:07 PM        Passed - Blood pressure under 140/90 in past 12 months     BP Readings from Last 3 Encounters:   02/01/21 90/60   01/22/21 110/70   12/31/20 109/78                 Passed - Patient is 12 years of age or older        Passed - Lipid panel on file within the past 12 months     Recent Labs   Lab Test 01/17/21  0830   CHOL 138   TRIG 35   HDL 64   LDL 67   NHDL 74               Passed - CBC on file in past 12 months     Recent Labs   Lab Test 01/22/21  0909   WBC 3.7*   RBC 4.66   HGB 14.4   HCT 43.2                    Passed - Heart Rate on file within past 12 months     Pulse Readings from Last 3 Encounters:   02/01/21 60   01/22/21 119   12/30/20 115               Passed - A1c or Glucose on file in past 12 months     Recent Labs   Lab Test 01/22/21  0909   *       Please review patients last 3 weights. If a weight gain of >10 lbs exists, you may refill the prescription once after instructing the patient to schedule an appointment within the next 30 days.    Wt Readings from Last 3 Encounters:   02/01/21 79.8 kg (176 lb)   01/22/21 79.4 kg (175 lb)   11/10/20 77.6 kg (171 lb)             Passed - Medication is active on med list        Passed - Recent (6 mo) or future (30 days) visit within the authorizing provider's specialty     Patient had office visit in the last 6 months or has a visit in the next 30 days with authorizing provider or within the authorizing provider's specialty.  See \"Patient Info\" tab in inbasket, or \"Choose Columns\" in Meds & Orders section of the refill " encounter.

## 2021-02-23 NOTE — TELEPHONE ENCOUNTER
2- at 1:48 PM    IN PERSON MESSAGE. As patient checked in for his [facility] appt he left the following message.    Reason: Request for an order or referral:    Order  being requested: liver enzymes for sure and anything pcp recommends    Date needed: at your convenience    Has the patient been seen by the PCP for this problem? YES    Additional comments: Patient said he had an appt scheduled with pcp and suddenly it was no longer scheduled. Chart reflects clinic cancelled it because pcp was not available.  offered to reschedule the appt - patient declined at this time.    ALSO, patient also requests refill of Seroquel. See other encounter from 2- for Seroquel refill request.    Phone number Patient can be reached at:  Cell number on file:    Telephone Information:   Mobile [phone]       Best Time:  anytime    Can we leave a detailed message on this number?  YES    In Person message created on 2/23/2021 at 1:52 PM by Viviana Nava

## 2021-02-24 ENCOUNTER — INFUSION THERAPY VISIT (OUTPATIENT)
Dept: INFUSION THERAPY | Facility: CLINIC | Age: 31
End: 2021-02-24
Attending: NURSE PRACTITIONER
Payer: COMMERCIAL

## 2021-02-24 VITALS
DIASTOLIC BLOOD PRESSURE: 95 MMHG | OXYGEN SATURATION: 99 % | HEART RATE: 95 BPM | SYSTOLIC BLOOD PRESSURE: 140 MMHG | TEMPERATURE: 98.7 F

## 2021-02-24 DIAGNOSIS — F10.21 ALCOHOL USE DISORDER, SEVERE, IN EARLY REMISSION (H): Primary | ICD-10-CM

## 2021-02-24 PROCEDURE — 250N000011 HC RX IP 250 OP 636: Performed by: FAMILY MEDICINE

## 2021-02-24 PROCEDURE — 96372 THER/PROPH/DIAG INJ SC/IM: CPT | Performed by: FAMILY MEDICINE

## 2021-02-24 RX ADMIN — NALTREXONE 380 MG: KIT at 13:23

## 2021-02-24 ASSESSMENT — PAIN SCALES - GENERAL: PAINLEVEL: NO PAIN (0)

## 2021-02-24 NOTE — LETTER
2021         RE: Mohit Porter  [address] [facility] [address]        Dear Colleague,    Thank you for referring your patient, Mohit Porter, to the [facility]. Please see a copy of my visit note below.    Patient presents to the [facility] for Vivitrol 1st dose here.  Order written by Dr. Huitron was completed today. Name and  verified with patient. See MAR for medication details. Medication was divided into 1 syringes by pharmacy and given in the following sites left gluteal. Patient tolerated injection well and was willing to stay 10 minutes after administration. Covid swab declined today. Patient to return back in 4 weeks.    CHINYERE Gibson LPN    Administrations This Visit     naltrexone (VIVITROL) injection 380 mg     Admin Date  2021 Action  Given Dose  380 mg Route  Intramuscular Administered By  Evelyn Gibson LPN                  Patient last drink was 1/15/2021. OK to proceed with Vivitrol.     Adriana Manriquez RN        Again, thank you for allowing me to participate in the care of your patient.        Sincerely,        [facility]

## 2021-02-24 NOTE — TELEPHONE ENCOUNTER
Left msg for Pt. Rx was sent on 2/23.   Pt was to f/u with PCP around 3/1, needs to make appt and discuss his lab request then.     Jorge L Ramírez RN   [facility]

## 2021-02-26 ENCOUNTER — TELEPHONE (OUTPATIENT)
Dept: FAMILY MEDICINE | Facility: CLINIC | Age: 31
End: 2021-02-26

## 2021-02-26 NOTE — TELEPHONE ENCOUNTER
Forms completed and faxed back to [facility] @ [phone]. Placed in abstraction to be scanned in patient's chart.    Ashvin Song, Patient Representative

## 2021-02-28 ASSESSMENT — PATIENT HEALTH QUESTIONNAIRE - PHQ9: SUM OF ALL RESPONSES TO PHQ QUESTIONS 1-9: 11

## 2021-02-28 NOTE — PROGRESS NOTES
"[facility]: : Integrated Behavioral Health  January 29, 2021      Behavioral Health Clinician Progress Note    Patient Name: Mohit Porter           Service Type:  Individual      Service Location:   Face to Face in Clinic     Session Start Time: 8:34 am  Session End Time: 9:26 am      Session Length: 38 - 52      Attendees: Patient    Visit Activities (Refresh list every visit): [facility] Only    Diagnostic Assessment Date: in process  Treatment Plan Review Date: in process  See Flowsheets for today's PHQ-9 and KALINA-7 results  Previous PHQ-9:   PHQ-9 SCORE 1/29/2021   PHQ-9 Total Score 11   Some encounter information is confidential and restricted. Go to Review Flowsheets activity to see all data.     Previous KALINA-7:   KALINA-7 SCORE 1/29/2021   Total Score 18   Some encounter information is confidential and restricted. Go to Review Flowsheets activity to see all data.       FARIHA LEVEL:  No flowsheet data found.    DATA  Extended Session (60+ minutes): No  Interactive Complexity: No  Crisis: No  [facility] Patient: No    Treatment Objective(s) Addressed in This Session:  Target Behavior(s): alcohol use and disease management/lifestyle changes and mental health symptoms    Anxiety: will experience a reduction in anxiety, will develop more effective coping skills to manage anxiety symptoms, will develop healthy cognitive patterns and beliefs and will increase ability to function adaptively  Alcohol / Substance Use: will increase understanding of the effects of substance use  continue to make healthy choices regarding substance use and engage in activities / supportive services that promote sobriety    Current Stressors / Issues:  [facility] visit with patient in the clinic. Patient presents to establish therapy and complete diagnostic assessment. Assessment was not completed due to time. A follow up visit was scheduled for 2/8/21. Patient reports \"roller coaster\" of emotions, \"I can feel real good one " "minute and super fearful the next.\" Patient endorses anxiety describing racing thoughts, feeling keyed up, and catastrophic thinking. Patient reports history of alcohol abuse beginning in his piotr year of college. He is currently attending [facility] [employer] program. Patient reflected on the effects of his use sharing about familial strain, legal problems, and employment issues. Validated patient's awareness and the steps he's taking to address his behavioral health needs.    Progress on Treatment Objective(s) / Homework:  No improvement - PREPARATION (Decided to change - considering how); Intervened by negotiating a change plan and determining options / strategies for behavior change, identifying triggers, exploring social supports, and working towards setting a date to begin behavior change    Motivational Interviewing    MI Intervention: Expressed Empathy/Understanding, Supported Autonomy, Collaboration, Evocation, Open-ended questions, Reflections: simple and complex, Change talk (evoked) and Reframe     Change Talk Expressed by the Patient: Desire to change Ability to change Reasons to change Need to change Committment to change Activation Taking steps    Provider Response to Change Talk: E - Evoked more info from patient about behavior change, A - Affirmed patient's thoughts, decisions, or attempts at behavior change, R - Reflected patient's change talk and S - Summarized patient's change talk statements    Also provided psychoeducation about behavioral health condition, symptoms, and treatment options    Care Plan review completed: No    Medication Review:  No changes to current psychiatric medication(s)    Medication Compliance:  Yes    Changes in Health Issues:   None reported    Chemical Use Review:   Substance Use: decrease in alcohol .  Patient reports frequency of use sober since 1/15/21.  Stage of Change: Action  Provided support and affirmation for steps taken towards sobriety    "      Tobacco Use: No change in amount of tobacco use since last session.  Did not discuss at this time    Assessment: Current Emotional / Mental Status (status of significant symptoms):  Risk status (Self / Other harm or suicidal ideation)  Patient denies a history of suicidal ideation, suicide attempts, self-injurious behavior, homicidal ideation, homicidal behavior and and other safety concerns  Patient denies current fears or concerns for personal safety.  Patient denies current or recent suicidal ideation or behaviors.  Patient denies current or recent homicidal ideation or behaviors.  Patient denies current or recent self injurious behavior or ideation.  Patient denies other safety concerns.  A safety and risk management plan has not been developed at this time, however patient was encouraged to call [place] [phone] should there be a change in any of these risk factors.    Appearance:   Appropriate   Eye Contact:   Good   Psychomotor Behavior: Normal   Attitude:   Cooperative   Orientation:   All  Speech   Rate / Production: Normal    Volume:  Normal   Mood:    Anxious   Affect:    Appropriate   Thought Content:  Clear   Thought Form:  Coherent  Logical   Insight:    Good     Diagnoses:  1. KALINA (generalized anxiety disorder)        Collateral Reports Completed:  Not Applicable    Plan: (Homework, other):  Patient was given information about behavioral services and encouraged to schedule a follow up appointment with the clinic [facility] in 1 week.  He was also given information about mental health symptoms and treatment options .  CD Recommendations: CD Treatment at [facility]. GAYR Silverman, [facility]  Reviewed/Supervised by: NIRANJAN Zamora, [facility]  This note has been reviewed and I agree with the plan of care. This note is co-signed by ALIX Zamora [facility], Supervisor, on: 3/3/21

## 2021-03-01 ASSESSMENT — ANXIETY QUESTIONNAIRES: GAD7 TOTAL SCORE: 18

## 2021-03-11 NOTE — PROGRESS NOTES
"[facility]: : Integrated Behavioral Health  Provider Name:  Madhavi Santoyoard     Credentials:  [facility]    PATIENT'S NAME: Mohit Porter  PREFERRED NAME: Mohit  PRONOUNS: he/him/his     MRN: [id number]  : 1990  ADDRESS: [address] [facility]  [place] [address]   ACCT. NUMBER:  [id number]  DATE OF SERVICE: 21  START TIME: 9:00 am   END TIME: 9:57 am  PREFERRED PHONE: [phone]  May we leave a program related message: Yes  SERVICE MODALITY:  In-person    UNIVERSAL ADULT Mental Health DIAGNOSTIC ASSESSMENT      Identifying Information:  Patient is a 30 year old, .  The pronoun use throughout this assessment reflects the patient's chosen pronoun.  Patient was referred for an assessment by [facility].  Patient attended the session alone.     Chief Complaint:   The reason for seeking services at this time is: anxiety. Patient endorses racing thoughts, rumination, feeling keyed up, and sleep issues. Patient reports catastrophic thinking \"worse case scenario for everything\". He described an overall negative outlook and poor self-esteem \"glass is half empty\" and \"I set myself for failure, nothing is ever good enough.\" He states that it's difficult being alone with his thoughts, especially when he's sober. Patient reports difficulty regulating his emotions, \"roller coaster, I can feel good and feel super fearful.\" Patient states this has slowly been improving since he started CD treatment noting that the structure is helpful. Patient has been sober since 1/15/21. He is currently attending [facility]. Patient expressed hesitancy about recovery, sharing a desire to remain sober and acknowledging that he's struggled with relapse in the past. The problem(S) began in early high school. Patient has attempted to resolve these concerns in the past through counseling, CD treatment, psychiatry, primary care, and medications.    Social/Family History:  Patient reported " "they grew up in [place], [place].  They were raised by biological parents.  Parents stayed .  Patient reported that their childhood was \"good, we got everything we needed.\" Patient shared that his family was active, went on multiple vacations, and spent time with their extended family. Patient enjoyed school, he was a good student and had many friends. Patient described their current relationships with family of origin as strained.      The patient describes their cultural background as strong family values \"having a family, being a father, being a positive role model.\"  Cultural influences and impact on patient's life structure, values, norms, and healthcare: Racial or Ethnic Self-Identification White and Social Orientation: Values family and being a father.  Contextual influences on patient's health include: Family Factors Alcohol abuse in extended family and Health- Seeking Factors Attends appointments, medication compliance, motivated in recovery.    These factors will be addressed in the Preliminary Treatment plan.  Patient identified their preferred language to be English. Patient reported they does not need the assistance of an  or other support involved in therapy.     Patient reported had no significant delays in developmental tasks.   Patient's highest education level was college graduate. Patient identified the following learning problems: none reported.  Modifications will not be used to assist communication in therapy. Patient reports they are  able to understand written materials.    Patient reported the following relationship history: 3 long term relationships.  Patient's current relationship status is single for NA.   Patient identified their sexual orientation as heterosexual.  Patient reported having one child(davi), daughter 3 years old. Patient has not seen his daughter since February 2020 due to alcohol use. Patient identified parents, siblings and friends as part of their " "support system.  Patient identified the quality of these relationships as fair.      Patient's current living/housing situation involves sober housing.  They live with housemates and they report that housing is stable.     Patient is currently unemployed.  Patient reports their finances are partly obtained through parents.  Patient does identify finances as a current stressor.      Patient reported that they have been involved with the legal system.  3 DWIs, Patient does report being on probation / parole / under the jurisdiction of the court: 5 years, license currently suspended.    Patient's Strengths and Limitations:  Patient identified the following strengths or resources that will help them succeed in treatment: commitment to health and well being, friends / good social support, family support, insight, intelligence, motivation, sober support group / recovery support  and \"resilient, personable, collaborative, communicative, calm in crisis, uplifting\". Things that may interfere with the patient's success in treatment include: financial hardship.     Personal and Family Medical History:  Patient does report a family history of mental health concerns.  Patient reports dad and youngest brother have anxiety.     Patient does report Mental Health Diagnosis and/or Treatment.  Patient reported the following previous diagnoses which include(s): an Anxiety Disorder and Depression.  Patient reported symptoms began in early high school.   Patient has received mental health services in the past: therapy with NA, MI / CD day treatment at [facility], psychiatry with NA.  and detox.  Psychiatric Hospitalizations: None.  Patient denies a history of civil commitment.  Currently, patient is receiving other mental health services.  These include MI / CD day treatment at [facility] and primary care provider at [facility].  For follow-up on NA.           Patient has " had a physical exam to rule out medical causes for current symptoms.  Date of last physical exam was within the past year. Client was encouraged to follow up with PCP if symptoms were to develop. The patient has a [facility] Provider, who is named Lisandra Carver.  Patient reports no current medical concerns.  Patient denies any issues with pain.   There are not significant appetite / nutritional concerns / weight changes.   Patient does report a possible history of head injury / trauma / cognitive impairment.      Patient reports current meds as:   Current Outpatient Medications   Medication     hydrOXYzine (ATARAX) 25 MG tablet     multivitamin w/minerals (THERA-VIT-M) tablet     naltrexone (DEPADE/REVIA) 50 MG tablet     nicotine (NICORETTE) 2 MG gum     nicotine (NICORETTE) 2 MG gum     QUEtiapine (SEROQUEL) 25 MG tablet     QUEtiapine (SEROQUEL) 400 MG tablet     thiamine (B-1) 100 MG tablet     No current facility-administered medications for this visit.        Medication Adherence:  Patient reports taking prescribed medications as prescribed.    Patient Allergies:  No Known Allergies    Medical History:    Past Medical History:   Diagnosis Date     Alcohol abuse      Anxiety          Current Mental Status Exam:   Appearance:  Appropriate    Eye Contact:  Good   Psychomotor:  Normal       Gait / station:  no problem  Attitude / Demeanor: Cooperative  Friendly Pleasant  Speech      Rate / Production: Normal/ Responsive      Volume:  Normal  volume      Language:  no problems  Mood:   Anxious  Depressed   Affect:   Appropriate    Thought Content: Clear   Thought Process: Coherent  Goal Directed  Logical       Associations: No loosening of associations  Insight:   Fair  and Intellectual Insight  Judgment:  Intact   Orientation:  All  Attention/concentration: Good    Rating Scales:    PHQ9:    PHQ-9 SCORE 1/29/2021   PHQ-9 Total Score 11   Some encounter information is confidential and restricted. Go  to Review Flowsheets activity to see all data.   ;    GAD7:    KALINA-7 SCORE 1/29/2021   Total Score 18   Some encounter information is confidential and restricted. Go to Review Flowsheets activity to see all data.     CGI:     First:Considering your total clinical experience with this particular patient population, how severe are the patient's symptoms at this time?: 3 (2/9/2021 10:13 AM)  ;    Most recentCompared to the patient's condition at the START of treatment, this patient's condition is: 3 (2/9/2021 10:13 AM)      Substance Use:  Patient did report a family history of substance use concerns; see medical history section for details.  Patient has received chemical dependency treatment in the past at [facility].  Patient has ever been to detox.      Patient is currently receiving the following services: CD Treatment at [facility]. Patient reported the following problems as a result of their substance use: child custody, DUI, family problems, financial problems, legal issues, occupational / vocational problems and relationship problems.    Patient reports using alcohol multiple times per day and has a pint over 3 days mixed drinks at a time. Patient first started drinking at age [age].  Patient reported date of last use was 1/15/21.  Patient reports heaviest use was 26-29.  Patient reports using tobacco NA times per day. Client started using tobacco at age 17..  Patient denies using marijuana.  Patient reports using caffeine 1 times per day and drinks 30oz coffee at a time. Patient started using caffeine at age NA.  Patient reports using/abusing the following substance(s). Patient reported no other substance use.     CAGE- AID:    CAGE-AID Total Score 1/29/2021   Total Score 4       Substance Use: blackouts, passing out, hangovers, other substance related medical issue liver issues, daily use, substance related legal problems, work absence due to substance use,  family relationship problems due to substance use, social problems related to substance use, driving under the influence and cravings/urges to use    Based on the positive CAGE score and clinical interview there  are not indications of drug or alcohol abuse at this current time. Patient has been sober since 1/15/21. He is currently attending  treatment at [facility].    Significant Losses / Trauma / Abuse / Neglect Issues:   Patient did not serve in the .  There are indications or report of significant loss, trauma, abuse or neglect issues related to: major medical problems patient reports he has been intubated due to alcohol use.  Concerns for possible neglect are not present.     Safety Assessment:   Current Safety Concerns:  [place] Suicide Severity Rating Scale (Lifetime/Recent)  [place] Suicide Severity Rating (Lifetime/Recent) 1/29/2021   1. Wish to be Dead (Lifetime) No   1. Wish to be Dead (Recent) No   2. Non-Specific Active Suicidal Thoughts (Lifetime) No   2. Non-Specific Active Suicidal Thoughts (Recent) No   3. Active Suicidal Ideation with any Methods (Not Plan) Without Intent to Act (Lifetime) No   3. Active Suicidal Ideation with any Methods (Not Plan) Without Intent to Act (Recent) No   4. Active Suicidal Ideation with Some Intent to Act, Without Specific Plan (Lifetime) No   4. Active Suicidal Ideation with Some Intent to Act, Without Specific Plan (Recent) No   5. Active Suicidal Ideation with Specific Plan and Intent (Lifetime) No   5. Active Suicidal Ideation with Specific Plan and Intent (Recent) No   Most Severe Ideation Rating (Lifetime) NA   Frequency (Lifetime) NA   Duration (Lifetime) NA   Controllability (Lifetime) NA   Protective Factors  (Lifetime) NA   Reasons for Ideation (Lifetime) NA   Most Severe Ideation Rating (Past Month) NA   Frequency (Past Month) NA   Duration (Past Month) NA   Controllability (Past Month) NA   Protective Factors (Past Month) NA   Reasons for  Ideation (Past Month) NA   Actual Attempt (Lifetime) No   Actual Attempt (Past 3 Months) No   Has subject engaged in non-suicidal self-injurious behavior? (Lifetime) No   Has subject engaged in non-suicidal self-injurious behavior? (Past 3 Months) No   Interrupted Attempts (Lifetime) No   Interrupted Attempts (Past 3 Months) No   Aborted or Self-Interrupted Attempt (Lifetime) No   Aborted or Self-Interrupted Attempt (Past 3 Months) No   Preparatory Acts or Behavior (Lifetime) No   Preparatory Acts or Behavior (Past 3 Months) No   Most Recent Attempt Actual Lethality Code NA   Most Lethal Attempt Actual Lethality Code NA   Initial/First Attempt Actual Lethality Code NA   Some encounter information is confidential and restricted. Go to Review Flowsheets activity to see all data.     Patient denies current homicidal ideation and behaviors.  Patient denies current self-injurious ideation and behaviors.    Patient reported unsafe motor vehicle operation associated with substance use.  Patient reported substance use associated with mental health symptoms.  Patient reports the following current concerns for their personal safety: None.  Patient reports there are  firearms in his parent's house. The firearms are secured in a locked space.     History of Safety Concerns:  Patient denied a history of homicidal ideation.     Patient denied a history of personal safety concerns.    Patient denied a history of assaultive behaviors.    Patient denied a history of sexual assault behaviors.     Patient reported a history unsafe motor vehicle operation associated with substance use.  Patient reported a history of substance use associated with mental health symptoms.  Patient reports the following protective factors: forward/future oriented thinking, dedication to family/friends, safe and stable environment, abstinence from substances, adherence with prescribed medication, living with other people, structured day, committment to  well-being and access to a variety of clinical interventions    Risk Plan:  See Recommendations for Safety and Risk Management Plan    Review of Symptoms per patient report:  Depression: No symptoms  Yamileth:  No Symptoms  Psychosis: No Symptoms  Anxiety: Excessive worry, Nervousness, Sleep disturbance, Ruminations, Irritability and Low self-worth  Panic:  No symptoms  Post Traumatic Stress Disorder:  No Symptoms   Eating Disorder: No Symptoms  ADD / ADHD:  No symptoms  Conduct Disorder: No symptoms  Autism Spectrum Disorder: No symptoms  Obsessive Compulsive Disorder: No Symptoms    Patient reports the following compulsive behaviors and treatment history: NA.      Diagnostic Criteria:   A. Excessive anxiety and worry about a number of events or activities (such as work or school performance).   B. The person finds it difficult to control the worry.  C. Select 3 or more symptoms (required for diagnosis). Only one item is required in children.   - Restlessness or feeling keyed up or on edge.    - Irritability.    - Sleep disturbance (difficulty falling or staying asleep, or restless unsatisfying sleep).   D. The focus of the anxiety and worry is not confined to features of an Axis I disorder.  E. The anxiety, worry, or physical symptoms cause clinically significant distress or impairment in social, occupational, or other important areas of functioning.   F. The disturbance is not due to the direct physiological effects of a substance (e.g., a drug of abuse, a medication) or a general medical condition (e.g., hyperthyroidism) and does not occur exclusively during a Mood Disorder, a Psychotic Disorder, or a Pervasive Developmental Disorder.  OP BEH RYAN CRITERIA: Substance is often taken in larger amounts or over a longer period than was intended.  Met for:  Alcohol, There is persistent desire or unsuccessful efforts to cut down or control use of the substance.  Met for:  Alcohol,  A great deal of time is spent in  activities necessary to obtain the substance, use the substance, or recover from its effects.  Met for:  Alcohol, Craving, or a strong desire or urge to use the substance.  Met for:  Alcohol, Recurrent use of the substance resulting in a failure to fulfill major role obligations at work, school, or home.  Met for:  Alcohol, Continued use of the substance despite having persistent or recurrent social or interpersonal problems caused or exacerbated by the effects of its use.  Met for:  Alcohol, Important social, occupational, or recreational activities are given up or reduced because of the substance.  Met for:  Alcohol, Recurrent use of the substance in which it is physically hazardous.  Met for:  Alcohol, Use of the substance is continued despite knowledge of having a persistent or recurrent physical or psychological problem that is likely to have been cause or exacerbated by the substance.  Met for:  Alcohol, Tolerance:  either a need for markedly increased amounts of the substance to achieve the desired effect or a markedly diminished effect with continued use of the dame amount of the substance.  Met for:  Alcohol, Withdrawal:  either patient endorses characteristic withdrawal syndrome for the substance or the substance (or closely related substance) is taken to relieve or avoid withdrawal symptoms.  Met for:  Alcohol    Functional Status:  Patient reports the following functional impairments: relationship(s), self-care and work / vocational responsibilities.     WHODAS:   WHODAS 2.0 Total Score 1/29/2021   Total Score 12     Nonprogrammatic care:  Patient is requesting basic services to address current mental health concerns.    Clinical Summary:  1. Reason for assessment: to clarify behavioral health diagnosis.  2. Psychosocial, Cultural and Contextual Factors: attending treatment, financial stressors, social support, father to a young daughter.  3. Principal DSM5 Diagnoses  (Sustained by DSM5 Criteria Listed  Above):   300.02 (F41.1) Generalized Anxiety Disorder.  4. Other Diagnoses that is relevant to services:   Substance-Related & Addictive Disorders Alcohol Use Disorder   303.90 (F10.20) Severe In a controlled environment.  5. Provisional Diagnosis:  311 (F32.9) Unspecified Depressive Disorder  as evidenced by low self esteem, worthlessness.  6. Prognosis: Return to Normal Functioning, Expect Improvement and Relieve Acute Symptoms.  7. Likely consequences of symptoms if not treated: Decline in functioning.  8. Client strengths include:  caring, committed to sobriety, educated, empathetic, goal-focused, has a previous history of therapy, insightful, intelligent, motivated, support of family, friends and providers and work history .     Recommendations:     1. Plan for Safety and Risk Management:   Recommended that patient call 911 or go to the local ED should there be a change in any of these risk factors..          Report to child / adult protection services was NA.     2. Patient's identified mental health and substance use concerns with a cultural influence will be addressed by intergration into treatment planning and sessions..     3. Initial Treatment will focus on:    Anxiety - low self-worth, catastrophic thinking, racing thoughts  Alcohol / Substance Use - maintain sobriety and engage in recovery.     4. Resources/Service Plan:    services are not indicated.   Modifications to assist communication are not indicated.   Additional disability accommodations are not indicated.      5. Collaboration:   Collaboration / coordination of treatment will be initiated with the following  support professionals: primary care physician and addiction medicine provider.      6.  Referrals:   The following referral(s) will be initiated: Outpatient Mental [facility] Therapy with [facility]. Referral to [facility] if additional services are needed. Next Scheduled Appointment: 2/23/21.     A Release of Information has been obtained for  the following: NA.    7. RYAN:    RYAN:  Discussed the general effects of drugs and alcohol on health and well-being. Provider gave patient printed information about the effects of chemical use on their health and well being. Recommendations:  Continue CD treatment at [facility] .     8. Records:   These were reviewed at time of assessment.   Information in this assessment was obtained from the medical record and provided by patient who is a good historian.   Patient will have open access to their mental health medical record.      Provider Name/ Credentials:  GARY Silverman  February 8, 2021  Reviewed/Supervised by: NIRANJAN Zamora    This note has been reviewed and I agree with the plan of care. This note is co-signed by ALIX Zamora LICSW, Supervisor, on: 3/16/21

## 2021-03-12 NOTE — PROGRESS NOTES
[facility] Primary Care: : Integrated Behavioral Health  February 23, 2021      Behavioral Health Clinician Progress Note    Patient Name: Mohit Porter           Service Type:  Individual      Service Location:   Face to Face in Clinic     Session Start Time: 2:00 pm  Session End Time: 2:50 am      Session Length: 38 - 52      Attendees: Patient    Visit Activities (Refresh list every visit): [facility] Only    Diagnostic Assessment Date: 2/8/21  Treatment Plan Review Date: 5/8/21  CGI: 2/8/21  See Flowsheets for today's PHQ-9 and KALINA-7 results  Previous PHQ-9:   PHQ-9 SCORE 1/29/2021   PHQ-9 Total Score 11   Some encounter information is confidential and restricted. Go to Review Flowsheets activity to see all data.     Previous KALINA-7:   KALINA-7 SCORE 1/29/2021   Total Score 18   Some encounter information is confidential and restricted. Go to Review Flowsheets activity to see all data.       FARIHA LEVEL:  No flowsheet data found.    DATA  Extended Session (60+ minutes): No  Interactive Complexity: No  Crisis: No  [facility] Patient: No    Treatment Objective(s) Addressed in This Session:  Target Behavior(s): alcohol use and disease management/lifestyle changes and mental health symptoms    Anxiety: will experience a reduction in anxiety, will develop more effective coping skills to manage anxiety symptoms, will develop healthy cognitive patterns and beliefs and will increase ability to function adaptively  Alcohol / Substance Use: will increase understanding of the effects of substance use  continue to make healthy choices regarding substance use and engage in activities / supportive services that promote sobriety    Current Stressors / Issues:  [facility] visit with patient in the clinic. Patient and writer reviewed patient's diagnostic assessment and diagnosis. Patient expressed understanding and agreement. Patient was recently discharged from [facility] and moved into sober housing. He notes recovery is going  well. Patient reflected on his history of alcohol abuse. He expressed frustration with himself, sharing that if he had made different choices when he was younger or had gotten help sooner he would be in a different situation. He shared that he expected himself to have a home, a job, and a family by now. Validated patient's feelings and the difficulties of looking at our pasts and thinking about what could have been. Worked with patient to cultivate self-compassion for his younger self. Patient was hesitant, noting he could have and should have done more.     Progress on Treatment Objective(s) / Homework:  No improvement - PREPARATION (Decided to change - considering how); Intervened by negotiating a change plan and determining options / strategies for behavior change, identifying triggers, exploring social supports, and working towards setting a date to begin behavior change    Motivational Interviewing    MI Intervention: Expressed Empathy/Understanding, Supported Autonomy, Collaboration, Evocation, Open-ended questions, Reflections: simple and complex, Change talk (evoked) and Reframe     Change Talk Expressed by the Patient: Desire to change Ability to change Reasons to change Need to change Committment to change Activation Taking steps    Provider Response to Change Talk: E - Evoked more info from patient about behavior change, A - Affirmed patient's thoughts, decisions, or attempts at behavior change, R - Reflected patient's change talk and S - Summarized patient's change talk statements    Also provided psychoeducation about behavioral health condition, symptoms, and treatment options    Care Plan review completed: No    Medication Review:  No changes to current psychiatric medication(s)    Medication Compliance:  Yes    Changes in Health Issues:   None reported    Chemical Use Review:   Substance Use: decrease in alcohol .  Patient reports frequency of use sober since 1/15/21.  Stage of Change:  Action  Provided support and affirmation for steps taken towards sobriety         Tobacco Use: No change in amount of tobacco use since last session.  Did not discuss at this time    Assessment: Current Emotional / Mental Status (status of significant symptoms):  Risk status (Self / Other harm or suicidal ideation)  Patient denies a history of suicidal ideation, suicide attempts, self-injurious behavior, homicidal ideation, homicidal behavior and and other safety concerns  Patient denies current fears or concerns for personal safety.  Patient denies current or recent suicidal ideation or behaviors.  Patient denies current or recent homicidal ideation or behaviors.  Patient denies current or recent self injurious behavior or ideation.  Patient denies other safety concerns.  A safety and risk management plan has not been developed at this time, however patient was encouraged to call [place] [phone] should there be a change in any of these risk factors.    Appearance:   Appropriate   Eye Contact:   Good   Psychomotor Behavior: Normal   Attitude:   Cooperative   Orientation:   All  Speech   Rate / Production: Normal    Volume:  Normal   Mood:    Anxious   Affect:    Appropriate   Thought Content:  Clear   Thought Form:  Coherent  Logical   Insight:    Good     Diagnoses:  1. KALINA (generalized anxiety disorder)      Collateral Reports Completed:  Not Applicable    Plan: (Homework, other):  Patient was given information about behavioral services and encouraged to schedule a follow up appointment with the clinic [facility] in 2 weeks.  He was also given information about mental health symptoms and treatment options .  CD Recommendations: CD Treatment at [facility]. GARY Silverman, [facility]  Reviewed/Supervised by: NIRANJAN Zamora, [facility]  This note has been reviewed and I agree with the plan of care. This note is co-signed by ALIX Zamora [facility], Supervisor, on: 4/5/21                                                        [facility] Treatment Plan    Client's Name: Mohit Porter  YOB: 1990    Date: 2/8/21    DSM-V Diagnoses: 300.02 (F41.1) Generalized Anxiety Disorder.  303.90 (F10.20) Alcohol Use Disorder, Severe In a controlled environment.  Psychosocial / Contextual Factors: attending treatment, financial stressors, social support, father to a young daughter.  WHODAS: 13    Referral / Collaboration:  Referral to another professional/service is not indicated at this time..    Anticipated number of session or this episode of care: 12-24/yr      Measurable Treatment Goal(s) related to diagnosis / functional impairment(s)  Goal 1: Client will experience a reduction in anxiety and develop coping skills to effectively manage symptoms.    Objective #A (Client Action)    Client will use thought-stopping strategy daily to reduce intrusive thoughts  Identify negative self-talk and behaviors: challenge core beliefs, myths, and actions.  Status: New - Date: 2/8/21     Intervention(s)  Therapist will teach emotional recognition/identification. Therapist will also use components of DBT and CBT strategies to understand emotions, understand thought process, and the impact on functioning..    Objective #B  Client will maintain sobriety and engage in activities / supportive services that promote sobriety.  Status: New - Date: 2/8/21     Intervention(s)  Therapist will teach client how to perform a behavioral chain analysis.    Patient has reviewed and agreed to the above plan.      GARY Silverman  February 8, 2021

## 2021-03-16 ENCOUNTER — VIRTUAL VISIT (OUTPATIENT)
Dept: ADDICTION MEDICINE | Facility: CLINIC | Age: 31
End: 2021-03-16
Payer: COMMERCIAL

## 2021-03-16 DIAGNOSIS — G47.00 INSOMNIA, UNSPECIFIED TYPE: ICD-10-CM

## 2021-03-16 DIAGNOSIS — F10.21 ALCOHOL USE DISORDER, SEVERE, IN EARLY REMISSION (H): Primary | ICD-10-CM

## 2021-03-16 DIAGNOSIS — F41.1 GAD (GENERALIZED ANXIETY DISORDER): ICD-10-CM

## 2021-03-16 PROCEDURE — 99214 OFFICE O/P EST MOD 30 MIN: CPT | Mod: 95 | Performed by: FAMILY MEDICINE

## 2021-03-16 RX ORDER — QUETIAPINE FUMARATE 100 MG/1
50-100 TABLET, FILM COATED ORAL
Qty: 30 TABLET | Refills: 0 | Status: ON HOLD | OUTPATIENT
Start: 2021-03-16 | End: 2021-01-01

## 2021-03-16 NOTE — TELEPHONE ENCOUNTER
Looks like on 3- the patient scheduled an appt with his pcp for later this month (dos 3-). Updated 3-23's appt note to include this. Closing encounter.          Viviana Nava  Patient Representative  [facility]

## 2021-03-16 NOTE — PROGRESS NOTES
SUBJECTIVE                                                    SUBSTANCE USE DISORDER FOLLOW UP:    Mohit Porter is a 30 year old male who presents to clinic today for follow up of Alcohol Use Disorder (AUD).    Visit performed Virtual, via video    Video-Visit Details    Type of service:  Video Visit    Video Start Time: 2:03 PM  Video End Time: 2:24 PM    Originating Location (pt. Location): Home    Distant Location (provider [facility]     Platform used for Video Visit: Elen        TODAY'S VISIT  HPI Mar 16, 2021  - No significant cravings, no side-effects besides soreness at injection site  - Vivitrol 3 weeks ago, scheduled for repeat next week  - Glad to have a consistent medication without needing to take tablet daily  - Attending counseling and meetings regularly  - Getting out and enjoying himself, taking care of himself. Benefiting from structure  - Worried that his daughter's mom is attempting to remove Mohit's right to custody.         Social Determinants:  Details in RED require regular/imminent attention; Blue are likely to change in near future    Living situation:  Extended stay at [facility]   Single///partner single   Children 3 1/1yo daughter; trying to reconnect, but unable to see her until he can have sustained recovery   Support system: Family, close friends; Has a lot of support   Employment: waiting to get a job so he can setup better sober support   Education BS - special education   Barriers to Care (transportation, childcare, etc): n/a   Upcoming Court Date(s): Nothing upcoming as long as he continues engaging in recovery   Consent to Communicate? n/a           A/P                                                    ASSESSMENT/PLAN    Diagnoses and all orders for this visit:  Alcohol use disorder, severe, in early remission (H)  Insomnia, unspecified type  -     QUEtiapine (SEROQUEL) 100 MG tablet; Take 0.5-1 tablets ( mg) by mouth  nightly as needed (anxiety)  KALINA (generalized anxiety disorder)      Orders Placed This Encounter   Medications     QUEtiapine (SEROQUEL) 100 MG tablet     Sig: Take 0.5-1 tablets ( mg) by mouth nightly as needed (anxiety)     Dispense:  30 tablet     Refill:  0       - Tapering seroquel, down from 200-400 (has been taking roughly 100mg lately)  - Major stress in life involves custody concerns; will continue to discuss  - Continue vivitrol    Reported IVDU in past 2 months?  No  Infectious disease screening UTD?  No   - HIV test date: none   - HepC test date: 1/17/21        RTC:  Future Appointments   Date Time Provider Department Center   3/23/2021  3:00 PM Madhavi Box LGSW [facility]   3/23/2021  4:00 PM Lisandra Carver APRN CNP [facility]   3/24/2021  1:30 PM [facility] SPEC INFUSION [facility]   4/13/2021  3:20 PM Sathish Huitron MD [facility]   4/16/2021 12:30 PM RI COVID VACCINE 28 DAY MODERNA [facility] RID         ENCOUNTER FOR LONG TERM USE OF HIGH RISK MEDICATION   High Risk Drug Monitoring?  YES   Drug being monitored: naltrexone   Reason for drug: Alcohol Use Disorder (AUD)   What is being monitored?: Dosage, Cravings, Trigger, side effects, and continued abstinence.    Counseled the patient on the importance of having a recovery program in addition to medication to manage recovery.  Components include avoiding isolating, having willingness to change, avoiding triggers and managing cravings. Encouraged having some type of sober network and practicing honesty with trusted support person(s). Encouraged other services such as counseling, 12 step or other self-help organizations.        [facility] Board of Pharmacy Data Base Reviewed;   Consistent with patient reports and Epic records.          OBJECTIVE                                                    PHYSICAL EXAM:  There were no vitals taken for this visit.    GENERAL: healthy, alert and no distress  EYES: Eyes grossly normal to  inspection, PERRL and conjunctivae and sclerae normal  RESP: No respiratory distress  MENTAL STATUS EXAM  Alert, normal speech, mood pleasant, insight appropriate    LAB  No results found for any visits on 03/16/21.      HISTORY                                                    Problem list reviewed & adjusted, as indicated.  Patient Active Problem List   Diagnosis     Alcohol withdrawal (H)     Alcohol intoxication (H)     Alcohol dependence (H)     Elevated liver function tests     Altered mental status, unspecified altered mental status type     Elevated LFTs     Chemical dependency (H)     KALINA (generalized anxiety disorder)     Alcohol use disorder, severe, in early remission (H)         MEDICATION LIST (prior to visit)  hydrOXYzine (ATARAX) 25 MG tablet, Take 1 tablet (25 mg) by mouth every 4 hours as needed for anxiety  multivitamin w/minerals (THERA-VIT-M) tablet, Take 1 tablet by mouth daily  naltrexone (DEPADE/REVIA) 50 MG tablet, Take 1 tablet (50 mg) by mouth daily  nicotine (NICORETTE) 2 MG gum, Place 1 each (2 mg) inside cheek as needed for smoking cessation (Patient not taking: Reported on 2/14/2021)  nicotine (NICORETTE) 2 MG gum, Place 1-2 each (2-4 mg) inside cheek every hour as needed for other (nicotine withdrawal symptoms)  QUEtiapine (SEROQUEL) 25 MG tablet, Take 1 tablet (25 mg) by mouth 2 times daily as needed (psychotic anxiety)  QUEtiapine (SEROQUEL) 400 MG tablet, Take 0.5-1 tablets (200-400 mg) by mouth At Bedtime  thiamine (B-1) 100 MG tablet, Take 1 tablet (100 mg) by mouth daily    No current facility-administered medications on file prior to visit.       No Known Allergies        Sathish Huitron MD  [facility] Addiction Medicine  [phone]

## 2021-03-16 NOTE — PROGRESS NOTES
Mohit is a 30 year old who is being evaluated via a billable video visit.      How would you like to obtain your AVS? MyChart  If the video visit is dropped, the invitation should be resent by: Text to cell phone: [phone]  Will anyone else be joining your video visit? No

## 2021-03-19 ENCOUNTER — IMMUNIZATION (OUTPATIENT)
Dept: NURSING | Facility: CLINIC | Age: 31
End: 2021-03-19
Payer: COMMERCIAL

## 2021-03-19 PROCEDURE — 0011A PR COVID VAC MODERNA 100 MCG/0.5 ML IM: CPT

## 2021-03-19 PROCEDURE — 91301 PR COVID VAC MODERNA 100 MCG/0.5 ML IM: CPT

## 2021-03-24 ENCOUNTER — INFUSION THERAPY VISIT (OUTPATIENT)
Dept: INFUSION THERAPY | Facility: CLINIC | Age: 31
End: 2021-03-24
Attending: NURSE PRACTITIONER
Payer: COMMERCIAL

## 2021-03-24 VITALS
TEMPERATURE: 98.5 F | DIASTOLIC BLOOD PRESSURE: 83 MMHG | RESPIRATION RATE: 16 BRPM | SYSTOLIC BLOOD PRESSURE: 134 MMHG | HEART RATE: 74 BPM | OXYGEN SATURATION: 99 %

## 2021-03-24 DIAGNOSIS — F10.21 ALCOHOL USE DISORDER, SEVERE, IN EARLY REMISSION (H): Primary | ICD-10-CM

## 2021-03-24 PROCEDURE — 250N000011 HC RX IP 250 OP 636: Performed by: FAMILY MEDICINE

## 2021-03-24 PROCEDURE — 96372 THER/PROPH/DIAG INJ SC/IM: CPT | Performed by: FAMILY MEDICINE

## 2021-03-24 RX ADMIN — NALTREXONE 380 MG: KIT at 14:06

## 2021-03-24 ASSESSMENT — PAIN SCALES - GENERAL: PAINLEVEL: NO PAIN (0)

## 2021-03-24 NOTE — LETTER
3/24/2021         RE: Mohit Porter  [address] [facility] [address]        Dear Colleague,    Thank you for referring your patient, Mohit Porter, to the [facility]. Please see a copy of my visit note below.    Patient presents to the [facility] for Vivitrol.  Order written by Dr. Huitron was completed today. Name and  verified with patient. See MAR for medication details. Medication was divided into 1 syringes by pharmacy and given in the following sites right gluteal. Patient tolerated injection well and was discharged to home. Patient to return back in 4 weeks.    Gemini Goodrich MA/CHINYERE Gibson LPN    Patient denies covid swab.    Administrations This Visit     naltrexone (VIVITROL) injection 380 mg     Admin Date  2021 Action  Given Dose  380 mg Route  Intramuscular Administered By  Evelyn Gibson LPN                    Again, thank you for allowing me to participate in the care of your patient.        Sincerely,        [facility]

## 2021-06-01 PROBLEM — R00.0 TACHYCARDIA: Status: ACTIVE | Noted: 2021-01-01

## 2021-06-01 PROBLEM — K92.2 GASTROINTESTINAL HEMORRHAGE, UNSPECIFIED GASTROINTESTINAL HEMORRHAGE TYPE: Status: ACTIVE | Noted: 2021-01-01

## 2021-06-01 PROBLEM — A41.9 SEPSIS, DUE TO UNSPECIFIED ORGANISM, UNSPECIFIED WHETHER ACUTE ORGAN DYSFUNCTION PRESENT (H): Status: ACTIVE | Noted: 2021-01-01

## 2021-06-02 NOTE — PHARMACY-VANCOMYCIN DOSING SERVICE
"Pharmacy Vancomycin Initial Note  Date of Service 2021  Patient's  1990  30 year old, male    Indication: Sepsis    Current estimated CrCl = Estimated Creatinine Clearance: 180.5 mL/min (A) (based on SCr of 0.62 mg/dL (L)).    Creatinine for last 3 days  2021: 10:39 PM Creatinine 0.62 mg/dL    Recent Vancomycin Level(s) for last 3 days  No results found for requested labs within last 72 hours.      Vancomycin IV Administrations (past 72 hours)      No vancomycin orders with administrations in past 72 hours.                Nephrotoxins and other renal medications (From now, onward)    Start     Dose/Rate Route Frequency Ordered Stop    21 1230  vancomycin 1250 mg in 0.9% NaCl 250 mL intermittent infusion 1,250 mg      1,250 mg  over 90 Minutes Intravenous EVERY 12 HOURS 21 2335      21 2335  piperacillin-tazobactam (ZOSYN) 4.5 g vial to attach to  mL bag     Note to Pharmacy: For [facility], [facility] and [facility]: For Zosyn-naive patients, use the \"Zosyn initial dose + extended infusion\" order panel.    4.5 g  over 30 Minutes Intravenous ONCE 21 23321 2335  vancomycin (VANCOCIN) 1,750 mg in sodium chloride 0.9 % 500 mL intermittent infusion      1,750 mg  over 120 Minutes Intravenous ONCE 211            Contrast Orders - past 72 hours (72h ago, onward)    None          Loading dose: 1750 mg at 00:30 2021.  Regimen: 1250 mg every 12 hours for 4 doses.  Start time: 23:35 on 2021  Exposure target: AUC24 (range)400-600 mg/L.hr   AUC24,ss: 463 mg/L.hr  PAUC*: 65 %  Ctrough,ss: 12.9 mg/L  Pconc*: 21 %  Tox.: 8 %          Plan:  1. Give vancomycin 1750mg IV x1 now, then start vancomycin 1250mg IV q12 hours  2. Vancomycin monitoring method: AUC  3. Vancomycin therapeutic monitoring goal: 400-600 mg*h/L  4. Pharmacy will check vancomycin levels as appropriate in 1-3 Days.    5. Serum creatinine levels will be ordered daily for the first week of therapy and at " least twice weekly for subsequent weeks.      Kyle Guo, PharmD, BCPS

## 2021-06-02 NOTE — CONSULTS
Care Management Initial Consult    General Information  Assessment completed with: Patient, VM-chart review    Type of CM/SW Visit: Initial Assessment    Primary Care Provider verified and updated as needed: No   Readmission within the last 30 days: previous discharge plan unsuccessful   Return Category: Exacerbation of disease  Reason for Consult: substance use concerns  Advance Care Planning: Advance Care Planning Reviewed: no concerns identified       Communication Assessment  Patient's communication style: spoken language (English or Bilingual)    Hearing Difficulty or Deaf: no   Wear Glasses or Blind: no    Cognitive  Cognitive/Neuro/Behavioral: .WDL except  Level of Consciousness: lethargic  Arousal Level: arouses to voice  Orientation: oriented x 4  Mood/Behavior: behavior appropriate to situation  Best Language: 0 - No aphasia  Speech: slow, slurred    Living Environment:   People in home: alone     Current living Arrangements: homeless      Able to return to prior arrangements: not safely  Living Arrangement Comments: has been using unemployment to pay for hotels.    Family/Social Support:  Care provided by: self  Provides care for: no one, unable/limited ability to care for self  Marital Status: Single,  from former SO   Support System: Parent(s)          Description of Support System: Uninvolved    Support Assessment: Complicated family dynamics. Pt declines to allow contact with family at this time. Per chart review he has a young daughter who lives with her mother. Also per chart review, shortly before pt's relapse in March he reported that daughter's mother was possibly attempting to remove his right to custody and listed this as a stressor (see note by Dr. Huitron of 3/16). Pt states he has supportive friends and family but does not appear that he has been in touch with them lately.    Current Resources:   Patient receiving home care services: No  Community Resources: Chemical Dependency  "Services  Equipment currently used at home: none  Supplies currently used at home: None    Employment/Financial:  Employment Status: unemployed        Financial Concerns: unemployed   Referral to Financial Counselor: No     Lifestyle & Psychosocial Needs:        Socioeconomic History     Marital status: Single     Spouse name: Not on file     Number of children: Not on file     Years of education: Not on file     Highest education level: Not on file   Occupational History     Employer: OTHER     Comment:      Tobacco Use     Smoking status: Never Smoker     Smokeless tobacco: Current User     Types: Chew   Substance and Sexual Activity     Alcohol use: Yes     Alcohol/week: 239.0 standard drinks     Types: 239 Standard drinks or equivalent per week     Frequency: 4 or more times a week     Drinks per session: 10 or more     Binge frequency: Daily or almost daily     Comment: 1.75L vodka/day=239 standard drinks     Drug use: No     Sexual activity: Yes     Partners: Female       Functional Status:  Prior to admission patient needed assistance:   Dependent ADLs:: Independent  Dependent IADLs:: Independent  Assesssment of Functional Status: Not at  functional baseline    Mental Health Status:  Mental Health Status: Current Concern       Chemical Dependency Status:  Chemical Dependency Status: Current Concern    Chemical Dependency Management: Previous treatment    See below for details.        Values/Beliefs:  Spiritual, Cultural Beliefs, Orthodoxy Practices, Values that affect care: no               Additional Information:  SW met with pt 1:1 in pt room. Introduced self and role of SW in ICU. When SW asked how pt was, pt stated \"brutal.\" He agreed to speak with SW but primarily answered with yes/no. He stated he would be okay with going to treatment and was open to speaking more with SW when he feels better. Pt declined to allow contact with any family at this time, but stated he had a good " "support system.    SW completed extensive chart review of pt's healthcare system interactions in the last few months. Briefly, it appears that pt has an established pattern of drinking in hotel room and being found down by staff. He has in the past agreed to treatment, but most recently has not been able to follow up. Discussed with MICU team, recommended addictions medicine and psychiatry consult. Also updated charge RN on pt's history of eloping, smuggling ETOH into hospital.    Additional details from past few months below. Based on all of this information SW would be very concerned about pt discharging to anything other than inpatient CD treatment given what appears to be a dangerous and escalating pattern of alcohol use.    Admission: 3/29 found down in hotel room with bottles of alcohol around him by staff after not checking out.   Per note by [facility]  Ivory Ely, [facility], of 3/30/21: \"Patient has been to CD treatment 4 times, [facility] 2018, [facility] in October 2018, [facility] in [place] from Aug-Nov 2019, and [facility] in 2020. He has had 30 or so ED visits since 2019, and 6 hospitalizations. He is typically found down in a hotel room, brought to ED for alcohol intoxication. Per court case database, pt does not have history of MI or CD commitment. History of 3 DUIs, currently on probation for 5 years, license suspended.\" He was supposed to return to [facility] CD treatment, left on his own and stated he planned to go to [facility]. Based on ED presentation following, pt went to hotel and began drinking instead.   ED: 4/2 found down in hotel room with bottles of alcohol around him by staff after not checking out. Discharged from ED.  ED: 4/3 found in hotel room drinking vodka. Discharged from ED.  Admission: 4/5 Was placed on on 72 hour hold after drinking vodka he had smuggled into hospital. Commitment was not pursued as he was agreeable to treatment. Found own " sober housing and scheduled intake at [facility] for IOP (inpatient treatment was recommended, pt declined).  Admission: 4/16 Did not follow through with plan from prior admission per pt report (documented in H&P from that admission). Plan was for commitment on this admission but pt eloped.  Admission: 4/24 found down in hotel room with bottles of alcohol around him by staff after not checking out. JOANA at this time was 0.59, required intubation for airway protection. Discharged to inpatient CD treatment voluntarily.  ED: 5/28 Found intoxicated in hotel room after refusing to leave at checkout time. Declined ETOH detox or treatment, and was discharged from [facility] stating he would return to outpatient treatment.  Admission (current): 6/1 found down in hotel room with bottles of alcohol around him by staff after not checking out.     ALIX Alvarez, [facility]  ICU    [facility]   P: [phone]  Pager: [phone]

## 2021-06-02 NOTE — ED PROVIDER NOTES
"ED Provider Note  [facility]      History     Chief Complaint   Patient presents with     GI Bleeding     The history is provided by the patient, medical records and the EMS personnel. The history is limited by the condition of the patient.     Mohit Porter is a 30 year old male with a past medical history significant for alcohol dependence and alcohol withdrawal (w/o hx of seizures), who presents here to the Emergency Department via EMS due to concern for possible GI bleed.      Per EMS, patient was staying at the [facility] and was supposed to check out at noon today. He did not formally check out, so this evening, staff of the hotel went to check on the room and found the patient in the room  with dried dark, possibly bloody emesis (no bright red blood reported) and \"looking unwell\".  Because of this they contacted EMS. Upon arrival, EMS found four empty 1.75 L bottles of vodka in the patient's room, unclear over what time period they were consumed.  Patient reportedly had an episode of fecal incontinence. No findings for trauma in the room or on patient that they noted. No other substances identified, empty pill bottles, etc., just the vodka bottles. They also noted him to be tachycardic to the 170's+, but he was answering their questions appropriately. They transferred here for further evaluation/management.     Patient endorses nausea, but denies pain anywhere.  Patient denies any substance use other than alcohol.  Patient denies a history of ulcer or GI bleeds.  Patient denies any trouble breathing. Has had chills, he does not know if he has fevers or other infectious illnesses recently. Denies fevers or chills. No passing out, just the vomiting/bowel movements. No HEENT symptoms. No neck or back symptoms. No CP. No  sx's. No SI or HI. Patient states that he has had alcohol withdrawal symptoms in the past, and feels unwell w/ withdrawal, but has not had any withdrawal " seizures or seizure history.  Patient states that he is otherwise healthy. No other symptoms or complaints at this time. Please see ROS for further details, but may be limited on patient condition (substance use, clinically unwell).         Past Medical History  Past Medical History:   Diagnosis Date     Alcohol abuse      Anxiety      History reviewed. No pertinent surgical history.  No current outpatient medications on file.    No Known Allergies  Family History  History reviewed. No pertinent family history.  Social History   Social History     Tobacco Use     Smoking status: Never Smoker     Smokeless tobacco: Current User     Types: Chew   Substance Use Topics     Alcohol use: Yes     Alcohol/week: 239.0 standard drinks     Types: 239 Standard drinks or equivalent per week     Frequency: 4 or more times a week     Drinks per session: 10 or more     Binge frequency: Daily or almost daily     Comment: 1.75L vodka/day=239 standard drinks     Drug use: No      Past medical history, past surgical history, medications, allergies, family history, and social history were reviewed with the patient. No additional pertinent items.       Review of Systems   Unable to perform ROS: Acuity of condition (Patient somewhat intoxicated (though answering questions), critically ill/unwell)   Constitutional: Positive for chills (occasional shivers/shaking chills) and fatigue. Negative for fever.   HENT: Negative.  Negative for trouble swallowing and voice change.    Respiratory: Negative for cough and shortness of breath.    Cardiovascular: Negative for chest pain.   Gastrointestinal: Positive for abdominal pain, nausea and vomiting (dark, bloody (no bright red blood)). Negative for blood in stool, constipation and diarrhea.   Genitourinary: Negative.    Musculoskeletal: Negative.  Negative for back pain, neck pain and neck stiffness.   Skin: Negative.    Allergic/Immunologic: Negative for immunocompromised state.   Neurological:  Positive for weakness (generally since feeling unwell). Negative for seizures, syncope, numbness and headaches.   Psychiatric/Behavioral: Negative for self-injury and suicidal ideas.   Otherwise limited and unable to obtain.       Physical Exam      Physical Exam  CONSTITUTIONAL: Patient arrives tachycardic, covered in dark bloody/coffee ground type emesis. Looks a bit pale and dazed (though answering questions appropriately).   HENT:   - Head: Normocephalic and atraumatic.   - Ears: Hearing and external ear grossly normal.   - Nose: Nose normal. No rhinorrhea. No epistaxis.   - Mouth/Throat: MMM  EYES: Conjunctivae and lids are normal. No scleral icterus. PERRL.   NECK: Phonation normal. Neck supple.  No tracheal deviation, no stridor. No edema or erythema noted. No pain to palpation. No apparent stiffness.   CARDIOVASCULAR: HR in the 170's (regular) on arrival, regular rhythm and no appreciable abnormal heart sounds.  PULMONARY/CHEST: Normal work of breathing. No accessory muscle usage or stridor. No respiratory distress.  No appreciable abnormal breath sounds.  ABDOMEN: Generally thin. May be a bit firm vs. Just thin/muscular body habitus. Some diffuse tenderness reported. No rebound or guarding. No palpable masses or abnormal pulsatility appreciated.  MUSCULOSKELETAL: Extremities warm and seemingly well perfused. No edema or calf tenderness.  NEUROLOGIC: Awake, seems a bit dazed/unwell, but is responding to questions. Displays no atrophy and no shilpa tremor, but does appear to occasionally shiver. Normal tone. No seizure activity. No focal deficits/abnormalities appreciated. Gait not assessed in setting of critical illness.   SKIN: Skin is warm and dry. No rash noted. No diaphoresis. Looks a bit pale.   PSYCHIATRIC: Affect somewhat flat in the setting of significant illness and feeling unwell. Denies SI or HI. No apparent hallucinations. Full eval limited by level of illness.     ED Course     ED Course as of  Jun 03 2157 Tue Jun 01, 2021   2245 96% currently   SpO2(!): 89 %   2323 Initial CG4 on arrival: pH 7.505/CO2 41/HCO3 32.3/lactate 7.64      2327 See Na is 127 here initially. Slowing the initial fluids a bit so as to not correct Na too quickly, giving protonix as well for apparent GIB/coffee ground emesis, Abx   Sodium(!): 125     -------------------------------------------------------------------    LACTIC ACID ELEVATION / SEPSIS  Lactic acid was elevated > 2. DDx for this includes etoh intoxication, GIB, which may be most likely, but can't exclude sepsis. Cultures obtained, fluids and broad spectrum abx given (initially started boluses, but slowed a given degree of significant hyponatremia given patient having normal BP, and other potential causes of lactate elevation, etc.)    -------------------------------------------------------------------    CRITICAL CARE TIME  Critical care time for this patient, excluding teaching and procedures, was 45 minutes for initial assessment, identification and management of intoxication, significant tachycardia, encephalopathy, multiple electrolyte abnormalities, GI bleed and sepsis w/ significant lactic acid elevation requiring multiple immediate interventions (Fluid boluses, Abx, GI bleed management, etc.), coordination w/ admitting teams, admission to ICU      Assessments & Plan (with Medical Decision Making)   IMPRESSION: 30 year old male w/ PMH notable for alcohol dependence and alcohol withdrawal (w/o hx of seizures), who presents here to the Emergency Department via EMS due to concern for possible GI bleed, tachycardia, et al, as described further above.      Clinically, patient appears unwell, though is awake, protecting his airway, and is answering questions seemingly appropriately (though seems dazed and unwell), and is covered in dried dark/coffeeground emesis. Otherwise on examination, is notably tachycardic (HRs in the 170's+ on arrival), but normotensive. An  initial SpO2 read 89%, but this was in normal range on recheck and that first recorded was poor pleth/waveform. Has some diffuse abdominal discomfort w/o definitive peritoneal findings. No apparent traumatic findings, findings for meningits/encephalitis, and no apparent focal neuro findings. Denying any acute psych symptoms/concerns.     Ddx includes, but not limited to, UGIB, encephalopathy, Etoh use, metabolic or electrolyte derangement, dehydration, infection (PNA, would be at risk of aspiration w/ his vomiting hx, UTI, et al.), boerrhave syndrome, etoh withdrawal, et al.    PLAN: Labs, head CT, CT C/A/P, will need fluids, other interventions based on labs (such as potential for Abx, transfusions, etc.), will need admission, pt amenable   - Getting CT C/A/P (holding off on oral contrast given concern for his emesis and risk of aspiration, etc.)  - Risks/benefits of pursuing imaging reviewed and accepted.     RESULTS:  - Labs: VBG CG8 ph 7/51/CO2 41/Bicarb 33, Na 127, K 3.2  --- Lactate on CG4 = 7.6  --- Etoh 0.33  --- Na 125, Cl 78, AG 21, glucose 136, Cr 0.62, ASzt 186, ALT 76, lipase 430  --- Negative acetaminophen/salicylates  --- WBC 24  --- Troponin negative  --- Blood cultures pending, COVID pending  - Urine: No sample yet obtained  - Imaging: CT Head, C/A/P pending at signout    INTERVENTIONS:   - PIV x2, both working well  - IVF (Boluses initially ordered, slowed a bit after seeing how low the Na was so as to not correct too rapidly)  - Abx: Vancomycin, Zosyn  - Vitamins: Thiamine, Folate (keeping NPO for now)  - Protonix bolus/drip    RE-EVALUATION:  - The patient's HR coming down from 170's to 160's, remains normotensive.   - Patient still awake, answering questions, but still looks unwell.     DISCUSSIONS:  - w/ ICU attending: Reviewed patient/presentation, current state of workup/any pending studies. They will admit for further evaluation/management, F/U pending studies as needed, coordinate w/  consulting services as needed. No additional requests/recommendations for workup/management for in the ED at this time.   - w/ Patient: I have reviewed the available findings, plan with the patient. He expressed understanding and agreement with this plan. All questions answered to the best of our ability at this time.     DISPOSITION/PLANNING:  - IMPRESSION:   --- Marked Lactic acid elevation  --- Sepsis (Source TBD)  --- Encephalopathy  --- Alcohol intoxication  --- Upper GI Bleed  --- Alcohol-related hepatitis  --- Significant sinus tachycardia (160-170's)  --- Hyponatremia and electrolyte abnormalities  - DISPOSITION: Admit to ICU for further evaluation/management  - PENDING: Imaging results, labs, cultures, et al.   - RECOMMENDATIONS: GI & ID consults, CD consult      ______________________________________________________________________            --  Cheyanne Hermosillo MD  [facility] EMERGENCY DEPARTMENT  6/1/2021     Cheyanne Hermosillo MD  06/03/21 2224       Cheyanne Hermosillo MD  06/03/21 2231

## 2021-06-02 NOTE — PROGRESS NOTES
MEDICAL ICU PROGRESS NOTE  06/02/2021      Date of Service (when I saw the patient): 06/02/2021    ASSESSMENT: Mohit Porter is a 30 year old male with PMH significant for Generalized Anxiety Disorder (KALINA), Alcohol use disorder, nicotine dependence, hyponatremia who presented on 6/1/2021 via EMS with tachycardia, acute encephalopathy, elevated liver enzymes, leukocytosis and ethanol 0.33 concerning for alcohol intoxication and alcoholic hepatitis complicated by nausea/vomiting leading to hematemesis, now with aspiration pneumonia and bacteremia.    CHANGES and MAJOR THINGS TODAY:   - Recheck labs  - Psychiatry consultation  - Addiction medicine consultation  - CIWA protocol  - Normal Saline fluid bolus  - FSBG checks c/f hypoglycemia  - Change PPI from infusion to BID  - Bedside attendant (concern for elopement)  -  consultation  - Vanco & Alee for bacteremia    PLAN:    Neuro:  # Acute Encephalopathy  # EtOH Intoxication  Last drink was < 1 day ago, consumed ~ 1.75 L of vodka. Admission ethanol 0.33. No known prior withdrawal seizures or DT's. Well known to chem dep per chart review. Has failed EtOH cessation several times. Denies falls or trauma to head, CTH normal. Ammonia <10. Denies other illicit use, Acetaminophen and Salicylate WNL. Denies SI, visual/auditory/tactile hallucinations. Utox only positive for EtOH.  - CIWA  - Gabapentin load and taper  - 1-2 mg Ativan PO vs IV prn q30 mins per protocol  - Precedex gtt prn 2nd line  - 0.1 mg clonidine q8 and Metoprolol 5 mg q6 prn for HTN  - Folate, Thiamine, Multivitamin  - Mg, K, Phos repletion protocol  - Continue IVF (NS) @ 125cc/hr while nauseous and hyperthermic   - Anti-emetics: Zofran and Compazine prn  - Addiction Medicine consultation, appreciate recs  - Psychiatry consultation, appreciate recs  - Social Work consultation, appreciate recs  - Continue PTA Naltrexone    # Generalized anxiety disorder  - Continue PTA Seroquel  -  Continue PTA Hydroxyzine    # Nicotine Dependence  - Continue PTA nicorette gum PRN    Pulmonary:  # Concern for aspiration pneumonia  On 1L on arrival to MICU. CT chest notable for multifocal nodular consolidation in the setting of emesis. COVID neg on admission; had COVID 12/2020.  - Zosyn & Vanco x1 now discontinued  - Unasyn 3 gms IV q 6 hrs    - Supplemental O2 prn    Cardiovascular:  # Sinus Tachycardia, improving  Suspect intoxication/withdrawal, dehydration (vomiting per EMS) and possible infection (-200 upon admission)  - 2.5L NS fluid bolus in ER  - Stop IVF now taking PO, tachycardia resolved  - Telemetry per ICU routine    GI/Nutrition:  # Suspected EtOh Hepatitis (AST>ALT)  # Nausea, Hematemesis, UGIB  # Mural thickening of esophagus, possible esophagitis  Per EMS, noted to have coffee ground emesis throughout room. Hepatocellular LFT's, normal bili. Madrey's discriminant 0.6 points. Fatty liver appreciable on CT. Tylenol level normal. Recent EGD, no evidence of esophageal varices. UGIB most likely 2/2 Tamera mccallum tear, esophagatis vs gastritis. Of note patient has elevated lipase but no abdominal tenderness on palpation and normal appearing pancreas on CT, Lipase 430.  - Anti-emetics above  - PPI infusion changed BID IV  - Typed and screened, 2 large PIVs   - Regular diet  - Consider GI/hepatology consult for possible EGD and EtOH liver f/up    Renal/Fluids/Electrolytes:  # Hyponatremia  # Hypomagnesemia  # Hypophosphotemia  # Hypochloridemia  # Lactic acidosis in setting of possible pneumonia, dehydration, resolved  Likely related to emesis, poor PO intake, dehydration, found incontinent at hotel. Has had several episodes of nausea/vomiting, incontinence while hospitalized.  - s/p 2.5L IVF bolus  - NS @ 125cc/hr  - Replete electrolytes per protocol  - BMP this afternoon and daily    Endocrine:  # Concern for hypoglycemia  - hypoglycemia protocol  - Start D10 infusion for FSBG <80  - FSBG QID  and PRN    ID:  # Concern for aspiration pneumonia  # Bacteremia  CT chest notable for multifocal nodular consolidation in the setting of emesis.  - MRSA swab negative  - Blood cultures positive (gram+ cocci in clusters, gram + cocci in pairs & chains)-awaiting speciation  - Sputum culture pending  - Urine culture pending  - Abx:   - Zosyn (6/1 x1 dose)   - Vanco (6/1-current)   - Unasyn (6/2-current)    Hematology:    # Leukocytosis  Concern for aspiration pneumonia, possible alcoholic hepatitis  - Antibiotics per ID section    Musculoskeletal:  # No active issues at this time    Skin:  # No active issues at this time    General Cares/Prophylaxis:    DVT Prophylaxis: Pneumatic Compression Devices  GI Prophylaxis: PPI  Restraints: bedside attendant per elopement risk, no physical restraints  Family Communication: Mom (Darling) point person however pt. Requesting no updates to his mom at this time  Code Status: Full    Lines/tubes/drains:  - PIV x2    Disposition:  - Medical ICU     Patient seen and findings/plan discussed with medical ICU staff, Dr. Clay.    Lorie Joshi , DNP, APRN, CNP  ====================================  INTERVAL HISTORY:   Nursing notes reviewed. CIWA score 9 upon admission to ICU requiring Ativan 1mg. Pt. Lethargic but arousable to voice. Oriented to person, place, situation. Slightly diaphoretic, tachycardic, eyes closed during interaction.    OBJECTIVE:   1. VITAL SIGNS:   Temp:  [97.3  F (36.3  C)-99.7  F (37.6  C)] 99.7  F (37.6  C)  Pulse:  [106-169] 106  Resp:  [11-25] 11  BP: (119-148)/(49-91) 148/91  SpO2:  [89 %-100 %] 96 %  Resp: 11    2. INTAKE/ OUTPUT:   I/O last 3 completed shifts:  In: 3062.58 [P.O.:1800; I.V.:1262.58]  Out: 600 [Urine:600]    3. PHYSICAL EXAMINATION:  General: withdrawn, mildly lethargic, slightly diaphoretic  HEENT: eyes closed, dry mucous membranes, NCAT  Neuro: withdrawn, follows commands, A&O x3  Pulm/Resp: Clear breath sounds upper bases, crackles  lower bases  CV: Tachycardic, regular, S1, S2, no murmurs, gallops, rubs  Abdomen: Soft, non-distended, non-tender, rounded, + bowel sounds 4/4 quadrants  : No guaman catheter in place, urine yellow and clear  Incisions/Skin: warm, ainsley, diaphoretic    4. LABS:   Arterial Blood Gases   No lab results found in last 7 days.  Complete Blood Count   Recent Labs   Lab 06/02/21 0417 06/01/21 2239   WBC 19.0* 24.0*   HGB 13.1* 16.4   * 183     Basic Metabolic Panel  Recent Labs   Lab 06/02/21 0417 06/01/21 2239   * 125*   POTASSIUM 3.4 3.5   CHLORIDE 86* 78*   CO2 24 26   BUN 15 18   CR 0.63* 0.62*   * 136*     Liver Function Tests  Recent Labs   Lab 06/01/21 2239   *   ALT 76*   ALKPHOS 95   BILITOTAL 0.6   ALBUMIN 3.4   INR 1.00     Coagulation Profile  Recent Labs   Lab 06/01/21 2239   INR 1.00   PTT 31       5. RADIOLOGY:   Recent Results (from the past 24 hour(s))   Head CT w/o contrast    Narrative    EXAM: CT HEAD W/O CONTRAST  LOCATION: [facility]  DATE/TIME: 6/2/2021 12:03 AM    INDICATION: Mental status change, unknown cause  COMPARISON: 10/17/2020 head CT  TECHNIQUE: Routine CT Head without IV contrast. Multiplanar reformats. Dose reduction techniques were used.    FINDINGS:  INTRACRANIAL CONTENTS: No intracranial hemorrhage, extraaxial collection, or mass effect.  No CT evidence of acute infarct. Normal parenchymal attenuation. Normal ventricles and sulci.     VISUALIZED ORBITS/SINUSES/MASTOIDS: No intraorbital abnormality. Mucosal thickening primarily involving the ethmoid air cells. No middle ear or mastoid effusion.    BONES/SOFT TISSUES: No acute abnormality.      Impression    IMPRESSION:  1.  No acute intracranial process.   CT Chest/Abdomen/Pelvis w Contrast    Narrative    EXAM: CT CHEST/ABDOMEN/PELVIS W CONTRAST  LOCATION: [facility]  DATE/TIME: 6/2/2021 12:03 AM    INDICATION: Boerrhave's, GI bleed, EtOH.    COMPARISON: None.    TECHNIQUE:  CT scan of the chest, abdomen and pelvis was performed following injection of IV contrast. Multiplanar reformats were obtained. Dose reduction techniques were used.     CONTRAST: 99 mL Isovue 370.    FINDINGS:   LUNGS AND PLEURA: Bilateral nodular consolidation, predominantly in the right lower and middle lobes, concerning for infectious or inflammatory process such as aspiration, recommend follow-up to resolution. No pneumothorax or pleural effusion.    MEDIASTINUM/AXILLAE: Mural thickening of the esophagus, correlate to exclude esophagitis, consider eventual evaluation with endoscopy to exclude metaplasia or malignancy. No pneumomediastinum. Normal-sized heart. No pericardial effusion. No hilar or   mediastinal lymphadenopathy.    CORONARY ARTERY CALCIFICATION: None.    HEPATOBILIARY: Fatty liver.    PANCREAS: Normal.    SPLEEN: Normal.    ADRENAL GLANDS: Normal.    KIDNEYS/BLADDER: Right extrarenal pelvis. No obstructing renal or ureteral stones. No hydroureteronephrosis. Fluid-distended bladder, otherwise normal.    BOWEL: Normal appendix. No obstruction, colitis, diverticulitis or appendicitis. No free air or fluid.    LYMPH NODES: Normal.    VASCULATURE: Unremarkable.    PELVIC ORGANS: Normal.    MUSCULOSKELETAL: L5 spondylolysis without spondylolisthesis.      Impression    IMPRESSION:  1.  Multifocal nodular consolidation, likely of an infectious or inflammatory etiology, correlate to exclude aspiration.    2.  Mural thickening of the esophagus, may reflect esophagitis, recommend follow-up to resolution and possible endoscopy to exclude metaplasia or esophageal malignancy.    3.  No evidence of esophageal perforation.    4.  Fatty liver.    5.  Normal appendix. Normal bowel.

## 2021-06-02 NOTE — ED TRIAGE NOTES
Pt BIBA 416 with c/o of GI Bleed. EMS states the patient was found by hotel staff. Pt was presumed to have been on a week faria of drinking. Pt has ETOH on board. EMS states that coffee ground emesis was all over pt's room. Pt also covered in dry coffee ground emesis.

## 2021-06-02 NOTE — PHARMACY-VANCOMYCIN DOSING SERVICE
Pharmacy to dose vancomycin    Vancomycin restarted due to positive blood cultures, continued previous dose - 1250mg (17 mg/kg) IV Q12h      See note from 6/1/21 for details.     06/01/2021 2245 06/02/2021 1435 Blood culture [[id number]]   (Abnormal)   Blood from Arm, Forearm Only, Left   Left Arm    Preliminary result Component Value   Specimen Description Blood Left Arm   Culture Micro Cultured on the 1st day of incubation:   Gram positive cocci in clusters   Abnormal P   Culture Micro Cultured on the 1st day of incubation:   Gram positive cocci in pairs and chains   Abnormal P   Culture Micro Critical Value/Significant Value, preliminary result only, called to and read back by   Mame Dennis RN on 4C at 1430 on 6/2/21 ac.           Shawanda Grady, PharmD  [facility] Pharmacist  [phone]  #[phone]

## 2021-06-02 NOTE — PLAN OF CARE
Admitted/transferred from: ED  Reason for admission/transfer: ETOH withdrawal   2 RN skin assessment: completed by Rosario Thomas RN and Joyce Haley RN  Result of skin assessment and interventions/actions: skin intact. Abrasions to lips.  Height, weight, drug calc weight: done  Patient belongings (see Flowsheet): clothes and cell phone  MDRO education added to care plan: NA  ?

## 2021-06-02 NOTE — PROGRESS NOTES
30M w/ EtOH dependence, anxiety, history of EtOH withdrawal who presented to the ED after being found unconscious with multiple 1.75L bottles of Vodka. CT chest/abdomen/pelvis showed multifocal consolidations, esophageal thickening w/o evidence of perforation.      # GPC bacteremia in chains and clusters  - Start Vancomycin. Await speciation.   - TTE tomorrow.     # multifocal pulmonary infiltrates, possible aspiration PNA  - On Unasyn   - Supplemental O2 as needed  - Sputum cx if able to produce sputum    # Concern for EtOH withdrawal  - Continue CIWA protocol.

## 2021-06-02 NOTE — PLAN OF CARE
Neuro: Initially pt lethargic/alert and Ox4; pt becoming obtunded and only arouses to vigorous shaking/stimulation - MD aware. Moves all extremities. Denied pain. CIWA score of 9 - 1mg PO Ativan given x1, CIWA score decreased to 4.     Cardiac: ST. HR 120s-160s. MAPs>65.     Respiratory: On RA. Lung sounds clear.     GI/: Protonix gtt infusing. Pt chugging glasses of water - transitioned to NPO. Medium BM x1. Voiding in urinal.     Lines: PIVx2. NS infusing at 125ml/lhr.    Plan: Continue to monitor and update MD as needed. Continue plan of care.       Problem: Adult Inpatient Plan of Care  Goal: Plan of Care Review  Outcome: No Change

## 2021-06-02 NOTE — PHARMACY-ADMISSION MEDICATION HISTORY
Admission Medication History Completed by Pharmacy    See [facility] Admission Navigator for allergy information, preferred outpatient pharmacy, prior to admission medications and immunization status.     Medication History Sources:   Medication history interview sources:  EPIC chart review and Med Dr. Baker (Rx fill history website); NO patient/family interview      Changes made to PTA medication list (reason):    Added: None    Deleted: Hydroxyzine, quetiapine (multiple doses), nicotine gum - all x1 refills and days supply not into this month    Changed: None    Additional Information:    Unlikely taking any medications, any recent RX fills from hospital stays    Prior to Admission medications    Medication Sig Last Dose Taking? Auth Provider   multivitamin w/minerals (THERA-VIT-M) tablet Take 1 tablet by mouth daily   Rena Lombardi MD   naltrexone (DEPADE/REVIA) 50 MG tablet Take 1 tablet (50 mg) by mouth daily   Sathish Huitron MD   thiamine (B-1) 100 MG tablet Take 1 tablet (100 mg) by mouth daily   Rena Lombardi MD       Date completed: 06/02/21    Medication history completed by: Shawanda Grady [facility]

## 2021-06-02 NOTE — PLAN OF CARE
A/O x4, lethargic, slow to respond, following commands, neuros intact. AVSS ex 's and bradycardic at times, on 1L of O2. CIWA 6,9,5,4,6, ativan given x1. K+ 3.1, phos 1.9, replacing per protocol. Denies pain. C/o nausea, zofran given x2, compazine given x1. Assist x1, voiding adequately using bedside urinal, incontinent of bowel, BM x4. Regular diet, poor appetite. + BC - MD aware. Sitter at bedside. Plan for psych and addiction service consult, will continue to monitor.

## 2021-06-02 NOTE — CONSULTS
"      Initial Psychiatric Consult   Consult date: June 2, 2021         Reason for Consult, requesting source:      Requesting source: Anjum Rosa Perlman      Psychiatry IP Consult: 30 y.o. M w/ history of severe EtOH abuse. History of leaving AMA several times from other hospitals when mental status improves. Please help assess for holdability or committment.        HPI:     Per interview, the patient states that he was drinking very heavily up to 1.75 L of vodka per day.  He states he was in a hotel and has been living in hotels recently.  He stated that he began to vomit up blood and got \"blood all over the sheets\" he then presented for care at the emergency room.  The patient states he has been drinking very heavily, he denies use of other drugs.  He states he has been in treatment recently although somewhat vague about the timeframe.  He states his longest recent period of sobriety was 4 months.  The patient denies any psychotic symptoms, he states he feels \"very down\".  He denies suicidal ideation.  He does state that he wants to get back into inpatient chemical dependence treatment.    Per initial HPI. The patient is a 30-year-old male with a history of generalized anxiety disorder and alcohol use disorder with multiple ED visits for alcohol intoxication and withdrawal who presented with acute encephalopathy, sinus tachycardia, elevated liver enzymes, and ethanol level 0.33 and leukocytosis.  The initial admitting service was concerned for alcohol intoxication and alcoholic hepatitis complement complicated by nausea and retching leading to hematemesis and upper GI bleed and possible aspiration pneumonia.        Past Psychiatric History:   He was never psychiatrically hospitalized.  He was in 5 or 6 chemical dependency treatments, including [place], [place],[facility].  .         Substance Use and History:   Initial alcohol use in the teens, by early 20s it was a problem.  Patient has a history of tolerance, " blackouts, withdrawal, progressive use, loss of control despite having problems with his job health and personal life.  He has a history of DTs, no history of seizures, denies use of other drugs.  He has been in multiple chemical dependence programs.  He was hospitalized for severe alcohol use disorder, alcoholic hepatitis, and generalized anxiety disorder in January of this year.  Per discharge summary plan was to go to [facility].        Past Medical History:   PAST MEDICAL HISTORY:   Past Medical History:   Diagnosis Date     Alcohol abuse      Anxiety        PAST SURGICAL HISTORY: History reviewed. No pertinent surgical history.          Family History:   FAMILY HISTORY: The patient reports that he has 2 maternal uncles with alcohol use disorder.         Social History:   SOCIAL HISTORY:   Social History     Tobacco Use     Smoking status: Never Smoker     Smokeless tobacco: Current User     Types: Chew   Substance Use Topics     Alcohol use: Yes     Alcohol/week: 239.0 standard drinks     Types: 239 Standard drinks or equivalent per week     Frequency: 4 or more times a week     Drinks per session: 10 or more     Binge frequency: Daily or almost daily     Comment: 1.75L vodka/day=239 standard drinks     The patient reports that he was born and raised in [place].  He has 2 younger brothers.  He denies history of physical, emotional or sexual abuse.  He reports attending [employer] for his undergraduate studies and states he started post-graduate studies but never completed the program.  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  The patient is currently homeless and living on unemployment per his report.  He states he is estranged from his family and has essentially no social support from family or friends.           Physical ROS:   The 10 point Review of Systems is negative other than noted in the HPI or here.           Medications:   Reviewed per  epic           Allergies:   No Known Allergies       Labs:     Recent Results (from the past 48 hour(s))   ISTAT INR POCT    Collection Time: 06/01/21 10:30 PM   Result Value Ref Range    ISTAT INR 1.0 0.86 - 1.14   ISTAT gases lactate michael POCT    Collection Time: 06/01/21 10:32 PM   Result Value Ref Range    Ph Venous 7.50 (H) 7.32 - 7.43 pH    PCO2 Venous 41 40 - 50 mm Hg    PO2 Venous 45 25 - 47 mm Hg    Bicarbonate Venous 32 (H) 21 - 28 mmol/L    O2 Sat Venous 85 %    Lactic Acid 7.6 (HH) 0.7 - 2.1 mmol/L   Creatinine POCT    Collection Time: 06/01/21 10:32 PM   Result Value Ref Range    Creatinine 1.3 (H) 0.66 - 1.25 mg/dL    GFR Estimate 65 >60 mL/min/[1.73_m2]    GFR Estimate If Black 79 >60 mL/min/[1.73_m2]   CBC with platelets differential    Collection Time: 06/01/21 10:39 PM   Result Value Ref Range    WBC 24.0 (H) 4.0 - 11.0 10e9/L    RBC Count 5.51 4.4 - 5.9 10e12/L    Hemoglobin 16.4 13.3 - 17.7 g/dL    Hematocrit 45.7 40.0 - 53.0 %    MCV 83 78 - 100 fl    MCH 29.8 26.5 - 33.0 pg    MCHC 35.9 31.5 - 36.5 g/dL    RDW 14.1 10.0 - 15.0 %    Platelet Count 183 150 - 450 10e9/L    Diff Method Automated Method     % Neutrophils 91.1 %    % Lymphocytes 3.1 %    % Monocytes 4.0 %    % Eosinophils 0.1 %    % Basophils 0.5 %    % Immature Granulocytes 1.2 %    Nucleated RBCs 0 0 /100    Absolute Neutrophil 21.8 (H) 1.6 - 8.3 10e9/L    Absolute Lymphocytes 0.7 (L) 0.8 - 5.3 10e9/L    Absolute Monocytes 1.0 0.0 - 1.3 10e9/L    Absolute Eosinophils 0.0 0.0 - 0.7 10e9/L    Absolute Basophils 0.1 0.0 - 0.2 10e9/L    Abs Immature Granulocytes 0.3 0 - 0.4 10e9/L    Absolute Nucleated RBC 0.0    INR    Collection Time: 06/01/21 10:39 PM   Result Value Ref Range    INR 1.00 0.86 - 1.14   Partial thromboplastin time    Collection Time: 06/01/21 10:39 PM   Result Value Ref Range    PTT 31 22 - 37 sec   Comprehensive metabolic panel    Collection Time: 06/01/21 10:39 PM   Result Value Ref Range    Sodium 125 (L) 133 - 144  mmol/L    Potassium 3.5 3.4 - 5.3 mmol/L    Chloride 78 (L) 94 - 109 mmol/L    Carbon Dioxide 26 20 - 32 mmol/L    Anion Gap 21 (H) 3 - 14 mmol/L    Glucose 136 (H) 70 - 99 mg/dL    Urea Nitrogen 18 7 - 30 mg/dL    Creatinine 0.62 (L) 0.66 - 1.25 mg/dL    GFR Estimate >90 >60 mL/min/[1.73_m2]    GFR Estimate If Black >90 >60 mL/min/[1.73_m2]    Calcium 8.0 (L) 8.5 - 10.1 mg/dL    Bilirubin Total 0.6 0.2 - 1.3 mg/dL    Albumin 3.4 3.4 - 5.0 g/dL    Protein Total 7.6 6.8 - 8.8 g/dL    Alkaline Phosphatase 95 40 - 150 U/L    ALT 76 (H) 0 - 70 U/L     (H) 0 - 45 U/L   Troponin I    Collection Time: 06/01/21 10:39 PM   Result Value Ref Range    Troponin I ES <0.015 0.000 - 0.045 ug/L   Alcohol ethyl    Collection Time: 06/01/21 10:39 PM   Result Value Ref Range    Ethanol g/dL 0.33 (HH) <0.01 g/dL   Ammonia    Collection Time: 06/01/21 10:39 PM   Result Value Ref Range    Ammonia <10 (L) 10 - 50 umol/L   Blood culture    Collection Time: 06/01/21 10:39 PM    Specimen: Arm, Forearm Only, Right; Blood    Right Arm   Result Value Ref Range    Specimen Description Blood Right Arm     Special Requests Received in aerobic bottle only     Culture Micro (A)      Cultured on the 1st day of incubation:  Gram positive cocci in clusters      Culture Micro       Critical Value/Significant Value, preliminary result only, called to and read back by  Mame Dennis RN on 4C at 1430 on 6/2/21 ac.     Lipase    Collection Time: 06/01/21 10:39 PM   Result Value Ref Range    Lipase 430 (H) 73 - 393 U/L   ABO/Rh type and screen    Collection Time: 06/01/21 10:39 PM   Result Value Ref Range    ABO O     RH(D) Neg     Antibody Screen Neg     Test Valid Only At          [facility]    Specimen Expires 06/04/2021    Magnesium    Collection Time: 06/01/21 10:39 PM   Result Value Ref Range    Magnesium 2.1 1.6 - 2.3 mg/dL   Phosphorus    Collection Time: 06/01/21 10:39 PM   Result Value Ref Range     Phosphorus 2.6 2.5 - 4.5 mg/dL   Acetaminophen level    Collection Time: 06/01/21 10:39 PM   Result Value Ref Range    Acetaminophen Level <2 mg/L   Salicylate level    Collection Time: 06/01/21 10:39 PM   Result Value Ref Range    Salicylate Level <2 mg/dL   EKG 12-lead, tracing only    Collection Time: 06/01/21 10:40 PM   Result Value Ref Range    Interpretation ECG Click View Image link to view waveform and result    Blood culture    Collection Time: 06/01/21 10:45 PM    Specimen: Arm, Forearm Only, Left; Blood    Left Arm   Result Value Ref Range    Specimen Description Blood Left Arm     Culture Micro (A)      Cultured on the 1st day of incubation:  Gram positive cocci in clusters      Culture Micro (A)      Cultured on the 1st day of incubation:  Gram positive cocci in pairs and chains      Culture Micro       Critical Value/Significant Value, preliminary result only, called to and read back by  Mame Dennis RN on 4C at 1430 on 6/2/21 ac.     Asymptomatic SARS-CoV-2 COVID-19 Virus (Coronavirus) by PCR    Collection Time: 06/01/21 10:45 PM    Specimen: Nasopharyngeal   Result Value Ref Range    SARS-CoV-2 Virus Specimen Source Throat     SARS-CoV-2 PCR Result NEGATIVE     SARS-CoV-2 PCR Comment       Testing was performed using the Xpert Xpress SARS-CoV-2 Assay on the Cepheid Gene-Xpert   Instrument Systems. Additional information about this Emergency Use Authorization (EUA)   assay can be found via the Lab Guide.     Glucose by meter    Collection Time: 06/02/21  2:34 AM   Result Value Ref Range    Glucose 128 (H) 70 - 99 mg/dL   Drug abuse screen 6 urine (chem dep)    Collection Time: 06/02/21  3:04 AM   Result Value Ref Range    Amphetamine Qual Urine Negative NEG^Negative    Barbiturates Qual Urine Negative NEG^Negative    Benzodiazepine Qual Urine Negative NEG^Negative    Cannabinoids Qual Urine Negative NEG^Negative    Cocaine Qual Urine Negative NEG^Negative    Ethanol Qual Urine Positive (A)  NEG^Negative    Opiates Qualitative Urine Negative NEG^Negative   Urine Culture Aerobic Bacterial    Collection Time: 06/02/21  3:04 AM    Specimen: Urine Midstream; Midstream Urine   Result Value Ref Range    Specimen Description Midstream Urine     Special Requests Specimen received in preservative     Culture Micro PENDING    UA with Microscopic    Collection Time: 06/02/21  3:04 AM   Result Value Ref Range    Color Urine Yellow     Appearance Urine Clear     Glucose Urine Negative NEG^Negative mg/dL    Bilirubin Urine Negative NEG^Negative    Ketones Urine 40 (A) NEG^Negative mg/dL    Specific Gravity Urine 1.015 1.003 - 1.035    Blood Urine Moderate (A) NEG^Negative    pH Urine 6.5 5.0 - 7.0 pH    Protein Albumin Urine 200 (A) NEG^Negative mg/dL    Urobilinogen mg/dL Normal 0.0 - 2.0 mg/dL    Nitrite Urine Negative NEG^Negative    Leukocyte Esterase Urine Negative NEG^Negative    Source Midstream Urine     WBC Urine 5 0 - 5 /HPF    RBC Urine 0 0 - 2 /HPF    Squamous Epithelial /HPF Urine <1 0 - 1 /HPF    Transitional Epi 1 0 - 1 /HPF    Mucous Urine Present (A) NEG^Negative /LPF    Granular Casts 7 (A) NEG^Negative /LPF    Amorphous Crystals Few (A) NEG^Negative /HPF   Osmolality urine    Collection Time: 06/02/21  3:04 AM   Result Value Ref Range    Urine Osmolality 727 100 - 1,200 mmol/kg   Sodium random urine    Collection Time: 06/02/21  3:04 AM   Result Value Ref Range    Sodium Urine mmol/L 10 mmol/L   Basic metabolic panel    Collection Time: 06/02/21  4:17 AM   Result Value Ref Range    Sodium 126 (L) 133 - 144 mmol/L    Potassium 3.4 3.4 - 5.3 mmol/L    Chloride 86 (L) 94 - 109 mmol/L    Carbon Dioxide 24 20 - 32 mmol/L    Anion Gap 16 (H) 3 - 14 mmol/L    Glucose 100 (H) 70 - 99 mg/dL    Urea Nitrogen 15 7 - 30 mg/dL    Creatinine 0.63 (L) 0.66 - 1.25 mg/dL    GFR Estimate >90 >60 mL/min/[1.73_m2]    GFR Estimate If Black >90 >60 mL/min/[1.73_m2]    Calcium 7.6 (L) 8.5 - 10.1 mg/dL   CBC with  platelets    Collection Time: 06/02/21  4:17 AM   Result Value Ref Range    WBC 19.0 (H) 4.0 - 11.0 10e9/L    RBC Count 4.37 (L) 4.4 - 5.9 10e12/L    Hemoglobin 13.1 (L) 13.3 - 17.7 g/dL    Hematocrit 36.3 (L) 40.0 - 53.0 %    MCV 83 78 - 100 fl    MCH 30.0 26.5 - 33.0 pg    MCHC 36.1 31.5 - 36.5 g/dL    RDW 14.4 10.0 - 15.0 %    Platelet Count 133 (L) 150 - 450 10e9/L   Osmolality    Collection Time: 06/02/21  4:17 AM   Result Value Ref Range    Osmolality 302 (H) 275 - 295 mmol/kg   Magnesium    Collection Time: 06/02/21  4:17 AM   Result Value Ref Range    Magnesium 1.9 1.6 - 2.3 mg/dL   Phosphorus    Collection Time: 06/02/21  4:17 AM   Result Value Ref Range    Phosphorus 1.5 (L) 2.5 - 4.5 mg/dL   Methicillin Resistant Staph Aureus PCR    Collection Time: 06/02/21  4:56 AM    Specimen: Nares   Result Value Ref Range    Specimen Description Nares     Methicillin Resist/Sens S. aureus PCR Negative NEG^Negative   Lactic acid whole blood    Collection Time: 06/02/21  6:03 AM   Result Value Ref Range    Lactic Acid 5.0 (HH) 0.7 - 2.0 mmol/L   Procalcitonin    Collection Time: 06/02/21  6:03 AM   Result Value Ref Range    Procalcitonin 1.37 ng/ml   Glucose by meter    Collection Time: 06/02/21  8:05 AM   Result Value Ref Range    Glucose 93 70 - 99 mg/dL   Sodium    Collection Time: 06/02/21  8:50 AM   Result Value Ref Range    Sodium 128 (L) 133 - 144 mmol/L   Hepatic panel    Collection Time: 06/02/21  8:50 AM   Result Value Ref Range    Bilirubin Direct 0.2 0.0 - 0.2 mg/dL    Bilirubin Total 0.6 0.2 - 1.3 mg/dL    Albumin 2.5 (L) 3.4 - 5.0 g/dL    Protein Total 5.7 (L) 6.8 - 8.8 g/dL    Alkaline Phosphatase 63 40 - 150 U/L    ALT 58 0 - 70 U/L     (H) 0 - 45 U/L   Lactic acid whole blood    Collection Time: 06/02/21 12:12 PM   Result Value Ref Range    Lactic Acid 1.4 0.7 - 2.0 mmol/L          Physical and Psychiatric Examination:     BP (!) 145/89   Pulse 77   Temp 98.8  F (37.1  C) (Axillary)   Resp  "(!) 32   Ht 1.778 m (5' 10\")   Wt 72.2 kg (159 lb 2.8 oz)   SpO2 95%   BMI 22.84 kg/m    Weight is 159 lbs 2.75 oz  Body mass index is 22.84 kg/m .    Physical Exam:  I have reviewed the physical exam as documented by by the medical team and agree with findings and assessment and have no additional findings to add at this time.    Mental Status Exam:  The patient was lying in bed, he was difficult to awaken.  He was generally cooperative but dozes off from time to time and was unable to complete the whole interview.  Mood and affect: The patient stated he was depressed his affect was blunted.  Thought processes: Generally goal-directed, paucity of speech noted, no loosening of associations.  Thought content: Denied auditory or visual hallucinations, denied suicidal ideation.  Attention and concentration were impaired.  Speech and language: Speech was somewhat slurred at times, use of language was appropriate.  Orientation: Patient knew he was in the hospital.  Recent and remote memory and fund of knowledge were difficult to assess but appeared generally intact.  Judgment and insight were adequate but guarded, he stated that he realized he needed to get into treatment and wanted to get back into inpatient treatment.  Muscle bulk and tone were difficult to assess this patient was lying in bed.  Abnormal movements, none noted.             DSM-5 Diagnosis:   Alcohol dependence, chronic severe.  Alcohol withdrawal.          Assessment:   The patient is a 30-year-old gentleman with a long history of chronic severe alcohol dependence which has markedly impacted his social and psychological functioning.  He has been in treatment multiple times.  He currently is unemployed and homeless.  He has been in other treatment programs earlier this year including lodging plus and transitions per his report.  He presented after a heavy alcoholic binge with an upper GI bleed.  He currently is requesting to go to inpatient " "treatment.  He does not appear psychotic, he denies suicidal ideation.          Summary of Recommendations:       1.  Management of alcohol withdrawal per medicine service.    2.  Please have the chemical dependence program see the patient.  He expresses a desire to go to inpatient treatment, and should be assessed by the chemical dependence program for treatment.    3.  Currently, due to his being in active withdrawal if he were to want to leave the hospital would place him on a 72-hour hold pending psychiatric evaluation.    4.  The patient is stating that he wants to go to inpatient treatment.  But around the issue of possible commitment, he has been in and out of treatment and appears to be drinking in a very dangerous pattern.  It would be helpful to contact collateral resources such as family members to find out their level of concern and other obtain other details about his drinking pattern and dangerousness.    5.  Please call or reconsult with any questions or concerns.    Danilo Antunez MD      \"This dictation was performed with voice recognition software and may contain errors,  omissions and inadvertent word substitution.\"         "

## 2021-06-02 NOTE — H&P
MEDICAL ICU H&P  06/02/2021    Date of Hospital Admission: 06/1/21  Date of ICU Admission: 06/1/21  Reason for Critical Care Admission: EtOH Intoxication/Withdrawal  Date of Service (when I saw the patient): 06/02/2021    ASSESSMENT: Mohit Porter is a 29 YO M with a h/o KALINA and AUD with multiple ED visits for EtOh intoxication and withdrawal who presents with acute encephalopathy, sinus tach, elevated liver enzymes, ethanol 0.33, and leukocytosis, altogether concerning for EtOH intoxication and EtOH hepatitis c/b uncontrolled nausea and retching leading to hematemesis/UGIB with concern for possible aspiration pneumonia.    CHANGES and MAJOR THINGS TODAY:   - Given 2L bolus in ED  - Admitted to MICU    PLAN:    Neuro:  # Acute Encephalopathy  # EtOH Intoxication  Last drink was < 1 day ago, consumed ~ 1.75 L of vodka. Admission ethanol 0.33. No known prior withdrawal seizures or DT's. Well known to chem dep per chart review. Has failed EtOH cessation several times. Denies falls or trauma to head, CTH normal. Ammonia <10. Denies other illicit use, Acetaminophen and Salicylate WNL. Denies SI, visual/auditory/tactile hallucinations. Utox only positive for EtOH.  - CIWA  - Gabapentin load and taper  - 1-2 mg Ativan PO vs IV prn q30 mins per protocol  - Precedex gtt prn 2nd line  - 0.1 mg clonidine q8 and Metoprolol 5 mg q6 prn for HTN  - Folate, Thiamine, Multivitamin  - Mg, K, Phopsh RN replacement protocols  - NS bolus, followed by NS at 125 ml/hr  - Anti-emetics: Zofran and Compazine prn  - Consider chem dep consult when more stable  - Consider nutrition consult    # KALINA  - PTA Seroquel  - Atarax prn    Pulmonary:  # Concern for aspiration pneumonia  On 1L on arrival to MICU. CT chest notable for multifocal nodular consolidation in the setting of emesis. COVID neg on admission; had COVID 12/2020.  - Antimicrobials below  - Supplemental O2 prn    Cardiovascular:    # Sinus Tachycardia  Suspect 2/2  intoxication/withdrawal, dehydration, and possible infection. Trop negative.  - Telemetry    GI/Nutrition:    # Suspected EtOh Hepatitis (AST>ALT)  # Nausea, Hematemesis, UGIB  # Mural thickening of esophagus, possible esophagitis  Per EMS, noted to have coffee ground emesis throughout room. Hepatocellular LFT's, normal bili. Madrey's discriminant 0.6 points. Fatty liver appreciable on CT. Tylenol level normal. Recent EGD, no evidence of esophageal varices. UGIB most likely 2/2 Tamera mccallum tear, esophagatis vs gastritis. Of note patient has elevated lipase but no abdominal tenderness on palpation and normal appearing pancreas on CT.   - Anti-emetics above  - PPI, Typed and screened, 2 large PIVs, NPO for possible EGD; restart diet if not planning on scope  - Consider GI/hepatology consult for possible EGD and EtOH liver f/up    Renal/Fluids/Electrolytes:  # Moderate Hyponatremia  # Hypochloremia  # HypoPhosph  # Hypovolemia  Rerlated to emesis and poor PO intake. Producing urine. Na 125->126.  - S/p 2L IVF bolus in ED (LR and NS)  - Ctn NS mIVF  - CTM lytes, Urine Osm, Urine Na, Serum Osm  - next Na at 8 AM  - replace Mg, K, phopsh prn    Endocrine:  None     ID:   # Concern for aspiration pneumonia  CT chest notable for multifocal nodular consolidation in the setting of emesis. Afebrile.  - F/up procal, sputum culture, blood culture, MRSA swab, lactate  - Antimicrobials:   Vancomycin: 06/2/21- ongoing; would pull off if MRSA neg   Zosyn: 06/2/21 - ongoing  - CTM WBC and fever curve  - NS bolus, followed by NS at 125 ml/hr    Hematology:    # Leukocytosis  C/f aspiration pneumonia vs EtOH hepatitis.  - plan above    Musculoskeletal:  None    Skin:  None    Other:  # Housing Instability  - SW consulted    General Cares/Prophylaxis:    DVT Prophylaxis: none  GI Prophylaxis: PPI  Restraints: none  Family Communication: did not update, patient stated mother would be contact if needed    Lines/tubes/drains:  - PIV  x2    Disposition:  - Medical ICU    Patient seen and findings/plan discussed with medical ICU staff, Dr. Perlman.    Samuel Washington Jr, MD  Internal Medicine, PGY-2  [phone]    -----------------------------------------------------------------------    HISTORY PRESENTING ILLNESS:     Mohit Porter is a 31 YO M with a h/o KALINA and AUD with multiple ED visits for EtOh intoxication and withdrawal who presents with acute encephalopathy and UGIB.    He has been staying at the [facility]. Staff found him surrounded by coffee ground emesis. He notes drinking ~ 1.75 L bottles of vodka. Denies illicits or NSAID overuse. Denies fevers, chills, diarrhea, melena, hematochezia, or dysphagia. Denies prior EtOH withdrawal seizures. Denies presyncope, syncope, chest pain, palpitations, dyspnea, cough, headaches, abdominal pain, rashes or lacerations, trauma to head or falls. Takes Seroquel PTA, but has not used in ~ 1 week. No known sick contacts or travel. Denies SI or HI. Denies auditory, tactile, or visual hallucinations. Per chart review, he follows with addiction and behavioral health. Recently seen 05/29/21 in ED at [facility] for EtOH intoxication. Was admitted at [facility] 04/24/21-04/28/21 with EtOH intoxication, EtOH related pancreatits, aspiration pneumonia; was followed by addiction medicine. EGD 04/24/21 showing LA Grade A chronic esophagitis.    REVIEW OF SYSTEMS: See HPI relevant ROS.    PAST MEDICAL HISTORY:   Past Medical History:   Diagnosis Date     Alcohol abuse      Anxiety      SURGICAL HISTORY:  History reviewed. No pertinent surgical history.  SOCIAL HISTORY:  Social History     Socioeconomic History     Marital status: Single     Spouse name: None     Number of children: None     Years of education: None     Highest education level: None   Occupational History     Employer: OTHER     Comment:    Social Needs     Financial resource strain: None     Food insecurity      Worry: None     Inability: None     Transportation needs     Medical: None     Non-medical: None   Tobacco Use     Smoking status: Never Smoker     Smokeless tobacco: Current User     Types: Chew   Substance and Sexual Activity     Alcohol use: Yes     Comment: 1.75L vodka/day     Drug use: No     Sexual activity: Yes     Partners: Female   Lifestyle     Physical activity     Days per week: None     Minutes per session: None     Stress: None   Relationships     Social connections     Talks on phone: None     Gets together: None     Attends Bahai service: None     Active member of club or organization: None     Attends meetings of clubs or organizations: None     Relationship status: None     Intimate partner violence     Fear of current or ex partner: None     Emotionally abused: None     Physically abused: None     Forced sexual activity: None   Other Topics Concern     None   Social History Narrative     None     FAMILY HISTORY:   History reviewed. No pertinent family history.     ALLERGIES:   No Known Allergies     MEDICATIONS:  Medications reviewed by me.  PHYSICAL EXAMINATION:  Temp:  [97.3  F (36.3  C)-99.4  F (37.4  C)] 99.4  F (37.4  C)  Pulse:  [138-169] 159  Resp:  [14-25] 20  BP: (119-141)/(49-89) 134/80  SpO2:  [89 %-100 %] 96 %    General: pleasant disheveled man, laying in bed, in NAD  HEENT: NC, AT, dried blood in nares, no appreciable blood in mouth, no scleral icterus  Neuro: A&Ox4, + tremors b/l in hands  Pulm/Resp: CTAB, on 1L NC, no wheeze or crackles  CV: RRR, no m/r/g  Abdomen: Soft, non-distended, non-tender, no ascites  : no suprapubic tenderness  Incisions/Skin: not jaundiced    LABS: Reviewed.   Arterial Blood Gases   No lab results found in last 7 days.  Complete Blood Count   Recent Labs   Lab 06/01/21  2239   WBC 24.0*   HGB 16.4        Basic Metabolic Panel  Recent Labs   Lab 06/01/21  2239   *   POTASSIUM 3.5   CHLORIDE 78*   CO2 26   BUN 18   CR 0.62*   *      Liver Function Tests  Recent Labs   Lab 06/01/21  2239   *   ALT 76*   ALKPHOS 95   BILITOTAL 0.6   ALBUMIN 3.4   INR 1.00     Pancreatic Enzymes  Recent Labs   Lab 06/01/21  2239   LIPASE 430*     Coagulation Profile  Recent Labs   Lab 06/01/21  2239   INR 1.00   PTT 31     IMAGING:  Recent Results (from the past 24 hour(s))   Head CT w/o contrast    Narrative    EXAM: CT HEAD W/O CONTRAST  LOCATION: [facility]  DATE/TIME: 6/2/2021 12:03 AM    INDICATION: Mental status change, unknown cause  COMPARISON: 10/17/2020 head CT  TECHNIQUE: Routine CT Head without IV contrast. Multiplanar reformats. Dose reduction techniques were used.    FINDINGS:  INTRACRANIAL CONTENTS: No intracranial hemorrhage, extraaxial collection, or mass effect.  No CT evidence of acute infarct. Normal parenchymal attenuation. Normal ventricles and sulci.     VISUALIZED ORBITS/SINUSES/MASTOIDS: No intraorbital abnormality. Mucosal thickening primarily involving the ethmoid air cells. No middle ear or mastoid effusion.    BONES/SOFT TISSUES: No acute abnormality.      Impression    IMPRESSION:  1.  No acute intracranial process.   CT Chest/Abdomen/Pelvis w Contrast    Narrative    EXAM: CT CHEST/ABDOMEN/PELVIS W CONTRAST  LOCATION: [facility]  DATE/TIME: 6/2/2021 12:03 AM    INDICATION: Boerrhave's, GI bleed, EtOH.    COMPARISON: None.    TECHNIQUE: CT scan of the chest, abdomen and pelvis was performed following injection of IV contrast. Multiplanar reformats were obtained. Dose reduction techniques were used.     CONTRAST: 99 mL Isovue 370.    FINDINGS:   LUNGS AND PLEURA: Bilateral nodular consolidation, predominantly in the right lower and middle lobes, concerning for infectious or inflammatory process such as aspiration, recommend follow-up to resolution. No pneumothorax or pleural effusion.    MEDIASTINUM/AXILLAE: Mural thickening of the esophagus, correlate to exclude esophagitis, consider eventual  evaluation with endoscopy to exclude metaplasia or malignancy. No pneumomediastinum. Normal-sized heart. No pericardial effusion. No hilar or   mediastinal lymphadenopathy.    CORONARY ARTERY CALCIFICATION: None.    HEPATOBILIARY: Fatty liver.    PANCREAS: Normal.    SPLEEN: Normal.    ADRENAL GLANDS: Normal.    KIDNEYS/BLADDER: Right extrarenal pelvis. No obstructing renal or ureteral stones. No hydroureteronephrosis. Fluid-distended bladder, otherwise normal.    BOWEL: Normal appendix. No obstruction, colitis, diverticulitis or appendicitis. No free air or fluid.    LYMPH NODES: Normal.    VASCULATURE: Unremarkable.    PELVIC ORGANS: Normal.    MUSCULOSKELETAL: L5 spondylolysis without spondylolisthesis.      Impression    IMPRESSION:  1.  Multifocal nodular consolidation, likely of an infectious or inflammatory etiology, correlate to exclude aspiration.    2.  Mural thickening of the esophagus, may reflect esophagitis, recommend follow-up to resolution and possible endoscopy to exclude metaplasia or esophageal malignancy.    3.  No evidence of esophageal perforation.    4.  Fatty liver.    5.  Normal appendix. Normal bowel.

## 2021-06-02 NOTE — PROGRESS NOTES
"CLINICAL NUTRITION SERVICES - ASSESSMENT NOTE     Nutrition Prescription    RECOMMENDATIONS FOR MDs/PROVIDERS TO ORDER:  --If unable to advance diet in the next 2-3 days or if pt becomes intubated, recommend starting enteral nutrition.   --If diet is advanced, recommend ordering Calorie Counts to help quantify PO intake. Recommend pt consistently consume at least 1800 kcal and 85 g pro daily (maintenance needs) before initiation of alternate source of nutrition.     Malnutrition Status:    Severe malnutrition in the context of chronic illness    Recommendations already ordered by Registered Dietitian (RD):  None at this time    Future/Additional Recommendations:  --If EN becomes POC:  --GOAL: Osmolite 1.5 Aaron @ goal of 65ml/hr (1560ml/day) will provide: 2340 kcals (32 kcal/kg), 97 g PRO (1.3 g/kg), 1188 ml free H20, 317 g CHO, and 0 g fiber daily.  --Start TF @ 15 ml/hr and advance by 10 ml q 8 hrs until goal rate.  --Do not start or advance TF rate unless K+ >3.0, Mg++ > 1.5,  and Phos > 1.9.  --Minimum 30 ml q 4 hrs water flushes for tube patency.  --If gastric enteral access: HOB > 30 degrees    --Diet advancement and ability to order supplements and calorie counts.        REASON FOR ASSESSMENT  Mohit Porter is a/an 30 year old male assessed by the dietitian for Admission Nutrition Risk Screen for positive for \"unsure\" weight loss    NUTRITION HISTORY  PMH KALINA (generalized anxiety disorder) and AUD (alcohol use disorder) with multiple ED visits for EtOh intoxication and withdrawal who presents with acute encephalopathy, sinus tach, elevated liver enzymes, ethanol 0.33, and leukocytosis, altogether concerning for EtOH intoxication and EtOH hepatitis c/b uncontrolled nausea and retching leading to hematemesis/UGIB with concern for possible aspiration pneumonia.    EGD today. Obtunded per notes. Pt was sleeping soundly upon visit this morning, deferred attempt to wake pt and perform NFPE/obtain diet/weight " "history. Difficult to visualize fat/muscle wasting with lights off and pt covered in blanket.     CURRENT NUTRITION ORDERS  Diet: NPO    LABS  Na 126 (L)  K+ 3.4 (low normal)  Cr 0.63 (L)  Phos 1.5 (L)  Lactic acid 5.0 (H)    MEDICATIONS  Folic acid  Thera-vit-m  Thiamine (200 mg IV TID from 6/2-6/4 --> 100 mg PO TID from 6/4-6/8 --> 100 mg PO daily starting 6/9)  Precedex (off)  Protonix gtt  NS @ 125 ml/hr    ANTHROPOMETRICS  Height: 177.8 cm (5' 10\")  Most Recent Weight: 72.2 kg (159 lb 2.8 oz)    IBW: 75.5 kg  BMI: Normal BMI  Weight History: 9.5% wt loss x 4 months - significant  Wt Readings from Last 30 Encounters:   06/02/21 72.2 kg (159 lb 2.8 oz)   02/01/21 79.8 kg (176 lb)   01/22/21 79.4 kg (175 lb)   11/10/20 77.6 kg (171 lb)   10/26/20 77.5 kg (170 lb 13.7 oz)   10/23/20 79.4 kg (175 lb)   10/17/20 79.4 kg (175 lb)   06/03/20 79.3 kg (174 lb 14.4 oz)   05/26/20 77.1 kg (170 lb)   12/07/19 79.4 kg (175 lb)   08/10/19 73.2 kg (161 lb 6.4 oz)   08/03/19 77.1 kg (170 lb)   05/30/19 80.7 kg (178 lb)   04/14/19 79.4 kg (175 lb)   04/11/19 79.4 kg (175 lb)   04/07/19 76.2 kg (168 lb)   02/16/19 77.1 kg (170 lb)   02/14/19 79.4 kg (175 lb)   02/04/19 77.7 kg (171 lb 6.4 oz)   02/04/19 77.1 kg (170 lb)     Dosing Weight: 72 kg lowest weight on 6/2    ASSESSED NUTRITION NEEDS  Estimated Energy Needs: 4547-2352 kcals/day (30 - 35 kcals/kg )  Justification: Repletion  Estimated Protein Needs: + grams protein/day (1.2 - 1.5+ grams of pro/kg)  Justification: Increased needs and Repletion  Estimated Fluid Needs: (1 mL/kcal)   Justification: Maintenance and Per provider pending fluid status    PHYSICAL FINDINGS  See malnutrition section below.   CT abdomen/pelvis: Mural thickening of the esophagus, may reflect esophagitis, recommend follow-up to resolution and possible endoscopy to exclude metaplasia or esophageal malignancy.    MALNUTRITION  % Intake: </=75% for >/= 1 month (severe) - suspected 2/2 significant " etoh use and weight loss   % Weight Loss: > 7.5% in 3 months (severe)  Subcutaneous Fat Loss: Unable to assess  Muscle Loss: Unable to assess  Fluid Accumulation/Edema: None noted  Malnutrition Diagnosis: Severe malnutrition in the context of chronic illness    NUTRITION DIAGNOSIS  Inadequate oral intake related to etoh use, current altered mentation as evidenced by NPO status x 1 day with suspected inadequate PO intake PTA given severe 9.5% weight loss x 4 months.       INTERVENTIONS  Implementation  Enteral Nutrition - recs     Goals  Diet adv v nutrition support within 2-3 days.     Monitoring/Evaluation  Progress toward goals will be monitored and evaluated per protocol.    Jeannine Vigil, MS, RD, LD, [facility]  MICU pager: [phone]  ASCOM: [phone]

## 2021-06-02 NOTE — CONSULTS
[facility]   Consult Note - Addiction Service     Date of Admission:  6/1/2021    Consult Requested by: Medicine team   Reason for Consult: Alcohol use disorder    Assessment & Plan   Mohit Porter admitted on 2/1/2021. The patient has a PMHx that includes alcohol use disorder admitted    Alcohol use disorder (AUD)  Alcoholic hepatitis  Alcohol intoxication with sepsis  Acute encephalopathy with aspiration pna and upper GI bleed  Admitted with alcohol level of 0.33 and his drinking contributed to multiple medical issues.   Tends to binge drink. Has been in treatment for AUD including [facility].  Has tried multiple medications(acomprosate, naltrexone, gabapentin) for AUD and not interested in changes in meds.  Not engaged in conversation much today.  -Wants to return to [facility] intensive outpatient  -Continue naltrexone as is  -Agree with thiamine and folic acid    Alcohol withdrawal:  Improving CIWA scores; add on gabapentin, 600 mg TID.  Consider prescribing at discharge if this helps with underlying anxiety.    Peer Support: Our peer  may meet him to provide additional outpatient resources but currently he declining further support.  To contact Zulma Peer  from [facility]:  Please call or text: [phone]    Linkage to Care:   -Wants to return to [facility] intensive outpatient  -Addiction team will continue to follow    The patient's care was discussed with the Patient and Primary team.    I spent 120 minutes on the unit/floor managing the care of Mohit Porter. Over 50% of my time was spent on the following:   - Counseling the patient and/or family regarding: diagnostic results, prognosis, risks and benefits of treatment options and recommended follow-up  - Coordination of care with the: consultant(s) and care coordinator/    Julio Nelson MD  [facility]   Contact information available via [facility]  Paging/Directory  Please see sign in/sign out for up to date coverage information    ChAT team (Addiction Consult Team): Coverage Monday-Friday 8-4pm    Provider (Pager)  (Pager)   Kaitlynn Boswell [phone]   Tues [address] William Boswell [phone]   Wed Dr. William Boswell [phone] None   Thurs Dr. Julio Nelson [phone]   Fri Dr. Anjum Pompa [phone]   [facility] Psych Team- Refer to [facility].  For urgent needs, please place a  consult for psychiatry. None     ______________________________________________________________________    Chief Complaint   Alcohol use    History is obtained from the patient    History of Present Illness     Alcohol history:  The patient began to drink alcohol at the age of 17 or 18 by 23, it was a problem.  He has tolerance, blackouts, withdrawal, progressive use, loss of control, use despite having problems, job, health, house.  He has no history of seizures, but he has a history of DTs as per patient.  He does not use any drugs.  He smokes one tin a day.  He does not butts.  He denies any depression other than he has numerous stressors.  He has legal stressors.  He has 1 daughter whom he cannot see.  He is worried about his career he was a .  He apparently has legal trouble .  He also has no place to live    He has been to  5 or 6 chemical dependency treatments, including [facility], [facility].  He has never had civil commitments.  He has had several recent AMA discharges, while inpatient chem dep treatment was being pursued.    Other substances used: none    Review of Systems   The 10 point Review of Systems is negative other than noted in the HPI or here.     Past Medical History      Past Medical History:   Diagnosis Date     Alcohol abuse      Anxiety        Past Surgical History   I have reviewed this patient's surgical history and updated it with pertinent information if needed.  No past surgical history on  file.    Social History   I have reviewed this patient's social history and updated it with pertinent information if needed.  Currently homeless  At treatment at [facility]  Family lives in [place] but does not contact them much    Family History   No addiction    Medications   Current Facility-Administered Medications   Medication     ampicillin-sulbactam (UNASYN) 3 g vial to attach to  mL bag     cloNIDine (CATAPRES) tablet 0.1 mg     glucose gel 15-30 g    Or     dextrose 50 % injection 25-50 mL    Or     glucagon injection 1 mg     enoxaparin ANTICOAGULANT (LOVENOX) injection 40 mg     flumazenil (ROMAZICON) injection 0.2 mg     [START ON 6/5/2021] folic acid (FOLVITE) tablet 1 mg     folic acid injection 1 mg     [START ON 6/9/2021] gabapentin (NEURONTIN) capsule 100 mg     [START ON 6/7/2021] gabapentin (NEURONTIN) capsule 300 mg     [START ON 6/5/2021] gabapentin (NEURONTIN) capsule 600 mg     gabapentin (NEURONTIN) capsule 900 mg     haloperidol lactate (HALDOL) injection 2.5-5 mg     hydrOXYzine (ATARAX) tablet 25 mg     LORazepam (ATIVAN) tablet 1-2 mg    Or     LORazepam (ATIVAN) injection 1-2 mg     metoprolol (LOPRESSOR) injection 5 mg     multivitamin w/minerals (THERA-VIT-M) tablet 1 tablet     naltrexone (DEPADE/REVIA) tablet 50 mg     nicotine (NICORETTE) gum 2-4 mg     ondansetron (ZOFRAN-ODT) ODT tab 4 mg    Or     ondansetron (ZOFRAN) injection 4 mg     pantoprazole (PROTONIX) IV push injection 40 mg     potassium & sodium phosphates (NEUTRA-PHOS) Packet 2 packet     prochlorperazine (COMPAZINE) injection 10 mg    Or     prochlorperazine (COMPAZINE) tablet 10 mg    Or     prochlorperazine (COMPAZINE) suppository 25 mg     QUEtiapine (SEROquel) tablet 25 mg     QUEtiapine (SEROquel) tablet  mg     sodium phosphate (NaPHOS) 9 mMol in 250 mL D5W (pre-mix) infusion     thiamine (B-1) 200 mg in sodium chloride 0.9 % 50 mL intermittent infusion     [START ON 6/4/2021] thiamine (B-1) tablet 100  mg     [START ON 6/9/2021] thiamine (B-1) tablet 100 mg     vancomycin 1250 mg in 0.9% NaCl 250 mL intermittent infusion 1,250 mg       Allergies   No Known Allergies    Physical Exam   Vital Signs: Temp: 96.4  F (35.8  C) Temp src: Oral BP: 99/41 Pulse: 75   Resp: 16 SpO2: 97 % O2 Device: None (Room air)    Weight: 125 lbs 0 oz    GENERAL:  Layng in bed in no acute distress. [place] over his head  EYES:  Eyes have anicteric sclerae.    LUNGS:  Clear to auscultation.  CARDIOVASCULAR:  Regular rate and rhythm with no murmurs, gallops or rubs.  ABDOMEN:  Normal bowel sounds, soft, nontender.  EXT: No edema  SKIN:  No acute rashes.  NEUROLOGIC:  Grossly nonfocal.    Due to regulation of Title 42 of the Code of Federal Regulations (CFR) Part 2: Confidentiality laws apply to this note and the information wherein.  Thus, this note cannot be copy and pasted into any other health care staff's note nor can it be included in general medical records sent to ANY outside agency without the patient's written consent.

## 2021-06-03 NOTE — PLAN OF CARE
Problem: Alcohol Withdrawal  Goal: Alcohol Withdrawal Symptom Control  Outcome: No Change     ICU End of Shift Summary. See flowsheets for vital signs and detailed assessment.    Changes this shift: Alert and oriented. No pain. Walked around unit with PT. Pt denies most withdrawal symptoms (hallucinations, headache, and anxiety/agitation), but is slightly nauseous, diaphoretic, and has mild tremors. No PRN meds given, per CIWA orders - scored 4-6 during shift. ECHO completed. IS education given. Electrolytes replaced today, per orders - K x 4, Mg x 1, and Ph x 3. Med/surg transfer orders placed.    Plan: Continue to assess CIWA and treat accordingly. Continue to follow replacement protocols for electrolytes. Transfer to floor once bed available.

## 2021-06-03 NOTE — H&P
[facility]    H&P - Maroon 2 Service        Date of Admission:  6/1/2021    Assessment & Plan     Mohit Porter is a 30 year old male with PMH significant for Generalized Anxiety Disorder (KALINA) and  Alcohol use disorder who was admitted to the ICU on 6/1/2021 via EMS with tachycardia, acute encephalopathy, elevated liver enzymes, leukocytosis and ethanol 0.33 concerning for alcohol intoxication and alcoholic hepatitis complicated by nausea/vomiting leading to hematemesis and concern for UGIB. Found to have bacteremia and possible aspiration pneumonia. Transferred to the floor on 6/3 for further management.     Acute Encephalopathy likely 2/2 Alcohol Intoxication, improving   Hx of Alcohol Use disorder  Admitted 6/2 with coffee ground emesis per EMS, acute encephalopathy, sinus tachy in setting of EtOH 0.33. Patient reports 2 days of 1.75 L vodka consumption on both days along with poor PO intake. Electrolyte derangements likely due to emesis / poor PO. Pt has been in and out of dependency programs most of the year. Interested in connecting with addiction Med and restarting treatment. CIWA scores have been ranging from 6-9 inpatient.  - On CIWA protocol    - 1 mg lorazepam if CIWA> 8  - Addiction Med consult, appreciate recs  - Chem Dep consult, appreciate recs  - Replace electrolytes PRN     Aspiration Pneumonia   Pulmonary consolidations seen on CT in setting of emesis, likely an aspiration pneumonia.   - Continue Unasyn 3g IV q6h    Sepsis, resolving   Bacteremia, blood cx with Staph Hemolyticus and Hominis, Enterococcus    Admitted with tachycardia, lactate of 7.6 and HTN. Given 4L fluid with improvement of lactic acid to 1.0. Blood Cultures grew Staphylococcus hemolyticus, Enterococcus &  Staphylococcus hominis, unlikely to have caused pneumonia. No known IVDU. Unclear source of bacteremia.  - Continue Vancomycin, pharmacy to dose   - Discontinue Zosyn (given  "6/2)    Suspected Esophagitis  Chronic Anemia  Ground coffee emesis per EMS. EGD on 4/24 at outside hospital showed multiple tears and esophagitis with no active bleed. 500 mL of hematin found in stomach and duodenum. No ulcers or varices. Likely the source of his new emesis.  No hematemesis since admission.  - Consult GI, appreciate recommendations.    Hypokalemia  Hypophosphatemia  K+ low at 2.9, phos low at 1.8.   - On electrolyte replacement protocol.     Chronic Medical Problems    KALINA  - Continue PTA Seroquel  - Continue PTA Hydroxyzine    Suspected EtOH Hepatitis  AST:ALT >2, evidence of fatty liver on CT.   - CTM    Elevated Lipase  430 at admission 6/2, 3355 on 6/3.No abdominal pain and pancreas normal on CT.   - CTM lipase and clinical symptoms        Diet: Regular Diet   Fluids: None  Lines: None  DVT Prophylaxis: Enoxaparin (Lovenox) SQ  Emerson Catheter: not present  Code Status: Full Code           Disposition Plan   Expected discharge: 2 - 3 days pending resolution of alcohol withdrawal and further workup by GI.     The patient's care was discussed with the Attending Physician, Dr. Weems and Patient.    Lyric Lockett  Medical Student  [address]  Please see sign in/sign out for up to date coverage information    Resident/Fellow Attestation   I, Christelle Lee, was present with the medical/ROSEANN student who participated in the service and in the documentation of the note.  I have verified the history and personally performed the physical exam and medical decision making.  I agree with the assessment and plan of care as documented in the note.      Christelle Lee MD  Internal Medicine, PGY2  Date of Service (when I saw the patient): 06/03/21  ______________________________________________________________________    HPI:     \"Mohit Porter is a 31 YO M with a h/o KALINA and AUD with multiple ED visits for EtOh " "intoxication and withdrawal who presents with acute encephalopathy and UGIB.     He has been staying at the [facility]. Staff found him surrounded by coffee ground emesis. He notes drinking ~ 1.75 L bottles of vodka. Denies illicits or NSAID overuse. Denies fevers, chills, diarrhea, melena, hematochezia, or dysphagia. Denies prior EtOH withdrawal seizures. Denies presyncope, syncope, chest pain, palpitations, dyspnea, cough, headaches, abdominal pain, rashes or lacerations, trauma to head or falls. Takes Seroquel PTA, but has not used in ~ 1 week. No known sick contacts or travel. Denies SI or HI. Denies auditory, tactile, or visual hallucinations. Per chart review, he follows with addiction and behavioral health. Recently seen 05/29/21 in ED at [facility] for EtOH intoxication. Was admitted at [facility] 04/24/21-04/28/21 with EtOH intoxication, EtOH related pancreatits, aspiration pneumonia; was followed by addiction medicine. EGD 04/24/21 showing LA Grade A chronic esophagitis.\"    On arrival to ICU, his lactic acid was elevated at 7. He received 4L of fluids with improvement in his lactic acid to 1.0. His blood cultures were positive for Staph hemolyticus and hominis and enterococcus. There were also concerns for aspiration pneumonia. Patient was started on Vanc/Zosyn which has since been narrowed to Vanc/Ampicillin-Sulbactam.     Data reviewed today: I reviewed all medications, new labs and imaging results over the last 24 hours.     Physical Exam   Vital Signs: Temp: 98.6  F (37  C) Temp src: Oral BP: 123/76 Pulse: 72   Resp: 11 SpO2: 94 % O2 Device: None (Room air) Oxygen Delivery: 1 LPM  Weight: 159 lbs 2.75 oz  3. PHYSICAL EXAMINATION:  General: withdrawn, mildly lethargic, mildly diaphoretic  HEENT:  dry mucous membranes, NCAT  Neuro: withdrawn, follows commands, A&O x3  Pulm/Resp: clear lung sounds bilaterall, no wheezing or crackles  CV: Tachycardic, regular, S1, S2, no murmurs, gallops, " rubs  Abdomen: Soft, non-distended, non-tender, rounded, + bowel sounds 4/4 quadrants  : No guaman catheter in place   Incisions/Skin: warm, ainsley, diaphoretic     Data     EXAM: CT HEAD W/O CONTRAST  LOCATION: [facility]  DATE/TIME: 6/2/2021 12:03 AM     INDICATION: Mental status change, unknown cause  COMPARISON: 10/17/2020 head CT  TECHNIQUE: Routine CT Head without IV contrast. Multiplanar reformats. Dose reduction techniques were used.     FINDINGS:  INTRACRANIAL CONTENTS: No intracranial hemorrhage, extraaxial collection, or mass effect.  No CT evidence of acute infarct. Normal parenchymal attenuation. Normal ventricles and sulci.      VISUALIZED ORBITS/SINUSES/MASTOIDS: No intraorbital abnormality. Mucosal thickening primarily involving the ethmoid air cells. No middle ear or mastoid effusion.     BONES/SOFT TISSUES: No acute abnormality.                                                                      IMPRESSION:  1.  No acute intracranial process.    EXAM: CT CHEST/ABDOMEN/PELVIS W CONTRAST  LOCATION: [facility]  DATE/TIME: 6/2/2021 12:03 AM    IMPRESSION:  1.  Multifocal nodular consolidation, likely of an infectious or inflammatory etiology, correlate to exclude aspiration.     2.  Mural thickening of the esophagus, may reflect esophagitis, recommend follow-up to resolution and possible endoscopy to exclude metaplasia or esophageal malignancy.     3.  No evidence of esophageal perforation.     4.  Fatty liver.     5.  Normal appendix. Normal bowel.    - End of Note -

## 2021-06-03 NOTE — PLAN OF CARE
OT: after chart review and observation with this pt it is concluded that this pt has no acute OT needs. Pt ambulating in hallway SBA with mild LOB likely 2/2 ETOH intoxication. Otherwise pt is I to mod I with mobility. Pt currently in contemplation phase of seeking CD treatment of which this writer greatly encouraged. 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 Defer OT at this time.

## 2021-06-03 NOTE — PROGRESS NOTES
MEDICAL ICU PROGRESS NOTE  06/03/2021      Date of Service (when I saw the patient): 06/03/2021    ASSESSMENT: Mohit Porter is a 30 year old male with PMH significant for Generalized Anxiety Disorder (KALINA), Alcohol use disorder, nicotine dependence, hyponatremia who presented on 6/1/2021 via EMS with tachycardia, acute encephalopathy, elevated liver enzymes, leukocytosis and ethanol 0.33 concerning for alcohol intoxication and alcoholic hepatitis complicated by nausea/vomiting leading to hematemesis, now with aspiration pneumonia and bacteremia.    CHANGES and MAJOR THINGS TODAY:   - Continue vanco & unasyn pending ID recommendations   - Recheck electrolytes  - Addiction medicine consultation  - CIWA protocol  - Bedside attendant (concern for elopement)  - Add prophy enoxaparin    PLAN:    Neuro:  # Acute Encephalopathy  # EtOH Intoxication  Last drink was < 1 day PTA, consumed ~ 1.75 L of vodka. Admission ethanol 0.33. No known prior withdrawal seizures or DT's. Well known to chem dep per chart review. Has failed EtOH cessation several times. Denies falls or trauma to head, CTH normal. Ammonia <10. Denies other illicit use, Acetaminophen and Salicylate WNL. Denies SI, visual/auditory/tactile hallucinations. Utox only positive for EtOH.  - CIWA  - Gabapentin load and taper  - 1-2 mg Ativan PO prn q30 mins per protocol  - 0.1 mg clonidine q8 and Metoprolol 5 mg q6 prn for HTN  - Folate, Thiamine, Multivitamin  - Anti-emetics: Zofran and Compazine prn  - Addiction Medicine consultation, appreciate recs  - Psychiatry consultation, appreciate recs  - Social Work consultation, appreciate recs  - Continue PTA Naltrexone    # Generalized anxiety disorder  - Continue PTA Seroquel  - Continue PTA Hydroxyzine    # Nicotine Dependence  - Continue PTA nicorette gum PRN    Pulmonary:  # Concern for aspiration pneumonia vs. Pneumonitis   On 1L on arrival to MICU. CT chest notable for multifocal nodular consolidation in  the setting of emesis. COVID neg on admission; had COVID 12/2020.  - Zosyn & Vanco x1 now discontinued  - Unasyn 3 gms IV q 6 hrs    - Pulmonary toilet with IS q 2 H as awake   - Supplemental O2 prn    Cardiovascular:  # Sinus Tachycardia, improving  Suspect intoxication/withdrawal, dehydration (vomiting per EMS) and possible infection (-200 upon admission). 2.5L NS fluid bolus in ER. Echo with EF 60-65%, no valvular or WMA noted.   - Stop IVF now taking PO, tachycardia resolved  - Telemetry per ICU routine  - PRN metoprolol for SBP > 160    GI/Nutrition:  # Suspected EtOh Hepatitis (AST>ALT)  # Nausea, Hematemesis, UGIB  # Mural thickening of esophagus, possible esophagitis  Per EMS, noted to have coffee ground emesis throughout room. Hepatocellular LFT's, normal bili. Madrey's discriminant 0.6 points. Fatty liver appreciable on CT. Tylenol level normal. Recent EGD, no evidence of esophageal varices. UGIB most likely 2/2 Tamera mccallum tear, esophagatis vs gastritis. Of note patient has elevated lipase but no abdominal tenderness on palpation and normal appearing pancreas on CT, Lipase 430.  - Anti-emetics above  - PPI BID IV  - Typed and screened, 2 large PIVs   - Regular diet  - Repeat Lipase 3,355.     Renal/Fluids/Electrolytes:  # Hyponatremia  # Hypomagnesemia  # Hypophosphotemia  # Hypochloridemia  #Hypokalemia in setting of poor nutrition and s/p fluid resuscitation   # Lactic acidosis in setting of possible pneumonia, dehydration, resolved  Likely related to emesis, poor PO intake, dehydration, found incontinent at [facility]. Has had several episodes of nausea/vomiting, incontinence while hospitalized.Large volume PO intake contributing to hyponatremia ( 4L intake).   - Replete electrolytes per protocol, change to high replacement protocol of K, Mg, Phos  - Additional 40meq K, recheck following replacement   - Stop IVF   - BMP at 1600 and daily    Endocrine:  # Concern for hypoglycemia  - hypoglycemia  protocol  - PRN D10 infusion for FSBG <80  - FSBG QID and PRN    ID:  # Concern for aspiration pneumonia  # Bacteremia  CT chest notable for multifocal nodular consolidation in the setting of emesis.  - MRSA swab negative  - Blood cultures positive 6/1   Staphylococcus haemolyticus, Enterococcus casseliflavus, Staphylococcus hominis   - ID consult, appreciate recs  - Daily blood cultures until clear   - Sputum culture pending ( pt not producing enough sputum)   - Urine culture pending  - Abx:   - Zosyn (6/1 x1 dose)   - Vanco (6/1-current)   - Unasyn (6/2-current)    Hematology:    # Leukocytosis  Concern for aspiration pneumonia, possible alcoholic hepatitis  - Antibiotics per ID section    Musculoskeletal:  # weakness/deconditioning  -ot/pt consult     Skin:  # No active issues at this time    General Cares/Prophylaxis:    DVT Prophylaxis: Pneumatic Compression Devices, lovenox   GI Prophylaxis: PPI  Restraints: bedside attendant per elopement risk, no physical restraints  Family Communication: Mom (Darling) point person however pt. Requesting no updates to his mom at this time  Code Status: Full    Lines/tubes/drains:  - PIV x2    Disposition:  - Stable for transfer to medicine floor pending acceptance from triage.     Patient seen and findings/plan discussed with medical ICU staff, Dr. Clay.    Marley Nielsen , DNP, APRN, CNP  ====================================  INTERVAL HISTORY:   No acute events overnight. Received PRN CIWA dose X 1.     OBJECTIVE:   1. VITAL SIGNS:   Temp:  [98.2  F (36.8  C)-99.1  F (37.3  C)] 98.5  F (36.9  C)  Pulse:  [] 60  Resp:  [10-32] 17  BP: (131-196)/() 144/89  SpO2:  [89 %-97 %] 94 %  Resp: 17    2. INTAKE/ OUTPUT:   I/O last 3 completed shifts:  In: 7902.66 [P.O.:2980; I.V.:4422.66; IV Piggyback:500]  Out: 5700 [Urine:5700]    3. PHYSICAL EXAMINATION:  General: withdrawn, mildly lethargic, mildly diaphoretic  HEENT: PERRL, dry mucous membranes, NCAT  Neuro:  withdrawn, follows commands, A&O x3  Pulm/Resp: Clear breath sounds upper bases, crackles lower bases  CV: Tachycardic, regular, S1, S2, no murmurs, gallops, rubs  Abdomen: Soft, non-distended, non-tender, rounded, + bowel sounds 4/4 quadrants  : No guaman catheter in place   Incisions/Skin: warm, ainsley, diaphoretic    4. LABS:   Arterial Blood Gases   No lab results found in last 7 days.  Complete Blood Count   Recent Labs   Lab 06/03/21  0548 06/02/21  0417 06/01/21 2239 06/01/21 2231   WBC 10.5 19.0* 24.0*  --    HGB 12.4* 13.1* 16.4 17.0   * 133* 183  --      Basic Metabolic Panel  Recent Labs   Lab 06/03/21  0548 06/02/21  1638 06/02/21  0850 06/02/21 0417 06/01/21 2239    127* 128* 126* 125*   POTASSIUM 2.6* 3.1*  --  3.4 3.5   CHLORIDE 91* 87*  --  86* 78*   CO2 34* 29  --  24 26   BUN 6* 8  --  15 18   CR 0.60* 0.59*  --  0.63* 0.62*   GLC 77 87  --  100* 136*     Liver Function Tests  Recent Labs   Lab 06/03/21  0548 06/02/21  0850 06/01/21 2239   * 135* 186*   ALT 53 58 76*   ALKPHOS 69 63 95   BILITOTAL 0.8 0.6 0.6   ALBUMIN 2.7* 2.5* 3.4   INR  --   --  1.00     Coagulation Profile  Recent Labs   Lab 06/01/21 2239   INR 1.00   PTT 31       5. RADIOLOGY:   No results found for this or any previous visit (from the past 24 hour(s)).

## 2021-06-03 NOTE — PHARMACY-VANCOMYCIN DOSING SERVICE
Pharmacy Vancomycin Note  Date of Service Sindhu 3, 2021  Patient's  1990   30 year old, male    Indication: Sepsis  Day of Therapy: 2  Current vancomycin regimen:  1250 mg IV q12h  Current vancomycin monitoring method: AUC  Current vancomycin therapeutic monitoring goal: 400-600 mg*h/L    Current estimated CrCl = Estimated Creatinine Clearance: 185.5 mL/min (A) (based on SCr of 0.6 mg/dL (L)).    Creatinine for last 3 days  2021: 10:39 PM Creatinine 0.62 mg/dL  2021:  4:17 AM Creatinine 0.63 mg/dL;  4:38 PM Creatinine 0.59 mg/dL  6/3/2021:  5:48 AM Creatinine 0.60 mg/dL    Recent Vancomycin Levels (past 3 days)  6/3/2021:  2:55 PM Vancomycin Level 5.0 mg/L    Vancomycin IV Administrations (past 72 hours)                   vancomycin 1250 mg in 0.9% NaCl 250 mL intermittent infusion 1,250 mg (mg) 1,250 mg New Bag 21 1649     1,250 mg New Bag  0436     1,250 mg New Bag 21 1625    vancomycin (VANCOCIN) 1,750 mg in sodium chloride 0.9 % 500 mL intermittent infusion (mg) 1,750 mg New Bag 21 0026                Nephrotoxins and other renal medications (From now, onward)    Start     Dose/Rate Route Frequency Ordered Stop    21 1600  vancomycin 1250 mg in 0.9% NaCl 250 mL intermittent infusion 1,250 mg      1,250 mg  over 90 Minutes Intravenous EVERY 12 HOURS 21 1458      21 1100  ampicillin-sulbactam (UNASYN) 3 g vial to attach to  mL bag      3 g  over 15-30 Minutes Intravenous EVERY 6 HOURS 21 0729               Contrast Orders - past 72 hours (72h ago, onward)    Start     Dose/Rate Route Frequency Ordered Stop    21 2350  iopamidol (ISOVUE-370) solution 99 mL      99 mL Intravenous ONCE 21 2347 21 0006          Interpretation of levels and current regimen:  Vancomycin level is reflective of -600 [measured AUC =340 mg/L.hr]    Has serum creatinine changed greater than 50% in last 72 hours: Yes- has improved  Urine output:   good urine output  Renal Function: Stable    New Regimen:   Loading dose: 1750 mg   Regimen: 1750 mg every 12 hours for 8 doses.  Start time: 07:56 on 06/04/2021  Exposure target: AUC24 (range)400-600 mg/L.hr   AUC24,ss: 476 mg/L.hr  PAUC*: 81 %  Ctrough,ss: 10.1 mg/L  Pconc*: 1 %  Tox.: 6 %      Plan:  1. Increase dose to Vancomycin 1750 mg iv q12h  2. Vancomycin monitoring method: AUC  3. Vancomycin therapeutic monitoring goal: 400-600 mg*h/L  4. Pharmacy will check vancomycin levels as appropriate in 1-3 Days.  5. Serum creatinine levels will be ordered daily for the first week of therapy and at least twice weekly for subsequent weeks.    Hailey Martin, ReaganD

## 2021-06-03 NOTE — CONSULTS
[facility] SERVICE: NEW CONSULTATION  Patient:  Mohit Porter, Date of birth 1990, Medical record number [id number]  Date of Admission: 6/1/2021  Date of Visit:  6/3/2021  Requesting Provider: David Morris Perlman         Assessment and Recommendations:   Problem List:  # Concern for sepsis, with SIRS criteria on admission  # Blood culture positive for Enterococcus casseliflavus  # Blood cultures positive for coag negative Staph (hemolyticus and hominis)  # Etoh intoxication/withdrawal, use disorder      Discussion:  Mr Lara presented with encephalopathy and concern for UGIB with coffee ground emesis prior to admission. Prior EGD with esophagitis but not with esophageal varices. Given the vomiting and SIRS, there was concern for aspiration. Blood cultures in left arm were positive for coag negative Staph (hemolyticus and hominis) and enterococcus casseliflavus. Blood cx from right arm positive for for Staph haemolyticus. CBC w/ diff on admission with leukocytosis to 24, no left shift, he was afebrile and on room air. Procalcitonin 1.37, and lactic acid from 7.6 down to 5 after fluids. He'd been on Vanc/Zosyn but was narrowed yesterday to Vanc/amp-sulbactam. He has remained afebrile since admission. Subsequent blood cx's have remained NGTD.    Coag negative staph are often contaminants, but concerning that staph hemolyticus grew in both left and right bottles. Enterococcus casseliflavus could be contaminant but also could be GI translocation. 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 Agree with vancomycin (for staph spp until sensitivities return) and ampicillin/sulbactam (for enterococcus) for now. If subsequent cultures remain negative plan for 5 days amp/sulbactam +/- vancomycin depending on the sensitivities.    Recommendations:  - c/t Unasyn, for enterococcus casseliflavus  - c/t Vancomycin until sensitivities on Staph spp return.  - If repeat cultures remain negative,  can treat for 5 day course of antibiotics  - We will c/t to follow    Recommendations discussed with Dr. Alvarez.    Thank you for this consult. The [facility] team will continue to follow this patient. Please feel free to call with any questions.     Fredrick Salas MD       History of Present Illness:     From H&P:  Mohit Porter is a 31 YO M with a h/o KALINA and AUD with multiple ED visits for EtOh intoxication and withdrawal who presents with acute encephalopathy and UGIB.     He has been staying at the [facility]. Staff found him surrounded by coffee ground emesis. He notes drinking ~ 1.75 L bottles of vodka. Denies illicits or NSAID overuse. Denies fevers, chills, diarrhea, melena, hematochezia, or dysphagia. Denies prior EtOH withdrawal seizures. Denies presyncope, syncope, chest pain, palpitations, dyspnea, cough, headaches, abdominal pain, rashes or lacerations, trauma to head or falls. Takes Seroquel PTA, but has not used in ~ 1 week. No known sick contacts or travel. Denies SI or HI. Denies auditory, tactile, or visual hallucinations. Per chart review, he follows with addiction and behavioral health. Recently seen 05/29/21 in ED at [facility] for EtOH intoxication. Was admitted at [facility] 04/24/21-04/28/21 with EtOH intoxication, EtOH related pancreatits, aspiration pneumonia; was followed by addiction medicine. EGD 04/24/21 showing LA Grade A chronic esophagitis.         Review of Systems:   CONSTITUTIONAL:  No fevers or chills  EYES: Negative for icterus  ENT:  Negative for oral lesions and sore throat  RESPIRATORY:  Negative for cough and dyspnea  CARDIOVASCULAR:  Negative for chest pain, palpitations  GASTROINTESTINAL:  Negative for nausea, vomiting, diarrhea and constipation  GENITOURINARY:  Negative for dysuria  INTEGUMENT:  Negative for rash and pruritus  NEURO:  Negative for headache       Past Medical History:     Past Medical History:   Diagnosis Date     Alcohol abuse       "Anxiety          Allergies:    No Known Allergies         Family History:   History reviewed. No pertinent family history.         Social History:     Social History     Socioeconomic History     Marital status: Single     Spouse name: Not on file     Number of children: Not on file     Years of education: Not on file     Highest education level: Not on file   Occupational History     Employer: OTHER     Comment:    Social Needs     Financial resource strain: Not on file     Food insecurity     Worry: Not on file     Inability: Not on file     Transportation needs     Medical: Not on file     Non-medical: Not on file   Tobacco Use     Smoking status: Never Smoker     Smokeless tobacco: Current User     Types: Chew   Substance and Sexual Activity     Alcohol use: Yes     Alcohol/week: 239.0 standard drinks     Types: 239 Standard drinks or equivalent per week     Frequency: 4 or more times a week     Drinks per session: 10 or more     Binge frequency: Daily or almost daily     Comment: 1.75L vodka/day=239 standard drinks     Drug use: No     Sexual activity: Yes     Partners: Female   Lifestyle     Physical activity     Days per week: Not on file     Minutes per session: Not on file     Stress: Not on file   Relationships     Social connections     Talks on phone: Not on file     Gets together: Not on file     Attends Religion service: Not on file     Active member of club or organization: Not on file     Attends meetings of clubs or organizations: Not on file     Relationship status: Not on file     Intimate partner violence     Fear of current or ex partner: Not on file     Emotionally abused: Not on file     Physically abused: Not on file     Forced sexual activity: Not on file   Other Topics Concern     Not on file   Social History Narrative     Not on file              Physical Exam:   /77   Pulse 86   Temp 98.6  F (37  C) (Oral)   Resp 16   Ht 1.778 m (5' 10\")   Wt 72.2 kg " (159 lb 2.8 oz)   SpO2 92%   BMI 22.84 kg/m     Exam:  GENERAL:  Layng in bed in no acute distress.   ENT:  PERRL  EYES:  Eyes have anicteric sclerae.    NECK:  Supple.  LUNGS:  Clear to auscultation.  CARDIOVASCULAR:  Regular rate and rhythm with no murmurs, gallops or rubs.  ABDOMEN:  Normal bowel sounds, soft, nontender.  EXT: Extremities warm and without edema.  SKIN:  No acute rashes.  Line is in place without any surrounding erythema.  NEUROLOGIC:  Grossly nonfocal.         Laboratory Data:     Creatinine   Date Value Ref Range Status   06/03/2021 0.60 (L) 0.66 - 1.25 mg/dL Final   06/02/2021 0.59 (L) 0.66 - 1.25 mg/dL Final   06/02/2021 0.63 (L) 0.66 - 1.25 mg/dL Final   06/01/2021 0.62 (L) 0.66 - 1.25 mg/dL Final   01/22/2021 0.75 0.66 - 1.25 mg/dL Final     WBC   Date Value Ref Range Status   06/03/2021 10.5 4.0 - 11.0 10e9/L Final   06/02/2021 19.0 (H) 4.0 - 11.0 10e9/L Final   06/01/2021 24.0 (H) 4.0 - 11.0 10e9/L Final   01/22/2021 3.7 (L) 4.0 - 11.0 10e9/L Final   01/17/2021 10.8 4.0 - 11.0 10e9/L Final     Hemoglobin   Date Value Ref Range Status   06/03/2021 12.4 (L) 13.3 - 17.7 g/dL Final     Platelet Count   Date Value Ref Range Status   06/03/2021 105 (L) 150 - 450 10e9/L Final     Lab Results   Component Value Date     06/03/2021    BUN 6 (L) 06/03/2021    CO2 34 (H) 06/03/2021     No results found for: CRP        Pertinent Recent Microbiology Data:     Recent Labs   Lab 06/03/21  0554 06/03/21  0547 06/02/21  0456 06/02/21  0304 06/01/21  2245 06/01/21 2239   CULT No growth after 1 hour No growth after 1 hour  --  No growth Cultured on the 1st day of incubation:  Staphylococcus haemolyticus  *  Cultured on the 1st day of incubation:  Enterococcus casseliflavus  Susceptibility testing in progress  *  Cultured on the 1st day of incubation:  Staphylococcus hominis  *  Critical Value/Significant Value, preliminary result only, called to and read back by  Mame Dennis RN on 4C at 1430 on  6/2/21 ac.    Culture in progress  (Note)  POSITIVE for Staphylococci other than S.aureus, S.epidermidis and  S.lugdunensis, by Verigene multiplex nucleic acid test.  Coagulase-negative staphylococci are the most common venipuncture or  collection associated skin CONTAMINANTS grown in blood cultures.  Final identification and antimicrobial susceptibility testing will be  verified by standard methods.    NEGATIVE for the following: Staphylococcus spp., Staph aureus, Staph  epidermidis, Staph lugdunensis, Streptococcus spp., Strep pneumoniae,  Strep pyogenes, Strep agalactiae, Strep anginosus group, Enterococcus  faecalis, Enterococcus faecium, and Listeria spp. by Verigene  multiplex nucleic acid test. Final identification and antimicrobial  susceptibility testing will be verified by standard methods.    Specimen tested with Verigene multiplex, gram-positive blood culture  nucleic acid test for the following targets: Staph aureus, Staph  epidermidis, Staph lugdunensis, other Staph species, Enterococcus  faecalis, Enteroc occus faecium, Streptococcus species, S. agalactiae,  S. anginosus grp., S. pneumoniae, S. pyogenes, Listeria sp., mecA  (methicillin resistance) and Sonam/B (vancomycin resistance).    Critical Value/Significant Value called to and read back by Hi Rice RN at 1725 on 6.2.21 CW   Cultured on the 1st day of incubation:  Staphylococcus haemolyticus  *  Critical Value/Significant Value, preliminary result only, called to and read back by  Mame Dennis RN on 4C at 1430 on 6/2/21 ac.     SDES Blood Left Hand Blood Right Hand Nares Midstream Urine Blood Left Arm Blood Right Arm            Imaging:     Recent Results (from the past 48 hour(s))   Head CT w/o contrast    Narrative    EXAM: CT HEAD W/O CONTRAST  LOCATION: [facility]  DATE/TIME: 6/2/2021 12:03 AM    INDICATION: Mental status change, unknown cause  COMPARISON: 10/17/2020 head CT  TECHNIQUE: Routine CT Head without IV  contrast. Multiplanar reformats. Dose reduction techniques were used.    FINDINGS:  INTRACRANIAL CONTENTS: No intracranial hemorrhage, extraaxial collection, or mass effect.  No CT evidence of acute infarct. Normal parenchymal attenuation. Normal ventricles and sulci.     VISUALIZED ORBITS/SINUSES/MASTOIDS: No intraorbital abnormality. Mucosal thickening primarily involving the ethmoid air cells. No middle ear or mastoid effusion.    BONES/SOFT TISSUES: No acute abnormality.      Impression    IMPRESSION:  1.  No acute intracranial process.   CT Chest/Abdomen/Pelvis w Contrast    Narrative    EXAM: CT CHEST/ABDOMEN/PELVIS W CONTRAST  LOCATION: [facility]  DATE/TIME: 6/2/2021 12:03 AM    INDICATION: Boerrhave's, GI bleed, EtOH.    COMPARISON: None.    TECHNIQUE: CT scan of the chest, abdomen and pelvis was performed following injection of IV contrast. Multiplanar reformats were obtained. Dose reduction techniques were used.     CONTRAST: 99 mL Isovue 370.    FINDINGS:   LUNGS AND PLEURA: Bilateral nodular consolidation, predominantly in the right lower and middle lobes, concerning for infectious or inflammatory process such as aspiration, recommend follow-up to resolution. No pneumothorax or pleural effusion.    MEDIASTINUM/AXILLAE: Mural thickening of the esophagus, correlate to exclude esophagitis, consider eventual evaluation with endoscopy to exclude metaplasia or malignancy. No pneumomediastinum. Normal-sized heart. No pericardial effusion. No hilar or   mediastinal lymphadenopathy.    CORONARY ARTERY CALCIFICATION: None.    HEPATOBILIARY: Fatty liver.    PANCREAS: Normal.    SPLEEN: Normal.    ADRENAL GLANDS: Normal.    KIDNEYS/BLADDER: Right extrarenal pelvis. No obstructing renal or ureteral stones. No hydroureteronephrosis. Fluid-distended bladder, otherwise normal.    BOWEL: Normal appendix. No obstruction, colitis, diverticulitis or appendicitis. No free air or fluid.    LYMPH NODES:  Normal.    VASCULATURE: Unremarkable.    PELVIC ORGANS: Normal.    MUSCULOSKELETAL: L5 spondylolysis without spondylolisthesis.      Impression    IMPRESSION:  1.  Multifocal nodular consolidation, likely of an infectious or inflammatory etiology, correlate to exclude aspiration.    2.  Mural thickening of the esophagus, may reflect esophagitis, recommend follow-up to resolution and possible endoscopy to exclude metaplasia or esophageal malignancy.    3.  No evidence of esophageal perforation.    4.  Fatty liver.    5.  Normal appendix. Normal bowel.     Echo Complete    Narrative    [id number]  [id number]  [id number]  [id number]^HIGINIO^GOLDY^TRINI     [facility],[place]  Echocardiography Laboratory  [address] [place] [id number]     Name: AMAYA GUSTAFSON  MRN: [id number]  : 1990  Study Date: 2021 07:51 AM  Age: 30 yrs  Gender: Male  Patient Location: [facility] [place] [facility]  Reason For Study: Endocarditis  Ordering Physician: GOLDY SYLVESTER  Performed By: Shawanda Ocampo [facility]     BSA: 1.9 m2  Height: 70 in  Weight: 159 lb  HR: 82  BP: 124/98 mmHg  ______________________________________________________________________________  Procedure  Complete Portable Echo Adult. Technically difficult study.  ______________________________________________________________________________  Interpretation Summary  No valvular vegetations seen on this good quality study.  Left ventricular function, chamber size, wall motion, and wall thickness are  normal.The EF is 60-65%.  IVC diameter <2.1 cm collapsing >50% with sniff suggests a normal RA pressure  of 3 mmHg.  No pericardial effusion is present.  ______________________________________________________________________________  Left Ventricle  Left ventricular function, chamber size, wall motion, and wall thickness are  normal.The EF is 60-65%. Left ventricular diastolic function is normal.     Right Ventricle  Right ventricular function, chamber size, wall  motion, and thickness are  normal.     Atria  Both atria appear normal.     Mitral Valve  The mitral valve is normal. Trace mitral insufficiency is present.     Aortic Valve  Aortic valve is normal in structure and function.     Tricuspid Valve  The tricuspid valve is normal. Trace tricuspid insufficiency is present. The  right ventricular systolic pressure is approximated at 14.4 mmHg plus the  right atrial pressure.     Pulmonic Valve  The pulmonic valve is normal.     Vessels  The thoracic aorta is normal. IVC diameter <2.1 cm collapsing >50% with sniff  suggests a normal RA pressure of 3 mmHg.     Pericardium  No pericardial effusion is present.     Compared to Previous Study  There is no prior study for direct comparison.  ______________________________________________________________________________  MMode/2D Measurements & Calculations     IVSd: 0.98 cm  LVIDd: 4.2 cm  LVIDs: 3.0 cm  LVPWd: 0.92 cm  FS: 28.6 %  LV mass(C)d: 127.4 grams  LV mass(C)dI: 67.3 grams/m2  Ao root diam: 3.6 cm  asc Aorta Diam: 2.8 cm  LVOT diam: 2.5 cm  LVOT area: 4.9 cm2  RWT: 0.44     Doppler Measurements & Calculations  MV E max korina: 85.9 cm/sec  MV A max korina: 46.6 cm/sec  MV E/A: 1.8  MV dec time: 0.10 sec  TR max korina: 190.0 cm/sec  TR max P.4 mmHg  E/E' av.5  Lateral E/e': 4.5  Medial E/e': 6.4     ______________________________________________________________________________  Report approved by: Aleksandar Rodney 2021 09:20 AM

## 2021-06-03 NOTE — CONSULTS
Duplicate consult orders placed. Please see consult note connected to original consult order below by .    Austyn Alvarez MD  Division of Infectious Diseases and International Medicine  P: [phone]

## 2021-06-03 NOTE — PLAN OF CARE
ICU End of Shift Summary. See flowsheets for vital signs and detailed assessment.    Changes this shift: Patient was given Ativan x1 CIWA 8, was nauseous and medication given with relief. Normal saline stopped patient taking oral well.    Plan:  Patient expressing frustration because of his drinking and has no support system. Monitor closely for signs of withdrawal

## 2021-06-04 NOTE — PROGRESS NOTES
[facility] ID Service: Follow Up Note      Patient:  Mohit Porter, Date of birth 1990, Medical record number [id number]  Date of Visit:  June 4, 2021         Assessment and Recommendations:   Problem List:  # Concern for sepsis, with SIRS criteria on admission  # Blood culture positive for Enterococcus casseliflavus  # Blood cultures positive for coag negative Staph (hemolyticus and hominis)  # Etoh intoxication/withdrawal, use disorder    Recommendations:  1. Continue vancomycin and unasyn for total of 5 days, treating transient bacteremia likely 2/2 UGIB  2. Can stop daily blood cultures    ID will sign off at this time, but please do not hesitate to contact us with any questions!    Discussion:  31yo M who presented with encephalopathy and concern for UGIB with coffee ground emesis prior to admission. Prior EGD with esophagitis but not with esophageal varices. Given the vomiting and SIRS, there was concern for aspiration. Blood cultures in left arm collected on 6/1 were positive for CoNS and Enterococcus casseliflavus. Blood cx from right arm positive for for Staph haemolyticus.He'd been on Vanc/Zosyn but was narrowed 6/3 to Vanc/amp-sulbactam. He has remained afebrile since admission. Subsequent blood cultures on 6/3 and 6/4 have remained NGTD. Given organisms and UGIB, concerned about possible translocation and transient bacteremia. With repeat cultures negative, can treat with short, 5-day course of targeted therapy.    Austyn Alvarez MD  Division of Infectious Diseases and International Medicine  P: [phone]        Interval History:     Seen and examined. No acute events overnight, no complaints. Repeat blood cultures on 6/3 and 6/4 remain ngtd.         HPI:     Mohit Porter is a 29 YO M with a h/o KALINA and AUD with multiple ED visits for EtOh intoxication and withdrawal who presents with acute encephalopathy and UGIB.     He has been staying at the [facility]. Staff found  him surrounded by coffee ground emesis. He notes drinking ~ 1.75 L bottles of vodka. Denies illicits or NSAID overuse. Denies fevers, chills, diarrhea, melena, hematochezia, or dysphagia. Denies prior EtOH withdrawal seizures. Denies presyncope, syncope, chest pain, palpitations, dyspnea, cough, headaches, abdominal pain, rashes or lacerations, trauma to head or falls. Takes Seroquel PTA, but has not used in ~ 1 week. No known sick contacts or travel. Denies SI or HI. Denies auditory, tactile, or visual hallucinations. Per chart review, he follows with addiction and behavioral health. Recently seen 05/29/21 in ED at [facility] for EtOH intoxication. Was admitted at [facility] 04/24/21-04/28/21 with EtOH intoxication, EtOH related pancreatits, aspiration pneumonia; was followed by addiction medicine. EGD 04/24/21 showing LA Grade A chronic esophagitis.             Review of Systems:     Full 9 pt ROS obtained and negative unless noted above in assessment and interval history.         Current Antimicrobials     Vancomycin  Unasyn         Physical Exam:   Ranges for vital signs:  Temp:  [97.5  F (36.4  C)-98.2  F (36.8  C)] 98  F (36.7  C)  Pulse:  [] 100  Resp:  [8-25] 11  BP: (125-141)/() 135/100  SpO2:  [92 %-100 %] 98 %    Intake/Output Summary (Last 24 hours) at 6/4/2021 1656  Last data filed at 6/4/2021 1600  Gross per 24 hour   Intake 3867.5 ml   Output 8700 ml   Net -4832.5 ml     Exam:  GENERAL:  well-developed, well-nourished, lying in bed in no acute distress.   ENT:  Head is normocephalic, atraumatic. Oropharynx is moist without exudates or ulcers.  EYES:  Eyes have anicteric sclerae.    NECK:  Supple.  LUNGS:  Clear to auscultation.  CARDIOVASCULAR:  Regular rate and rhythm with no murmurs, gallops or rubs.  ABDOMEN:  Normal bowel sounds, soft, nontender.  EXT: Extremities warm and without edema.  SKIN:  No acute rashes.    NEUROLOGIC:  Grossly nonfocal.         Laboratory Data:   Reviewed.   Pertinent for:    Microbiology:  Culture Micro   Date Value Ref Range Status   06/04/2021 No growth after 6 hours  Preliminary   06/04/2021 No growth after 6 hours  Preliminary   06/03/2021 No growth after 1 day  Preliminary   06/03/2021 No growth after 1 day  Preliminary   06/02/2021 No growth  Final     Metabolic Studies       Recent Labs   Lab Test 06/04/21  0558 06/03/21  1956 06/03/21  1455 06/03/21  1119 06/03/21  0548 06/02/21  1638 06/02/21  1212 06/02/21  0850 06/02/21  0417 06/02/21  0417 06/01/21  2239 10/26/20  0806 10/26/20  0806    130*  --   --  134 127*  --  128*  --  126* 125*   < > 140   POTASSIUM 3.6 3.2*  3.2*  --  2.9* 2.6* 3.1*  --   --   --  3.4 3.5   < > 3.8   CHLORIDE 95 90*  --   --  91* 87*  --   --   --  86* 78*   < > 107   CO2 34* 34*  --   --  34* 29  --   --   --  24 26   < > 26   ANIONGAP 4 5  --   --  9 11  --   --   --  16* 21*   < > 7   BUN 3* 3*  --   --  6* 8  --   --   --  15 18   < > 8   CR 0.59* 0.54*  --   --  0.60* 0.59*  --   --   --  0.63* 0.62*   < > 0.51*   GFRESTIMATED >90 >90  --   --  >90 >90  --   --   --  >90 >90   < > >90   * 118*  --   --  77 87  --   --   --  100* 136*   < > 82   ARYA 8.9 8.3*  --   --  8.4* 7.6*  --   --   --  7.6* 8.0*   < > 8.7   PHOS 2.2*  --  2.1*  --  1.8* 1.3*  --   --   --  1.5* 2.6  --  4.1   MAG 1.9  --   --   --  1.9 1.9  --   --   --  1.9 2.1  --  2.2   LACT  --   --   --   --  1.0  --  1.4  --    < >  --   --    < >  --    CKT  --   --   --   --   --   --   --   --   --   --   --   --  61    < > = values in this interval not displayed.     Hepatic Studies    Recent Labs   Lab Test 06/04/21  0558 06/03/21  0548 06/02/21  0850 06/01/21  2239 01/22/21  0909 01/19/21  0739 01/17/21  0830 01/17/21  0830   BILITOTAL 0.5 0.8 0.6 0.6 0.6 0.9   < > 0.8   ALKPHOS 92 69 63 95 98 131   < > 110   ALBUMIN 2.8* 2.7* 2.5* 3.4 4.0 4.3   < > 3.2*   AST 75* 112* 135* 186* 179* 496*   < > 603*   ALT 57 53 58 76* 489* 737*   < > 723*   GGT   --   --   --   --   --   --   --  347*    < > = values in this interval not displayed.     Pancreatitis testing    Recent Labs   Lab Test 06/03/21  0548 06/01/21 2239 01/17/21  0830 01/16/21  1447 10/17/20  1423 10/08/20  1408 12/07/19  1747 08/05/19  2213 02/17/19  0730   LIPASE 3,355* 430*  --  224 409* 674* 234 446*  --    TRIG  --   --  35  --   --   --   --   --  63     Hematology Studies      Recent Labs   Lab Test 06/04/21  0558 06/03/21  0548 06/02/21  0417 06/01/21 2239 06/01/21 2231 01/22/21  0909 01/17/21  0830 01/16/21  1447 12/31/20  0243 10/24/20  1037 10/24/20  1037   WBC 10.3 10.5 19.0* 24.0*  --  3.7* 10.8 23.5* 13.4*   < > 6.8   ANEU  --   --   --  21.8*  --  1.5* 7.6 19.6* 10.4*  --  5.2   ALYM  --   --   --  0.7*  --  1.4 2.4 2.3 1.7  --  0.9   ERNA  --   --   --  1.0  --  0.7 0.7 1.3 1.3  --  0.7   AEOS  --   --   --  0.0  --  0.1 0.1 0.0 0.0  --  0.0   HGB 13.1* 12.4* 13.1* 16.4 17.0 14.4  --  17.8* 14.9   < > 13.2*   HCT 37.8* 36.3* 36.3* 45.7  --  43.2  --  53.0 43.9   < > 38.1*   * 105* 133* 183  --  342  --  240 343   < > 220    < > = values in this interval not displayed.     Urine Studies     Recent Labs   Lab Test 06/02/21  0304 01/16/21  2246 10/08/20  1512 02/16/19  0300   URINEPH 6.5 6.0 5.5 6.0   NITRITE Negative Negative Negative Negative   LEUKEST Negative Negative Negative Negative   WBCU 5 1 5 12*     Vancomycin Levels     Recent Labs   Lab Test 06/03/21  1455   VANCOMYCIN 5.0     Transplant Immunosuppression Labs Latest Ref Rng & Units 6/4/2021 6/3/2021 6/3/2021 6/2/2021 6/2/2021   Creat 0.66 - 1.25 mg/dL 0.59(L) 0.54(L) 0.60(L) 0.59(L) 0.63(L)   BUN 7 - 30 mg/dL 3(L) 3(L) 6(L) 8 15   WBC 4.0 - 11.0 10e9/L 10.3 - 10.5 - 19.0(H)   Neutrophil % - - - - -   ANEU 1.6 - 8.3 10e9/L - - - - -            Imaging:   Ct Chest/abdomen/pelvis W Contrast  Result Date: 6/2/2021  IMPRESSION: 1.  Multifocal nodular consolidation, likely of an infectious or inflammatory etiology,  correlate to exclude aspiration. 2.  Mural thickening of the esophagus, may reflect esophagitis, recommend follow-up to resolution and possible endoscopy to exclude metaplasia or esophageal malignancy. 3.  No evidence of esophageal perforation. 4.  Fatty liver. 5.  Normal appendix. Normal bowel.     Echo Complete  Result Date: 6/3/2021   Interpretation Summary No valvular vegetations seen on this good quality study. Left ventricular function, chamber size, wall motion, and wall thickness are normal.The EF is 60-65%. IVC diameter <2.1 cm collapsing >50% with sniff suggests a normal RA pressure of 3 mmHg. No pericardial effusion is present.     Head Ct W/o Contrast  Result Date: 6/2/2021  IMPRESSION: 1.  No acute intracranial process.

## 2021-06-04 NOTE — PROGRESS NOTES
[facility]    Progress Note - [facility] 2 Service        Date of Admission:  6/1/2021    Assessment & Plan     Mohit Porter is a 30 year old male with PMH significant for Generalized Anxiety Disorder (KALINA) and  Alcohol use disorder who was admitted to the ICU on 6/1/2021 via EMS with tachycardia, acute encephalopathy, elevated liver enzymes, leukocytosis and ethanol 0.33 concerning for alcohol intoxication and alcoholic hepatitis complicated by nausea/vomiting leading to hematemesis and concern for UGIB. Found to have bacteremia and possible aspiration pneumonia. Transferred to the floor on 6/3 for further management.      Changes:  - EGD tomorrow; NPO @ [facility]  - Touch base with SW about Chem Dep  - Continue Vanc/Unasyn for 5 days (ED: 6/8) per ID    Acute Encephalopathy likely 2/2 Alcohol Intoxication, improving   Hx of Alcohol Use disorder  Admitted 6/2 with coffee ground emesis per EMS, acute encephalopathy, sinus tachy in setting of EtOH 0.33. Patient reports 2 days of 1.75 L vodka consumption on both days along with poor PO intake. Electrolyte derangements likely due to emesis / poor PO. Pt has been in and out of dependency programs most of the year. Interested in connecting with addiction Med and restarting treatment. CIWA scores have been ranging from 6-9 inpatient.  - On CIWA protocol               - 1 mg lorazepam if CIWA> 8  - Addiction Med consult, appreciate recs  - Chem Dep consult, appreciate recs  - Replace electrolytes PRN      Aspiration Pneumonia   Pulmonary consolidations seen on CT in setting of emesis, likely an aspiration pneumonia.   - Continue Unasyn 3g IV q6h     Sepsis, resolving   Bacteremia, blood cx with Staph Hemolyticus and Hominis, Enterococcus    Admitted with tachycardia, lactate of 7.6 and HTN. Given 4L fluid with improvement of lactic acid to 1.0. Blood Cultures grew Staphylococcus hemolyticus, Enterococcus &  Staphylococcus hominis,  unlikely to have caused pneumonia. No known IVDU. Unclear source of bacteremia.  - Continue Vancomycin, pharmacy to dose   - Discontinue Zosyn (given 6/2)     Suspected Esophagitis  Chronic Anemia  Ground coffee emesis per EMS. EGD on 4/24 at outside hospital showed multiple tears and esophagitis with no active bleed. 500 mL of hematin found in stomach and duodenum. No ulcers or varices. Likely the source of his new emesis.  No hematemesis since admission.  - Consult GI, appreciate recommendations.     Hypokalemia  Hypophosphatemia  K+ low at 2.9, phos low at 1.8.   - On electrolyte replacement protocol.      Chronic Medical Problems   KALINA  - Continue PTA Seroquel  - Continue PTA Hydroxyzine     Suspected EtOH Hepatitis  AST:ALT >2, evidence of fatty liver on CT.   - CTM     Elevated Lipase  430 at admission 6/2, 3355 on 6/3.No abdominal pain and pancreas normal on CT.   - CTM lipase and clinical symptoms      Diet: Regular Diet Adult    Fluids: None  Lines: None   DVT Prophylaxis: Enoxaparin (Lovenox) SQ  Emerson Catheter: not present  Code Status: Full Code           Disposition Plan   Expected discharge: 2 - 3 days, recommended to prior living arrangement once resolution of alcohol withdrawal and further workup by GI. .  Entered: Nick Hale MD 06/04/2021, 7:22 AM       The patient's care was discussed with the Attending Physician, Dr. Weems.    Nick Hale MD  [address]  Please see sign in/sign out for up to date coverage information  ______________________________________________________________________    Interval History   NAEO. Patient was somnolent but answering questions. Patient is pretty anxious and a little nausea still. Patient is states that he has had success with chem dep in the past and wants to participate this hospitalization. Patient throat is also sore but known esophagitis.  No new symptoms otherwise. No questions or  concerns at the moment.    Data reviewed today: I reviewed all medications, new labs and imaging results over the last 24 hours. I personally reviewed no images or EKG's today.    Physical Exam   Vital Signs: Temp: 97.5  F (36.4  C) Temp src: Axillary BP: 126/86 Pulse: 98   Resp: 9 SpO2: 95 % O2 Device: None (Room air)    Weight: 159 lbs 2.75 oz  Constitutional: somnolent, but mild distress, and appears stated age  Eyes: Lids and lashes normal, pupils equal, round and reactive to light, extra ocular muscles intact, sclera clear, conjunctiva normal  ENT: Normocephalic, without obvious abnormality, atraumatic  Respiratory: No increased work of breathing, good air exchange, clear to auscultation bilaterally, no crackles or wheezing  Cardiovascular: Regular rate and rhythm, and no murmur noted  GI: No scars, normal bowel sounds, soft, non-distended, non-tender, no masses palpated, no hepatosplenomegally  Skin: no rashes and no lesions  Neurologic: No focal deficit.     Data   Recent Labs   Lab 06/04/21  0558 06/03/21  1956 06/03/21  1119 06/03/21  0548 06/02/21  0417 06/02/21  0417 06/01/21  2239   WBC 10.3  --   --  10.5  --  19.0* 24.0*   HGB 13.1*  --   --  12.4*  --  13.1* 16.4   MCV 87  --   --  87  --  83 83   *  --   --  105*  --  133* 183   INR  --   --   --   --   --   --  1.00    130*  --  134   < > 126* 125*   POTASSIUM 3.6 3.2*  3.2* 2.9* 2.6*   < > 3.4 3.5   CHLORIDE 95 90*  --  91*   < > 86* 78*   CO2 34* 34*  --  34*   < > 24 26   BUN 3* 3*  --  6*   < > 15 18   CR 0.59* 0.54*  --  0.60*   < > 0.63* 0.62*   ANIONGAP 4 5  --  9   < > 16* 21*   ARYA 8.9 8.3*  --  8.4*   < > 7.6* 8.0*   * 118*  --  77   < > 100* 136*   ALBUMIN 2.8*  --   --  2.7*   < >  --  3.4   PROTTOTAL 6.4*  --   --  6.1*   < >  --  7.6   BILITOTAL 0.5  --   --  0.8   < >  --  0.6   ALKPHOS 92  --   --  69   < >  --  95   ALT 57  --   --  53   < >  --  76*   AST 75*  --   --  112*   < >  --  186*   LIPASE  --   --    --  3,355*  --   --  430*   TROPI  --   --   --   --   --   --  <0.015    < > = values in this interval not displayed.     Recent Results (from the past 24 hour(s))   Echo Complete    Narrative    [id number]  [id number]  [id number]  [id number]^HIGINIO^GOLDY^TRINI     [facility]  Echocardiography [facility]  [address] [place] [address]     Name: AMAYA GUSTAFSON  MRN: [id number]  : 1990  Study Date: 2021 07:51 AM  Age: 30 yrs  Gender: Male  Patient Location: [facility]  Reason For Study: Endocarditis  Ordering Physician: GOLDY SYLVESTER  Performed By: Shawanda Ocampo RDCS     BSA: 1.9 m2  Height: 70 in  Weight: 159 lb  HR: 82  BP: 124/98 mmHg  ______________________________________________________________________________  Procedure  Complete Portable Echo Adult. Technically difficult study.  ______________________________________________________________________________  Interpretation Summary  No valvular vegetations seen on this good quality study.  Left ventricular function, chamber size, wall motion, and wall thickness are  normal.The EF is 60-65%.  IVC diameter <2.1 cm collapsing >50% with sniff suggests a normal RA pressure  of 3 mmHg.  No pericardial effusion is present.  ______________________________________________________________________________  Left Ventricle  Left ventricular function, chamber size, wall motion, and wall thickness are  normal.The EF is 60-65%. Left ventricular diastolic function is normal.     Right Ventricle  Right ventricular function, chamber size, wall motion, and thickness are  normal.     Atria  Both atria appear normal.     Mitral Valve  The mitral valve is normal. Trace mitral insufficiency is present.     Aortic Valve  Aortic valve is normal in structure and function.     Tricuspid Valve  The tricuspid valve is normal. Trace tricuspid insufficiency is present. The  right ventricular systolic pressure is approximated at 14.4 mmHg plus  the  right atrial pressure.     Pulmonic Valve  The pulmonic valve is normal.     Vessels  The thoracic aorta is normal. IVC diameter <2.1 cm collapsing >50% with sniff  suggests a normal RA pressure of 3 mmHg.     Pericardium  No pericardial effusion is present.     Compared to Previous Study  There is no prior study for direct comparison.  ______________________________________________________________________________  MMode/2D Measurements & Calculations     IVSd: 0.98 cm  LVIDd: 4.2 cm  LVIDs: 3.0 cm  LVPWd: 0.92 cm  FS: 28.6 %  LV mass(C)d: 127.4 grams  LV mass(C)dI: 67.3 grams/m2  Ao root diam: 3.6 cm  asc Aorta Diam: 2.8 cm  LVOT diam: 2.5 cm  LVOT area: 4.9 cm2  RWT: 0.44     Doppler Measurements & Calculations  MV E max korina: 85.9 cm/sec  MV A max korina: 46.6 cm/sec  MV E/A: 1.8  MV dec time: 0.10 sec  TR max korina: 190.0 cm/sec  TR max P.4 mmHg  E/E' av.5  Lateral E/e': 4.5  Medial E/e': 6.4     ______________________________________________________________________________  Report approved by: Aleksandar Rodney 2021 09:20 AM           Medications     - MEDICATION INSTRUCTIONS -         ampicillin-sulbactam (UNASYN) IV  3 g Intravenous Q6H     cloNIDine  0.1 mg Oral Q8H     enoxaparin ANTICOAGULANT  40 mg Subcutaneous Q24H     [START ON 2021] folic acid  1 mg Oral Daily     folic acid  1 mg Intravenous Daily     [START ON 2021] gabapentin  100 mg Oral Q8H     [START ON 2021] gabapentin  300 mg Oral Q8H     [START ON 2021] gabapentin  600 mg Oral Q8H     gabapentin  900 mg Oral Q8H     magnesium sulfate  2 g Intravenous Once     multivitamin w/minerals  1 tablet Oral Daily     naltrexone  50 mg Oral Daily     pantoprazole (PROTONIX) IV  40 mg Intravenous BID     potassium chloride  20 mEq Oral Once     sodium chloride (PF)  3 mL Intracatheter Q8H     sodium phosphate  9 mmol Intravenous Once     thiamine  100 mg Oral TID     [START ON 2021] thiamine  100 mg Oral Daily      vancomycin (VANCOCIN) IV  1,250 mg Intravenous Q12H

## 2021-06-04 NOTE — PROGRESS NOTES
CLINICAL NUTRITION SERVICES - BRIEF NOTE    Nutrition Prescription    RECOMMENDATIONS FOR MDs/PROVIDERS TO ORDER-  - Encouragement of PO     Recommendations already ordered by Registered Dietitian (RD):  - RD ordered oral nutrition supplements in the setting of decreased appetite and PO    Future/Additional Recommendations:  - PO/supp adequacy vs more aggressive nutrition intervention      Nutrition Progress Note - f/u for progress towards previous nutrition POC (see previous reassessment for details)     Noemi Walton RD, LD, [facility]  Neuro ICU  Pager: [phone]    Unit Pager: [phone]

## 2021-06-04 NOTE — PLAN OF CARE
Problem: Alcohol Withdrawal  Goal: Alcohol Withdrawal Symptom Control  Outcome: No Change     ICU End of Shift Summary. See flowsheets for vital signs and detailed assessment.    Changes this shift: Pt alert and oriented. No pain. Pt complaining of nausea and anxiety - CIWA scores 6-11 today - 2mg ativan given total. GI assessed patient - EGD in AM - NPO at midnight tonight and lovenox held. No other changes today.    Plan: Monitor withdrawal symptoms and score/administer meds as able. Monitor electrolytes and replace, per orders. Transfer to floor once bed available.

## 2021-06-04 NOTE — PLAN OF CARE
4C PT: Defer; PT consult acknowledged and appreciated. Per OT and RN, pt ambulating in halls with SBA, only mild LOB presented likely 2/2 ETOH withdrawal. Overall pt mobilizing safely without acute PT needs. Will complete orders.

## 2021-06-04 NOTE — CONSULTS
Gastroenterology Consultation  Mohit Porter [id number] 30 year old 1990  6/3/2021        Date of Admission: 6/1/2021  Requesting physician: Soren Pineda MD       Reason for Consultation:   Pt presented initially with emesis. Also CT CAP with mural thickening of esophagus, EGD recommended to rule out metaplasia or malignacy         Assessment and Plan:   Mohit Porter is a 30 year old male with PMHx of KALINA and AUD who presented with encephalopathy, nausea and emesis (w/some hematemesis) in the setting of recent alcohol use with concern for alcohol intoxication and alcohol related hepatitis. Found to have bacteremia 2/2 Staph haemolyticus, Enterococcus casseliflavus and Staph hominis.     #. Hematemesis  #. Anemia  #. Esophageal Wall Thickening  Patient presented w/hematemesis in the setting of alcohol overuse. Hgb of 16 on admission (likely hemo concentrated), down to 12-13. No further hematemesis or documented melena since admission. DDx pura mccallum tear, peptic ulcer disease, gastritis, esophagitis. Less likely varices given no portal HTN.     Recent EGD 4/24 with grade A esophagitis + blood in the stomach and duodenum. No varices were seen. No splenomegaly to suggest portal HTN; however platelets < 150.     #. Alcohol related liver disease  Patient presented with AST > ALT and normal Tb. INR wnl suggests appropriate synthetic function. Platelet count < 150 concerning for portal HTN, but no splenomegaly on abdominal US; thus may be a feature of acute increase in portal pressures in the setting of acute alcohol use. Fatty liver noted on abdominal ultrasound.     #. Alcohol use disorder, severe         Recommendations:   -- F/u iron studies, ferritin and retic  -- Continue pantoprazole 40mg IV BID  -- Plan for EGD on 6/5/21 if stable from an alcohol withdrawal perspective   * NPO except for medications at midnight  -- Appreciate chemical dependency treatment; benefit from inpatient  alcohol use disorder programming    It has been a pleasure to participate in the care of this patient.  Patient discussed with GI staff, Dr. Zacarias.  Please do not hesitate to contact the GI service with any questions or concerns.     Maria Antonia Jameson (Lizzie)  Gastroenterology Fellow  Pager [phone]    Attending Attestation:  I saw the patient with Dr. Jameson and agree with the findings and the plan of care as documented in the fellow's note. In summary, the patient is an 30 year old male with a history of KALINA and AUD who presented with encephalopathy, nausea and emesis (w/some hematemesis) in the setting of recent alcohol use with concern for alcohol intoxication and alcohol related hepatitis. Found to have bacteremia 2/2 Staph haemolyticus, Enterococcus casseliflavus and Staph hominis. We were consulted due to being found own in coffee ground emesis and CT findings.     If the patient is stable from withdrawal symptoms we will plan for non-urgent upper endoscopy tomorrow. If he continues to be tachycardic and shows signs of withdrawal the endoscopy will be postponed until a time that is determined to be safe to proceed.     Overall time spent discussing, thinking, reviewing the chart including available imaging and labs, and evaluating the patient was 60 minutes.    Olivier Zacarias DO   of Medicine  Division of Gastroenterology, Hepatology, and Nutrition  [facility]           HPI:   Mohit Porter is a 30 year old male with PMHx of KALINA and AUD who presented with encephalopathy, nausea and emesis (w/some hematemesis) in the setting of recent alcohol use with concern for alcohol intoxication and alcohol related hepatitis. Found to have bacteremia 2/2 Staph haemolyticus, Enterococcus casseliflavus and Staph hominis.     Admitted to the MICU after he was found passed out in a hotel surrounded by coffee ground emesis after drinking 1.7L of vodka. At the time of admission, denied any NSAID  use, melena or hematochezia. At the time of admission, he denied any presyncope, syncope or falls.     This AM, he mentions that has been having some pain with swallowing since admission. He had hematemesis/coffee ground emesis on admission, but none since his last admission 4/2021. Before admission with his four days of alcohol use, with associated nausea and non-bloody emesis until the day he was brought in. Denies any NSAIDs prior to admission. Denies PPI prior to admission.     Recently admitted to [facility] from 4/24-4/28 for alcohol intoxication and coffee ground emesis. EGD 4/2021 note to have grade A esophagitis, blood in the entire stomach and blood in the entire duodenum. No overt bleeding noted from the stomach. No ulcers or varices were seen. Discharged to inpatient chemical dependency treatment       Past Medical History:   - KALINA  - Alcohol use         Past Surgical History:   - No prior abdominal imag         Medications:     Current Facility-Administered Medications   Medication     ampicillin-sulbactam (UNASYN) 3 g vial to attach to  mL bag     cloNIDine (CATAPRES) tablet 0.1 mg     glucose gel 15-30 g    Or     dextrose 50 % injection 25-50 mL    Or     glucagon injection 1 mg     enoxaparin ANTICOAGULANT (LOVENOX) injection 40 mg     flumazenil (ROMAZICON) injection 0.2 mg     [START ON 6/5/2021] folic acid (FOLVITE) tablet 1 mg     [START ON 6/9/2021] gabapentin (NEURONTIN) capsule 100 mg     [START ON 6/7/2021] gabapentin (NEURONTIN) capsule 300 mg     [START ON 6/5/2021] gabapentin (NEURONTIN) capsule 600 mg     gabapentin (NEURONTIN) capsule 900 mg     haloperidol lactate (HALDOL) injection 2.5-5 mg     hydrOXYzine (ATARAX) tablet 25 mg     lidocaine (LMX4) cream     lidocaine 1 % 0.1-1 mL     LORazepam (ATIVAN) tablet 1-2 mg    Or     LORazepam (ATIVAN) injection 1-2 mg     melatonin tablet 1 mg     metoprolol (LOPRESSOR) injection 5 mg     multivitamin w/minerals (THERA-VIT-M) tablet 1  "tablet     naltrexone (DEPADE/REVIA) tablet 50 mg     nicotine (NICORETTE) gum 2-4 mg     ondansetron (ZOFRAN-ODT) ODT tab 4 mg    Or     ondansetron (ZOFRAN) injection 4 mg     pantoprazole (PROTONIX) IV push injection 40 mg     Patient is already receiving anticoagulation with heparin, enoxaparin (LOVENOX), warfarin (COUMADIN)  or other anticoagulant medication     prochlorperazine (COMPAZINE) injection 10 mg    Or     prochlorperazine (COMPAZINE) tablet 10 mg    Or     prochlorperazine (COMPAZINE) suppository 25 mg     QUEtiapine (SEROquel) tablet 25 mg     QUEtiapine (SEROquel) tablet  mg     sodium chloride (PF) 0.9% PF flush 3 mL     sodium chloride (PF) 0.9% PF flush 3 mL     thiamine (B-1) tablet 100 mg     [START ON 6/9/2021] thiamine (B-1) tablet 100 mg     vancomycin (VANCOCIN) 1,750 mg in sodium chloride 0.9 % 500 mL intermittent infusion            Allergies   NKDA         Social History:   - Single   - Alcohol use: Patient has been to CD treatment 4 times ([facility] in 2018, [facility] in October 2018, [facility] from Aug-Nov 2019, [facility] in 2020, [facility] 2021). History of 3 DUIs, currently on probation for 5 years, license suspended. Has tried acamprosate, naltrexone and gabapentin.   - Smoker: one tin per day  - Currently without stable housing        Family History:   - No family history of addiction         Review of Systems:   A complete 10 point review of systems was obtained.  Please see the HPI for pertinent positives and negatives.  All other systems were reviewed and were found to be negative.          Physical Exam:   VS:  BP (!) 127/94   Pulse 115   Temp 98.2  F (36.8  C) (Axillary)   Resp 22   Ht 1.778 m (5' 10\")   Wt 72.2 kg (159 lb 2.8 oz)   SpO2 94%   BMI 22.84 kg/m      Wt:   Wt Readings from Last 2 Encounters:   06/02/21 72.2 kg (159 lb 2.8 oz)   02/01/21 79.8 kg (176 lb)      Constitutional: cooperative, pleasant. Diaphoretic, tremulous.   Eyes: " Conjunctiva anicteric. Keeps his eyes closed most of the interview.   HEENT: Mucus membranes dry  CV: Tachycardic  Respiratory: Breathing comfortably on RA  Abd: Nondistended,  nontender, no rebound or guarding   Skin:  no jaundice  Neuro: A&O x 3.          Laboratory:   BMP  Recent Labs   Lab 06/03/21 1956 06/03/21  1119 06/03/21  0548 06/02/21  1638 06/02/21  0850 06/02/21 0417   *  --  134 127* 128* 126*   POTASSIUM 3.2*  3.2* 2.9* 2.6* 3.1*  --  3.4   CHLORIDE 90*  --  91* 87*  --  86*   ARYA 8.3*  --  8.4* 7.6*  --  7.6*   CO2 34*  --  34* 29  --  24   BUN 3*  --  6* 8  --  15   CR 0.54*  --  0.60* 0.59*  --  0.63*   *  --  77 87  --  100*     CBC  Recent Labs   Lab 06/03/21 0548 06/02/21 0417 06/01/21 2239 06/01/21 2231   WBC 10.5 19.0* 24.0*  --    RBC 4.17* 4.37* 5.51  --    HGB 12.4* 13.1* 16.4 17.0   HCT 36.3* 36.3* 45.7  --    MCV 87 83 83  --    MCH 29.7 30.0 29.8  --    MCHC 34.2 36.1 35.9  --    RDW 14.6 14.4 14.1  --    * 133* 183  --      INR  Recent Labs   Lab 06/01/21 2239   INR 1.00     LFTs  Recent Labs   Lab 06/03/21 0548 06/02/21  0850 06/01/21 2239   ALKPHOS 69 63 95   * 135* 186*   ALT 53 58 76*   BILITOTAL 0.8 0.6 0.6   PROTTOTAL 6.1* 5.7* 7.6   ALBUMIN 2.7* 2.5* 3.4      PANC  Recent Labs   Lab 06/03/21 0548 06/01/21 2239   LIPASE 3,355* 430*     Imaging/Procedures/Studies:   CT Chest + Abdomen + Pelvis w/Contrast 6/2/21:  1.  Multifocal nodular consolidation, likely of an infectious or inflammatory etiology, correlate to exclude aspiration.  2.  Mural thickening of the esophagus, may reflect esophagitis, recommend follow-up to resolution and possible endoscopy to exclude metaplasia or esophageal malignancy.  3.  No evidence of esophageal perforation.  4.  Fatty liver.  5.  Normal appendix. Normal bowel.    EGD 4/24/21:  Impression:          - LA Grade A chronic esophagitis.                       - Hematin (altered blood/coffee-ground-like material)                         in the entire stomach.                       - Blood in the entire examined duodenum.                       - No specimens collected.  Recommendation:      - - transfer patient to floor                       - continue protonix 40mg IV bid, start carafate 1 gm                        po qid                       - monitor cbc and transfuse to keep hb>8                       - maintain 2 large bore peripheral IV                       - watch for alcohol withdrawl, CIWA protocol as                        applicable                       - recommend RUQ USG to evaluate hepatic parenchymal                        disease in setting of long term alcohol consumption.                       - please page GI service if patient has transfusion                        dependent anemia

## 2021-06-04 NOTE — PHARMACY-VANCOMYCIN DOSING SERVICE
Pharmacy Empiric Dose Change Per Policy  Original Dose Ordered: Vancomycin 1250mg iv q12h  Dose Changed To: vancomycin 1750mg iv q12h (based on plan from note on 6/3/30736 @ 1834; AUC based on today's SCr with increased regimen would be 476 mg/L*hr)    This dose change was based on the pharmacist's assessment of this patient's age, weight, concurrent drug therapy, treatment goals, whether patient's creatinine clearance adequately indicates renal function (factoring in age, muscle mass, fluid and clinical status), and, if applicable, prior pharmacokinetic data.    Creatinine Clearance= Estimated Creatinine Clearance: 188.7 mL/min (A) (based on SCr of 0.59 mg/dL (L)).  Will continue to follow and modify dosage according to levels, organ function and clinical condition    Ann-Marie Linder, Pharm.D., [facility]  Pager [phone]

## 2021-06-04 NOTE — PROGRESS NOTES
Addiction Team Social Work Note    Date of  Intervention: 06/04/21  Collaborated with:  patient    Data:  Addiction Medicine Team following.      Intervention:  Chart reviewed.  Attempted to visit pt this morning however pt sleeping.      Assessment:  Support.    Plan:    Anticipated Disposition:  Ongoing hospitalization for now.    Follow Up:  Will check back later as time allows.    Due to regulation of Title 42 of the Code of Federal Regulations (CFR) Part 2: Confidentiality laws apply to this note and the information wherein.  Thus, this note cannot be copy and pasted into any other health care staff's note nor can it be included in general medical records sent to ANY outside agency without the patient's written consent.    SELINA Valencia  She/her/hers  Social Work Services  Addiction Team  [phone] work cell phone  [phone] pager  8:00am-4:30pm M/T/Th/F; Off Wednesday's

## 2021-06-04 NOTE — PLAN OF CARE
Patient said he is going through withdrawal, HR varied from 100-120s but not steady, sweaty, anxious. Patient was given ativan x2 CIWA  was 10, 9, and PRN which helped. Patient kept asking for snacks, sandwiches and ate some of it. Patient has approximately 25% dinner he said his throat was hurting because of drinking.  Patient emotional support given and teaching about alcohol done. Patient says he want to stop drinking. Patient had diarrhea x1 in the toilet, walked well with standby assist.        Problem: Adult Inpatient Plan of Care  Goal: Plan of Care Review  Outcome: Improving     Problem: Alcohol Withdrawal  Goal: Alcohol Withdrawal Symptom Control  Outcome: Improving

## 2021-06-05 NOTE — PROGRESS NOTES
Transferred to: (5A - bed 8) at (1910)  Status at time of transfer: VSS. No pain. Alert and oriented.  Belongings: Sent with patient to receiving unit.  Emerson removed? (if no, why?): N/A  Chart and medications: Sent with patient to receiving unit.  Family notified: N/A

## 2021-06-05 NOTE — PLAN OF CARE
Alert and oriented x 4. Vital signs stable. On room air. Denies pain. Had EGD done today. Good oral intake. Voids without problems. Passes gas. Patient stated that he arranged treatment plan for alcohol use and ready for discharge. MD Came talked to patient.Patient requested prescription to be sent to [place] [facility].. Plan: Continue care.Discharge to home this afternoon.

## 2021-06-05 NOTE — OR NURSING
EGD with biopsies completed with conscious sedation. Well tolerated by pt, vitals WNL throughout. Report called to RN on 5A. Transferred back to unit in stable condition.

## 2021-06-05 NOTE — PLAN OF CARE
Doctor explained patient the need for patient to stay in hospital to continue on treatment and prevent complications including death. But patient insisted on leaving, signed  AMA Form. Declined wheelchair transport. Left the unit accompanied by staff to the main lobby.

## 2021-06-05 NOTE — PLAN OF CARE
"BP (!) 145/104   Pulse 81   Temp 98.6  F (37  C) (Oral)   Resp 18   Ht 1.778 m (5' 10\")   Wt 74.4 kg (164 lb 0.4 oz)   SpO2 97%   BMI 23.53 kg/m       Time: 3630-2551    Reason for admission: Encephalopathy   Activity: SBA.   Pain: Denies   Neuro: A&O x4.   Cardiac: Intermittently tachycardic. Denies chest pain.   Respiratory: Pt on RA. Denies SOB. No cough. Lung sounds clear.   GI/: No BM overnight- last BM 6/04. Pt reports having diarrhea. Voiding spontaneously using bedside urinal.   Diet: NPO since midnight.   Lines: 2 PIV. TKO in between antibiotics.   Labs/Imaging: Electrolytes recheck this AM    New changes this shift: No acute events overnight. CIWA: 4 & 5- no PRN ativan given. HS seroquel increased to 400mg- home dose. Pt states slept better with increased medication.    Continue to monitor and follow POC     "

## 2021-06-05 NOTE — PLAN OF CARE
Pt wanted to discharge, MD called and pt was told he could leave AMA from MD,  left MD NADEEN aware, MD wanted pt to have PPI script after he left, we attempted to call patient and lobby to see if he was avail, no answer and pt not seen in MD esvin aware

## 2021-06-05 NOTE — PROGRESS NOTES
AMA    Mr. Porter is a 31 yo with PMHx of alcohol abuse and anxiety who was admitted for alcohol withdrawal and bacteremia. Patient is now not in active withdrawal and is only on 3 day of IV antibiotics for transient Enterococcus and Staph bacteremia 2/2 UGIB. Per ID, patient did receive a dose of Zosyn and Vanco on 6/2 so the earliest he could leave is Sunday. Patient refused to wait another night. It was explain to him that if he did not finish his course and the bacteremia end up being more serious than he could end up with heart problems requiring valve replace or become septic and die. Patient was aware of the risk and still is choosing to leave [place].     KANU

## 2021-06-05 NOTE — PROGRESS NOTES
[facility]    Progress Note - [place] 2 Service        Date of Admission:  6/1/2021    Assessment & Plan     Mohit Porter is a 30 year old male with PMH significant for Generalized Anxiety Disorder (KALINA) and  Alcohol use disorder who was admitted to the ICU on 6/1/2021 via EMS with tachycardia, acute encephalopathy, elevated liver enzymes, leukocytosis and ethanol 0.33 concerning for alcohol intoxication and alcoholic hepatitis complicated by nausea/vomiting leading to hematemesis and concern for UGIB. Found to have bacteremia and possible aspiration pneumonia. Transferred to the floor on 6/3 for further management.      Changes:  - EGD today  - Regular Diet  - Awaiting Chem Dep  - Finish IV Antibiotics on 6/8    Acute Encephalopathy likely 2/2 Alcohol Intoxication, improving   Hx of Alcohol Use disorder  Admitted 6/2 with coffee ground emesis per EMS, acute encephalopathy, sinus tachy in setting of EtOH 0.33. Patient reports 2 days of 1.75 L vodka consumption on both days along with poor PO intake. Electrolyte derangements likely due to emesis / poor PO. Pt has been in and out of dependency programs most of the year. Interested in connecting with addiction Med and restarting treatment. CIWA scores have been ranging from 6-9 inpatient.  - On CIWA protocol               - 1 mg lorazepam if CIWA> 8  - Addiction Med consult, appreciate recs  - Chem Dep consult, appreciate recs  - Replace electrolytes PRN      Aspiration Pneumonia   Pulmonary consolidations seen on CT in setting of emesis, likely an aspiration pneumonia.   - Continue Unasyn 3g IV q6h     Sepsis, resolving   Bacteremia, blood cx with Staph Hemolyticus and Hominis, Enterococcus    Admitted with tachycardia, lactate of 7.6 and HTN. Given 4L fluid with improvement of lactic acid to 1.0. Blood Cultures grew Staphylococcus hemolyticus, Enterococcus &  Staphylococcus hominis, unlikely to have caused pneumonia. No  known IVDU. Unclear source of bacteremia.  - Continue Vancomycin, pharmacy to dose   - Discontinue Zosyn (given 6/2)     Suspected Esophagitis  Chronic Anemia  Ground coffee emesis per EMS. EGD on 4/24 at outside hospital showed multiple tears and esophagitis with no active bleed. 500 mL of hematin found in stomach and duodenum. No ulcers or varices. Likely the source of his new emesis.  No hematemesis since admission. EGD (6/5) showed extensive LA grade D esophagitis .   - Consult GI, appreciate recommendations.   - Repeat Endoscopy 8 weeks   - Continue PPI BID   - Biopsy pending    Hypokalemia  Hypophosphatemia  K+ low at 2.9, phos low at 1.8.   - On electrolyte replacement protocol.      Chronic Medical Problems   KALINA  - Continue PTA Seroquel  - Continue PTA Hydroxyzine     Suspected EtOH Hepatitis  AST:ALT >2, evidence of fatty liver on CT.   - CTM     Elevated Lipase  430 at admission 6/2, 3355 on 6/3.No abdominal pain and pancreas normal on CT.   - CTM lipase and clinical symptoms      Diet: Snacks/Supplements Adult: Gelatein Plus; With Meals  Snacks/Supplements Adult: Ensure Clear; Between Meals  NPO per Anesthesia Guidelines for Procedure/Surgery Except for: Meds    Fluids: None  Lines: None   DVT Prophylaxis: Enoxaparin (Lovenox) SQ  Emerson Catheter: not present  Code Status: Full Code           Disposition Plan   Expected discharge: 2 - 3 days, recommended to prior living arrangement once resolution of alcohol withdrawal and further workup by GI. .  Entered: Nick Hale MD 06/05/2021, 7:07 AM       The patient's care was discussed with the Attending Physician, Dr. Faustina Hale MD  [address]  Please see sign in/sign out for up to date coverage information  ______________________________________________________________________    Interval History   NAEO. Patient look better this morning. VIWA scores have been less than 8.   Patient has just gotten done with EGD and was ordering lunch. No new symptoms. No questions or concerns.     Data reviewed today: I reviewed all medications, new labs and imaging results over the last 24 hours. I personally reviewed no images or EKG's today.    Physical Exam   Vital Signs: Temp: 98.6  F (37  C) Temp src: Oral BP: (!) 145/104 Pulse: 81   Resp: 18 SpO2: 97 % O2 Device: None (Room air)    Weight: 164 lbs .36 oz  Constitutional: No acute distressed and appears stated age  Eyes: extra ocular muscles intact, sclera clear, conjunctiva normal  ENT: Normocephalic, without obvious abnormality, atraumatic  Respiratory: No increased work of breathing, good air exchange, clear to auscultation bilaterally, no crackles or wheezing  Cardiovascular: Regular rate and rhythm, and no murmur noted  GI: No scars, normal bowel sounds, soft, non-distended, non-tender, no masses palpated, no hepatosplenomegally  Skin: no rashes and no lesions  Neurologic: No focal deficit.     Data   Recent Labs   Lab 06/05/21  0411 06/04/21  0558 06/03/21  1956 06/03/21  0548 06/03/21  0548 06/01/21 2239 06/01/21 2239   WBC 10.8 10.3  --   --  10.5   < > 24.0*   HGB 13.8 13.1*  --   --  12.4*   < > 16.4   MCV 87 87  --   --  87   < > 83   * 108*  --   --  105*   < > 183   INR  --   --   --   --   --   --  1.00    133 130*  --  134   < > 125*   POTASSIUM 4.0 3.6 3.2*  3.2*   < > 2.6*   < > 3.5   CHLORIDE 100 95 90*  --  91*   < > 78*   CO2 30 34* 34*  --  34*   < > 26   BUN 6* 3* 3*  --  6*   < > 18   CR 0.56* 0.59* 0.54*  --  0.60*   < > 0.62*   ANIONGAP 6 4 5  --  9   < > 21*   ARYA 9.4 8.9 8.3*  --  8.4*   < > 8.0*   GLC 98 111* 118*  --  77   < > 136*   ALBUMIN  --  2.8*  --   --  2.7*   < > 3.4   PROTTOTAL  --  6.4*  --   --  6.1*   < > 7.6   BILITOTAL  --  0.5  --   --  0.8   < > 0.6   ALKPHOS  --  92  --   --  69   < > 95   ALT  --  57  --   --  53   < > 76*   AST  --  75*  --   --  112*   < > 186*   LIPASE  --   --    --   --  3,355*  --  430*   TROPI  --   --   --   --   --   --  <0.015    < > = values in this interval not displayed.     No results found for this or any previous visit (from the past 24 hour(s)).  Medications     - MEDICATION INSTRUCTIONS -         ampicillin-sulbactam (UNASYN) IV  3 g Intravenous Q6H     cloNIDine  0.1 mg Oral Q8H     [Held by provider] enoxaparin ANTICOAGULANT  40 mg Subcutaneous Q24H     folic acid  1 mg Oral Daily     [START ON 6/9/2021] gabapentin  100 mg Oral Q8H     [START ON 6/7/2021] gabapentin  300 mg Oral Q8H     gabapentin  600 mg Oral Q8H     multivitamin w/minerals  1 tablet Oral Daily     naltrexone  50 mg Oral Daily     pantoprazole (PROTONIX) IV  40 mg Intravenous BID     sodium chloride (PF)  3 mL Intracatheter Q8H     thiamine  100 mg Oral TID     [START ON 6/9/2021] thiamine  100 mg Oral Daily     vancomycin (VANCOCIN) IV  1,750 mg Intravenous Q12H

## 2021-06-05 NOTE — PLAN OF CARE
A&O VSS on RA. PIV SL. Up SBA to bathroom. Uses BSU. Sitter at bedside for elopement. No hold on pt at this time. Contact provider if pt tries to leave. Reg diet, fair appetite (intermittent nausea). BG checks if pt not eating due to withdrawal. NPO at midnight for EDG. Transferred to floor from  this shift for continuation of care. Resting well. CIWA 2.

## 2021-06-06 NOTE — PROGRESS NOTES
BRIEF PROGRESS NOTE    Notified overnight of additional positive blood culture. Called patient and left voicemail requesting he present to the hospital for further treatment and monitoring.

## 2021-06-06 NOTE — PROGRESS NOTES
AMA    Mr. Porter is a 31 yo with PMHx of alcohol abuse and anxiety who was admitted for alcohol withdrawal and bacteremia. Patient is now not in active withdrawal and is only on 3 day of IV antibiotics for transient Enterococcus and Staph bacteremia 2/2 UGIB. Per ID, patient did receive a dose of Zosyn and Vanco on 6/2 so the earliest he could leave is Sunday. Patient refused to wait another night. It was explain to him that if he did not finish his course and the bacteremia end up being more serious than he could end up with heart problems requiring valve replace or become septic and die. Patient was aware of the risk and still is choosing to leave AM.     Update:  - Patient was contacted multiple times about new positive culture this morning (6/6).  - Patient was made aware of the significance of needing to complete his IV antibiotic and chose to leave knowing the consequences.

## 2021-06-06 NOTE — DISCHARGE SUMMARY
[facility]  Discharge Summary - Medicine & Pediatrics       Date of Admission:  6/1/2021  Date of Discharge:  6/5/2021  4:25 PM  Discharging Provider: Dr. Nick Hale  Discharge Service: Jani 2    Discharge Diagnoses   Acute Encephalopathy 2/2 Alcohol Intoxication  Alcohol withdrawal  Transient Bacteremia likely d/t UGIB  Esophagitis (Grade D)  Alcohol Abuse  Aspiration Pneumonia  Severe malnutrition in the context of chronic illness     Follow-ups Needed After Discharge   None    Unresulted Labs Ordered in the Past 30 Days of this Admission       Date and Time Order Name Status Description    6/5/2021 1045 Surgical pathology exam In process     6/4/2021 2200 Blood culture Preliminary     6/4/2021 2200 Blood culture Preliminary     6/4/2021 0030 Blood culture Preliminary     6/4/2021 0030 Blood culture Preliminary     6/2/2021 2200 Blood culture Preliminary     6/2/2021 2200 Blood culture Preliminary             Discharge Disposition   Discharged to home  Patient left AMA  Condition at discharge: Stable      Hospital Course   Mohit Porter was admitted on 6/1/2021 for Acute Encephalopathy 2/2 Alcohol Intoxication/Withdrawal.  The following problems were addressed during his hospitalization:    Sepsis, resolving   Bacteremia, blood cx with Staph Hemolyticus and Hominis, Enterococcus    Admitted with tachycardia, lactate of 7.6 and HTN. Given 4L fluid with improvement of lactic acid to 1.0. Blood Cultures grew Staphylococcus hemolyticus, Enterococcus &  Staphylococcus hominis, unlikely to have caused pneumonia. No known IVDU. Bacteremia thought to be a possible translocation from UGIB. Patient was supposed to get 5 days of IV antibiotics with treatment completion on Monday (6/7), but patient left AMA on 6/5. ID stated there wasn't a good oral option for patient to complete therapy and that if Enterococcus was a true infection than it could lead to heart problems  or sepsis possibly leading to death. Patient received Unasyn (6/3-6/5),Vancomycn (6/2-6/5) and Zosyn (6/2). Patient was otherwise medically stable on departure from the hospital. Patient was told to follow up with PCP within the next 1 to 2 weeks.   - Follow up with PCP in 1-2 weeks for post-hospitalization     Grade D Esophagitis  Chronic Anemia  Ground coffee emesis per EMS. EGD on 4/24 at outside hospital showed multiple tears and esophagitis with no active bleed. 500 mL of hematin found in stomach and duodenum. No ulcers or varices. Likely the source of his new emesis.  No hematemesis since admission. EGD (6/5) showed extensive LA grade D esophagitis. Per GI, patient will need to be on PPI indefinitely and to follow up with a endoscopy in 2 months. Pantoprazole was sent to [facility] for patient to . Attempts were made to contact patient about medication and follow up but did not .   - Patient to repeat endoscopy in 8 weeks  - Continue pantoprazole 40 mg BID  - Biopsy pending    Acute Encephalopathy likely 2/2 Alcohol Intoxication, improving   Hx of Alcohol Use disorder  Admitted 6/2 with coffee ground emesis per EMS, acute encephalopathy, sinus tachy in setting of EtOH 0.33. Patient reports 2 days of 1.75 L vodka consumption on both days along with poor PO intake. Electrolyte derangements likely due to emesis / poor PO. Pt has been in and out of dependency programs most of the year. Interested in connecting with addiction Med and restarting treatment. CIWA scores have been ranging from 6-9 inpatient. Patient clinically improved after 3 days of Ativan and supportive care. Patient was supposed to meet with Chem Dep and was agreeable to inpatient rehab. Unfortunately, patient left AMA on 6/5.    Aspiration Pneumonia   Pulmonary consolidations seen on CT in setting of emesis, likely an aspiration pneumonia.    Antibitoics  - Vancomycin (6/2-6/5)  - Unasyn (6/3-6/5)  - Zosyn (6/2)      Hypokalemia  Hypophosphatemia  K+ low at 2.9, phos low at 1.8.   - On electrolyte replacement protocol.      Chronic Medical Problems   KALINA  - Continue PTA Seroquel  - Continue PTA Hydroxyzine     Suspected EtOH Hepatitis  AST:ALT >2, evidence of fatty liver on CT.   - CTM     Elevated Lipase  430 at admission 6/2, 3355 on 6/3.No abdominal pain and pancreas normal on CT.   - CTM lipase and clinical symptoms    Consultations This Hospital Stay   PHARMACY TO DOSE VANCO  SOCIAL WORK IP CONSULT  PHARMACY TO DOSE VANCO  VASCULAR ACCESS CARE ADULT IP CONSULT  CARE MANAGEMENT / SOCIAL WORK IP CONSULT  PSYCHIATRY IP CONSULT  ADDICTION SERVICE ADULT IP CONSULT FOR [place]  PHARMACY TO DOSE VANCO  OCCUPATIONAL THERAPY ADULT IP CONSULT  PHYSICAL THERAPY ADULT IP CONSULT  INFECTIOUS DISEASE GENERAL ADULT IP CONSULT  INFECTIOUS DISEASE GENERAL ADULT IP CONSULT  GI LUMINAL ADULT IP CONSULT  CHEMICAL DEPENDENCY IP CONSULT  CHEMICAL DEPENDENCY IP CONSULT    Code Status   Prior       The patient was discussed with Dr. Faustina Hale MD  [address] [place]  [address] [facility] [address]  Phone: [phone]  ______________________________________________________________________    Physical Exam   Vital Signs: Temp: 97.5  F (36.4  C) Temp src: Oral BP: (!) 146/108 Pulse: 141   Resp: 16 SpO2: 99 % O2 Device: None (Room air) Oxygen Delivery: 2 LPM  Weight: 164 lbs .36 oz (Exam from early in the day)  Constitutional: awake, alert, cooperative, no apparent distress, and appears stated age  Eyes:  Extra ocular muscles intact, sclera clear, conjunctiva normal  ENT: Normocephalic, without obvious abnormality, atraumatic  Respiratory: No increased work of breathing, good air exchange, clear to auscultation bilaterally, no crackles or wheezing  Cardiovascular:  Regular rate and rhythm, and no murmur noted  GI: No scars, normal bowel sounds, soft, non-distended, non-tender, no masses  palpated, no hepatosplenomegally  Skin: no redness, warmth, or swelling, no rashes and no lesions  Musculoskeletal: There is no redness, warmth, or swelling of the joints.  Full range of motion noted.  Motor strength is 5 out of 5 all extremities bilaterally.  Tone is normal.  Neurologic: Awake, alert, oriented to name, place and time.  No focal deficit.  Sensory is intact.       Primary Care Physician   Lisandra Carver    Discharge Orders   Left AMA    Significant Results and Procedures   Most Recent 3 CBC's:  Recent Labs   Lab Test 06/05/21 0411 06/04/21  0558 06/03/21  0548   WBC 10.8 10.3 10.5   HGB 13.8 13.1* 12.4*   MCV 87 87 87   * 108* 105*     Most Recent 3 BMP's:  Recent Labs   Lab Test 06/05/21 0411 06/04/21  0558 06/03/21 1956    133 130*   POTASSIUM 4.0 3.6 3.2*  3.2*   CHLORIDE 100 95 90*   CO2 30 34* 34*   BUN 6* 3* 3*   CR 0.56* 0.59* 0.54*   ANIONGAP 6 4 5   ARYA 9.4 8.9 8.3*   GLC 98 111* 118*     Most Recent 2 LFT's:  Recent Labs   Lab Test 06/04/21  0558 06/03/21  0548   AST 75* 112*   ALT 57 53   ALKPHOS 92 69   BILITOTAL 0.5 0.8     Most Recent 3 INR's:  Recent Labs   Lab Test 06/01/21  2239 01/16/21  1447 10/24/20  1856   INR 1.00 0.89 0.93   ,   Results for orders placed or performed during the hospital encounter of 06/01/21   Head CT w/o contrast    Narrative    EXAM: CT HEAD W/O CONTRAST  LOCATION: [facility]  DATE/TIME: 6/2/2021 12:03 AM    INDICATION: Mental status change, unknown cause  COMPARISON: 10/17/2020 head CT  TECHNIQUE: Routine CT Head without IV contrast. Multiplanar reformats. Dose reduction techniques were used.    FINDINGS:  INTRACRANIAL CONTENTS: No intracranial hemorrhage, extraaxial collection, or mass effect.  No CT evidence of acute infarct. Normal parenchymal attenuation. Normal ventricles and sulci.     VISUALIZED ORBITS/SINUSES/MASTOIDS: No intraorbital abnormality. Mucosal thickening primarily involving the ethmoid air cells. No  middle ear or mastoid effusion.    BONES/SOFT TISSUES: No acute abnormality.      Impression    IMPRESSION:  1.  No acute intracranial process.   CT Chest/Abdomen/Pelvis w Contrast    Narrative    EXAM: CT CHEST/ABDOMEN/PELVIS W CONTRAST  LOCATION: [facility]  DATE/TIME: 6/2/2021 12:03 AM    INDICATION: Boerrhave's, GI bleed, EtOH.    COMPARISON: None.    TECHNIQUE: CT scan of the chest, abdomen and pelvis was performed following injection of IV contrast. Multiplanar reformats were obtained. Dose reduction techniques were used.     CONTRAST: 99 mL Isovue 370.    FINDINGS:   LUNGS AND PLEURA: Bilateral nodular consolidation, predominantly in the right lower and middle lobes, concerning for infectious or inflammatory process such as aspiration, recommend follow-up to resolution. No pneumothorax or pleural effusion.    MEDIASTINUM/AXILLAE: Mural thickening of the esophagus, correlate to exclude esophagitis, consider eventual evaluation with endoscopy to exclude metaplasia or malignancy. No pneumomediastinum. Normal-sized heart. No pericardial effusion. No hilar or   mediastinal lymphadenopathy.    CORONARY ARTERY CALCIFICATION: None.    HEPATOBILIARY: Fatty liver.    PANCREAS: Normal.    SPLEEN: Normal.    ADRENAL GLANDS: Normal.    KIDNEYS/BLADDER: Right extrarenal pelvis. No obstructing renal or ureteral stones. No hydroureteronephrosis. Fluid-distended bladder, otherwise normal.    BOWEL: Normal appendix. No obstruction, colitis, diverticulitis or appendicitis. No free air or fluid.    LYMPH NODES: Normal.    VASCULATURE: Unremarkable.    PELVIC ORGANS: Normal.    MUSCULOSKELETAL: L5 spondylolysis without spondylolisthesis.      Impression    IMPRESSION:  1.  Multifocal nodular consolidation, likely of an infectious or inflammatory etiology, correlate to exclude aspiration.    2.  Mural thickening of the esophagus, may reflect esophagitis, recommend follow-up to resolution and possible endoscopy to  exclude metaplasia or esophageal malignancy.    3.  No evidence of esophageal perforation.    4.  Fatty liver.    5.  Normal appendix. Normal bowel.     Echo Complete    Narrative    [id number]  [id number]  [id number]  [id number]^HIGINIO^GOLDY^TRINI     [facility]  Echocardiography Laboratory  [address] [place] [address]     Name: AMAYA GUSTAFSON  MRN: [id number]  : 1990  Study Date: 2021 07:51 AM  Age: 30 yrs  Gender: Male  Patient Location: [facility] [place]  Reason For Study: Endocarditis  Ordering Physician: GOLDY SYLVESTER  Performed By: Shawanda Ocampo RDCS     BSA: 1.9 m2  Height: 70 in  Weight: 159 lb  HR: 82  BP: 124/98 mmHg  ______________________________________________________________________________  Procedure  Complete Portable Echo Adult. Technically difficult study.  ______________________________________________________________________________  Interpretation Summary  No valvular vegetations seen on this good quality study.  Left ventricular function, chamber size, wall motion, and wall thickness are  normal.The EF is 60-65%.  IVC diameter <2.1 cm collapsing >50% with sniff suggests a normal RA pressure  of 3 mmHg.  No pericardial effusion is present.  ______________________________________________________________________________  Left Ventricle  Left ventricular function, chamber size, wall motion, and wall thickness are  normal.The EF is 60-65%. Left ventricular diastolic function is normal.     Right Ventricle  Right ventricular function, chamber size, wall motion, and thickness are  normal.     Atria  Both atria appear normal.     Mitral Valve  The mitral valve is normal. Trace mitral insufficiency is present.     Aortic Valve  Aortic valve is normal in structure and function.     Tricuspid Valve  The tricuspid valve is normal. Trace tricuspid insufficiency is present. The  right ventricular systolic pressure is approximated at 14.4 mmHg plus the  right  atrial pressure.     Pulmonic Valve  The pulmonic valve is normal.     Vessels  The thoracic aorta is normal. IVC diameter <2.1 cm collapsing >50% with sniff  suggests a normal RA pressure of 3 mmHg.     Pericardium  No pericardial effusion is present.     Compared to Previous Study  There is no prior study for direct comparison.  ______________________________________________________________________________  MMode/2D Measurements & Calculations     IVSd: 0.98 cm  LVIDd: 4.2 cm  LVIDs: 3.0 cm  LVPWd: 0.92 cm  FS: 28.6 %  LV mass(C)d: 127.4 grams  LV mass(C)dI: 67.3 grams/m2  Ao root diam: 3.6 cm  asc Aorta Diam: 2.8 cm  LVOT diam: 2.5 cm  LVOT area: 4.9 cm2  RWT: 0.44     Doppler Measurements & Calculations  MV E max korina: 85.9 cm/sec  MV A max korina: 46.6 cm/sec  MV E/A: 1.8  MV dec time: 0.10 sec  TR max korina: 190.0 cm/sec  TR max P.4 mmHg  E/E' av.5  Lateral E/e': 4.5  Medial E/e': 6.4     ______________________________________________________________________________  Report approved by: Aleksandar Rodney 2021 09:20 AM               Discharge Medications   Discharge Medication List as of 2021  4:25 PM        CONTINUE these medications which have NOT CHANGED    Details   multivitamin w/minerals (THERA-VIT-M) tablet Take 1 tablet by mouth daily, Disp-30 tablet, R-1, E-Prescribe      naltrexone (DEPADE/REVIA) 50 MG tablet Take 1 tablet (50 mg) by mouth daily, Disp-30 tablet, R-1, E-Prescribe      thiamine (B-1) 100 MG tablet Take 1 tablet (100 mg) by mouth daily, Disp-30 tablet, R-0, E-Prescribe           STOP taking these medications       hydrOXYzine (ATARAX) 25 MG tablet Comments:   Reason for Stopping:         nicotine (NICORETTE) 2 MG gum Comments:   Reason for Stopping:         QUEtiapine (SEROQUEL) 100 MG tablet Comments:   Reason for Stopping:         QUEtiapine (SEROQUEL) 25 MG tablet Comments:   Reason for Stopping:             Allergies   No Known Allergies

## 2021-06-07 NOTE — PROGRESS NOTES
Clinic Care Coordination Contact  [facility]    Clinical Data: Care Coordinator Outreach- Transition Call Management    Outreach attempted x 2.  Left message on patient's voicemail with call back information and requested return call.    Plan: Connected [facility] team will do no further outreaches at this time.    Jocelyn Mckeon MA  [facility] Resource [place], [facility]      Clinic Care Coordination Contact  [facility]    Clinical Data: Care Coordinator Outreach- Transition Call Management    Outreach attempted x 1.  Left message on patient's voicemail with call back information and requested return call.    Plan: Connected [facility] Resource [facility] team will try to reach patient again in 1-2 business days.    Jocelyn Mckeon MA  [facility]

## 2021-06-24 NOTE — PROGRESS NOTES
Clinic Care Coordination Contact  [facility]/Vo[facility]       Clinical Data: Care Coordinator Outreach  Outreach attempted x 1.  Left message on patient's voicemail with call back information and requested return call.  Plan: Care Coordinator will try to reach patient again in 1-2 business days.    Prema Edwards [facility] Care Coordination  Tel: [phone]

## 2021-06-29 NOTE — PROGRESS NOTES
Clinic Care Coordination Contact  [facility]/Vo[facility]       Clinical Data: Care Coordinator Outreach  Outreach attempted x 2.  Left message on patient's voicemail with call back information and requested return call.  Plan:Care Coordinator will do no further outreaches at this time.    Prema Edwards [facility] Care Coordination  Tel: [phone]

## 2022-01-01 ENCOUNTER — HEALTH MAINTENANCE LETTER (OUTPATIENT)
Age: 32
End: 2022-01-01

## 2022-08-10 NOTE — DISCHARGE INSTRUCTIONS
Behavioral Discharge Planning and Instructions  THANK YOU FOR CHOOSING THE [facility]  3A  [phone]    Summary: You were admitted to Station 3A on 2/16/2019 for detoxification from alcohol.  A medical exam was performed that included lab work. You have met with a  and opted to return home and follow-up with AA and sponsorship.  Please take care and make your recovery a daily priority, Mohit! It was a pleasure working with you and the entire treatment team here wishes you the very best in your recovery!     Recommendation:  AA and Sponsor. If you are having difficulty staying sober please seek a Rule 25 assessment for treatment referral.    Main Diagnoses:  Per Dr. Rena Lombardi MD:  Alcohol Use Disorder-Severe    Major Treatments, Procedures and Findings:  You were treated for alcohol detoxification using valium administered based on the [facility] protocol. You declined a chemical dependency assessment. You had labs drawn and those results were reviewed with you. Please take a copy of your lab work with you to your next primary care physician appointment.    Symptoms to Report:  If you experience more anxiety, confusion, sleeplessness, deep sadness or thoughts of suicide, notify your treatment team or notify your primary care physician. IF ANY OF THE SYMPTOMS YOU ARE EXPERIENCING ARE A MEDICAL EMERGENCY CALL 911 IMMEDIATELY.     Lifestyle Adjustment: Adjust your lifestyle to get enough sleep, relaxation, exercise and good nutrition. Continue to develop healthy coping skills to decrease stress and promote a sober living environment. Do not use mood altering substances including alcohol, illegal drugs or addictive medications other than what is currently prescribed.     Disposition: Home    Rule 25 Assessment Follow-Up:  You will need a rule 25 to attend treatment. The following clinics offer walk-in assessments    [facility]  They have walk in assessments available M-F from  7am to 2:15 pm  [address] [place] [address]  Phone: [phone]  Walk-ins also available on Saturday mornings starting at 8am at [address], [place] [address] [place]  Mondays from 8:00am-5:00pm   [address] [place] [address]  Phone: [phone]    Follow up appointment:    Feb 20, 2019  4:30 PM CST  SHORT with Gerardo Rock MD  [facility] [address] [phone]   Mar 04, 2019  4:30 PM CST  SHORT with Gerardo Rock MD  [facility]) [address] [phone]     Resources:   AA/NA and Sponsors are excellent resources for support and you can find one at any support group meeting.   http://www.aastpaul.org (then click meetings) This for the [facility] AA meetings  http://aamin[place].org/meetings/   This is for the [facility] AA meetings  http://www.[place]. (then click find a  Meeting) This is for [facility] NA   SMART Recovery - self management for addiction recovery:  www.smartrecovery.org  Pathways ~ A Health Crisis Resource & Support Center:  846.623.8044.  https://prescribetoprevent.org/patient-education/videos/  http://www.harmreduction.org  [facility] 594-459-9436  Support Group:  AA/NA and Sponsor/support.  National [place] on Mental Illness (www.[place].dinorah.org): 251.925.9323 or 389-698-3646.  Alcoholics Anonymous (www.alcoholics-anonymous.org): Check your phone book for your local chapter.  Suicide Awareness Voices of Education (SAVE) (www.save.org): 372-775-NTHX (7728)  National Suicide Prevention Line (www.mentalhealthmn.org): 933-094-ZPGU (8372)  Mental Health Consumer/Survivor Network of [place] (www.mhcsn.net): 252-313-7739 or 522-059-2882  Mental Health Association of [place] (www.mentalhealth.org): 762.780.7057 or  663.726.3467   Substance Abuse and Mental Health Services (www.samhsa.gov)    [place] [facility] ([facility])  [facility] connects people seeking recovery to resources that help foster and sustain long-term recovery.  Whether you are seeking resources for treatment, transportation, housing, job training, education, health care or other pathways to recovery, [facility] is a great place to start.  289.156.8812.  www.[facility].org    General Medication Instructions:   See your medication sheet(s) for instructions.   Take all medications as prescribed.  Make no changes unless your doctor suggests them.   Go to all your doctor visits.  Be sure to have all your required lab tests. This way, your medicines can be refilled on time.  Do not use any forms of alcohol.    Please Note:  If you have any questions at anytime after you are discharged please call the [facility] [place] detox unit 3AW unit at [phone].  [facility] Behavioral Intake [phone]  Medical Records call [phone]  Outpatient Behavioral Intake call [phone]  LP+ Wait List/Bed Availability call [phone]    Please take this discharge folder with you to all your follow up appointments, it contains your lab results, diagnosis, medication list and discharge recommendations.      THANK YOU FOR CHOOSING THE [facility]      Primary Defect Width (In Cm): 1

## 2022-12-30 NOTE — PLAN OF CARE
withdrawal.   DATE & TIME: 6/2/2020, 07-19  Cognitive Concerns/ Orientation : A+Ox4   BEHAVIOR & AGGRESSION TOOL COLOR: Green  CIWA SCORE: 1/5, increased anxiety  ABNL VS/O2: VSS on RA  MOBILITY: Up independently in room and halls.   PAIN MANAGMENT: Denies pain  DIET: Tolerating regular diet.  BOWEL/BLADDER: Continent of B&B  ABNL LAB/BG: elevated LFTs, JOANA .52 on admission  DRAIN/DEVICES: PIV, SL  TELEMETRY RHYTHM: NA  SKIN: WDL, many cuts/scratches on chin from shaving.   TESTS/PROCEDURES: Social work to see for rehab placement.  D/C DAY/GOALS/PLACE: Pending resolution of ETOH withdrawal  OTHER IMPORTANT INFO: Refusing IVF, tolerating PO. If developing N/V agrees to start. Holdable per psych note.       Good

## 2023-08-07 NOTE — PROGRESS NOTES
Patient presents to the [facility] for Vivitrol.  Order written by Dr. Huitron was completed today. Name and  verified with patient. See MAR for medication details. Medication was divided into 1 syringes by pharmacy and given in the following sites right gluteal. Patient tolerated injection well and was discharged to home. Patient to return back in 4 weeks.    Gemini Goodrich MA/CHINYERE Gibson LPN    Patient denies covid swab.    Administrations This Visit     naltrexone (VIVITROL) injection 380 mg     Admin Date  2021 Action  Given Dose  380 mg Route  Intramuscular Administered By  Evelyn [facility] LPN                
No

## 2024-11-11 NOTE — GROUP NOTE
Group Therapy Documentation    PATIENT'S NAME: Mohit Porter  MRN:   [id number]  :   1990  ACCT. NUMBER: [id number]  DATE OF SERVICE: 21  START TIME: 12:30 PM  END TIME:  2:30 PM  FACILITATOR(S): Zuleyka Peterson LADC; Geri Soni  TOPIC: BEH Group Therapy  Number of patients attending the group:  5  Group Length:  2 Hours    Group Therapy Type: Health and wellbeing     Summary of Group / Topics Discussed:    Spiritual Care      Group Attendance:  Attended group session    Patient's response to the group topic/interactions:  cooperative with task    Patient appeared to be Engaged.        Client specific details:  Mohit actively participated in spirituality group facilitated by Geri Soni. .   Writer contacted patient to discuss programming. Writer requested a call back to [phone].    VERÓNICA Louise on 11/11/2024 at 2:55 PM

## 2024-11-25 NOTE — TELEPHONE ENCOUNTER
Please call patient for Hospital follow up.    
Pt is  being discharged to [facility] in [place], [place]   
Attending Attestation (For Attendings USE Only)...
